# Patient Record
Sex: FEMALE | Race: WHITE | NOT HISPANIC OR LATINO | Employment: PART TIME | URBAN - METROPOLITAN AREA
[De-identification: names, ages, dates, MRNs, and addresses within clinical notes are randomized per-mention and may not be internally consistent; named-entity substitution may affect disease eponyms.]

---

## 2017-11-29 ENCOUNTER — HOSPITAL ENCOUNTER (EMERGENCY)
Facility: HOSPITAL | Age: 32
Discharge: HOME | End: 2017-11-30
Attending: EMERGENCY MEDICINE | Admitting: EMERGENCY MEDICINE
Payer: COMMERCIAL

## 2017-11-29 DIAGNOSIS — I10 ESSENTIAL HYPERTENSION: Primary | ICD-10-CM

## 2017-11-29 DIAGNOSIS — R42 DIZZINESS: ICD-10-CM

## 2017-11-29 PROCEDURE — 93005 ELECTROCARDIOGRAM TRACING: CPT | Performed by: EMERGENCY MEDICINE

## 2017-11-29 RX ORDER — LISINOPRIL 20 MG/1
20 TABLET ORAL DAILY
COMMUNITY
End: 2020-04-27

## 2017-11-29 RX ORDER — LORAZEPAM 0.5 MG/1
0.5 TABLET ORAL ONCE
Status: COMPLETED | OUTPATIENT
Start: 2017-11-29 | End: 2017-11-29

## 2017-11-29 RX ADMIN — LORAZEPAM 0.5 MG: 0.5 TABLET ORAL at 22:54

## 2017-11-30 VITALS
RESPIRATION RATE: 18 BRPM | BODY MASS INDEX: 31.09 KG/M2 | SYSTOLIC BLOOD PRESSURE: 163 MMHG | OXYGEN SATURATION: 100 % | HEART RATE: 50 BPM | DIASTOLIC BLOOD PRESSURE: 102 MMHG | WEIGHT: 170 LBS | TEMPERATURE: 98.5 F

## 2017-11-30 LAB
ATRIAL RATE: 56 BPM
P AXIS: 50 DEGREES
PR INTERVAL: 172 MS
QRS AXIS: 59 DEGREES
QRSD INTERVAL: 96 MS
QT INTERVAL: 408 MS
QTC INTERVAL: 393 MS
T WAVE AXIS: 52 DEGREES
VENTRICULAR RATE: 56 BPM

## 2017-11-30 PROCEDURE — 99283 EMERGENCY DEPT VISIT LOW MDM: CPT

## 2017-11-30 RX ORDER — LISINOPRIL 20 MG/1
20 TABLET ORAL DAILY
Qty: 30 TABLET | Refills: 6 | Status: SHIPPED | OUTPATIENT
Start: 2017-11-30 | End: 2018-04-20 | Stop reason: SDUPTHER

## 2017-11-30 NOTE — ED PROVIDER NOTES
History  Chief Complaint   Patient presents with    Hypertension     pt c/o headache, dizziness, BP at home 179/104, pt c/o CP and being under a lot of stress  27 yr female with hx of htnsion- has been off meds for months-but did take a dose tonight- has been under a lot of stress with mothers current illness- c/o lighthedness- does not feel right-- no vertiog/ no diplopia/ dysathria/ dysphagia/ dysponina/ dsymetria- no headache/neck pain - no cp/sob/plap/near syncope- no other comps        History provided by:  Patient and relative   used: No        Prior to Admission Medications   Prescriptions Last Dose Informant Patient Reported? Taking?   diclofenac (VOLTAREN) 75 mg EC tablet   No No   Sig: Take 1 tablet (75 mg total) by mouth 2 (two) times a day  Facility-Administered Medications: None       Past Medical History:   Diagnosis Date    Hypertension        History reviewed  No pertinent surgical history  History reviewed  No pertinent family history  I have reviewed and agree with the history as documented  Social History   Substance Use Topics    Smoking status: Current Every Day Smoker     Packs/day: 1 00     Types: Cigarettes    Smokeless tobacco: Never Used    Alcohol use Yes      Comment: social        Review of Systems   Constitutional: Negative  HENT: Negative  Eyes: Negative  Respiratory: Negative  Cardiovascular: Negative  Gastrointestinal: Negative  Endocrine: Negative  Genitourinary: Negative  Musculoskeletal: Negative  Skin: Negative  Allergic/Immunologic: Negative  Neurological: Positive for light-headedness  Negative for dizziness, tremors, seizures, syncope, facial asymmetry, speech difficulty, weakness, numbness and headaches  Hematological: Negative      Psychiatric/Behavioral: Negative for agitation, behavioral problems, confusion, decreased concentration, dysphoric mood, hallucinations, self-injury, sleep disturbance and suicidal ideas  The patient is nervous/anxious  The patient is not hyperactive  Physical Exam  ED Triage Vitals [11/29/17 2223]   Temperature Pulse Respirations Blood Pressure SpO2   98 5 °F (36 9 °C) 67 18 (!) 182/91 95 %      Temp Source Heart Rate Source Patient Position - Orthostatic VS BP Location FiO2 (%)   Oral Monitor Sitting Left arm --      Pain Score       6           Orthostatic Vital Signs  Vitals:    11/29/17 2223 11/29/17 2226 11/29/17 2301   BP: (!) 182/91 (!) 195/100 (!) 169/111   Pulse: 67  61   Patient Position - Orthostatic VS: Sitting Sitting Lying       Physical Exam   Constitutional: She is oriented to person, place, and time  She appears well-developed and well-nourished  She appears distressed  avss- htnsive-- bp decreasing- pulse ox 100 % on ra- intepretation is normal- no intervention    HENT:   Head: Normocephalic and atraumatic  Eyes: Conjunctivae and EOM are normal  Pupils are equal, round, and reactive to light  Right eye exhibits no discharge  Left eye exhibits no discharge  No scleral icterus  No papilledema seen with panoptic bilaterallyMm pink   Neck: Normal range of motion  Neck supple  No JVD present  No tracheal deviation present  No thyromegaly present  No caortib bruits bilaterally   Cardiovascular: Regular rhythm, normal heart sounds and intact distal pulses  Exam reveals no gallop and no friction rub  No murmur heard  Pulmonary/Chest: Effort normal and breath sounds normal  No stridor  No respiratory distress  She has no wheezes  She has no rales  She exhibits no tenderness  Abdominal: Soft  Bowel sounds are normal  She exhibits no distension and no mass  There is no tenderness  There is no rebound and no guarding  No hernia  Musculoskeletal: Normal range of motion  She exhibits no edema, tenderness or deformity  Equal bilateral radial/dp pulses- no ble edema/claf tendenress/assym/ erythema   Lymphadenopathy:     She has no cervical adenopathy  Neurological: She is alert and oriented to person, place, and time  She displays normal reflexes  No cranial nerve deficit or sensory deficit  She exhibits normal muscle tone  Coordination normal    No nystagmus- nomral finger to nose- neg test of sckew/ normla gait   Skin: Skin is warm  Capillary refill takes less than 2 seconds  No rash noted  She is not diaphoretic  No erythema  No pallor  Psychiatric:   Anxious appearing   Nursing note and vitals reviewed  ED Medications  Medications   LORazepam (ATIVAN) tablet 0 5 mg (0 5 mg Oral Given 11/29/17 6930)       Diagnostic Studies  Results Reviewed     None                 No orders to display              Procedures  Procedures       Phone Contacts  ED Phone Contact    ED Course  ED Course as of Dec 03 1641   Thu Nov 30, 2017   0101 Er md  note- pt tolerated ambulation in er with no comps prior to er d/c                                MDM  CritCare Time    Disposition  Final diagnoses:   None     ED Disposition     None      Follow-up Information    None       Patient's Medications   Discharge Prescriptions    No medications on file     No discharge procedures on file      ED Provider  Electronically Signed by           Norma Ron MD  12/03/17 7138

## 2017-11-30 NOTE — DISCHARGE INSTRUCTIONS
Diagnosis: high blood pressure/ dizziness    - activity as tolerated    - please start the lisinopril tomorrow 1 tablet- 20 mg daily     - dr Lisa Almanzar- is a local doctor that has been recommended to me --  [phone]     - please return to  The er for any difficulty with vision talking/ swallowing/ or with balance- or any new/ worsening/concerning symptoms to you

## 2017-11-30 NOTE — ED PROCEDURE NOTE
PROCEDURE  ECG 12 Lead Documentation  Date/Time: 11/29/2017 11:27 PM  Performed by: Santi Sapp  Authorized by: Samantha NUNEZ     Indications / Diagnosis:  Cp  ECG reviewed by me, the ED Provider: yes    Patient location:  ED and bedside  Previous ECG:     Previous ECG:  Compared to current    Comparison ECG info:  2/24/15    Similarity:  No change  Interpretation:     Interpretation: non-specific    Rate:     ECG rate:  56    ECG rate assessment: bradycardic    Rhythm:     Rhythm: sinus rhythm      Rhythm comment:  Sinus arrhtymia  Ectopy:     Ectopy: none    QRS:     QRS axis:  Normal    QRS intervals:  Normal  Conduction:     Conduction: normal    ST segments:     ST segments:  Normal  T waves:     T waves: flattening      Flattening:  V1  Q waves:     Q waves:  V1  Other findings:     Other findings: poor R wave progression and U wave    Comments:      No ecg signs of ischemia/ injury / r heart strain

## 2017-12-06 ENCOUNTER — ALLSCRIPTS OFFICE VISIT (OUTPATIENT)
Dept: OTHER | Facility: OTHER | Age: 32
End: 2017-12-06

## 2017-12-06 ENCOUNTER — APPOINTMENT (OUTPATIENT)
Dept: LAB | Facility: HOSPITAL | Age: 32
End: 2017-12-06

## 2017-12-06 DIAGNOSIS — R07.9 CHEST PAIN: ICD-10-CM

## 2017-12-06 DIAGNOSIS — I10 ESSENTIAL (PRIMARY) HYPERTENSION: ICD-10-CM

## 2017-12-06 DIAGNOSIS — F41.8 OTHER SPECIFIED ANXIETY DISORDERS: ICD-10-CM

## 2017-12-06 DIAGNOSIS — R39.15 URGENCY OF URINATION: ICD-10-CM

## 2017-12-06 DIAGNOSIS — F17.210 CIGARETTE NICOTINE DEPENDENCE, UNCOMPLICATED: ICD-10-CM

## 2017-12-06 LAB
BILIRUB UR QL STRIP: NEGATIVE
BILIRUB UR QL STRIP: NORMAL
CLARITY UR: CLEAR
CLARITY UR: NORMAL
COLOR UR: NORMAL
COLOR UR: YELLOW
GLUCOSE (HISTORICAL): NORMAL
GLUCOSE UR STRIP-MCNC: NEGATIVE MG/DL
HGB UR QL STRIP.AUTO: NEGATIVE
HGB UR QL STRIP.AUTO: NORMAL
KETONES UR STRIP-MCNC: NEGATIVE MG/DL
KETONES UR STRIP-MCNC: NORMAL MG/DL
LEUKOCYTE ESTERASE UR QL STRIP: NEGATIVE
LEUKOCYTE ESTERASE UR QL STRIP: NORMAL
NITRITE UR QL STRIP: NEGATIVE
NITRITE UR QL STRIP: NORMAL
PH UR STRIP.AUTO: 7.5 [PH]
PH UR STRIP.AUTO: 7.5 [PH] (ref 4.5–8)
PROT UR STRIP-MCNC: NEGATIVE MG/DL
PROT UR STRIP-MCNC: NORMAL MG/DL
SP GR UR STRIP.AUTO: 1.01
SP GR UR STRIP.AUTO: 1.01 (ref 1–1.03)
UROBILINOGEN UR QL STRIP.AUTO: 0.2
UROBILINOGEN UR QL STRIP.AUTO: 0.2 E.U./DL

## 2017-12-06 PROCEDURE — 81003 URINALYSIS AUTO W/O SCOPE: CPT

## 2017-12-11 ENCOUNTER — GENERIC CONVERSION - ENCOUNTER (OUTPATIENT)
Dept: OTHER | Facility: OTHER | Age: 32
End: 2017-12-11

## 2017-12-12 ENCOUNTER — GENERIC CONVERSION - ENCOUNTER (OUTPATIENT)
Dept: OTHER | Facility: OTHER | Age: 32
End: 2017-12-12

## 2018-01-23 VITALS
SYSTOLIC BLOOD PRESSURE: 156 MMHG | HEART RATE: 72 BPM | RESPIRATION RATE: 16 BRPM | HEIGHT: 62 IN | DIASTOLIC BLOOD PRESSURE: 96 MMHG | BODY MASS INDEX: 32.62 KG/M2 | WEIGHT: 177.25 LBS

## 2018-01-23 NOTE — MISCELLANEOUS
Provider Comments  Provider Comments:   Julia Glover no-showed for her appt with me today        Signatures   Electronically signed by : Hailee Sena LCSW; Dec 12 2017  3:53PM EST                       (Author)

## 2018-01-23 NOTE — RESULT NOTES
Verified Results  (1) URINALYSIS w URINE C/S REFLEX (will reflex a microscopy if leukocytes, occult blood, or nitrites are not within normal limits) [id number] 08:31PM Charlie Matias     Test Name Result Flag Reference   COLOR Yellow     CLARITY Clear     PH UA 7 5  4 5-8 0   LEUKOCYTE ESTERASE UA Negative  Negative   NITRITE UA Negative  Negative   PROTEIN UA Negative mg/dl  Negative   GLUCOSE UA Negative mg/dl  Negative   KETONES UA Negative mg/dl  Negative   UROBILINOGEN UA 0 2 E U /dl  0 2, 1 0 E U /dl   BILIRUBIN UA Negative  Negative   BLOOD UA Negative  Negative   SPECIFIC GRAVITY UA 1 015  1 003-1 030

## 2018-03-26 ENCOUNTER — TRANSCRIBE ORDERS (OUTPATIENT)
Dept: LAB | Facility: CLINIC | Age: 33
End: 2018-03-26

## 2018-03-26 ENCOUNTER — LAB (OUTPATIENT)
Dept: LAB | Facility: CLINIC | Age: 33
End: 2018-03-26
Payer: COMMERCIAL

## 2018-03-26 DIAGNOSIS — F17.210 CIGARETTE NICOTINE DEPENDENCE, UNCOMPLICATED: ICD-10-CM

## 2018-03-26 DIAGNOSIS — Z11.3 SCREENING EXAMINATION FOR VENEREAL DISEASE: ICD-10-CM

## 2018-03-26 DIAGNOSIS — R07.9 CHEST PAIN: ICD-10-CM

## 2018-03-26 DIAGNOSIS — F41.8 OTHER SPECIFIED ANXIETY DISORDERS: ICD-10-CM

## 2018-03-26 DIAGNOSIS — I10 ESSENTIAL (PRIMARY) HYPERTENSION: ICD-10-CM

## 2018-03-26 DIAGNOSIS — Z11.3 SCREENING EXAMINATION FOR VENEREAL DISEASE: Primary | ICD-10-CM

## 2018-03-26 LAB
ALBUMIN SERPL BCP-MCNC: 4.3 G/DL (ref 3.5–5)
ALP SERPL-CCNC: 48 U/L (ref 46–116)
ALT SERPL W P-5'-P-CCNC: 39 U/L (ref 12–78)
ANION GAP SERPL CALCULATED.3IONS-SCNC: 5 MMOL/L (ref 4–13)
AST SERPL W P-5'-P-CCNC: 19 U/L (ref 5–45)
BASOPHILS # BLD AUTO: 0.02 THOUSANDS/ΜL (ref 0–0.1)
BASOPHILS NFR BLD AUTO: 0 % (ref 0–1)
BILIRUB SERPL-MCNC: 0.8 MG/DL (ref 0.2–1)
BUN SERPL-MCNC: 15 MG/DL (ref 5–25)
CALCIUM SERPL-MCNC: 9.3 MG/DL (ref 8.3–10.1)
CHLORIDE SERPL-SCNC: 102 MMOL/L (ref 100–108)
CHOLEST SERPL-MCNC: 191 MG/DL (ref 50–200)
CO2 SERPL-SCNC: 31 MMOL/L (ref 21–32)
CREAT SERPL-MCNC: 0.85 MG/DL (ref 0.6–1.3)
EOSINOPHIL # BLD AUTO: 0.06 THOUSAND/ΜL (ref 0–0.61)
EOSINOPHIL NFR BLD AUTO: 1 % (ref 0–6)
ERYTHROCYTE [DISTWIDTH] IN BLOOD BY AUTOMATED COUNT: 12.7 % (ref 11.6–15.1)
GFR SERPL CREATININE-BSD FRML MDRD: 89 ML/MIN/1.73SQ M
GLUCOSE P FAST SERPL-MCNC: 94 MG/DL (ref 65–99)
HCT VFR BLD AUTO: 44.5 % (ref 34.8–46.1)
HDLC SERPL-MCNC: 48 MG/DL (ref 40–60)
HGB BLD-MCNC: 14.6 G/DL (ref 11.5–15.4)
LDLC SERPL CALC-MCNC: 132 MG/DL (ref 0–100)
LYMPHOCYTES # BLD AUTO: 2.51 THOUSANDS/ΜL (ref 0.6–4.47)
LYMPHOCYTES NFR BLD AUTO: 39 % (ref 14–44)
MCH RBC QN AUTO: 29.2 PG (ref 26.8–34.3)
MCHC RBC AUTO-ENTMCNC: 32.8 G/DL (ref 31.4–37.4)
MCV RBC AUTO: 89 FL (ref 82–98)
MONOCYTES # BLD AUTO: 0.52 THOUSAND/ΜL (ref 0.17–1.22)
MONOCYTES NFR BLD AUTO: 8 % (ref 4–12)
NEUTROPHILS # BLD AUTO: 3.27 THOUSANDS/ΜL (ref 1.85–7.62)
NEUTS SEG NFR BLD AUTO: 52 % (ref 43–75)
PLATELET # BLD AUTO: 223 THOUSANDS/UL (ref 149–390)
PMV BLD AUTO: 10.5 FL (ref 8.9–12.7)
POTASSIUM SERPL-SCNC: 4 MMOL/L (ref 3.5–5.3)
PROT SERPL-MCNC: 7.7 G/DL (ref 6.4–8.2)
RBC # BLD AUTO: 5 MILLION/UL (ref 3.81–5.12)
SODIUM SERPL-SCNC: 138 MMOL/L (ref 136–145)
TRIGL SERPL-MCNC: 56 MG/DL
TSH SERPL DL<=0.05 MIU/L-ACNC: 1.76 UIU/ML (ref 0.36–3.74)
WBC # BLD AUTO: 6.38 THOUSAND/UL (ref 4.31–10.16)

## 2018-03-26 PROCEDURE — 86592 SYPHILIS TEST NON-TREP QUAL: CPT

## 2018-03-26 PROCEDURE — 87491 CHLMYD TRACH DNA AMP PROBE: CPT

## 2018-03-26 PROCEDURE — 80053 COMPREHEN METABOLIC PANEL: CPT

## 2018-03-26 PROCEDURE — 80061 LIPID PANEL: CPT

## 2018-03-26 PROCEDURE — 87536 HIV-1 QUANT&REVRSE TRNSCRPJ: CPT

## 2018-03-26 PROCEDURE — 85025 COMPLETE CBC W/AUTO DIFF WBC: CPT

## 2018-03-26 PROCEDURE — 84443 ASSAY THYROID STIM HORMONE: CPT

## 2018-03-26 PROCEDURE — 80074 ACUTE HEPATITIS PANEL: CPT

## 2018-03-26 PROCEDURE — 87591 N.GONORRHOEAE DNA AMP PROB: CPT

## 2018-03-26 PROCEDURE — 36415 COLL VENOUS BLD VENIPUNCTURE: CPT

## 2018-03-27 LAB — RPR SER QL: NORMAL

## 2018-03-28 LAB
HAV IGM SER QL: NORMAL
HBV CORE IGM SER QL: NORMAL
HBV SURFACE AG SER QL: NORMAL
HCV AB SER QL: NORMAL
HIV1 RNA # SERPL NAA+PROBE: <20 COPIES/ML
HIV1 RNA SERPL NAA+PROBE-LOG#: NORMAL LOG10COPY/ML

## 2018-03-29 LAB
CHLAMYDIA DNA CVX QL NAA+PROBE: NORMAL
N GONORRHOEA DNA GENITAL QL NAA+PROBE: NORMAL

## 2018-03-30 NOTE — PROGRESS NOTES
Please let the patient know that their bloodwork was essentially normal; including her STD screen  LDL cholesterol was slightly up at 132 - continue to work on a healthy diet, and regular exercise  Thanks    Mamta

## 2018-04-20 DIAGNOSIS — I10 ESSENTIAL HYPERTENSION: Primary | Chronic | ICD-10-CM

## 2018-04-20 DIAGNOSIS — I10 ESSENTIAL HYPERTENSION: Chronic | ICD-10-CM

## 2018-04-20 RX ORDER — HYDROCHLOROTHIAZIDE 12.5 MG/1
12.5 TABLET ORAL DAILY
Qty: 90 TABLET | Refills: 1 | Status: SHIPPED | OUTPATIENT
Start: 2018-04-20 | End: 2018-04-20 | Stop reason: SDUPTHER

## 2018-04-20 RX ORDER — LISINOPRIL 20 MG/1
20 TABLET ORAL DAILY
Qty: 90 TABLET | Refills: 3 | Status: SHIPPED | OUTPATIENT
Start: 2018-04-20 | End: 2018-07-19 | Stop reason: ALTCHOICE

## 2018-04-20 RX ORDER — LISINOPRIL 20 MG/1
20 TABLET ORAL DAILY
Qty: 90 TABLET | Refills: 1 | Status: SHIPPED | OUTPATIENT
Start: 2018-04-20 | End: 2018-04-20 | Stop reason: SDUPTHER

## 2018-04-20 RX ORDER — HYDROCHLOROTHIAZIDE 12.5 MG/1
12.5 TABLET ORAL DAILY
Qty: 90 TABLET | Refills: 3 | Status: SHIPPED | OUTPATIENT
Start: 2018-04-20 | End: 2020-04-27

## 2020-03-22 ENCOUNTER — HOSPITAL ENCOUNTER (EMERGENCY)
Facility: HOSPITAL | Age: 35
Discharge: HOME | End: 2020-03-22
Attending: EMERGENCY MEDICINE | Admitting: EMERGENCY MEDICINE
Payer: COMMERCIAL

## 2020-03-22 VITALS
RESPIRATION RATE: 18 BRPM | HEART RATE: 72 BPM | WEIGHT: 195 LBS | OXYGEN SATURATION: 96 % | TEMPERATURE: 97.5 F | SYSTOLIC BLOOD PRESSURE: 178 MMHG | DIASTOLIC BLOOD PRESSURE: 93 MMHG | BODY MASS INDEX: 35.44 KG/M2

## 2020-03-22 VITALS
OXYGEN SATURATION: 100 % | WEIGHT: 195 LBS | HEART RATE: 72 BPM | TEMPERATURE: 97.5 F | RESPIRATION RATE: 16 BRPM | BODY MASS INDEX: 35.44 KG/M2 | SYSTOLIC BLOOD PRESSURE: 141 MMHG | DIASTOLIC BLOOD PRESSURE: 92 MMHG

## 2020-03-22 DIAGNOSIS — R42 DIZZINESS: Primary | ICD-10-CM

## 2020-03-22 DIAGNOSIS — K92.2 GI BLEED: Primary | ICD-10-CM

## 2020-03-22 DIAGNOSIS — R79.89 ELEVATED TSH: ICD-10-CM

## 2020-03-22 LAB
ALBUMIN SERPL BCP-MCNC: 3.6 G/DL (ref 3.5–5)
ALP SERPL-CCNC: 45 U/L (ref 46–116)
ALT SERPL W P-5'-P-CCNC: 39 U/L (ref 12–78)
ANION GAP SERPL CALCULATED.3IONS-SCNC: 9 MMOL/L (ref 4–13)
AST SERPL W P-5'-P-CCNC: 21 U/L (ref 5–45)
ATRIAL RATE: 65 BPM
BACTERIA UR QL AUTO: ABNORMAL /HPF
BASOPHILS # BLD AUTO: 0.05 THOUSANDS/ΜL (ref 0–0.1)
BASOPHILS NFR BLD AUTO: 1 % (ref 0–1)
BILIRUB SERPL-MCNC: 0.24 MG/DL (ref 0.2–1)
BILIRUB UR QL STRIP: NEGATIVE
BUN SERPL-MCNC: 16 MG/DL (ref 5–25)
CALCIUM SERPL-MCNC: 8.8 MG/DL (ref 8.3–10.1)
CHLORIDE SERPL-SCNC: 107 MMOL/L (ref 100–108)
CLARITY UR: CLEAR
CO2 SERPL-SCNC: 26 MMOL/L (ref 21–32)
COLOR UR: YELLOW
CREAT SERPL-MCNC: 0.95 MG/DL (ref 0.6–1.3)
EOSINOPHIL # BLD AUTO: 0.1 THOUSAND/ΜL (ref 0–0.61)
EOSINOPHIL NFR BLD AUTO: 1 % (ref 0–6)
ERYTHROCYTE [DISTWIDTH] IN BLOOD BY AUTOMATED COUNT: 12.3 % (ref 11.6–15.1)
EXT PREG TEST URINE: NEGATIVE
EXT. CONTROL ED NAV: NORMAL
GFR SERPL CREATININE-BSD FRML MDRD: 77 ML/MIN/1.73SQ M
GLUCOSE SERPL-MCNC: 96 MG/DL (ref 65–140)
GLUCOSE UR STRIP-MCNC: NEGATIVE MG/DL
HCT VFR BLD AUTO: 41.3 % (ref 34.8–46.1)
HGB BLD-MCNC: 13.2 G/DL (ref 11.5–15.4)
HGB UR QL STRIP.AUTO: ABNORMAL
HYALINE CASTS #/AREA URNS LPF: ABNORMAL /LPF
IMM GRANULOCYTES # BLD AUTO: 0.03 THOUSAND/UL (ref 0–0.2)
IMM GRANULOCYTES NFR BLD AUTO: 0 % (ref 0–2)
KETONES UR STRIP-MCNC: NEGATIVE MG/DL
LEUKOCYTE ESTERASE UR QL STRIP: ABNORMAL
LYMPHOCYTES # BLD AUTO: 2.38 THOUSANDS/ΜL (ref 0.6–4.47)
LYMPHOCYTES NFR BLD AUTO: 24 % (ref 14–44)
MCH RBC QN AUTO: 29.6 PG (ref 26.8–34.3)
MCHC RBC AUTO-ENTMCNC: 32 G/DL (ref 31.4–37.4)
MCV RBC AUTO: 93 FL (ref 82–98)
MONOCYTES # BLD AUTO: 0.79 THOUSAND/ΜL (ref 0.17–1.22)
MONOCYTES NFR BLD AUTO: 8 % (ref 4–12)
MUCOUS THREADS UR QL AUTO: ABNORMAL
NEUTROPHILS # BLD AUTO: 6.79 THOUSANDS/ΜL (ref 1.85–7.62)
NEUTS SEG NFR BLD AUTO: 66 % (ref 43–75)
NITRITE UR QL STRIP: NEGATIVE
NON-SQ EPI CELLS URNS QL MICRO: ABNORMAL /HPF
NRBC BLD AUTO-RTO: 0 /100 WBCS
P AXIS: 22 DEGREES
PH UR STRIP.AUTO: 6 [PH]
PLATELET # BLD AUTO: 208 THOUSANDS/UL (ref 149–390)
PMV BLD AUTO: 10.1 FL (ref 8.9–12.7)
POTASSIUM SERPL-SCNC: 3.8 MMOL/L (ref 3.5–5.3)
PR INTERVAL: 162 MS
PROT SERPL-MCNC: 6.9 G/DL (ref 6.4–8.2)
PROT UR STRIP-MCNC: ABNORMAL MG/DL
QRS AXIS: 70 DEGREES
QRSD INTERVAL: 96 MS
QT INTERVAL: 388 MS
QTC INTERVAL: 403 MS
RBC # BLD AUTO: 4.46 MILLION/UL (ref 3.81–5.12)
RBC #/AREA URNS AUTO: ABNORMAL /HPF
SODIUM SERPL-SCNC: 142 MMOL/L (ref 136–145)
SP GR UR STRIP.AUTO: 1.02 (ref 1–1.03)
T WAVE AXIS: 46 DEGREES
T4 FREE SERPL-MCNC: 1.13 NG/DL (ref 0.76–1.46)
TROPONIN I SERPL-MCNC: <0.02 NG/ML
TSH SERPL DL<=0.05 MIU/L-ACNC: 4.58 UIU/ML (ref 0.36–3.74)
UROBILINOGEN UR QL STRIP.AUTO: 0.2 E.U./DL
VENTRICULAR RATE: 65 BPM
WBC # BLD AUTO: 10.14 THOUSAND/UL (ref 4.31–10.16)
WBC #/AREA URNS AUTO: ABNORMAL /HPF

## 2020-03-22 PROCEDURE — 93005 ELECTROCARDIOGRAM TRACING: CPT

## 2020-03-22 PROCEDURE — 93010 ELECTROCARDIOGRAM REPORT: CPT | Performed by: INTERNAL MEDICINE

## 2020-03-22 PROCEDURE — 81025 URINE PREGNANCY TEST: CPT | Performed by: PHYSICIAN ASSISTANT

## 2020-03-22 PROCEDURE — 99283 EMERGENCY DEPT VISIT LOW MDM: CPT | Performed by: PHYSICIAN ASSISTANT

## 2020-03-22 PROCEDURE — 99283 EMERGENCY DEPT VISIT LOW MDM: CPT | Performed by: EMERGENCY MEDICINE

## 2020-03-22 PROCEDURE — 84439 ASSAY OF FREE THYROXINE: CPT | Performed by: PHYSICIAN ASSISTANT

## 2020-03-22 PROCEDURE — 99284 EMERGENCY DEPT VISIT MOD MDM: CPT

## 2020-03-22 PROCEDURE — 80053 COMPREHEN METABOLIC PANEL: CPT | Performed by: PHYSICIAN ASSISTANT

## 2020-03-22 PROCEDURE — 36415 COLL VENOUS BLD VENIPUNCTURE: CPT | Performed by: PHYSICIAN ASSISTANT

## 2020-03-22 PROCEDURE — 85025 COMPLETE CBC W/AUTO DIFF WBC: CPT | Performed by: PHYSICIAN ASSISTANT

## 2020-03-22 PROCEDURE — 84443 ASSAY THYROID STIM HORMONE: CPT | Performed by: PHYSICIAN ASSISTANT

## 2020-03-22 PROCEDURE — 84484 ASSAY OF TROPONIN QUANT: CPT | Performed by: PHYSICIAN ASSISTANT

## 2020-03-22 PROCEDURE — 81001 URINALYSIS AUTO W/SCOPE: CPT | Performed by: PHYSICIAN ASSISTANT

## 2020-03-22 RX ORDER — MECLIZINE HCL 12.5 MG/1
25 TABLET ORAL ONCE
Status: COMPLETED | OUTPATIENT
Start: 2020-03-22 | End: 2020-03-22

## 2020-03-22 RX ORDER — MECLIZINE HYDROCHLORIDE 25 MG/1
25 TABLET ORAL 3 TIMES DAILY PRN
Qty: 12 TABLET | Refills: 0 | Status: SHIPPED | OUTPATIENT
Start: 2020-03-22 | End: 2020-04-27

## 2020-03-22 RX ADMIN — SODIUM CHLORIDE 1000 ML: 0.9 INJECTION, SOLUTION INTRAVENOUS at 03:34

## 2020-03-22 RX ADMIN — MECLIZINE 25 MG: 12.5 TABLET ORAL at 03:26

## 2020-03-22 NOTE — ED PROVIDER NOTES
History  Chief Complaint   Patient presents with    Rectal Bleeding     per pt "she was just D/C'd from [facility], had a bowel movement and thinks she saw blood in it "     Patient returns to the emergency department after being discharged 2 hours prior but never leaving the emergency department waiting room waiting for transportation for evaluation of GI bleeding  She states that she thinks she may have had blood in her stool while using the bathroom in the emergency department waiting room  She does not have a history of GI bleeding  She was seen prior to this visit for dizziness which was described as intermittent  She had blood work done which was unremarkable including a CBC not showing anemia  She is not on blood thinners  She denies hematuria  She does have lower abdominal pain but states that she began her period within the past 24 hours  She denies back pain  Denies chest pain or sob  Prior to Admission Medications   Prescriptions Last Dose Informant Patient Reported?  Taking?   hydrochlorothiazide (HYDRODIURIL) 12 5 mg tablet   No Yes   Sig: Take 1 tablet (12 5 mg total) by mouth daily for 90 days   lisinopril (ZESTRIL) 20 mg tablet   Yes Yes   Sig: Take 20 mg by mouth daily   meclizine (ANTIVERT) 25 mg tablet   No Yes   Sig: Take 1 tablet (25 mg total) by mouth 3 (three) times a day as needed for dizziness for up to 4 days      Facility-Administered Medications: None       Past Medical History:   Diagnosis Date    Anxiety 05/2018    Female hirsutism     GERD (gastroesophageal reflux disease)     Hypertension 2018    Papanicolaou smear for cervical cancer screening 2017    NL, no h/o of abn pap that she can recall    Thyroid nodule 05/2018    Tobacco user 05/2018       Past Surgical History:   Procedure Laterality Date    NO PAST SURGERIES         Family History   Problem Relation Age of Onset    Hypertension Mother     Kidney disease Mother     Cirrhosis Mother     Dialysis Mother     Diabetes Mother     Melanoma Paternal Uncle     Stomach cancer Other     Colon cancer Other         Passed age 34     I have reviewed and agree with the history as documented  E-Cigarette/Vaping    E-Cigarette Use Never User      E-Cigarette/Vaping Substances     Social History     Tobacco Use    Smoking status: Current Every Day Smoker     Packs/day: 1 00     Years: 25 00     Pack years: 25 00     Types: Cigarettes    Smokeless tobacco: Never Used    Tobacco comment: smoked since age 13   Substance Use Topics    Alcohol use: Yes     Comment: social    Drug use: Yes     Types: Marijuana     Comment: rare       Review of Systems   Constitutional: Negative  Negative for appetite change, chills, diaphoresis, fatigue and fever  HENT: Negative  Negative for congestion, drooling, ear discharge, rhinorrhea, sinus pressure, sinus pain, sneezing, sore throat, tinnitus, trouble swallowing and voice change  Eyes: Negative  Negative for photophobia and visual disturbance  Respiratory: Negative  Negative for cough, chest tightness, shortness of breath, wheezing and stridor  Cardiovascular: Negative  Negative for chest pain, palpitations and leg swelling  Gastrointestinal: Positive for abdominal pain, blood in stool and diarrhea  Negative for abdominal distention, anal bleeding, constipation, nausea and vomiting  Endocrine: Negative  Genitourinary: Negative  Negative for difficulty urinating, dysuria, flank pain, frequency, hematuria, urgency, vaginal bleeding, vaginal discharge and vaginal pain  Musculoskeletal: Negative  Negative for arthralgias, back pain, gait problem, joint swelling, neck pain and neck stiffness  Skin: Negative  Negative for rash and wound  Allergic/Immunologic: Negative  Neurological: Negative  Negative for dizziness, tremors, seizures, syncope, facial asymmetry, speech difficulty, weakness, light-headedness, numbness and headaches     Hematological: Negative  Does not bruise/bleed easily  Psychiatric/Behavioral: Negative  Negative for confusion  Physical Exam  Physical Exam   Constitutional: She is oriented to person, place, and time  She appears well-developed and well-nourished  No distress  Nontoxic appearance  No respiratory distress  Very anxious affect  Able to speak in full sentences and engage in normal conversation  Normal ambulation in the department  HENT:   Head: Normocephalic and atraumatic  Right Ear: External ear normal    Left Ear: External ear normal    Nose: Nose normal    Mouth/Throat: Oropharynx is clear and moist  No oropharyngeal exudate  Eyes: Pupils are equal, round, and reactive to light  Conjunctivae and EOM are normal    Neck: Normal range of motion  Neck supple  Cardiovascular: Normal rate, regular rhythm, normal heart sounds and intact distal pulses  Pulmonary/Chest: Effort normal and breath sounds normal  No stridor  No respiratory distress  She has no wheezes  She has no rales  She exhibits no tenderness  Abdominal: Soft  Bowel sounds are normal  She exhibits no distension and no mass  There is no tenderness  There is no rebound and no guarding  No hernia  No reproducible tenderness to palpation  No peritoneal signs masses or hernias  Genitourinary:   Genitourinary Comments: Patient refused rectal examination  Musculoskeletal: Normal range of motion  She exhibits no edema, tenderness or deformity  Neurological: She is alert and oriented to person, place, and time  She has normal reflexes  She displays normal reflexes  No cranial nerve deficit or sensory deficit  She exhibits normal muscle tone  Coordination normal    Skin: Skin is warm and dry  Capillary refill takes less than 2 seconds  No rash noted  She is not diaphoretic  No erythema  No pallor  Psychiatric: Her behavior is normal  Judgment and thought content normal    Anxious affect   Nursing note and vitals reviewed        Vital Signs  ED Triage Vitals   Temperature Pulse Respirations Blood Pressure SpO2   03/22/20 0858 03/22/20 0856 03/22/20 0856 03/22/20 0856 03/22/20 0856   97 5 °F (36 4 °C) 72 18 (!) 178/93 96 %      Temp Source Heart Rate Source Patient Position - Orthostatic VS BP Location FiO2 (%)   03/22/20 0858 03/22/20 0856 03/22/20 0856 03/22/20 0856 --   Oral Monitor Lying Right arm       Pain Score       03/22/20 0856       7           Vitals:    03/22/20 0856   BP: (!) 178/93   Pulse: 72   Patient Position - Orthostatic VS: Lying         Visual Acuity      ED Medications  Medications - No data to display    Diagnostic Studies  Results Reviewed     None                 No orders to display              Procedures  Procedures         ED Course  ED Course as of Mar 22 0908   Sun Mar 22, 2020   6972 Patient is stable for discharge  She refuses a rectal exam at this point stating that she feels bloated and prefers not to have this done  Vital signs from prior ED visit in currently stable and unremarkable  Her blood work including CBC was unremarkable  Her abdominal exam is unremarkable  She is not on blood thinners  Will refer to GI  Discussed signs and symptoms requiring more immediate return to the emergency department  MDM      Disposition  Final diagnoses:   GI bleed     Time reflects when diagnosis was documented in both MDM as applicable and the Disposition within this note     Time User Action Codes Description Comment    3/22/2020  8:57 AM Ngoc Page Add [K92 2] GI bleed       ED Disposition     ED Disposition Condition Date/Time Comment    Discharge Stable [place] Mar 22, 2020  8:57 AM Anay De Los Santos discharge to home/self care              Follow-up Information     Follow up With Specialties Details Why Yaneth Brown MD Gastroenterology Schedule an appointment as soon as possible for a visit  Call GI for follow-up appointment [id number] [facility] [place] [address]  [phone]            Discharge Medication List as of 3/22/2020  8:57 AM      CONTINUE these medications which have NOT CHANGED    Details   hydrochlorothiazide (HYDRODIURIL) 12 5 mg tablet Take 1 tablet (12 5 mg total) by mouth daily for 90 days, Starting Fri 4/20/2018, Until Sun 3/22/2020, Normal      lisinopril (ZESTRIL) 20 mg tablet Take 20 mg by mouth daily, Historical Med      meclizine (ANTIVERT) 25 mg tablet Take 1 tablet (25 mg total) by mouth 3 (three) times a day as needed for dizziness for up to 4 days, Starting Sun 3/22/2020, Until u 3/26/2020, Normal           No discharge procedures on file      PDMP Review     None          ED Provider  Electronically Signed by           Josh Booth MD  03/22/20 9468

## 2020-03-22 NOTE — DISCHARGE INSTRUCTIONS
Take Antivert as indicated  Follow up with ENT  Follow up with PCP   Follow up with the emergency department if symptoms persist or exacerbate

## 2020-03-22 NOTE — ED NOTES
Chaperoning Dr Neo Moore, pt refused rectal exam   Dr Neo Moore was informative and recommending her to follow up with her PCP         Argelia Cifuentes  03/22/20 0900

## 2020-03-22 NOTE — ED PROVIDER NOTES
History  Chief Complaint   Patient presents with    Dizziness     Pt comes to ED for feeling dizzy and lightheaded while going to the bathroom this morning  Patient is a 28-year-old female with history of anxiety, GERD, hypertension, thyroid nodule, in no significant past surgical history that presents emergency department with intermittent bouts of swaying dizziness beginning 3 days prior to current ED presentation  Patient reports that the aforementioned dizziness bouts exist 5 seconds approximate induration with complete spontaneous abatement  Patient stated that earlier tonight she had 1 bout of watery nonbloody diarrhea, which exacerbated her dizziness symptomatology and she seeks emergency department evaluation at this time  Patient states that she had recently seen her PCP had prescribed her Keflex  Patient denies palliative and provocative factors  Patient denies not effective treatment  Patient denies fevers, chills, nausea, vomiting, constipation, urinary symptoms  Patient verbalizes last menstrual period is now  Patient denies headaches, tinnitus, and meningeal symptoms  Patient denies numbness, tingling loss of power  Patient denies recent fall or recent trauma  Patient sick contacts or recent travel  Patient verbalizes recent antibiotic use; Keflex  Patient denies chest pain, shortness of breath, and abdominal pain      History provided by:  Patient   used: No    Dizziness   Quality:  Imbalance  Severity:  Mild  Duration:  3 days  Timing:  Intermittent  Progression:  Waxing and waning  Chronicity:  New  Context: bowel movement and medication    Context comment:  Keflex  Relieved by:  None tried  Worsened by:  Nothing  Ineffective treatments:  None tried  Associated symptoms: diarrhea    Associated symptoms: no blood in stool, no chest pain, no headaches, no nausea, no palpitations, no shortness of breath, no tinnitus, no vomiting and no weakness    Diarrhea: Quality:  Watery    Number of occurrences:  2    Severity:  Mild    Duration:  1 day    Timing:  Intermittent    Progression:  Worsening  Risk factors: no anemia, no hx of vertigo and no new medications        Prior to Admission Medications   Prescriptions Last Dose Informant Patient Reported? Taking?   hydrochlorothiazide (HYDRODIURIL) 12 5 mg tablet   No No   Sig: Take 1 tablet (12 5 mg total) by mouth daily for 90 days   lisinopril (ZESTRIL) 20 mg tablet 3/22/2020 at Unknown time  Yes Yes   Sig: Take 20 mg by mouth daily      Facility-Administered Medications: None       Past Medical History:   Diagnosis Date    Anxiety 05/2018    Female hirsutism     GERD (gastroesophageal reflux disease)     Hypertension 2018    Papanicolaou smear for cervical cancer screening 2017    NL, no h/o of abn pap that she can recall    Thyroid nodule 05/2018    Tobacco user 05/2018       Past Surgical History:   Procedure Laterality Date    NO PAST SURGERIES         Family History   Problem Relation Age of Onset    Hypertension Mother     Kidney disease Mother     Cirrhosis Mother     Dialysis Mother     Diabetes Mother     Melanoma Paternal Uncle     Stomach cancer Other     Colon cancer Other         Passed age 34     I have reviewed and agree with the history as documented  E-Cigarette/Vaping    E-Cigarette Use Never User      E-Cigarette/Vaping Substances     Social History     Tobacco Use    Smoking status: Current Every Day Smoker     Packs/day: 1 00     Years: 25 00     Pack years: 25 00     Types: Cigarettes    Smokeless tobacco: Never Used    Tobacco comment: smoked since age 13   Substance Use Topics    Alcohol use: Yes     Comment: social    Drug use: Yes     Types: Marijuana     Comment: rare       Review of Systems   Constitutional: Negative for activity change, appetite change, chills and fever     HENT: Negative for congestion, postnasal drip, rhinorrhea, sinus pressure, sinus pain, sore throat and tinnitus  Eyes: Negative for photophobia and visual disturbance  Respiratory: Negative for cough, chest tightness and shortness of breath  Cardiovascular: Negative for chest pain and palpitations  Gastrointestinal: Positive for diarrhea  Negative for abdominal pain, blood in stool, constipation, nausea and vomiting  Genitourinary: Negative for difficulty urinating, dysuria, flank pain, frequency and urgency  Musculoskeletal: Negative for back pain, gait problem, neck pain and neck stiffness  Skin: Negative for pallor and rash  Allergic/Immunologic: Negative for environmental allergies and food allergies  Neurological: Positive for dizziness  Negative for weakness, numbness and headaches  Psychiatric/Behavioral: Negative for confusion  All other systems reviewed and are negative  Physical Exam  Physical Exam   Constitutional: She is oriented to person, place, and time  She appears well-developed and well-nourished  She is active and cooperative  Non-toxic appearance  She does not have a sickly appearance  She does not appear ill  /83 (BP Location: Right arm)   Pulse 63   Temp 97 5 °F (36 4 °C) (Oral)   Resp 18   Wt 88 5 kg (195 lb)   LMP 03/22/2020   SpO2 100%   BMI 35 44 kg/m²   Patient came to the ed via EMS   HENT:   Head: Normocephalic and atraumatic  Right Ear: Hearing, tympanic membrane, external ear and ear canal normal  No drainage, swelling or tenderness  No mastoid tenderness  No decreased hearing is noted  Left Ear: Hearing, tympanic membrane, external ear and ear canal normal  No drainage, swelling or tenderness  No mastoid tenderness  No decreased hearing is noted  Nose: Nose normal    Mouth/Throat: Uvula is midline, oropharynx is clear and moist and mucous membranes are normal    Eyes: Pupils are equal, round, and reactive to light  Conjunctivae, EOM and lids are normal  Right eye exhibits no discharge  Left eye exhibits no discharge     Neck: Trachea normal, normal range of motion, full passive range of motion without pain and phonation normal  Neck supple  No JVD present  No tracheal tenderness, no spinous process tenderness and no muscular tenderness present  No neck rigidity  No tracheal deviation and normal range of motion present  Cardiovascular: Normal rate, regular rhythm, normal heart sounds, intact distal pulses and normal pulses  Pulses:       Radial pulses are 2+ on the right side, and 2+ on the left side  Posterior tibial pulses are 2+ on the right side, and 2+ on the left side  Pulmonary/Chest: Effort normal and breath sounds normal  No stridor  She has no decreased breath sounds  She has no wheezes  She has no rhonchi  She has no rales  She exhibits no tenderness, no bony tenderness and no crepitus  Abdominal: Soft  Bowel sounds are normal  She exhibits no distension  There is no tenderness  There is no rigidity, no rebound, no guarding and no CVA tenderness  Musculoskeletal: Normal range of motion  Passive ROM intact  Upper and lower extremity 5/5 bilaterally  Neurovascularly intact  No grinding or clicking of joints     Lymphadenopathy:        Head (right side): No submental, no submandibular, no tonsillar, no preauricular, no posterior auricular and no occipital adenopathy present  Head (left side): No submental, no submandibular, no tonsillar, no preauricular, no posterior auricular and no occipital adenopathy present  She has no cervical adenopathy  Right cervical: No superficial cervical, no deep cervical and no posterior cervical adenopathy present  Left cervical: No superficial cervical, no deep cervical and no posterior cervical adenopathy present  Neurological: She is alert and oriented to person, place, and time  She has normal strength and normal reflexes  No cranial nerve deficit (cn 2-12 intact) or sensory deficit  She displays a negative Romberg sign   Coordination and gait normal  GCS eye subscore is 4  GCS verbal subscore is 5  GCS motor subscore is 6  Reflex Scores:       Patellar reflexes are 2+ on the right side and 2+ on the left side  Skin: Skin is warm and intact  Capillary refill takes less than 2 seconds  She is not diaphoretic  Psychiatric: She has a normal mood and affect  Her speech is normal and behavior is normal  Judgment and thought content normal  Cognition and memory are normal    Nursing note and vitals reviewed        Vital Signs  ED Triage Vitals [03/22/20 0248]   Temperature Pulse Respirations Blood Pressure SpO2   97 5 °F (36 4 °C) 63 18 134/83 100 %      Temp Source Heart Rate Source Patient Position - Orthostatic VS BP Location FiO2 (%)   Oral Monitor Sitting Right arm --      Pain Score       --           Vitals:    03/22/20 0248 03/22/20 0359 03/22/20 0400 03/22/20 0401   BP: 134/83 137/82 138/89 141/92   Pulse: 63 64 77 72   Patient Position - Orthostatic VS: Sitting Lying - Orthostatic VS Sitting - Orthostatic VS Standing - Orthostatic VS         Visual Acuity      ED Medications  Medications   sodium chloride 0 9 % bolus 1,000 mL (0 mL Intravenous Stopped 3/22/20 0400)   meclizine (ANTIVERT) tablet 25 mg (25 mg Oral Given 3/22/20 0326)       Diagnostic Studies  Results Reviewed     Procedure Component Value Units Date/Time    Urine Microscopic [[id number]]  (Abnormal) Collected:  03/22/20 0450    Lab Status:  Final result Specimen:  Urine, Clean Catch Updated:  03/22/20 0520     RBC, UA 10-20 /hpf      WBC, UA 4-10 /hpf      Epithelial Cells Moderate /hpf      Bacteria, UA Occasional /hpf      Hyaline Casts, UA 0-1 /lpf      MUCUS THREADS Occasional    UA w Reflex to Microscopic w Reflex to Culture [[id number]]  (Abnormal) Collected:  03/22/20 0450    Lab Status:  Final result Specimen:  Urine, Clean Catch Updated:  03/22/20 0505     Color, UA Yellow     Clarity, UA Clear     Specific Gravity, UA 1 025     pH, UA 6 0     Leukocytes, UA Small     Nitrite, UA Negative     Protein, UA 30 (1+) mg/dl      Glucose, UA Negative mg/dl      Ketones, UA Negative mg/dl      Urobilinogen, UA 0 2 E U /dl      Bilirubin, UA Negative     Blood, UA Moderate    POCT pregnancy, urine [[id number]]  (Normal) Resulted:  03/22/20 0459    Lab Status:  Final result Specimen:  Urine Updated:  03/22/20 0459     EXT PREG TEST UR (Ref: Negative) negative     Control valid    TSH, 3rd generation with Free T4 reflex [[id number]]  (Abnormal) Collected:  03/22/20 0334    Lab Status:  Final result Specimen:  Blood from Arm, Right Updated:  03/22/20 0408     TSH 3RD GENERATON 4 582 uIU/mL     Narrative:       Patients undergoing fluorescein dye angiography may retain small amounts of fluorescein in the body for 48-72 hours post procedure  Samples containing fluorescein can produce falsely depressed TSH values  If the patient had this procedure,a specimen should be resubmitted post fluorescein clearance  T4, free [[id number]] Collected:  03/22/20 0334    Lab Status:   In process Specimen:  Blood from Arm, Right Updated:  03/22/20 0408    Comprehensive metabolic panel [[id number]]  (Abnormal) Collected:  03/22/20 0334    Lab Status:  Final result Specimen:  Blood from Arm, Right Updated:  03/22/20 0401     Sodium 142 mmol/L      Potassium 3 8 mmol/L      Chloride 107 mmol/L      CO2 26 mmol/L      ANION GAP 9 mmol/L      BUN 16 mg/dL      Creatinine 0 95 mg/dL      Glucose 96 mg/dL      Calcium 8 8 mg/dL      AST 21 U/L      ALT 39 U/L      Alkaline Phosphatase 45 U/L      Total Protein 6 9 g/dL      Albumin 3 6 g/dL      Total Bilirubin 0 24 mg/dL      eGFR 77 ml/min/1 73sq m     Narrative:       Debra guidelines for Chronic Kidney Disease (CKD):     Stage 1 with normal or high GFR (GFR > 90 mL/min/1 73 square meters)    Stage 2 Mild CKD (GFR = 60-89 mL/min/1 73 square meters)    Stage 3A Moderate CKD (GFR = 45-59 mL/min/1 73 square meters)    Stage 3B Moderate CKD (GFR = 30-44 mL/min/1 73 square meters)    Stage 4 Severe CKD (GFR = 15-29 mL/min/1 73 square meters)    Stage 5 End Stage CKD (GFR <15 mL/min/1 73 square meters)  Note: GFR calculation is accurate only with a steady state creatinine    Troponin I [[id number]]  (Normal) Collected:  03/22/20 0334    Lab Status:  Final result Specimen:  Blood from Arm, Right Updated:  03/22/20 0359     Troponin I <0 02 ng/mL     CBC and differential [[id number]] Collected:  03/22/20 0334    Lab Status:  Final result Specimen:  Blood from Arm, Right Updated:  03/22/20 0342     WBC 10 14 Thousand/uL      RBC 4 46 Million/uL      Hemoglobin 13 2 g/dL      Hematocrit 41 3 %      MCV 93 fL      MCH 29 6 pg      MCHC 32 0 g/dL      RDW 12 3 %      MPV 10 1 fL      Platelets 396 Thousands/uL      nRBC 0 /100 WBCs      Neutrophils Relative 66 %      Immat GRANS % 0 %      Lymphocytes Relative 24 %      Monocytes Relative 8 %      Eosinophils Relative 1 %      Basophils Relative 1 %      Neutrophils Absolute 6 79 Thousands/µL      Immature Grans Absolute 0 03 Thousand/uL      Lymphocytes Absolute 2 38 Thousands/µL      Monocytes Absolute 0 79 Thousand/µL      Eosinophils Absolute 0 10 Thousand/µL      Basophils Absolute 0 05 Thousands/µL                  No orders to display              Procedures  ECG 12 Lead Documentation Only  Date/Time: 3/22/2020 3:03 AM  Performed by: Calvin Padilla PA-C  Authorized by: Calvin Padilla PA-C     Indications / Diagnosis:  Dizziness  ECG reviewed by me, the ED Provider: yes    Patient location:  ED  Previous ECG:     Previous ECG:  Compared to current    Similarity:  No change    Comparison to cardiac monitor: Yes    Interpretation:     Interpretation: normal    Rate:     ECG rate:  65    ECG rate assessment: normal    Rhythm:     Rhythm: sinus rhythm    Ectopy:     Ectopy: none    QRS:     QRS axis:  Normal    QRS intervals:  Normal  Conduction:     Conduction: normal    ST segments:     ST segments:  Normal  T waves:     T waves: normal               ED Course  ED Course as of Mar 22 0716   Sun Mar 22, 2020   0422 TSH 3RD Hamida Charley(!): [phone] Patient states that she was recently placed on keflex by PCP for a non specific skin reaction; patient states that she requested the amoxicillin and that her PCP was reluctant to give amoxicillin      0521 Patient denies urinary symptoms at this time  RBC 10-20, WBC 4-10, moderate, occasional bacteria with no symptoms of dysuria  Patient denies recent vaginal intercourse  Will not deliver antibiotics at this time; will wait for urine culture to return                                    MDM  Number of Diagnoses or Management Options  Dizziness: new and does not require workup  Elevated TSH: new and does not require workup     Amount and/or Complexity of Data Reviewed  Clinical lab tests: ordered and reviewed  Tests in the radiology section of CPT®: ordered and reviewed  Review and summarize past medical records: yes  Independent visualization of images, tracings, or specimens: yes    Risk of Complications, Morbidity, and/or Mortality  Presenting problems: moderate  Diagnostic procedures: moderate  Management options: low    Patient Progress  Patient progress: stable    Patient is a 27-year-old female with history of anxiety, GERD, hypertension, thyroid nodule, in no significant past surgical history that presents emergency department with intermittent bouts of swaying dizziness beginning 3 days prior to current ED presentation    Patient hemodynamically stable and afebrile  Benign physical exam  ECG with normal sinus rhythm with ventricular rate of 65; negative troponin  Elevated TSH 4 582 with T4 in process; patient has hx of thyroid nodule  Clinical blood labs indicating normal glucose, normal electrolytes, normal H&H, and normal WBC  Normal liver enzynmes  Urinalysis - RBC 10-20 (patient verbalizes being currently on menstrual period), WBC 4-10 with moderate epithelial cells and occasional bacteria  Delivered antivert in the emergency department; patient verbalized decrease in dizziness symptomatology status post medication delivery  Patient with self ambulation without assistance of persons, cane, or assistive ambulatory device with no difficulty or incident  Hypothyroidism vs  Anxiety   Prescribed antivert and counseled patient on medication administration and side effects  Follow-up with ENT  Follow-up with PCP  Follow up with emergency department symptoms persist or exacerbate  Patient demonstrates verbal understanding of all clinical laboratory, discharge instructions, follow-up, and treatment plan        Disposition  Final diagnoses:   Dizziness   Elevated TSH     Time reflects when diagnosis was documented in both MDM as applicable and the Disposition within this note     Time User Action Codes Description Comment    3/22/2020  5:25 AM Rebeca Taj Add [R42] Dizziness     3/22/2020  5:25 AM Rebeca Taj Add [R79 [id number]] Elevated TSH       ED Disposition     ED Disposition Condition Date/Time Comment    Discharge Stable Sun Mar 22, 2020  5:24 AM Serene Wang discharge to home/self care              Follow-up Information     Follow up With Specialties Details Why Contact Info Additional [address] [facility] ENT Otolaryngology Call in 3 days for further evaluation of symptoms [address] [facility] [address]  [address] ENT, [address], [address] [phone]    Vicente Land MD Family Medicine Call in 1 week for further evaluation of symptoms [address] [phone]       [facility] Emergency Department Emergency Medicine Go to  As needed [address] [facility] [address], [address], [place], [address] [facility], [address]          Discharge Medication List as of 3/22/2020  5:30 AM      START taking these medications    Details   meclizine (ANTIVERT) 25 mg tablet Take 1 tablet (25 mg total) by mouth 3 (three) times a day as needed for dizziness for up to 4 days, Starting Sun 3/22/2020, Until Thu 3/26/2020, Normal         CONTINUE these medications which have NOT CHANGED    Details   lisinopril (ZESTRIL) 20 mg tablet Take 20 mg by mouth daily, Historical Med      hydrochlorothiazide (HYDRODIURIL) 12 5 mg tablet Take 1 tablet (12 5 mg total) by mouth daily for 90 days, Starting Fri 4/20/2018, Until Thu 7/19/2018, Normal           No discharge procedures on file      PDMP Review     None          ED Provider  Electronically Signed by           Geno Cano PA-C  03/22/20 5418

## 2020-03-22 NOTE — ED NOTES
Pt ambulated to bathroom and returned to room without difficulty  Pt able to provide urine sample at this time  Will continue to monitor        Natalie Sethi  03/22/20 [id number]

## 2020-04-27 ENCOUNTER — TELEMEDICINE (OUTPATIENT)
Dept: FAMILY MEDICINE CLINIC | Facility: CLINIC | Age: 35
End: 2020-04-27
Payer: COMMERCIAL

## 2020-04-27 VITALS
WEIGHT: 201.8 LBS | DIASTOLIC BLOOD PRESSURE: 92 MMHG | BODY MASS INDEX: 35.75 KG/M2 | SYSTOLIC BLOOD PRESSURE: 132 MMHG | HEART RATE: 78 BPM | OXYGEN SATURATION: 98 % | HEIGHT: 63 IN | TEMPERATURE: 97.3 F | RESPIRATION RATE: 18 BRPM

## 2020-04-27 DIAGNOSIS — E03.8 SUBCLINICAL HYPOTHYROIDISM: Primary | ICD-10-CM

## 2020-04-27 DIAGNOSIS — F41.9 ANXIETY AND DEPRESSION: ICD-10-CM

## 2020-04-27 DIAGNOSIS — F32.A ANXIETY AND DEPRESSION: ICD-10-CM

## 2020-04-27 DIAGNOSIS — I10 BENIGN ESSENTIAL HYPERTENSION: ICD-10-CM

## 2020-04-27 DIAGNOSIS — R21: ICD-10-CM

## 2020-04-27 DIAGNOSIS — J44.9 CHRONIC OBSTRUCTIVE PULMONARY DISEASE, UNSPECIFIED COPD TYPE (HCC): ICD-10-CM

## 2020-04-27 PROBLEM — E04.1 THYROID NODULE: Status: ACTIVE | Noted: 2020-04-27

## 2020-04-27 PROCEDURE — 99214 OFFICE O/P EST MOD 30 MIN: CPT | Performed by: FAMILY MEDICINE

## 2020-04-27 RX ORDER — LISINOPRIL 40 MG/1
40 TABLET ORAL DAILY
COMMUNITY
End: 2020-05-14

## 2020-04-27 RX ORDER — LIDOCAINE 4 G/G
1 PATCH TOPICAL EVERY 12 HOURS PRN
COMMUNITY
End: 2021-03-15

## 2020-04-27 RX ORDER — OMEPRAZOLE 40 MG/1
40 CAPSULE, DELAYED RELEASE ORAL DAILY
COMMUNITY
End: 2021-03-15

## 2020-04-27 RX ORDER — ALPRAZOLAM 1 MG/1
1 TABLET ORAL 2 TIMES DAILY PRN
COMMUNITY
End: 2020-05-14

## 2020-04-27 RX ORDER — CETIRIZINE HYDROCHLORIDE 10 MG/1
10 TABLET ORAL DAILY
COMMUNITY
End: 2020-04-27

## 2020-04-27 RX ORDER — FLUTICASONE FUROATE AND VILANTEROL 200; 25 UG/1; UG/1
1 POWDER RESPIRATORY (INHALATION) DAILY
COMMUNITY
End: 2021-03-15

## 2020-04-27 RX ORDER — ALBUTEROL SULFATE 90 UG/1
2 AEROSOL, METERED RESPIRATORY (INHALATION) EVERY 4 HOURS PRN
COMMUNITY
End: 2020-12-01

## 2020-05-14 DIAGNOSIS — F41.9 ANXIETY: ICD-10-CM

## 2020-05-14 DIAGNOSIS — I10 ESSENTIAL HYPERTENSION: Primary | ICD-10-CM

## 2020-05-14 RX ORDER — LISINOPRIL 40 MG/1
TABLET ORAL
Qty: 90 TABLET | Refills: 3 | Status: SHIPPED | OUTPATIENT
Start: 2020-05-14 | End: 2021-05-14

## 2020-05-14 RX ORDER — ALPRAZOLAM 1 MG/1
TABLET ORAL
Qty: 60 TABLET | Refills: 3 | Status: SHIPPED | OUTPATIENT
Start: 2020-05-14 | End: 2020-09-10

## 2020-06-10 ENCOUNTER — TELEPHONE (OUTPATIENT)
Dept: FAMILY MEDICINE CLINIC | Facility: CLINIC | Age: 35
End: 2020-06-10

## 2020-06-10 DIAGNOSIS — L73.9 FOLLICULITIS: Primary | ICD-10-CM

## 2020-06-10 DIAGNOSIS — L50.8 URTICARIA, ACUTE: ICD-10-CM

## 2020-06-10 RX ORDER — FEXOFENADINE HCL 180 MG/1
180 TABLET ORAL DAILY
Qty: 30 TABLET | Refills: 1 | Status: SHIPPED | OUTPATIENT
Start: 2020-06-10 | End: 2021-03-15

## 2020-06-10 RX ORDER — CLINDAMYCIN PHOSPHATE 10 MG/G
GEL TOPICAL 2 TIMES DAILY
Qty: 30 G | Refills: 0 | Status: SHIPPED | OUTPATIENT
Start: 2020-06-10 | End: 2020-08-11 | Stop reason: SDUPTHER

## 2020-06-25 ENCOUNTER — OFFICE VISIT (OUTPATIENT)
Dept: FAMILY MEDICINE CLINIC | Facility: CLINIC | Age: 35
End: 2020-06-25
Payer: COMMERCIAL

## 2020-06-25 VITALS
BODY MASS INDEX: 32.96 KG/M2 | WEIGHT: 186 LBS | SYSTOLIC BLOOD PRESSURE: 142 MMHG | DIASTOLIC BLOOD PRESSURE: 84 MMHG | HEIGHT: 63 IN | OXYGEN SATURATION: 99 % | TEMPERATURE: 97.6 F | RESPIRATION RATE: 18 BRPM | HEART RATE: 71 BPM

## 2020-06-25 DIAGNOSIS — L81.9 PIGMENTED SKIN LESION OF UNCERTAIN NATURE: Primary | ICD-10-CM

## 2020-06-25 PROCEDURE — 3008F BODY MASS INDEX DOCD: CPT | Performed by: FAMILY MEDICINE

## 2020-06-25 PROCEDURE — 3079F DIAST BP 80-89 MM HG: CPT | Performed by: FAMILY MEDICINE

## 2020-06-25 PROCEDURE — 3077F SYST BP >= 140 MM HG: CPT | Performed by: FAMILY MEDICINE

## 2020-06-25 PROCEDURE — 99213 OFFICE O/P EST LOW 20 MIN: CPT | Performed by: FAMILY MEDICINE

## 2020-06-29 LAB
CLINICAL INFO: NORMAL
PATH REPORT.FINAL DX SPEC: NORMAL
PROCEDURE TYPE: NORMAL
SPECIMEN SOURCE: NORMAL

## 2020-06-30 ENCOUNTER — TELEPHONE (OUTPATIENT)
Dept: FAMILY MEDICINE CLINIC | Facility: CLINIC | Age: 35
End: 2020-06-30

## 2020-08-06 ENCOUNTER — NURSE TRIAGE (OUTPATIENT)
Dept: OTHER | Facility: OTHER | Age: 35
End: 2020-08-06

## 2020-08-06 ENCOUNTER — OFFICE VISIT (OUTPATIENT)
Dept: URGENT CARE | Age: 35
End: 2020-08-06
Payer: COMMERCIAL

## 2020-08-06 VITALS
HEART RATE: 75 BPM | OXYGEN SATURATION: 100 % | RESPIRATION RATE: 16 BRPM | SYSTOLIC BLOOD PRESSURE: 139 MMHG | WEIGHT: 183 LBS | HEIGHT: 62 IN | TEMPERATURE: 96.4 F | BODY MASS INDEX: 33.68 KG/M2 | DIASTOLIC BLOOD PRESSURE: 87 MMHG

## 2020-08-06 DIAGNOSIS — H66.002 ACUTE SUPPURATIVE OTITIS MEDIA OF LEFT EAR WITHOUT SPONTANEOUS RUPTURE OF TYMPANIC MEMBRANE, RECURRENCE NOT SPECIFIED: Primary | ICD-10-CM

## 2020-08-06 PROCEDURE — 99283 EMERGENCY DEPT VISIT LOW MDM: CPT | Performed by: PHYSICIAN ASSISTANT

## 2020-08-06 PROCEDURE — G0382 LEV 3 HOSP TYPE B ED VISIT: HCPCS | Performed by: PHYSICIAN ASSISTANT

## 2020-08-06 RX ORDER — AZITHROMYCIN 250 MG/1
TABLET, FILM COATED ORAL
Qty: 6 TABLET | Refills: 0 | Status: SHIPPED | OUTPATIENT
Start: 2020-08-06 | End: 2020-08-11

## 2020-08-06 NOTE — TELEPHONE ENCOUNTER
Reason for Disposition   Earache persists > 1 hour    Answer Assessment - Initial Assessment Questions  1  LOCATION: "Which ear is involved?"        Left   2  SENSATION: "Describe how the ear feels "       Pressure if she bends down feels like there is fluid is moving inside  3  ONSET:  "When did the ear symptoms start?"        Yesterday but worse today  4  PAIN: "Do you also have an earache?" If so, ask: "How bad is it?" (Scale 1-10; or mild, moderate, severe)      7-8  5  CAUSE: "What do you think is causing the ear congestion?"      Allergic reaction to cats  6  URI: "Do you have a runny nose or cough?"       yes  7  NASAL ALLERGIES: "Are there symptoms of hay fever, such as sneezing or a clear nasal discharge?"      Nose and throat phelgm  8   PREGNANCY: "Is there any chance you are pregnant?" "When was your last menstrual period?"      no    Protocols used: EAR - CONGESTION-ADULT-

## 2020-08-06 NOTE — TELEPHONE ENCOUNTER
Regarding: Fluid in ear and hindered breathing  ----- Message from Karis Valadez sent at 8/6/2020  7:44 PM EDT -----  " I feel like I have fluid in my ear and my breathing is hindered   I am also worried that I am have strep "

## 2020-08-07 NOTE — TELEPHONE ENCOUNTER
Has had similar symptoms in the past due to allergies with cat feels it is a similar flair up  She is planning to isolate from the cat but concerned she takes care of her unwell mother does not want to give her anything  Plans to be seen at [facility] now tonight, did not want to wait for appt tomorrow

## 2020-08-07 NOTE — PROGRESS NOTES
[facility]        NAME: Angus Asif is a 38 y o  female  : 1985    MRN: [id number]  DATE: 2020  TIME: 9:32 PM    Assessment and Plan   Acute suppurative otitis media of left ear without spontaneous rupture of tympanic membrane, recurrence not specified [[id number] 002]  1  Acute suppurative otitis media of left ear without spontaneous rupture of tympanic membrane, recurrence not specified  azithromycin (ZITHROMAX) 250 mg tablet         Patient Instructions       Follow up with PCP in 3-5 days  Proceed to  ER if symptoms worsen  Chief Complaint     Chief Complaint   Patient presents with    Abrasion     FROM CAT ON LEFT FOOT    Sinus Problem    Ear Fullness     CAN FEEL FLUID MOVING WITH CHANGE OF POSITION    Shortness of Breath    Allergies         History of Present Illness       Patient for evaluation of a cat scratch to her left foot as well as fluid in her left ear  Patient states she has had had the issues with fluid before  She has had prior infections in that ear  She denies any fevers or cough  She does feel little short of breath at times  Review of Systems   Review of Systems   Constitutional: Negative  HENT: Positive for ear pain  Negative for congestion, ear discharge, postnasal drip, rhinorrhea, sinus pressure, sinus pain, sore throat and trouble swallowing  Eyes: Negative  Skin: Positive for wound           Current Medications       Current Outpatient Medications:     albuterol (PROVENTIL HFA,VENTOLIN HFA) 90 mcg/act inhaler, Inhale 2 puffs every 4 (four) hours as needed, Disp: , Rfl:     ALPRAZolam (XANAX) 1 mg tablet, take 1 tablet by mouth twice a day, Disp: 60 tablet, Rfl: 3    clindamycin (CLINDAGEL) 1 % gel, Apply topically 2 (two) times a day, Disp: 30 g, Rfl: 0    fluticasone-vilanterol (BREO ELLIPTA) 200-25 MCG/INH inhaler, Inhale 1 puff daily Rinse mouth after use , Disp: , Rfl:     Hydrocortisone Acetate (PROCTOZONE H RE), APPLY A THIN LAYER TO THE AFFECTED AREAS BY TOPICAL ROUTE 2-4 TIMES DAILY FOR 7 DAYS, Disp: , Rfl:     lisinopril (ZESTRIL) 40 mg tablet, take 1 tablet by mouth once daily, Disp: 90 tablet, Rfl: 3    tiotropium (SPIRIVA RESPIMAT) 2 5 MCG/ACT AERS inhaler, Inhale 2 puffs daily, Disp: , Rfl:     azithromycin (ZITHROMAX) 250 mg tablet, Take 2 tablets day 1 then 1 tab days 2-5, Disp: 6 tablet, Rfl: 0    fexofenadine (ALLEGRA) 180 MG tablet, Take 1 tablet (180 mg total) by mouth daily (Patient not taking: Reported on 8/6/2020), Disp: 30 tablet, Rfl: 1    Lidocaine 4 % PTCH, Apply 1 patch topically every 12 (twelve) hours as needed, Disp: , Rfl:     mupirocin (BACTROBAN) 2 % ointment, Apply topically 3 (three) times a day for 7 days, Disp: 22 g, Rfl: 0    omeprazole (PriLOSEC) 40 MG capsule, Take 40 mg by mouth daily, Disp: , Rfl:     Current Allergies     Allergies as of 08/06/2020 - Reviewed 08/06/2020   Allergen Reaction Noted    Penicillins Swelling 04/28/2016            The following portions of the patient's history were reviewed and updated as appropriate: allergies, current medications, past family history, past medical history, past social history, past surgical history and problem list      Past Medical History:   Diagnosis Date    Allergies     Anxiety 05/2018    Female hirsutism     GERD (gastroesophageal reflux disease)     Hypertension 2018    Papanicolaou smear for cervical cancer screening 2017    NL, no h/o of abn pap that she can recall    STD (female)     Thyroid nodule 05/2018    Tobacco user 05/2018       Past Surgical History:   Procedure Laterality Date    NO PAST SURGERIES         Family History   Problem Relation Age of Onset    Hypertension Mother     Kidney disease Mother     Cirrhosis Mother     Dialysis Mother     Diabetes Mother     Melanoma Paternal Uncle     Stomach cancer Other     Colon cancer Other         Passed age 34         Medications have been verified  Objective   /87   Pulse 75   Temp (!) 96 4 °F (35 8 °C)   Resp 16   Ht 5' 2" (1 575 m)   Wt 83 kg (183 lb)   LMP 07/13/2020 (Exact Date)   SpO2 100%   BMI 33 47 kg/m²        Physical Exam     Physical Exam   Constitutional: She is oriented to person, place, and time  She appears well-developed  She does not appear ill  No distress  HENT:   Head: Normocephalic and atraumatic  Right Ear: Tympanic membrane, external ear and ear canal normal    Left Ear: External ear and ear canal normal    Mouth/Throat: Mucous membranes are moist    Eyes: Pupils are equal, round, and reactive to light  Conjunctivae are normal  Right eye exhibits no discharge  Left eye exhibits no discharge  Cardiovascular: Normal rate and regular rhythm  No murmur heard  Pulmonary/Chest: Effort normal and breath sounds normal  No stridor  No respiratory distress  She has no wheezes  She has no rhonchi  She has no rales  Neurological: She is alert and oriented to person, place, and time  Skin: Skin is warm and dry  No rash noted  She is not diaphoretic  Superficial scratch on the left foot  No purulent discharge  No soft tissue swelling  No warmth  Psychiatric: Her behavior is normal  Judgment and thought content normal    Nursing note and vitals reviewed

## 2020-08-11 ENCOUNTER — OFFICE VISIT (OUTPATIENT)
Dept: FAMILY MEDICINE CLINIC | Facility: CLINIC | Age: 35
End: 2020-08-11
Payer: COMMERCIAL

## 2020-08-11 VITALS
WEIGHT: 182 LBS | SYSTOLIC BLOOD PRESSURE: 140 MMHG | BODY MASS INDEX: 33.49 KG/M2 | HEART RATE: 63 BPM | HEIGHT: 62 IN | OXYGEN SATURATION: 98 % | RESPIRATION RATE: 16 BRPM | TEMPERATURE: 99.4 F | DIASTOLIC BLOOD PRESSURE: 90 MMHG

## 2020-08-11 DIAGNOSIS — Z72.0 TOBACCO ABUSE: ICD-10-CM

## 2020-08-11 DIAGNOSIS — F41.9 ANXIETY AND DEPRESSION: ICD-10-CM

## 2020-08-11 DIAGNOSIS — H66.002 ACUTE SUPPURATIVE OTITIS MEDIA OF LEFT EAR WITHOUT SPONTANEOUS RUPTURE OF TYMPANIC MEMBRANE, RECURRENCE NOT SPECIFIED: Primary | ICD-10-CM

## 2020-08-11 DIAGNOSIS — H65.92 LEFT SEROUS OTITIS MEDIA, UNSPECIFIED CHRONICITY: Primary | ICD-10-CM

## 2020-08-11 DIAGNOSIS — I10 BENIGN ESSENTIAL HYPERTENSION: ICD-10-CM

## 2020-08-11 DIAGNOSIS — F32.A ANXIETY AND DEPRESSION: ICD-10-CM

## 2020-08-11 DIAGNOSIS — L73.9 FOLLICULITIS: ICD-10-CM

## 2020-08-11 PROCEDURE — 99214 OFFICE O/P EST MOD 30 MIN: CPT | Performed by: FAMILY MEDICINE

## 2020-08-11 PROCEDURE — 3008F BODY MASS INDEX DOCD: CPT | Performed by: FAMILY MEDICINE

## 2020-08-11 PROCEDURE — 3080F DIAST BP >= 90 MM HG: CPT | Performed by: FAMILY MEDICINE

## 2020-08-11 PROCEDURE — 3077F SYST BP >= 140 MM HG: CPT | Performed by: FAMILY MEDICINE

## 2020-08-11 RX ORDER — CLINDAMYCIN HYDROCHLORIDE 300 MG/1
300 CAPSULE ORAL 3 TIMES DAILY
Qty: 21 CAPSULE | Refills: 0 | Status: SHIPPED | OUTPATIENT
Start: 2020-08-11 | End: 2020-08-18

## 2020-08-11 RX ORDER — CLINDAMYCIN PHOSPHATE 10 MG/G
GEL TOPICAL 2 TIMES DAILY
Qty: 30 G | Refills: 0 | Status: SHIPPED | OUTPATIENT
Start: 2020-08-11 | End: 2020-08-11 | Stop reason: CLARIF

## 2020-08-11 NOTE — ASSESSMENT & PLAN NOTE
Discussed about quitting smoking and she says she will work on it she was given the Chantix by her PCP but she could not quit which she thinks about it

## 2020-08-11 NOTE — PROGRESS NOTES
Assessment/Plan:    Problem List Items Addressed This Visit        Cardiovascular and Mediastinum    Benign essential hypertension     She is nervous anxious and her blood pressure is slightly up, she will continue same medication            Nervous and Auditory    Left serous otitis media - Primary     She finished the antibiotic Zithromax,, there is some fluid behind the left eardrum, advised to see the ENT, she does not want to take prednisone by mouth as of the side effects,           Relevant Orders    Ambulatory Referral to Otolaryngology       Other    Anxiety and depression     She is very anxious and she has few loss in the family, advised to see the counselor         Relevant Orders    Ambulatory referral to [facility] abuse     Discussed about quitting smoking and she says she will work on it she was given the Chantix by her PCP but she could not quit which she thinks about it               Chief Complaint   Patient presents with    Follow-up       Subjective:   Patient ID: Ailyn Narvaez is a 38 y o  female  She says she was treated with Zithromax for the left ear infection, she finished the antibiotic she fell nausea with the medicine but she was able to finish it, she still feel fluid behind the eardrum, when she moves her head, she says she has allergies to the cat and she sometimes get hives when she holds the CAT, he is not currently taking any antihistamines  She tries more natural ways to do things  She also feels lot of anxiety and stress and she is not seeing any counselor she said 3 years ago her  diet and he has another death in the family recently,  She says she was given the alprazolam through her PCP, she does not see any psychiatrist and counselor    She says she is still smoker and she is unable to quit  He does not use inhalers regularly      Review of Systems   Constitutional: Negative for activity change, appetite change, chills, diaphoresis, fatigue, fever and unexpected weight change  HENT: Negative for congestion, dental problem, ear discharge, ear pain, facial swelling, hearing loss, mouth sores, nosebleeds, postnasal drip, rhinorrhea, sinus pressure, sinus pain, sneezing, sore throat, trouble swallowing and voice change  Fluid feeling in left ear    Eyes: Negative for photophobia, pain, discharge, redness and itching  Respiratory: Negative for cough, chest tightness, shortness of breath and wheezing  Cardiovascular: Negative for chest pain, palpitations and leg swelling  Gastrointestinal: Negative for abdominal distention, abdominal pain, blood in stool, constipation, diarrhea and nausea  Endocrine: Negative for cold intolerance, heat intolerance, polydipsia, polyphagia and polyuria  Genitourinary: Negative for dysuria, flank pain, frequency, hematuria and urgency  Musculoskeletal: Negative for arthralgias, back pain, myalgias and neck pain  Skin: Negative for color change and pallor  Cat scratch on rt foot , healing    Allergic/Immunologic: Negative for environmental allergies and food allergies  Neurological: Negative for dizziness, weakness, light-headedness, numbness and headaches  Hematological: Negative for adenopathy  Does not bruise/bleed easily  Psychiatric/Behavioral: Negative for behavioral problems, sleep disturbance and suicidal ideas  The patient is not nervous/anxious  Objective:  Physical Exam  Vitals signs and nursing note reviewed  Constitutional:       Appearance: She is well-developed  HENT:      Head: Normocephalic and atraumatic  Right Ear: Tympanic membrane and external ear normal       Left Ear: External ear normal       Ears:      Comments: Fluid behind the left eardrum     Nose: Nose normal       Mouth/Throat:      Pharynx: No oropharyngeal exudate  Eyes:      General: No scleral icterus  Right eye: No discharge  Left eye: No discharge        Extraocular Movements: Extraocular movements intact  Conjunctiva/sclera: Conjunctivae normal       Pupils: Pupils are equal, round, and reactive to light  Neck:      Musculoskeletal: Normal range of motion and neck supple  Thyroid: No thyromegaly  Trachea: No tracheal deviation  Cardiovascular:      Rate and Rhythm: Normal rate and regular rhythm  Heart sounds: Normal heart sounds  No murmur  Pulmonary:      Effort: Pulmonary effort is normal  No respiratory distress  Breath sounds: Normal breath sounds  No wheezing or rales  Abdominal:      General: Bowel sounds are normal  There is no distension  Palpations: Abdomen is soft  There is no mass  Tenderness: There is no abdominal tenderness  There is no rebound  Musculoskeletal: Normal range of motion  Lymphadenopathy:      Cervical: No cervical adenopathy  Skin:     General: Skin is warm  Coloration: Skin is not pale  Findings: No erythema or rash  Comments: Scratch on the dorsum of the right foot, no infection healing well   Neurological:      Mental Status: She is alert and oriented to person, place, and time  Cranial Nerves: No cranial nerve deficit  Deep Tendon Reflexes: Reflexes are normal and symmetric  Psychiatric:         Behavior: Behavior normal          Thought Content:  Thought content normal          Judgment: Judgment normal             Past Surgical History:   Procedure Laterality Date    NO PAST SURGERIES         Family History   Problem Relation Age of Onset    Hypertension Mother     Kidney disease Mother     Cirrhosis Mother     Dialysis Mother     Diabetes Mother     Melanoma Paternal Uncle     Stomach cancer Other     Colon cancer Other         Passed age 34         Current Outpatient Medications:     albuterol (PROVENTIL HFA,VENTOLIN HFA) 90 mcg/act inhaler, Inhale 2 puffs every 4 (four) hours as needed, Disp: , Rfl:     ALPRAZolam (XANAX) 1 mg tablet, take 1 tablet by mouth twice a day, Disp: 60 tablet, Rfl: 3    clindamycin (CLINDAGEL) 1 % gel, Apply topically 2 (two) times a day, Disp: 30 g, Rfl: 0    fexofenadine (ALLEGRA) 180 MG tablet, Take 1 tablet (180 mg total) by mouth daily, Disp: 30 tablet, Rfl: 1    fluticasone-vilanterol (BREO ELLIPTA) 200-25 MCG/INH inhaler, Inhale 1 puff daily Rinse mouth after use , Disp: , Rfl:     Hydrocortisone Acetate (PROCTOZONE H RE), APPLY A THIN LAYER TO THE AFFECTED AREAS BY TOPICAL ROUTE 2-4 TIMES DAILY FOR 7 DAYS, Disp: , Rfl:     Lidocaine 4 % PTCH, Apply 1 patch topically every 12 (twelve) hours as needed, Disp: , Rfl:     lisinopril (ZESTRIL) 40 mg tablet, take 1 tablet by mouth once daily, Disp: 90 tablet, Rfl: 3    omeprazole (PriLOSEC) 40 MG capsule, Take 40 mg by mouth daily, Disp: , Rfl:     tiotropium (SPIRIVA RESPIMAT) 2 5 MCG/ACT AERS inhaler, Inhale 2 puffs daily, Disp: , Rfl:     mupirocin (BACTROBAN) 2 % ointment, Apply topically 3 (three) times a day for 7 days, Disp: 22 g, Rfl: 0    Allergies   Allergen Reactions    Penicillins Swelling       Vitals:    08/11/20 1402   BP: 140/90   Pulse: 63   Resp: 16   Temp: 99 4 °F (37 4 °C)   SpO2: 98%   Weight: 82 6 kg (182 lb)   Height: 5' 2" (1 575 m)

## 2020-08-11 NOTE — ASSESSMENT & PLAN NOTE
She finished the antibiotic Zithromax,, there is some fluid behind the left eardrum, advised to see the ENT, she does not want to take prednisone by mouth as of the side effects,

## 2020-08-18 ENCOUNTER — TELEPHONE (OUTPATIENT)
Dept: FAMILY MEDICINE CLINIC | Facility: CLINIC | Age: 35
End: 2020-08-18

## 2020-08-18 NOTE — TELEPHONE ENCOUNTER
Please inform the patient that she was referred to ENT and she already have taken antibiotics, she needs to see the ENT

## 2020-08-18 NOTE — TELEPHONE ENCOUNTER
I gave her clindamycin and we cannot give her more antibiotic she has to wait to see the ENT and her appointment is on Thursday that should be okay

## 2020-08-18 NOTE — TELEPHONE ENCOUNTER
Patient was seen a week ago and stated that she isn't sure what she should do  She stated that her ear is not getting better  She wasn't sure if she could get a stronger antibiotic

## 2020-08-18 NOTE — TELEPHONE ENCOUNTER
Patient did call the ENT and got an appointment on Thursday    Advised when I spoke to her she had started the Z amarjit but did not finish it and she has gotten no relief from this antibiotic She feels as though it was not strong enough ? Please advise patient is very upset at this point

## 2020-08-21 ENCOUNTER — TELEPHONE (OUTPATIENT)
Dept: PSYCHIATRY | Facility: CLINIC | Age: 35
End: 2020-08-21

## 2020-08-24 ENCOUNTER — OFFICE VISIT (OUTPATIENT)
Dept: PSYCHOLOGY | Facility: CLINIC | Age: 35
End: 2020-08-24
Payer: COMMERCIAL

## 2020-08-24 ENCOUNTER — OFFICE VISIT (OUTPATIENT)
Dept: PSYCHIATRY | Facility: CLINIC | Age: 35
End: 2020-08-24
Payer: COMMERCIAL

## 2020-08-24 VITALS
DIASTOLIC BLOOD PRESSURE: 87 MMHG | SYSTOLIC BLOOD PRESSURE: 136 MMHG | TEMPERATURE: 98.9 F | WEIGHT: 182 LBS | BODY MASS INDEX: 33.49 KG/M2 | HEIGHT: 62 IN | RESPIRATION RATE: 16 BRPM | HEART RATE: 71 BPM

## 2020-08-24 DIAGNOSIS — F41.1 GENERALIZED ANXIETY DISORDER: ICD-10-CM

## 2020-08-24 DIAGNOSIS — F33.1 MAJOR DEPRESSIVE DISORDER, RECURRENT, MODERATE (HCC): Primary | ICD-10-CM

## 2020-08-24 PROCEDURE — 90792 PSYCH DIAG EVAL W/MED SRVCS: CPT | Performed by: PSYCHIATRY & NEUROLOGY

## 2020-08-24 PROCEDURE — 90791 PSYCH DIAGNOSTIC EVALUATION: CPT

## 2020-08-24 RX ORDER — BUPROPION HYDROCHLORIDE 150 MG/1
150 TABLET ORAL DAILY
Qty: 30 TABLET | Refills: 1 | Status: SHIPPED | OUTPATIENT
Start: 2020-08-24 | End: 2021-03-15

## 2020-08-24 NOTE — PSYCH
Assessment/Plan:      Diagnoses and all orders for this visit:    Major depressive disorder, recurrent, moderate (HCC)    Generalized anxiety disorder          Subjective:     Patient ID: Desiree Beavers is a 38 y o  female  [facility]   AREAS OF NEED: Depression as evidenced by feeling overwhelmed, disorganized, having difficulty following through with goals for herself, tearfulness, anxiety, poor sleep, isolation related to mother's health and multiple losses  Date Initiated: 08/24/2020    Strengths: wants to feel better, cares for others      LONG TERM GOAL:   Date Initiated: 8/24/2020  1 0 I will identify 3 signs that my mood has consistently improvement and I am more productive in my day to day life  Target Date: 9/21/2020  Completion Date:     SHORT TERM OBJECTIVES:     Date Initiated: 8/24/2020  1 1 I will identify 3 mindfulness and/or distress tolerance skills I can utilize to refocus when overwhelmed  Revision Date:   Target Date: 9/3/2020  Completion Date:     Date Initiated: 8/24/2020  1 2 I will identify 3 steps I can take to focus on self-care and my own personal long term goals each day  Revision Date:   Target Date: 9/3/2020  Completion Date:     Date Initiated: 8/24/2020  1 3 I will take medications as prescribed and share questions and concerns if arise  Revision Date:   Target Date: 9/3/2020  Completion Date:     Date Initiated: 8/24/2020  1 4 I will identify 3 ways my supports can assist in my recovery and agree to staff/support contact as indicated      Revision Date:   Target Date: 9/3/2020  Completion Date:          7 DAY REVISION:    Date Initiated:  Revision Date:   Target Date:   Completion Date:      PSYCHIATRY:  Date Initiated: 8/24/2020  Medication Management and Education       Revision Date:   The person(s) responsible for carrying out the plan is Jeannie White MD    NURSING:   Date Initiated: 8/24/2020  1 1,1 2,1 3,1 4 This RN will provide daily wellness group five days weekly to educate BATISTA Roman CatholicSMILEY ROBB on S/S of her diagnoses and medications used in treatment  Revision Date:  The person(s) responsible for carrying out the plan is Lizbeth Cordero RN    PSYCHOLOGY:   Date Initiated: 8/24/2020       1 1, 1 2, 1 4 Provide psychotherapy group 5 times per week to allow opportunity for LYNNE ROBB  to explore stressors and ways of coping  Revision Date:   The person(s) responsible for carrying out the plan is Delores Shaikh MA, LPC    ALLIED THERAPY:   Date Initiated: 8/24/2020  1 1,1 2 Engage LYNNE ROBB in AT group 5 times daily to encourage development and use of wellness tools to decrease symptoms and promote recovery through meaningful activity  Revision Date:   The person(s) responsible for carrying out the plan is DIEGO Boyd     CASE MANAGEMENT:   Date Initiated: 8/24/2020      1 0 This  will meet with BATISTA Roman Catholic ANTONETTECYRUS  3-4 times weekly to assess treatment progress, discharge planning, connection to community supports and UR as indicated  Revision Date:   The person(s) responsible for carrying out the plan is Trey CORTEZ    TREATMENT REVIEW/COMMENTS:     DISCHARGE CRITERIA:Identify 3 signs of progress and complete relapse prevention plan  DISCHARGE PLAN: OP services  Estimated Length of Stay:10 treatment days     Diagnosis and Treatment Plan explained to Fany Jarrett relates understanding diagnosis and is agreeable to Treatment Plan           CLIENT COMMENTS / Please share your thoughts, feelings, need and/or experiences regarding your treatment plan: _____________________________________________________________________________________________________________________________________________________________________________________________________________________________________________________________________________________________________________________ Date/Time: ______________ Patient Signature: _________________________________     Date/Time: ______________      Signature: _________________________________     Date/Time: ______________
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
## 2020-08-24 NOTE — PSYCH
Subjective:     Patient ID: Hue Roth is a 38 y o  female  [facility] Clinical Progress Notes      Specialized Services Documentation  Therapist must complete separate progress note for each specific clinical activity in which the individual participated during the day  Other Pre-Authorization: Spoke with [place] at [employer] - phone [phone]  10 days authorized from 08/24/20  to 9/4/2020  Authorization number [id number]  Reviewer assigned - call in above if more time required  [facility]     Case Management Note    Jessicane Adam [facility]    Current suicide risk : Low     [id number] Met with Hue Rtoh  Reviewed program and given on call number  she completed releases and OP providers/ PCP notified of admission and health care coordination form completed  Completed initial psycho-social evaluation and initial treatment goals discussed  Medications changes/added/denied? Yes     Treatment session number: 1    Individual Case Management Visit provided today?  Yes     [facility] follow up physician's orders: see Dr Elizabeth Galicia evaluation

## 2020-08-24 NOTE — PSYCH
Subjective:     Patient ID: Brenden Khan is a 38 y o  female  [facility] Clinical Progress Notes      Specialized Services Documentation  Therapist must complete separate progress note for each specific clinical activity in which the individual participated during the day  Group Psychotherapy (9:30am- 10:15am) Group was facilitated virtually in a private office using HIPAA Compliant and Approved Microsoft Teams  Brenden Khan consented to the use of tele-video modality of treatment  She was not present for this group today as she just began the program on this date  This group session introduced narrative therapy concepts and how the stories a person tells him or herself about life are largely shaped by external forces and messages received from others throughout life  At times these stories limit a person's view and narrowly focus on details and labels others have influenced  An example of how this could impact a person was reviewed and discussed and participants were asked to help rewrite the narrative in the example  Participants were then asked to reflect on life goals, skills and values at the core of their new narratives for their lives  Anna was attentive throughout this group session  She shared how when she was younger she was known as a complainer which she internalized in her life  She discussed how it was through introspection about herself she came to learn that she was very smart, despite the fact that her sister was always known as the "smart one" in the family  Anna seemed to relate the group discussion well to her own life  She is making good progress toward her treatment goals        Tx Plan Objective: 1 1,1 2, Therapist:  SAVANAH Tejada
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
## 2020-08-24 NOTE — PSYCH
PHQ-9 Depression Screening    PHQ-9:    Frequency of the following problems over the past two weeks:       Little interest or pleasure in doing things:  1 - several days  Feeling down, depressed, or hopeless:  2 - more than half the days  Trouble falling or staying asleep, or sleeping too much:  3 - nearly every day  Feeling tired or having little energy:  2 - more than half the days  Poor appetite or overeating:  3 - nearly every day  Feeling bad about yourself - or that you are a failure or have let yourself or your family down:  2 - more than half the days  Trouble concentrating on things, such as reading the newspaper or watching television:  3 - nearly every day  Moving or speaking so slowly that other people could have noticed   Or the opposite - being so fidgety or restless that you have been moving around a lot more than usual:  3 - nearly every day  Thoughts that you would be better off dead, or of hurting yourself in some way:  0 - not at all  PHQ-2 Score:  3  PHQ-9 Score:  19

## 2020-08-25 ENCOUNTER — OFFICE VISIT (OUTPATIENT)
Dept: PSYCHOLOGY | Facility: CLINIC | Age: 35
End: 2020-08-25
Payer: COMMERCIAL

## 2020-08-25 DIAGNOSIS — F33.1 MAJOR DEPRESSIVE DISORDER, RECURRENT, MODERATE (HCC): Primary | ICD-10-CM

## 2020-08-25 DIAGNOSIS — F41.1 GENERALIZED ANXIETY DISORDER: ICD-10-CM

## 2020-08-25 PROCEDURE — G0176 OPPS/PHP;ACTIVITY THERAPY: HCPCS

## 2020-08-25 PROCEDURE — G0410 GRP PSYCH PARTIAL HOSP 45-50: HCPCS

## 2020-08-25 PROCEDURE — G0177 OPPS/PHP; TRAIN & EDUC SERV: HCPCS

## 2020-08-25 NOTE — PSYCH
Subjective:     Patient ID: Jonas Leos is a 38 y o  female  [facility] Clinical Progress Notes      Specialized Services Documentation  Therapist must complete separate progress note for each specific clinical activity in which the individual participated during the day  Group Psychotherapy     ([id number]) Group was facilitated virtually in a private office using HIPAA Compliant and Approved Microsoft Teams  Jonas Leos consented to the use of tele-video modality of treatment and was virtually present for group psychotherapy process today  Jonas Leos  actively shared in psychoeducational group which focused on nutrition to improve physical and mental wellbeing  Topics included appropriate serving sizes for all food groups as well as benefits to eating for health  They then discussed ideas on how to improve on their nutrition  A little monopolizing at times in group  Had to be redirected  Moderate progress toward goal noted  Continue psychoeducation to educate on the importance of making nutrition a priority      Tx Plan Objective:  1 3 Therapist:  Joaquin Irving RN

## 2020-08-25 NOTE — PSYCH
Subjective:     Patient ID: Tori Wheeler is a 38 y o  female  [facility] Clinical Progress Notes      Specialized Services Documentation  Therapist must complete separate progress note for each specific clinical activity in which the individual participated during the day  Allied Therapy   This group was facilitated virtually in a private office using [employer] and Approved [employer] Teams  Tori Wheeler consented to the use of tele-video modality of treatment  [id number] Tori Wheeler  actively shared in [facility] group focused on the [facility] skills of mindfulness  Group explored practice of what skills through observing, describing and participating in a marie listening and then engaging in song  Group discussed ways to apply to slowing down to make more effective choices  Kathy identified picking up mom and then taking a walk  Some effort noted toward treatment goal   Continue AT to encourage the development and practice of mindfulness strategies to alleviate symptoms and support wellness  Tx Plan Objective: 1 1, Therapist:  Jorge CORTEZ    Education Therapy   This group was facilitated virtually in a private office using [employer] and Approved [employer] Teams  Tori Wheeler consented to the use of tele-video modality of treatment  Time:  1269-5087  Previous goal met: first treatment day  Readiness to Learning: Receptive  Barriers to Learning: None  Learning Assessment  Time: 2108-8563  Education Completed: Illness and Wellness Tools  Teaching Method: Verbal, A/V and Demonstration  Shared Area of Learning: Yes   Goal Set: shower  Tx Plan Objective: 1 2, Therapist:  Jorge CORTEZ      Case Management Note  This case management session was facilitated virtually in a private office using [employer] and Approved [employer] Stone  Tori Liam consented to the use of tele-video modality of treatment     Jorge CORTEZ    Current suicide risk : Low     [id number]  Met with Genia Mckee  Reported a positive first treatment day  Reviewed treatment plan and copy emailed to her  UR reviewed     Medications changes/added/denied? No    Treatment session number: 1    Individual Case Management Visit provided today?  No    [facility] follow up physician's orders: na

## 2020-08-25 NOTE — PSYCH
Subjective:     Patient ID: Smiley Lindsey is a 38 y o  female  [facility] Clinical Progress Notes      Specialized Services Documentation  Therapist must complete separate progress note for each specific clinical activity in which the individual participated during the day  Group Psychotherapy [id number]  This group was facilitated virtually in a private office using [place] and Approved [employer] Teams  Smiley Lindsey consented to the use of tele-video modality of treatment  Psychotherapy group focused on identifying irrational thoughts and cognitive distortions  Psychoeducation was provided on the cognitive model and cognitive distortions  Through discussion, Claudeen Mt was able to gain increased awareness of how her thoughts effect her feelings and behaviors  Due to time constraint, cognitive restructuring techniques were briefly reviewed including:  thought record log, socratic questioning, decatastrophizing, and putting thoughts on trial   Claudeen Mt participated in group discussion  She required redirection at times  She related to several of the cognitive distortions discussed  She demonstrated understanding of the topic by giving personal examples of how her thoughts have effected her emotions and behaviors   Some positive progress toward goals  Claudeen Mt  is encouraged to continue making progress towards goals and objectives through group participation and will continue to attend psychotherapy group     Tx Plan Objective: 1 1, Therapist:  Tran Andrew LCSW

## 2020-08-25 NOTE — PSYCH
Virtual Regular Visit      Assessment/Plan:    Problem List Items Addressed This Visit        Other    Major depressive disorder, recurrent, moderate ([facility] [address] ) - Primary    Generalized anxiety disorder               Reason for visit is VIRTUAL PHP GROUP DUE TO COVID-19  Encounter provider [facility]    Provider located at [address] [address]    The patient was identified by name and date of birth  Angus Asif was informed that this is a telemedicine visit and that the visit is being conducted through [employer]  My office door was closed  No one else was in the room  She acknowledged consent and understanding of privacy and security of the video platform  The patient has agreed to participate and understands they can discontinue the visit at any time  Patient is aware this is a billable service  Татьяна Haddad is a 38 y o  female   I spent FOUR GROUP HOURS PLUS CASE MANAGEMENT minutes with patient today in which greater than 50% of the time was spent in counseling/coordination of care regarding PHP - SEE NOTES  VIRTUAL VISIT DISCLAIMER    Angus Asif acknowledges that she has consented to an online visit or consultation  She understands that the online visit is based solely on information provided by her, and that, in the absence of a face-to-face physical evaluation by the physician, the diagnosis she receives is both limited and provisional in terms of accuracy and completeness  This is not intended to replace a full medical face-to-face evaluation by the physician  Angus Asif understands and accepts these terms

## 2020-08-26 ENCOUNTER — OFFICE VISIT (OUTPATIENT)
Dept: PSYCHOLOGY | Facility: CLINIC | Age: 35
End: 2020-08-26
Payer: COMMERCIAL

## 2020-08-26 DIAGNOSIS — F41.1 GENERALIZED ANXIETY DISORDER: ICD-10-CM

## 2020-08-26 DIAGNOSIS — F33.1 MAJOR DEPRESSIVE DISORDER, RECURRENT, MODERATE (HCC): Primary | ICD-10-CM

## 2020-08-26 PROCEDURE — G0177 OPPS/PHP; TRAIN & EDUC SERV: HCPCS

## 2020-08-26 PROCEDURE — G0410 GRP PSYCH PARTIAL HOSP 45-50: HCPCS

## 2020-08-26 PROCEDURE — G0176 OPPS/PHP;ACTIVITY THERAPY: HCPCS

## 2020-08-26 NOTE — PSYCH
Virtual Regular Visit      Assessment/Plan:    Problem List Items Addressed This Visit        Other    Major depressive disorder, recurrent, moderate ([facility] [address] ) - Primary    Generalized anxiety disorder               Reason for visit is VIRTUAL PHP GROUP DUE TO COVID-19  Encounter provider [facility]    Provider located at [address] [place] [id number]      Recent Visits  Date Type Provider Dept   08/25/20 Office Visit [facility]   08/24/20 Office Visit [facility]   Showing recent visits within past 7 days and meeting all other requirements     Today's Visits  Date Type Provider Dept   08/26/20 Office Visit [facility]   Showing today's visits and meeting all other requirements     Future Appointments  No visits were found meeting these conditions  Showing future appointments within next 150 days and meeting all other requirements        The patient was identified by name and date of birth  Landon Primrose was informed that this is a telemedicine visit and that the visit is being conducted through [employer]  My office door was closed  No one else was in the room  She acknowledged consent and understanding of privacy and security of the video platform  The patient has agreed to participate and understands they can discontinue the visit at any time  Patient is aware this is a billable service       Subjective  Landon Primrose is a 38 y o  female      HPI     Past Medical History:   Diagnosis Date    Allergies     Anxiety 05/2018    Female hirsutism     GERD (gastroesophageal reflux disease)     Head injury     Hypertension 2018    Papanicolaou smear for cervical cancer screening 2017    [place], no h/o of abn pap that she can recall    STD (female)     Thyroid nodule 05/2018    Tobacco user 05/2018       Past Surgical History:   Procedure Laterality Date    NO PAST SURGERIES         Current Outpatient Medications   Medication Sig Dispense Refill    albuterol (PROVENTIL HFA,VENTOLIN HFA) 90 mcg/act inhaler Inhale 2 puffs every 4 (four) hours as needed      ALPRAZolam (XANAX) 1 mg tablet take 1 tablet by mouth twice a day 60 tablet 3    buPROPion (WELLBUTRIN XL) 150 mg 24 hr tablet Take 1 tablet (150 mg total) by mouth daily 30 tablet 1    clarithromycin (BIAXIN) 500 mg tablet Take 1 tablet (500 mg total) by mouth every 12 (twelve) hours for 10 days 20 tablet 0    fexofenadine (ALLEGRA) 180 MG tablet Take 1 tablet (180 mg total) by mouth daily (Patient not taking: Reported on 8/24/2020) 30 tablet 1    fluticasone-vilanterol (BREO ELLIPTA) 200-25 MCG/INH inhaler Inhale 1 puff daily Rinse mouth after use   Lidocaine 4 % PTCH Apply 1 patch topically every 12 (twelve) hours as needed      lisinopril (ZESTRIL) 40 mg tablet take 1 tablet by mouth once daily 90 tablet 3    mupirocin (BACTROBAN) 2 % ointment Apply topically 3 (three) times a day for 7 days 22 g 0    omeprazole (PriLOSEC) 40 MG capsule Take 40 mg by mouth daily      tiotropium (SPIRIVA RESPIMAT) 2 5 MCG/ACT AERS inhaler Inhale 2 puffs daily       No current facility-administered medications for this visit  Allergies   Allergen Reactions    Azithromycin Vomiting    Penicillins Swelling       Review of Systems    Video Exam    There were no vitals filed for this visit  Physical Exam     I spent FOUR GROUP HOURS PLUS CASE MANAGEMENT minutes with patient today in which greater than 50% of the time was spent in counseling/coordination of care regarding PHP - SEE NOTES  VIRTUAL VISIT DISCLAIMER    Mayra Palacios acknowledges that she has consented to an online visit or consultation   She understands that the online visit is based solely on information provided by her, and that, in the absence of a face-to-face physical evaluation by the physician, the diagnosis she receives is both limited and provisional in terms of accuracy and completeness  This is not intended to replace a full medical face-to-face evaluation by the physician  LYNNE ROBB understands and accepts these terms

## 2020-08-26 NOTE — PSYCH
Subjective:     Patient ID: Joe Hi is a 38 y o  female  [facility] Clinical Progress Notes      Specialized Services Documentation  Therapist must complete separate progress note for each specific clinical activity in which the individual participated during the day  Group Psychotherapy     ([id number]) Group was facilitated virtually in a private office using HIPAA Compliant and Approved Microsoft Teams  Joe Hi consented to the use of tele-video modality of treatment and was virtually present for group psychotherapy process today  Joe Hi  attended group on physical wellness  The group used a worksheet to as a guide to prompt talking points regarding care of, goals for and areas of improvement for physical wellness  Group opened by talking about physical and emotional wellness as a continuum  Group talked about 8 separate areas of wellness: weight, flexibility, nutrition, eye, preventative care, oral care, skin and strength, using the prompts  Group discussed the importance of each area for overall physical health  Good progress towards goals  Continue wellness groups to teach about the importance of physical health and how it relates to emotional wellness        Tx Plan Objective:  1 3 Therapist:  Nayeli Whitehead RN

## 2020-08-26 NOTE — PSYCH
Subjective:     Patient ID: Bob Jones is a 38 y o  female  [facility] Clinical Progress Notes      Specialized Services Documentation  Therapist must complete separate progress note for each specific clinical activity in which the individual participated during the day  Allied Therapy   This group was facilitated virtually in a private office using [employer] and Approved [employer] Teams  Bob Jones consented to the use of tele-video modality of treatment  [id number]  Bob Jones  actively shared in [facility] group exploring DBT distress tolerance crisis survival strategies  Group explored crisis survival strategies ACCEPTS, SELF-SOOTHING, TIP, STOP, IMPROVE and PROS & CONS) reinforcing actions one could take to learn to tolerate stressful experiences, thoughts and urges  Guerda June was engaged and she felt she could put effort into practicing imagery from [facility]  Some progress toward goal noted  Continue AT to encourage learning, practice and home practice of skills to manage distress  Tx Plan Objective: 1 1, Therapist:  Sandie CORTEZ    Case Management Note    Sandie CORTEZ    Current suicide risk : Low     1355 Spoke with Bob Jones briefly she requested to touch base tomorrow due to helping her mother  Medications changes/added/denied? No    Treatment session number: 2    Individual Case Management Visit provided today?  Yes     [facility] follow up physician's orders: na

## 2020-08-26 NOTE — PSYCH
Subjective:     Patient ID: Smiley Lindsey is a 38 y o  female  [facility] Clinical Progress Notes      Specialized Services Documentation  Therapist must complete separate progress note for each specific clinical activity in which the individual participated during the day  Group Psychotherapy ([id number])   This group was facilitated virtually in a private office using [place] [employer] and Approved [employer] Teams  Smiley Lindsey consented to the use of tele-video modality of treatment  Smiley Lindsey participated in nurse education group this morning which focused on relationships  Group members were encouraged to talk about their current relationships and whether or not they believed they were supportive  They were also prompted to share one quality of a relationship that is important to them  Group was then educated on characteristics of a healthy relationship and characteristics of an unhealthy relationship  Good progress toward goal noted  Continue nurse education group to educate on the importance of maintaining positive relationships with others  Tx Plan Objective: 1 4 Therapist:  Michelle Moody RN    Education Therapy   This group was facilitated virtually in a private office using HIPPA Compliant and Approved [employer] Teams  Smiley Lindsey consented to the use of tele-video modality of treatment    Time:  1711-5713  Previous goal met: No  Readiness to Learning: Receptive  Barriers to Learning: None  Learning Assessment  Time: 6036-2952  Education Completed: Illness and Wellness Tools  Teaching Method: Verbal, A/V and Demonstration  Shared Area of Learning: Yes   Goal Set: To put on facemask   Tx Plan Objective: 1 2 Therapist:  Michelle Moody RN

## 2020-08-27 ENCOUNTER — OFFICE VISIT (OUTPATIENT)
Dept: PSYCHOLOGY | Facility: CLINIC | Age: 35
End: 2020-08-27
Payer: COMMERCIAL

## 2020-08-27 DIAGNOSIS — F41.1 GENERALIZED ANXIETY DISORDER: ICD-10-CM

## 2020-08-27 DIAGNOSIS — F33.1 MAJOR DEPRESSIVE DISORDER, RECURRENT, MODERATE (HCC): Primary | ICD-10-CM

## 2020-08-27 PROCEDURE — G0410 GRP PSYCH PARTIAL HOSP 45-50: HCPCS

## 2020-08-27 PROCEDURE — G0176 OPPS/PHP;ACTIVITY THERAPY: HCPCS

## 2020-08-27 PROCEDURE — G0177 OPPS/PHP; TRAIN & EDUC SERV: HCPCS

## 2020-08-27 NOTE — PSYCH
Subjective:     Patient ID: Saulo Stone is a 38 y o  female  [facility] Clinical Progress Notes      Specialized Services Documentation  Therapist must complete separate progress note for each specific clinical activity in which the individual participated during the day  Education Therapy   This group was facilitated virtually in a private office using [place] and Approved [employer] Teams  Saulo Stone consented to the use of tele-video modality of treatment    Time:  1693-8561  Previous goal met: No  Readiness to Learning: Receptive  Barriers to Learning: None  Learning Assessment  Time: 9836-5613  Education Completed: Illness and Wellness Tools  Teaching Method: Verbal, A/V and Demonstration  Shared Area of Learning: Yes   Goal Set: To engage in self-care  Tx Plan Objective: 1 2 Therapist:  Loi Corrigan RN

## 2020-08-27 NOTE — PSYCH
Virtual Regular Visit      Assessment/Plan:    Problem List Items Addressed This Visit        Other    Major depressive disorder, recurrent, moderate ([facility] [address] ) - Primary    Generalized anxiety disorder               Reason for visit is VIRTUAL PHP GROUP DUE TO COVID-19  Encounter provider [facility]    Provider located at [address] [place] [phone]      Recent Visits  Date Type Provider Dept   08/26/20 Office Visit [facility]   08/25/20 Office Visit [facility]   08/24/20 Office Visit [facility]   Showing recent visits within past 7 days and meeting all other requirements     Future Appointments  No visits were found meeting these conditions  Showing future appointments within next 150 days and meeting all other requirements        The patient was identified by name and date of birth  Jay Manuel was informed that this is a telemedicine visit and that the visit is being conducted through [employer]  My office door was closed  No one else was in the room  She acknowledged consent and understanding of privacy and security of the video platform  The patient has agreed to participate and understands they can discontinue the visit at any time  Patient is aware this is a billable service       Subjective  Jay Manuel is a 38 y o  female         HPI     Past Medical History:   Diagnosis Date    Allergies     Anxiety 05/2018    Female hirsutism     GERD (gastroesophageal reflux disease)     Head injury     Hypertension 2018    Papanicolaou smear for cervical cancer screening 2017    [place], no h/o of abn pap that she can recall    STD (female)     Thyroid nodule 05/2018    Tobacco user 05/2018       Past Surgical History:   Procedure Laterality Date    NO PAST SURGERIES         Current Outpatient Medications   Medication Sig Dispense Refill    albuterol (PROVENTIL HFA,VENTOLIN HFA) 90 mcg/act inhaler Inhale 2 puffs every 4 (four) hours as needed      ALPRAZolam (XANAX) 1 mg tablet take 1 tablet by mouth twice a day 60 tablet 3    buPROPion (WELLBUTRIN XL) 150 mg 24 hr tablet Take 1 tablet (150 mg total) by mouth daily 30 tablet 1    clarithromycin (BIAXIN) 500 mg tablet Take 1 tablet (500 mg total) by mouth every 12 (twelve) hours for 10 days 20 tablet 0    fexofenadine (ALLEGRA) 180 MG tablet Take 1 tablet (180 mg total) by mouth daily (Patient not taking: Reported on 8/24/2020) 30 tablet 1    fluticasone-vilanterol (BREO ELLIPTA) 200-25 MCG/INH inhaler Inhale 1 puff daily Rinse mouth after use   Lidocaine 4 % PTCH Apply 1 patch topically every 12 (twelve) hours as needed      lisinopril (ZESTRIL) 40 mg tablet take 1 tablet by mouth once daily 90 tablet 3    mupirocin (BACTROBAN) 2 % ointment Apply topically 3 (three) times a day for 7 days 22 g 0    omeprazole (PriLOSEC) 40 MG capsule Take 40 mg by mouth daily      tiotropium (SPIRIVA RESPIMAT) 2 5 MCG/ACT AERS inhaler Inhale 2 puffs daily       No current facility-administered medications for this visit  Allergies   Allergen Reactions    Azithromycin Vomiting    Penicillins Swelling       Review of Systems    Video Exam    There were no vitals filed for this visit  Physical Exam     I spent FOUR GROUP HOURS PLUS CASE MANAGEMENT minutes with patient today in which greater than 50% of the time was spent in counseling/coordination of care regarding PHP - SEE NOTES  VIRTUAL VISIT DISCLAIMER    Yimi Soni acknowledges that she has consented to an online visit or consultation  She understands that the online visit is based solely on information provided by her, and that, in the absence of a face-to-face physical evaluation by the physician, the diagnosis she receives is both limited and provisional in terms of accuracy and completeness   This is not intended to replace a full medical face-to-face evaluation by the physician  LYNNE RBOB understands and accepts these terms

## 2020-08-27 NOTE — PSYCH
Subjective:     Patient ID: Damian Martínez is a 38 y o  female  [facility] Clinical Progress Notes      Specialized Services Documentation  Therapist must complete separate progress note for each specific clinical activity in which the individual participated during the day  Allied Therapy   This group was facilitated virtually in a private office using [place] [employer] and Approved [employer] Teams  Damian Martínez consented to the use of tele-video modality of treatment  [phone] Damian Martínez  actively  shared in [facility] group focused on boundary setting  Kathy engaged in improvisation and discussion exploring value of and ways to set healthy limits with self and others  DBT strategy GIVE introduced as skill to voice boundaries  She participated in practicing boundaries with given scenarios  She required limits as she shares frequently  Some  work toward goal noted  Continue AT to encourage skill development and use  Tx Plan Objective: 1 4,1 1, Therapist:  Letha CORTEZ    Case Management Note  This case management session was facilitated virtually in a private office using HIPPA Compliant and Approved [employer] Teams  Damian Martínez consented to the use of tele-video modality of treatment  Letha CORTEZ    Current suicide risk : Low     INDIVIDUAL PSYCHOTHERAPY  [phone] Spoke with Damian Martínez  Reported that she is gaining from program  She has orientation for her [employer] program tonight  She presents as smiling and talkative, yet individually she becomes tearful "I just want to help others"  Emotional lability continues  Medications changes/added/denied? No    Treatment session number: 3    Individual Case Management Visit provided today?  Yes     [facility] follow up physician's orders: na

## 2020-08-27 NOTE — PSYCH
Subjective:     Patient ID: Hue Roth is a 38 y o  female  [facility] Clinical Progress Notes      Specialized Services Documentation  Therapist must complete separate progress note for each specific clinical activity in which the individual participated during the day  Group Psychotherapy     ([id number]) Group was facilitated virtually in a private office using HIPAA Compliant and Approved [employer] Teams  Hue Roth consented to the use of tele-video modality of treatment and was virtually present for group psychotherapy process today  Hue Roth  engaged in psychoeducational group which focused on the signs and symptoms of metabolic syndrome as when as the possible outcome if left untreated  Information on the syndrome was also related to medications frequently used by those with mental illness  Benefits and risks of medications were discussed  The importance of speaking with a doctor regarding any medication or physical changes was reinforced  The group then discussed how to reduce the likelihood of metabolic syndrome  Good progress toward goal noted  Continue psychoeducation to educate on the importance of physical health as a priority to improve physical and mental wellbeing          Tx Plan Objective:  1 3 Therapist:  Bipin Doshi RN

## 2020-08-28 ENCOUNTER — OFFICE VISIT (OUTPATIENT)
Dept: PSYCHOLOGY | Facility: CLINIC | Age: 35
End: 2020-08-28
Payer: COMMERCIAL

## 2020-08-28 DIAGNOSIS — F33.1 MAJOR DEPRESSIVE DISORDER, RECURRENT, MODERATE (HCC): Primary | ICD-10-CM

## 2020-08-28 DIAGNOSIS — F41.1 GENERALIZED ANXIETY DISORDER: ICD-10-CM

## 2020-08-28 PROCEDURE — G0176 OPPS/PHP;ACTIVITY THERAPY: HCPCS

## 2020-08-28 PROCEDURE — G0410 GRP PSYCH PARTIAL HOSP 45-50: HCPCS

## 2020-08-28 PROCEDURE — G0177 OPPS/PHP; TRAIN & EDUC SERV: HCPCS

## 2020-08-28 NOTE — PSYCH
Subjective:     Patient ID: Genia Mckee is a 38 y o  female  [facility] Clinical Progress Notes      Specialized Services Documentation  Therapist must complete separate progress note for each specific clinical activity in which the individual participated during the day  Allied Therapy   This group was facilitated virtually in a private office using [employer] and Approved [employer] Teams  Genia Mckee consented to the use of tele-video modality of treatment  [id number]  Genia Mckee  actively shared in [facility] group focused on managing anger and emotional regulation skills  She engaged in task exploring effective versus ineffective ways in addition to skills to refocus in the moment  She was slightly less overbearing in group however needs redirection  She was tearful  Group explored the benefits of positive choices with intense emotion and factors that increase emotional vulnerability  Some  work toward goal noted   Continue AT to explore wellness tool awareness and practice  Tx Plan Objective: 1 1,1 2, Therapist:  Bel CORTEZ    Case Management Note    Bel CORTEZ    Current suicide risk : Low    INDIVIDUAL PSYCHOTHERAPY   [id number] Met with Genia Mckee to discuss weekend plans and supports  Genia Mckee identified that she gained from her sessions this week  She feels more peaceful and would like to continue to work on balance  She was encouraged to remember to focus on self versus peers  Goals for next week include work toward treatment plan objectives and finding an OP therapist       Medications changes/added/denied? No    Treatment session number: 4    Individual Case Management Visit provided today?  Yes     [facility] follow up physician's orders: na

## 2020-08-28 NOTE — PSYCH
Subjective:     Patient ID: Mayra Palacios is a 38 y o  female  [facility] Clinical Progress Notes      Specialized Services Documentation  Therapist must complete separate progress note for each specific clinical activity in which the individual participated during the day  Group Psychotherapy     ([id number]) Group was facilitated virtually in a private office using HIPPA Compliant and Approved Microsoft Teams  Mayra Palacios consented to the use of tele-video modality of treatment and was virtually present for group psychotherapy process today  Mayra Palacios actively shared in psychotherapy group which focused on Weekly Wellness Assessment  Theyengaged in self-rate and discussion  Group members individually went through the assessment and rated themselves based on the past week  Group members shared assessment results  Group discussed the six domains of wellness; physical, emotional, cognitive, vocational, social, and spiritual  Group discussed strengths, challenges, barriers, and ways to increase each domain in and open forum and developed goals  Good progress towards goal observed and shared, however, Sudhakar Parisi frequently went off topic and attempted to monopolize the group  Had to be redirected on numerous occasions  Continue psychotherapy to further encourage self-reflection on strengths and challenges with personal wellness      Tx Plan Objective:  1 1,1 2,1 3,1 4 Therapist:  Imelda Sotomayor RN

## 2020-08-31 ENCOUNTER — DOCUMENTATION (OUTPATIENT)
Dept: PSYCHOLOGY | Facility: CLINIC | Age: 35
End: 2020-08-31

## 2020-08-31 NOTE — PROGRESS NOTES
Subjective:     Patient ID: Mic Magana is a 38 y o  female  [facility] Clinical Progress Notes      Specialized Services Documentation  Therapist must complete separate progress note for each specific clinical activity in which the individual participated during the day  Case Management Note    Nimisha Ramos [facility]    Current suicide risk : Low     1106 Mic Magana no show; no call  Called Kathy - she was apologetic but she overslept  She reported  Having a difficult weekend related to issues with her mom  She was difficult to hear and scattered  Encouraged logging in tomorrow  Medications changes/added/denied? No    Treatment session number: na    Individual Case Management Visit provided today? No    [facility] follow up physician's orders: Mic Magana missed her Med Check scheduled with KORTNEY Qiu as she over slept

## 2020-09-01 ENCOUNTER — DOCUMENTATION (OUTPATIENT)
Dept: PSYCHOLOGY | Facility: CLINIC | Age: 35
End: 2020-09-01

## 2020-09-01 NOTE — PROGRESS NOTES
Subjective:     Patient ID: Gabrielle Lawler is a 38 y o  female  [facility] Clinical Progress Notes      Specialized Services Documentation  Therapist must complete separate progress note for each specific clinical activity in which the individual participated during the day  Case Management Note    Chuy Oliva [facility]    Current suicide risk : Low     1243 No call / no show for program  She left an email that she overslept  This writer emailed her a time that I would be contacting her today at 0900 which she did not address in the email related to over sleeping  Called her at that time "can I call you back, I am at the [facility]"  Explained that unfortunately I am not available today later and she was encouraged to be in program tomorrow  Medications changes/added/denied? No    Treatment session number: na    Individual Case Management Visit provided today?  attempted    [facility]ations follow up physician's orders: na

## 2020-09-02 ENCOUNTER — DOCUMENTATION (OUTPATIENT)
Dept: PSYCHOLOGY | Facility: CLINIC | Age: 35
End: 2020-09-02

## 2020-09-02 NOTE — PROGRESS NOTES
Assessment/Plan:       Diagnoses and all orders for this visit:     Major depressive disorder, recurrent, moderate (HCC)     Generalized anxiety disorder            Subjective:      Patient ID: Mic Magana is a 38 y o  female      [facility] Treatment Plan   AREAS OF NEED: Depression as evidenced by feeling overwhelmed, disorganized, having difficulty following through with goals for herself, tearfulness, anxiety, poor sleep, isolation related to mother's health and multiple losses  Date Initiated: 08/24/2020     Strengths: wants to feel better, cares for others         LONG TERM GOAL:   Date Initiated: 8/24/2020  1 0 I will identify 3 signs that my mood has consistently improvement and I am more productive in my day to day life  Target Date: 9/21/2020  Completion Date: AMA DISCHARGE 09/02/20      SHORT TERM OBJECTIVES:      Date Initiated: 8/24/2020  1 1 I will identify 3 mindfulness and/or distress tolerance skills I can utilize to refocus when overwhelmed  Revision Date:   Target Date: 9/3/2020  Completion Date: AMA DISCHARGE 09/02/20        Date Initiated: 8/24/2020  1 2 I will identify 3 steps I can take to focus on self-care and my own personal long term goals each day  Revision Date:   Target Date: 9/3/2020  Completion Date: AMA DISCHARGE 09/02/20        Date Initiated: 8/24/2020  1 3 I will take medications as prescribed and share questions and concerns if arise  Revision Date:   Target Date: 9/3/2020  Completion Date: AMA DISCHARGE 09/02/20        Date Initiated: 8/24/2020  1 4 I will identify 3 ways my supports can assist in my recovery and agree to staff/support contact as indicated      Revision Date:   Target Date: 9/3/2020  Completion Date: AMA DISCHARGE 09/02/20             7 DAY REVISION:     Date Initiated:  Revision Date:   Target Date:   Completion Date:        PSYCHIATRY:  Date Initiated: 8/24/2020  Medication Management and Education       Revision Date:   The person(s) responsible for carrying out the plan is Rebeca Bar MD     NURSING:   Date Initiated: 8/24/2020  1 1,1 2,1 3,1 4 This RN will provide daily wellness group five days weekly to educate BATISTA ReligionSMILEY ROBB on S/S of her diagnoses and medications used in treatment  Revision Date:  The person(s) responsible for carrying out the plan is Letha Dumont RN     PSYCHOLOGY:   Date Initiated: 8/24/2020       1 1, 1 2, 1 4 Provide psychotherapy group 5 times per week to allow opportunity for LYNNE ROBB  to explore stressors and ways of coping  Revision Date:   The person(s) responsible for carrying out the plan is Margaret Pichardo MA, LPC     ALLIED THERAPY:   Date Initiated: 8/24/2020  1 1,1 2 Engage BATISTA ReligionSMILEY ROBB in AT group 5 times daily to encourage development and use of wellness tools to decrease symptoms and promote recovery through meaningful activity  Revision Date:   The person(s) responsible for carrying out the plan is DIEGO Cramer      CASE MANAGEMENT:   Date Initiated: 8/24/2020      1 0 This  will meet with LYNNE ROBB  3-4 times weekly to assess treatment progress, discharge planning, connection to community supports and UR as indicated  Revision Date:   The person(s) responsible for carrying out the plan is Andrea CORTEZ     TREATMENT REVIEW/COMMENTS:      DISCHARGE CRITERIA:Identify 3 signs of progress and complete relapse prevention plan      DISCHARGE PLAN: OP services  Estimated Length of Stay:10 treatment days      Diagnosis and Treatment Plan explained to Ирина Iniguez relates understanding diagnosis and is agreeable to Treatment Plan             CLIENT COMMENTS / Please share your thoughts, feelings, need and/or experiences regarding your treatment plan: _____________________________________________________________________________________________________________________________________________________________________________________________________________________________________________________________________________________________________________________ Date/Time: ______________      Patient Signature: _________________________________      Date/Time: ______________       Signature: _________________________________      Date/Time: ______________

## 2020-09-02 NOTE — PROGRESS NOTES
Subjective:     Patient ID: Miriam Chavis is a 38 y o  female  [facility] Clinical Progress Notes      Specialized Services Documentation  Therapist must complete separate progress note for each specific clinical activity in which the individual participated during the day  Case Management Note    [facility]    Current suicide risk : Low     26 Third no show; no call  Sent email explaining policy and discharge  Miriam Chavis identified struggle with her schedule and understating policy  Discharge AMA due to non-attendance  Medications changes/added/denied? No    Treatment session number: na    Individual Case Management Visit provided today?  No    In[facility]ations follow up physician's orders: DC AMA due to non-attendance - Dr Mauro Nguyen MD

## 2020-09-02 NOTE — PROGRESS NOTES
Subjective:     Patient ID: Neo Dahl is a 38 y o  female  [facility] Discharge Summary:   Admission Date: 8/24/2020  Patient was referred by OP Intake  Discharge Date: 09/02/20   Was this a routine discharge? no - [facility] non-attendance  Diagnosis: Axis I: F31 1 MDD: F 39 1 EMILY   Treating Physician: Dr Tad Arevalo MD; Dr Farooq Crump MD  Treatment Complications: non-compliance with attendance and never started medication to our knowledge    Presenting Need: Per Dr Amaury Estevez Doe Vazquez is a 38 y  o  female with GERD, thyroid nodule, COPD, hypertension, anxiety and depression referred by Dayne Izquierdo is feeling very depressed, she is struggling   Her  and grandmother passed away, both in 36   Her mother is on dialysis and at this time she is in the hospital    Onset of symptoms was  a few years ago with gradually worsening course since that time  Psychosocial Stressors: family   She states that she had been depressed for a long time, she lost her  grandmother in 2017, her mother is in the hospital and her mother is her bigger support   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   She has crying spells, she sleep only with medication, she has fluctuating appetite, she feels hopeless at times, she has little energy and poor concentration   Her primary doctor placing Wellbutrin long time ago but she only took for several days   She also has been in Lexapro, several years ago   TodayKathy Vazquez feels anxious and depressed, she cries during the interview, she denies any suicidal thoughts plans or intent, she denies any psychotic symptoms, she denies any history manic episodes   Her PHQ-9 is 19  She states that she needs support and wants to learn coping skills    Per this writer:  Neo Dahl reached out to treatment per recommendation of PCP and her mother to "get help"  She was labile and disorganized during assessment   She reported worsening symptoms since July which is 3rd anniversary of  and grandmother's death  Tearful, circumstantial, and voiced being overwhelmed  Recent stressor is health of her mother and isolation  Per Lisbeth Morejon:   "I'm overwhelmed"      Course of treatment includes:    group counseling, medication management, individual case management, allied therapy, psychoeducation, psychiatric evaluation and individual therapy     Treatment Progress:   Lisbeth Morejon attended 4 our of 7 treatment days scheduled missing 3 days without calling this week due to over-sleeping and then not contacting   Treatment objectives encouraged her to look at self-care and mindfulness/distress tolerance skills  In session, she needed much redirection as she was very verbal and attempted to be a "co-therapist"  Her mood and presentation was labile  She reported that she had not picked up the medication prescribed by Dr Madina Siddiqui MD  She was continuing to use THC  Aftercare recommendations include:    She was discharged AMA and encouraged to explore providers in her network - she stated she had an appointment on 9/14/2020  With Lin Clifford through the Integration program at her PCP office    Discharge Medications include:  Current Outpatient Medications:     albuterol (PROVENTIL HFA,VENTOLIN HFA) 90 mcg/act inhaler, Inhale 2 puffs every 4 (four) hours as needed, Disp: , Rfl:     ALPRAZolam (XANAX) 1 mg tablet, take 1 tablet by mouth twice a day, Disp: 60 tablet, Rfl: 3    buPROPion (WELLBUTRIN XL) 150 mg 24 hr tablet, Take 1 tablet (150 mg total) by mouth daily, Disp: 30 tablet, Rfl: 1    fexofenadine (ALLEGRA) 180 MG tablet, Take 1 tablet (180 mg total) by mouth daily (Patient not taking: Reported on 8/24/2020), Disp: 30 tablet, Rfl: 1    fluticasone-vilanterol (BREO ELLIPTA) 200-25 MCG/INH inhaler, Inhale 1 puff daily Rinse mouth after use , Disp: , Rfl:     Lidocaine 4 % PTCH, Apply 1 patch topically every 12 (twelve) hours as needed, Disp: , Rfl:     lisinopril (ZESTRIL) 40 mg tablet, take 1 tablet by mouth once daily, Disp: 90 tablet, Rfl: 3    mupirocin (BACTROBAN) 2 % ointment, Apply topically 3 (three) times a day for 7 days, Disp: 22 g, Rfl: 0    omeprazole (PriLOSEC) 40 MG capsule, Take 40 mg by mouth daily, Disp: , Rfl:     tiotropium (SPIRIVA RESPIMAT) 2 5 MCG/ACT AERS inhaler, Inhale 2 puffs daily, Disp: , Rfl:

## 2020-09-10 DIAGNOSIS — F41.9 ANXIETY: ICD-10-CM

## 2020-09-10 RX ORDER — ALPRAZOLAM 1 MG/1
TABLET ORAL
Qty: 60 TABLET | Refills: 0 | Status: SHIPPED | OUTPATIENT
Start: 2020-09-10 | End: 2020-10-13

## 2020-10-12 DIAGNOSIS — F41.9 ANXIETY: ICD-10-CM

## 2020-10-13 RX ORDER — ALPRAZOLAM 1 MG/1
TABLET ORAL
Qty: 60 TABLET | Refills: 0 | Status: SHIPPED | OUTPATIENT
Start: 2020-10-13 | End: 2020-11-16

## 2020-11-14 ENCOUNTER — NURSE TRIAGE (OUTPATIENT)
Dept: OTHER | Facility: OTHER | Age: 35
End: 2020-11-14

## 2020-11-14 DIAGNOSIS — F41.9 ANXIETY: ICD-10-CM

## 2020-11-16 RX ORDER — ALPRAZOLAM 1 MG/1
TABLET ORAL
Qty: 60 TABLET | Refills: 3 | Status: SHIPPED | OUTPATIENT
Start: 2020-11-16 | End: 2021-03-12 | Stop reason: SDUPTHER

## 2020-12-01 DIAGNOSIS — J44.9 CHRONIC OBSTRUCTIVE PULMONARY DISEASE, UNSPECIFIED COPD TYPE (HCC): Primary | ICD-10-CM

## 2020-12-01 RX ORDER — ALBUTEROL SULFATE 90 UG/1
AEROSOL, METERED RESPIRATORY (INHALATION)
Qty: 8.5 G | Refills: 2 | Status: SHIPPED | OUTPATIENT
Start: 2020-12-01 | End: 2021-03-25

## 2021-01-07 ENCOUNTER — TELEPHONE (OUTPATIENT)
Dept: FAMILY MEDICINE CLINIC | Facility: CLINIC | Age: 36
End: 2021-01-07

## 2021-01-07 NOTE — TELEPHONE ENCOUNTER
Pt lost her medicine and she wants to know if we can call the pharmacy and give the ok so she can get a early refill

## 2021-01-08 NOTE — TELEPHONE ENCOUNTER
If she is talking about Lorazepam -we cannot refil that ahead of time whether it is lost or stollen due to CDS contract   Other medications we can , but insurance may not pay for them earlier-please find out which ones and pend them, thanks

## 2021-03-12 ENCOUNTER — NURSE TRIAGE (OUTPATIENT)
Dept: OTHER | Facility: OTHER | Age: 36
End: 2021-03-12

## 2021-03-12 DIAGNOSIS — F41.9 ANXIETY: ICD-10-CM

## 2021-03-12 RX ORDER — ALPRAZOLAM 1 MG/1
1 TABLET ORAL 2 TIMES DAILY
Qty: 6 TABLET | Refills: 0 | Status: SHIPPED | OUTPATIENT
Start: 2021-03-12 | End: 2021-03-15 | Stop reason: SDUPTHER

## 2021-03-13 NOTE — TELEPHONE ENCOUNTER
Regarding: Med  refill  ----- Message from [facility] sent at 3/12/2021  7:20 PM EST -----  "I do not have any more of my aprazolam and I am concerned about what will happen if I do not take it "

## 2021-03-13 NOTE — TELEPHONE ENCOUNTER
Reason for Disposition   [1] Request for URGENT new prescription or refill of "essential" medication (i e , likelihood of harm to patient if not taken) AND [2] triager unable to fill per unit policy    Answer Assessment - Initial Assessment Questions  1  NAME of MEDICATION: "What medicine are you calling about?"      Xanax  2  QUESTION: "What is your question?"      "I need a refill, thought I had more refills left but I don't and I'm totally out of them "  Last refill 2/11/21  3    PRESCRIBING HCP: "Who prescribed it?" Reason: if prescribed by specialist, call should be referred to that group  Dr Pritesh Morris  4   SYMPTOMS: "Do you have any symptoms?"      Anxiety, palpitations at times    Protocols used: MEDICATION QUESTION CALL-ADULT-

## 2021-03-15 ENCOUNTER — OFFICE VISIT (OUTPATIENT)
Dept: FAMILY MEDICINE CLINIC | Facility: CLINIC | Age: 36
End: 2021-03-15
Payer: COMMERCIAL

## 2021-03-15 VITALS
WEIGHT: 194.4 LBS | TEMPERATURE: 97.8 F | RESPIRATION RATE: 18 BRPM | DIASTOLIC BLOOD PRESSURE: 96 MMHG | HEART RATE: 78 BPM | SYSTOLIC BLOOD PRESSURE: 140 MMHG | BODY MASS INDEX: 35.77 KG/M2 | OXYGEN SATURATION: 98 % | HEIGHT: 62 IN

## 2021-03-15 DIAGNOSIS — Z00.01 ENCOUNTER FOR GENERAL ADULT MEDICAL EXAMINATION WITH ABNORMAL FINDINGS: Primary | ICD-10-CM

## 2021-03-15 DIAGNOSIS — J44.9 COPD WITH ASTHMA (HCC): ICD-10-CM

## 2021-03-15 DIAGNOSIS — E66.01 SEVERE OBESITY (BMI 35.0-39.9) WITH COMORBIDITY (HCC): ICD-10-CM

## 2021-03-15 DIAGNOSIS — N60.02 BILATERAL BREAST CYSTS: ICD-10-CM

## 2021-03-15 DIAGNOSIS — F41.8 MIXED ANXIETY DEPRESSIVE DISORDER: ICD-10-CM

## 2021-03-15 DIAGNOSIS — G47.01 INSOMNIA DUE TO MEDICAL CONDITION: ICD-10-CM

## 2021-03-15 DIAGNOSIS — N60.01 BILATERAL BREAST CYSTS: ICD-10-CM

## 2021-03-15 DIAGNOSIS — F41.9 ANXIETY: ICD-10-CM

## 2021-03-15 DIAGNOSIS — R14.2 BELCHING: ICD-10-CM

## 2021-03-15 PROBLEM — J44.89 COPD WITH ASTHMA: Status: ACTIVE | Noted: 2020-04-27

## 2021-03-15 PROCEDURE — 99213 OFFICE O/P EST LOW 20 MIN: CPT | Performed by: FAMILY MEDICINE

## 2021-03-15 PROCEDURE — 99395 PREV VISIT EST AGE 18-39: CPT | Performed by: FAMILY MEDICINE

## 2021-03-15 RX ORDER — FLUTICASONE FUROATE AND VILANTEROL 200; 25 UG/1; UG/1
1 POWDER RESPIRATORY (INHALATION) DAILY
Qty: 1 INHALER | Refills: 1 | Status: SHIPPED | OUTPATIENT
Start: 2021-03-15 | End: 2021-07-12

## 2021-03-15 RX ORDER — ALPRAZOLAM 1 MG/1
TABLET ORAL
Qty: 60 TABLET | OUTPATIENT
Start: 2021-03-15

## 2021-03-15 RX ORDER — HYDROXYZINE HYDROCHLORIDE 25 MG/1
25 TABLET, FILM COATED ORAL EVERY 6 HOURS PRN
Qty: 120 TABLET | Refills: 0 | Status: SHIPPED | OUTPATIENT
Start: 2021-03-15 | End: 2021-06-16

## 2021-03-15 RX ORDER — ALPRAZOLAM 0.5 MG/1
1 TABLET ORAL 2 TIMES DAILY PRN
Qty: 120 TABLET | Refills: 0 | Status: SHIPPED | OUTPATIENT
Start: 2021-03-15 | End: 2021-04-14

## 2021-03-15 RX ORDER — SIMETHICONE 180 MG
180 CAPSULE ORAL EVERY 12 HOURS PRN
Qty: 30 CAPSULE | Refills: 0 | Status: SHIPPED | OUTPATIENT
Start: 2021-03-15 | End: 2021-06-16

## 2021-03-15 RX ORDER — FLUOXETINE HYDROCHLORIDE 20 MG/1
20 CAPSULE ORAL DAILY
Qty: 30 CAPSULE | Refills: 1 | Status: SHIPPED | OUTPATIENT
Start: 2021-03-15 | End: 2021-06-16
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
## 2021-03-15 NOTE — PATIENT INSTRUCTIONS
Wellness Visit for Adults   AMBULATORY CARE:   A wellness visit  is when you see your healthcare provider to get screened for health problems  Your healthcare provider will also give you advice on how to stay healthy  Write down your questions so you remember to ask them  Ask your healthcare provider how often you should have a wellness visit  What happens at a wellness visit:  Your healthcare provider will ask about your health, and your family history of health problems  This includes high blood pressure, heart disease, and cancer  He or she will ask if you have symptoms that concern you, if you smoke, and about your mood  You may also be asked about your intake of medicines, supplements, food, and alcohol  Any of the following may be done:  · Your weight  will be checked  Your height may also be checked so your body mass index (BMI) can be calculated  Your BMI shows if you are at a healthy weight  · Your blood pressure  and heart rate will be checked  Your temperature may also be checked  · Blood and urine tests  may be done  Blood tests may be done to check your cholesterol levels  Abnormal cholesterol levels increase your risk for heart disease and stroke  You may also need a blood or urine test to check for diabetes if you are at increased risk  Urine tests may be done to look for signs of an infection or kidney disease  · A physical exam  includes checking your heartbeat and lungs with a stethoscope  Your healthcare provider may also check your skin to look for sun damage  · Screening tests  may be recommended  A screening test is done to check for diseases that may not cause symptoms  The screening tests you may need depend on your age, gender, family history, and lifestyle habits  For example, colorectal screening may be recommended if you are 48years old or older  Screening tests you need if you are a woman:   · A Pap smear  is used to screen for cervical cancer   Pap smears are usually done every 3 to 5 years depending on your age  You may need them more often if you have had abnormal Pap smear test results in the past  Ask your healthcare provider how often you should have a Pap smear  · A mammogram  is an x-ray of your breasts to screen for breast cancer  Experts recommend mammograms every 2 years starting at age 48 years  You may need a mammogram at age 52 years or younger if you have an increased risk for breast cancer  Talk to your healthcare provider about when you should start having mammograms and how often you need them  Vaccines you may need:   · Get an influenza vaccine  every year  The influenza vaccine protects you from the flu  Several types of viruses cause the flu  The viruses change over time, so new vaccines are made each year  · Get a tetanus-diphtheria (Td) booster vaccine  every 10 years  This vaccine protects you against tetanus and diphtheria  Tetanus is a severe infection that may cause painful muscle spasms and lockjaw  Diphtheria is a severe bacterial infection that causes a thick covering in the back of your mouth and throat  · Get a human papillomavirus (HPV) vaccine  if you are female and aged 23 to 32 or male 23 to 24 and never received it  This vaccine protects you from HPV infection  HPV is the most common infection spread by sexual contact  HPV may also cause vaginal, penile, and anal cancers  · Get a pneumococcal vaccine  if you are aged 72 years or older  The pneumococcal vaccine is an injection given to protect you from pneumococcal disease  Pneumococcal disease is an infection caused by pneumococcal bacteria  The infection may cause pneumonia, meningitis, or an ear infection  · Get a shingles vaccine  if you are 60 or older, even if you have had shingles before  The shingles vaccine is an injection to protect you from the varicella-zoster virus  This is the same virus that causes chickenpox   Shingles is a painful rash that develops in people who had chickenpox or have been exposed to the virus  How to eat healthy:  My Plate is a model for planning healthy meals  It shows the types and amounts of foods that should go on your plate  Fruits and vegetables make up about half of your plate, and grains and protein make up the other half  A serving of dairy is included on the side of your plate  The amount of calories and serving sizes you need depends on your age, gender, weight, and height  Examples of healthy foods are listed below:  · Eat a variety of vegetables  such as dark green, red, and orange vegetables  You can also include canned vegetables low in sodium (salt) and frozen vegetables without added butter or sauces  · Eat a variety of fresh fruits , canned fruit in 100% juice, frozen fruit, and dried fruit  · Include whole grains  At least half of the grains you eat should be whole grains  Examples include whole-wheat bread, wheat pasta, brown rice, and whole-grain cereals such as oatmeal     · Eat a variety of protein foods such as seafood (fish and shellfish), lean meat, and poultry without skin (turkey and chicken)  Examples of lean meats include pork leg, shoulder, or tenderloin, and beef round, sirloin, tenderloin, and extra lean ground beef  Other protein foods include eggs and egg substitutes, beans, peas, soy products, nuts, and seeds  · Choose low-fat dairy products such as skim or 1% milk or low-fat yogurt, cheese, and cottage cheese  · Limit unhealthy fats  such as butter, hard margarine, and shortening  Exercise:  Exercise at least 30 minutes per day on most days of the week  Some examples of exercise include walking, biking, dancing, and swimming  You can also fit in more physical activity by taking the stairs instead of the elevator or parking farther away from stores  Include muscle strengthening activities 2 days each week  Regular exercise provides many health benefits   It helps you manage your weight, and decreases your risk for type 2 diabetes, heart disease, stroke, and high blood pressure  Exercise can also help improve your mood  Ask your healthcare provider about the best exercise plan for you  General health and safety guidelines:   · Do not smoke  Nicotine and other chemicals in cigarettes and cigars can cause lung damage  Ask your healthcare provider for information if you currently smoke and need help to quit  E-cigarettes or smokeless tobacco still contain nicotine  Talk to your healthcare provider before you use these products  · Limit alcohol  A drink of alcohol is 12 ounces of beer, 5 ounces of wine, or 1½ ounces of liquor  · Lose weight, if needed  Being overweight increases your risk of certain health conditions  These include heart disease, high blood pressure, type 2 diabetes, and certain types of cancer  · Protect your skin  Do not sunbathe or use tanning beds  Use sunscreen with a SPF 15 or higher  Apply sunscreen at least 15 minutes before you go outside  Reapply sunscreen every 2 hours  Wear protective clothing, hats, and sunglasses when you are outside  · Drive safely  Always wear your seatbelt  Make sure everyone in your car wears a seatbelt  A seatbelt can save your life if you are in an accident  Do not use your cell phone when you are driving  This could distract you and cause an accident  Pull over if you need to make a call or send a text message  · Practice safe sex  Use latex condoms if are sexually active and have more than one partner  Your healthcare provider may recommend screening tests for sexually transmitted infections (STIs)  · Wear helmets, lifejackets, and protective gear  Always wear a helmet when you ride a bike or motorcycle, go skiing, or play sports that could cause a head injury  Wear protective equipment when you play sports  Wear a lifejacket when you are on a boat or doing water sports      © Copyright [employer] 2020 Information is for End User's use only and may not be sold, redistributed or otherwise used for commercial purposes  All illustrations and images included in CareNotes® are the copyrighted property of A D A M [employer] Inc  or Jessa Cash  The above information is an  only  It is not intended as medical advice for individual conditions or treatments  Talk to your doctor, nurse or pharmacist before following any medical regimen to see if it is safe and effective for you  Obesity   AMBULATORY CARE:   Obesity  is when your body mass index (BMI) is greater than 30  Your healthcare provider will use your height and weight to measure your BMI  The risks of obesity include  many health problems, such as injuries or physical disability  You may need tests to check for the following:  · Diabetes    · High blood pressure or high cholesterol    · Heart disease    · Gallbladder or liver disease    · Cancer of the colon, breast, prostate, liver, or kidney    · Sleep apnea    · Arthritis or gout    Seek care immediately if:   · You have a severe headache, confusion, or difficulty speaking  · You have weakness on one side of your body  · You have chest pain, sweating, or shortness of breath  Contact your healthcare provider if:   · You have symptoms of gallbladder or liver disease, such as pain in your upper abdomen  · You have knee or hip pain and discomfort while walking  · You have symptoms of diabetes, such as intense hunger and thirst, and frequent urination  · You have symptoms of sleep apnea, such as snoring or daytime sleepiness  · You have questions or concerns about your condition or care  Treatment for obesity  focuses on helping you lose weight to improve your health  Even a small decrease in BMI can reduce the risk for many health problems  Your healthcare provider will help you set a weight-loss goal   · Lifestyle changes  are the first step in treating obesity   These include making healthy food choices and getting regular physical activity  Your healthcare provider may suggest a weight-loss program that involves coaching, education, and therapy  · Medicine  may help you lose weight when it is used with a healthy diet and physical activity  · Surgery  can help you lose weight if you are very obese and have other health problems  There are several types of weight-loss surgery  Ask your healthcare provider for more information  Be successful losing weight:   · Set small, realistic goals  An example of a small goal is to walk for 20 minutes 5 days a week  Anther goal is to lose 5% of your body weight  · Tell friends, family members, and coworkers about your goals  and ask for their support  Ask a friend to lose weight with you, or join a weight-loss support group  · Identify foods or triggers that may cause you to overeat , and find ways to avoid them  Remove tempting high-calorie foods from your home and workplace  Place a bowl of fresh fruit on your kitchen counter  If stress causes you to eat, then find other ways to cope with stress  · Keep a diary to track what you eat and drink  Also write down how many minutes of physical activity you do each day  Weigh yourself once a week and record it in your diary  Eating changes: You will need to eat 500 to 1,000 fewer calories each day than you currently eat to lose 1 to 2 pounds a week  The following changes will help you cut calories:  · Eat smaller portions  Use small plates, no larger than 9 inches in diameter  Fill your plate half full of fruits and vegetables  Measure your food using measuring cups until you know what a serving size looks like  · Eat 3 meals and 1 or 2 snacks each day  Plan your meals in advance  Amandas Eliot and eat at home most of the time  Eat slowly  Do not skip meals  Skipping meals can lead to overeating later in the day  This can make it harder for you to lose weight   Talk with a dietitian to help you make a meal plan and schedule that is right for you  · Eat fruits and vegetables at every meal   They are low in calories and high in fiber, which makes you feel full  Do not add butter, margarine, or cream sauce to vegetables  Use herbs to season steamed vegetables  · Eat less fat and fewer fried foods  Eat more baked or grilled chicken and fish  These protein sources are lower in calories and fat than red meat  Limit fast food  Dress your salads with olive oil and vinegar instead of bottled dressing  · Limit the amount of sugar you eat  Do not drink sugary beverages  Limit alcohol  Activity changes:  Physical activity is good for your body in many ways  It helps you burn calories and build strong muscles  It decreases stress and depression, and improves your mood  It can also help you sleep better  Talk to your healthcare provider before you begin an exercise program   · Exercise for at least 30 minutes 5 days a week  Start slowly  Set aside time each day for physical activity that you enjoy and that is convenient for you  It is best to do both weight training and an activity that increases your heart rate, such as walking, bicycling, or swimming  · Find ways to be more active  Do yard work and housecleaning  Walk up the stairs instead of using elevators  Spend your leisure time going to events that require walking, such as outdoor festivals or fairs  This extra physical activity can help you lose weight and keep it off  Follow up with your healthcare provider as directed: You may need to meet with a dietitian  Write down your questions so you remember to ask them during your visits  © Copyright [address] Information is for End User's use only and may not be sold, redistributed or otherwise used for commercial purposes   All illustrations and images included in CareNotes® are the copyrighted property of A D A M , Inc  or [facility] Samara Levi   The above information is an  only  It is not intended as medical advice for individual conditions or treatments  Talk to your doctor, nurse or pharmacist before following any medical regimen to see if it is safe and effective for you

## 2021-03-16 NOTE — PROGRESS NOTES
Subjective:      Patient ID: Kristian Arambula is a 43 y o  female  Mixed anxiety and depression- chronic, patient ahs been on xanax for some years now and states that she has been noticing that she sometimes has to use 1 mg xanax 3 times a day , and is worried about addiction, patient is interested in daily medications, however is very non complaints, worries about side effects of all medications, as well as has erratic schedule  She is trying OTC supplements and kava root etc    Hypertension-chronic, uncontrolled, has stress, continues to smoke, she forgets to take medications daily,    COPD- does not use daily breo, chronic ,uncontrolled, not compliant with breo, has to use albuterol multiple times a day    Obesity- trying to lose weight, but not compliant with diet or exercise, chronic    Breast lesion- needed biopsy, every visit she does mention and is aware that she needed USGbiopsy but does not schedule it, she is aware of the risk and acknowledges it as mentioned several times in the past visits , she is aware that breast cancer can cause death ,but did not schedule the biopsy    Belching- constantly , new ,through out the visit, denies drinking carbonated drinks,   States she has right sided costochondral pain-chronic , she is worried since her ex   of thymus cancer    Anxiety  Symptoms include nervous/anxious behavior  Patient reports no chest pain, dizziness, nausea, palpitations or shortness of breath             Past Medical History:   Diagnosis Date    Allergies     Anxiety 2018    Female hirsutism     GERD (gastroesophageal reflux disease)     Head injury     Hypertension 2018    Papanicolaou smear for cervical cancer screening 2017    NL, no h/o of abn pap that she can recall    STD (female)     Thyroid nodule 2018    Tobacco user 2018       Family History   Problem Relation Age of Onset    Hypertension Mother     Kidney disease Mother     Cirrhosis Mother    Reid Aceves Dialysis Mother     Diabetes Mother     Anxiety disorder Mother     Depression Mother     Melanoma Paternal Uncle     Stomach cancer Other     Colon cancer Other         Passed age 34   Eduar Galvan Anxiety disorder Father     Depression Father     Drug abuse Father        Past Surgical History:   Procedure Laterality Date    NO PAST SURGERIES          reports that she has been smoking cigarettes  She has a 25 00 pack-year smoking history  She has never used smokeless tobacco  She reports current alcohol use  She reports current drug use  Drug: Marijuana        Current Outpatient Medications:     albuterol (PROVENTIL HFA,VENTOLIN HFA) 90 mcg/act inhaler, inhale 2 puffs by mouth every 4 hours if needed, Disp: 8 5 g, Rfl: 2    ALPRAZolam (XANAX) 0 5 mg tablet, Take 2 tablets (1 mg total) by mouth 2 (two) times a day as needed for anxiety, Disp: 120 tablet, Rfl: 0    lisinopril (ZESTRIL) 40 mg tablet, take 1 tablet by mouth once daily, Disp: 90 tablet, Rfl: 3    aluminum-magnesium hydroxide 200-200 MG/5ML suspension, Take 10 mL by mouth 2 (two) times a day as needed for heartburn for up to 18 days, Disp: 355 mL, Rfl: 0    FLUoxetine (PROzac) 20 mg capsule, Take 1 capsule (20 mg total) by mouth daily, Disp: 30 capsule, Rfl: 1    fluticasone-vilanterol (BREO ELLIPTA) 200-25 MCG/INH inhaler, Inhale 1 puff daily Rinse mouth after use , Disp: 1 Inhaler, Rfl: 1    hydrOXYzine HCL (ATARAX) 25 mg tablet, Take 1 tablet (25 mg total) by mouth every 6 (six) hours as needed for anxiety (and 2 at bedtime if needed for insomnia), Disp: 120 tablet, Rfl: 0    mupirocin (BACTROBAN) 2 % ointment, Apply topically 3 (three) times a day for 7 days, Disp: 22 g, Rfl: 0    simethicone (MYLICON,GAS-X) 620 MG capsule, Take 1 capsule (180 mg total) by mouth every 12 (twelve) hours as needed for flatulence (bloating, gas), Disp: 30 capsule, Rfl: 0    The following portions of the patient's history were reviewed and updated as appropriate: allergies, current medications, past family history, past medical history, past social history, past surgical history and problem list     Review of Systems   Constitutional: Negative for activity change, appetite change, chills, diaphoresis, fatigue and fever  HENT: Negative for congestion, facial swelling, mouth sores, rhinorrhea, sinus pressure, sneezing, sore throat, tinnitus, trouble swallowing and voice change  Eyes: Negative for pain, discharge, redness and visual disturbance  Respiratory: Positive for wheezing  Negative for cough and shortness of breath  Cardiovascular: Negative for chest pain, palpitations and leg swelling  Chest area pain-right costochondral pain   Gastrointestinal: Negative for abdominal pain, blood in stool, constipation, diarrhea and nausea  Endocrine: Negative for cold intolerance and heat intolerance  Genitourinary: Negative for dysuria, hematuria and urgency  Musculoskeletal: Negative for arthralgias, back pain and myalgias  Skin: Negative for rash and wound  Allergic/Immunologic: Negative for environmental allergies and food allergies  Neurological: Negative for dizziness, tremors, seizures, syncope, facial asymmetry, speech difficulty, weakness, light-headedness, numbness and headaches  Hematological: Negative for adenopathy  Psychiatric/Behavioral: Positive for sleep disturbance  Negative for agitation and behavioral problems  The patient is nervous/anxious  Objective:    /96 (BP Location: Left arm, Patient Position: Sitting, Cuff Size: Adult)   Pulse 78   Temp 97 8 °F (36 6 °C) (Temporal)   Resp 18   Ht 5' 2" (1 575 m)   Wt 88 2 kg (194 lb 6 4 oz)   SpO2 98%   BMI 35 56 kg/m²      Physical Exam  Vitals signs and nursing note reviewed  Constitutional:       Appearance: Normal appearance  She is well-developed  She is obese  HENT:      Head: Normocephalic and atraumatic        Right Ear: Tympanic membrane and external ear normal       Left Ear: Tympanic membrane and external ear normal       Nose: Nose normal    Eyes:      General: No scleral icterus  Right eye: No discharge  Left eye: No discharge  Conjunctiva/sclera: Conjunctivae normal       Pupils: Pupils are equal, round, and reactive to light  Neck:      Musculoskeletal: Normal range of motion and neck supple  Thyroid: No thyromegaly  Cardiovascular:      Rate and Rhythm: Normal rate and regular rhythm  Heart sounds: Normal heart sounds  No murmur  No gallop  Pulmonary:      Effort: Pulmonary effort is normal       Breath sounds: Normal breath sounds  No wheezing or rales  Chest:      Chest wall: Tenderness (right constochondral area tenderness) present  Abdominal:      General: There is no distension  Palpations: Abdomen is soft  Tenderness: There is no abdominal tenderness  Musculoskeletal:         General: No tenderness or deformity  Lymphadenopathy:      Cervical: No cervical adenopathy  Skin:     Findings: No erythema or rash  Neurological:      General: No focal deficit present  Mental Status: She is alert and oriented to person, place, and time  Mental status is at baseline  Psychiatric:         Mood and Affect: Mood normal          Behavior: Behavior normal              Assessment/Plan:         Diagnoses and all orders for this visit:    Mixed anxiety depressive disorder  -     FLUoxetine (PROzac) 20 mg capsule; Take 1 capsule (20 mg total) by mouth daily  -     CBC and differential; Future  -     Comprehensive metabolic panel; Future  -     Lipid panel; Future  -     TSH, 3rd generation with Free T4 reflex; Future  -     hydrOXYzine HCL (ATARAX) 25 mg tablet; Take 1 tablet (25 mg total) by mouth every 6 (six) hours as needed for anxiety (and 2 at bedtime if needed for insomnia)    Bilateral breast cysts  -     Mammo diagnostic bilateral w cad;  Future    [facility]  - fluticasone-vilanterol (BREO ELLIPTA) 200-25 MCG/INH inhaler; Inhale 1 puff daily Rinse mouth after use  Belching  -     aluminum-magnesium hydroxide 200-200 MG/5ML suspension; Take 10 mL by mouth 2 (two) times a day as needed for heartburn for up to 18 days  -     simethicone (MYLICON,GAS-X) 260 MG capsule; Take 1 capsule (180 mg total) by mouth every 12 (twelve) hours as needed for flatulence (bloating, gas)    Anxiety  -     ALPRAZolam (XANAX) 0 5 mg tablet; Take 2 tablets (1 mg total) by mouth 2 (two) times a day as needed for anxiety  -     TSH, 3rd generation with Free T4 reflex; Future    Severe obesity (BMI 35 0-39  9) with comorbidity ([address] )    Insomnia due to medical condition  -     hydrOXYzine HCL (ATARAX) 25 mg tablet; Take 1 tablet (25 mg total) by mouth every 6 (six) hours as needed for anxiety (and 2 at bedtime if needed for insomnia)    Encounter for general adult medical examination with abnormal findings        Check labs,  Advised to get mammogram bilat diagnostic, she migh need USG breast bilateral    Start fluoxetine daily,  I have discussed FDA blackbox warning of increased risk of suicides with SSRI's and abruptly stopping it or changing the dose  She verbalized understanding and is accepting of the risks  Advised to start daily Breo and increase compliance with lisinopril daily  Prn maalox and simethicone for belching  Avoid excess tea, carbonated drinks and artificial sweetners  Advised smoking cessation, she verbalized understanding and is willing to quit and will cut back  Read package inserts for all medications before starting a new medications, call me if you have any questions  Patient was given opportunity to ask questions and all questions were answered

## 2021-03-24 NOTE — PSYCH
[facility] ED  EMERGENCY DEPARTMENT ENCOUNTER      Pt Name: Jag Cleary  MRN: [id number]  Mariamgfgin 1973  Date of evaluation: 3/24/2021  Provider: Jung Burrell MD    CHIEF COMPLAINT       Chief Complaint   Patient presents with    Headache         HISTORY OF PRESENT ILLNESS   (Location/Symptom, Timing/Onset, Context/Setting, Quality, Duration, Modifying Factors, Severity)  Note limiting factors. 57-year-old female presenting with headache. Patient notes an intermittent, sharp, shooting pain on the left side of the head that started around 2a. History of migraines but this feels different. Not currently having any symptoms. Has not tried anything for relief. No vision disturbance. Nothing noted to make symptoms better or worse. Nursing Notes were reviewed. REVIEW OF SYSTEMS    (2-9 systems for level 4, 10 or more for level 5)     Review of Systems   Neurological: Positive for headaches. All other systems reviewed and are negative. Except as noted above the remainder of the review of systems was reviewed and negative. PAST MEDICAL HISTORY     Past Medical History:   Diagnosis Date    Anxiety     Arthritis     Asthma     Bronchopneumonia     Cancer ([address]     renal    Cerebral artery occlusion with cerebral infarction (HCC)     Chronic bilateral low back pain with sciatica     Chronic kidney disease     Chronic obstructive lung disease ([address] 7/26/2019    Depression     Fibromyalgia     Hypertension     Insulin dependent type 2 diabetes mellitus, uncontrolled ([address] 8/3/2018    Localized enlarged lymph nodes 10/26/2018    Mixed headache     Pure hyperglyceridemia 5/19/2017    Sarcoidosis     Sleep apnea     does not wear cpap    Thyroid goiter          SURGICAL HISTORY       Past Surgical History:   Procedure Laterality Date    BRONCHOSCOPY  10/26/2018    DR. STEARNS    KIDNEY REMOVAL Right 08/2016    KIDNEY REMOVAL Right 2016    LUNG BIOPSY Right Virtual Regular Visit      Assessment/Plan:    Problem List Items Addressed This Visit        Other    Major depressive disorder, recurrent, moderate ([facility] [address] ) - Primary    Generalized anxiety disorder               Reason for visit is VIRTUAL PHP GROUP DUE TO COVID-19  Encounter provider [facility]    Provider located at [address] [place] [phone]      Recent Visits  Date Type Provider Dept   08/27/20 Office Visit [facility]   08/26/20 Office Visit [facility]   08/25/20 Office Visit [facility]   08/24/20 Office Visit [facility]   Showing recent visits within past 7 days and meeting all other requirements     Future Appointments  No visits were found meeting these conditions  Showing future appointments within next 150 days and meeting all other requirements        The patient was identified by name and date of birth  Zulema Lainez was informed that this is a telemedicine visit and that the visit is being conducted through [employer]  My office door was closed  No one else was in the room  She acknowledged consent and understanding of privacy and security of the video platform  The patient has agreed to participate and understands they can discontinue the visit at any time  Patient is aware this is a billable service       Subjective  Zulema Lainez is a 38 y o  female      HPI     Past Medical History:   Diagnosis Date    Allergies     Anxiety 05/2018    Female hirsutism     GERD (gastroesophageal reflux disease)     Head injury     Hypertension 2018    Papanicolaou smear for cervical cancer screening 2017    NL, no h/o of abn pap that she can recall    STD (female)     Thyroid nodule 05/2018    Tobacco user 05/2018       Past Surgical History:   Procedure Laterality Date    NO PAST 10/2018    THYROID LOBECTOMY Right 6/13/14    THYROIDECTOMY  02/21/2019    DR. MAGDALENO    THYROIDECTOMY  2018    URETER STENT PLACEMENT Left 08/2016         CURRENT MEDICATIONS       Current Discharge Medication List      CONTINUE these medications which have NOT CHANGED    Details   metoclopramide (REGLAN) 10 MG tablet Take 1 tablet by mouth 2 times daily as needed (Headache)  Qty: 60 tablet, Refills: 0      acetaminophen (TYLENOL) 500 MG tablet Take 1 tablet by mouth 4 times daily as needed for Pain  Qty: 360 tablet, Refills: 1      !! B-D ULTRAFINE III SHORT PEN 31G X 8 MM MISC USE THREE TIMES DAILY  Qty: 100 each, Refills: 5      lidocaine viscous hcl (XYLOCAINE) 2 % SOLN solution Take 15 mLs by mouth as needed for Dental Pain  Qty: 15 mL, Refills: 0      cetirizine (ZYRTEC) 10 MG tablet TAKE 1 TABLET BY MOUTH EVERY NIGHT AT BEDTIME AS NEEDED FOR ALLERGIES  Qty: 30 tablet, Refills: 5    Associated Diagnoses: Acute seasonal allergic rhinitis due to pollen      Dulaglutide (TRULICITY) 3.90 BC/6.9EO SOPN Inject 0.75 mg into the skin once a week  Qty: 4 pen, Refills: 3      Insulin Degludec (TRESIBA FLEXTOUCH) 100 UNIT/ML SOPN INJECT 40 UNITS UNDER THE SKIN NIGHTLY  Qty: 15 mL, Refills: 3    Associated Diagnoses: Type 2 diabetes mellitus with hyperglycemia, with long-term current use of insulin ([facility])      insulin lispro, 1 Unit Dial, (HUMALOG KWIKPEN) 100 UNIT/ML SOPN 4 units at each meals hold if glucose less than 150  Qty: 5 pen, Refills: 3      spironolactone (ALDACTONE) 50 MG tablet Take 1 tablet by mouth daily  Qty: 30 tablet, Refills: 3      !! OneTouch Delica Lancets 17M MISC qid  Qty: 200 each, Refills: 3      !! blood glucose test strips (ONETOUCH VERIO) strip 1 each by In Vitro route daily As needed. Qty: 100 each, Refills: 3      !! Blood Glucose Monitoring Suppl (Bakari Hwang) w/Device KIT As  Directed  Qty: 1 kit, Refills: 00      !!  Insulin Pen Needle (NOVOFINE) 32G X 6 MM MISC qid  Qty: 300 SURGERIES         Current Outpatient Medications   Medication Sig Dispense Refill    albuterol (PROVENTIL HFA,VENTOLIN HFA) 90 mcg/act inhaler Inhale 2 puffs every 4 (four) hours as needed      ALPRAZolam (XANAX) 1 mg tablet take 1 tablet by mouth twice a day 60 tablet 3    buPROPion (WELLBUTRIN XL) 150 mg 24 hr tablet Take 1 tablet (150 mg total) by mouth daily 30 tablet 1    clarithromycin (BIAXIN) 500 mg tablet Take 1 tablet (500 mg total) by mouth every 12 (twelve) hours for 10 days 20 tablet 0    fexofenadine (ALLEGRA) 180 MG tablet Take 1 tablet (180 mg total) by mouth daily (Patient not taking: Reported on 8/24/2020) 30 tablet 1    fluticasone-vilanterol (BREO ELLIPTA) 200-25 MCG/INH inhaler Inhale 1 puff daily Rinse mouth after use   Lidocaine 4 % PTCH Apply 1 patch topically every 12 (twelve) hours as needed      lisinopril (ZESTRIL) 40 mg tablet take 1 tablet by mouth once daily 90 tablet 3    mupirocin (BACTROBAN) 2 % ointment Apply topically 3 (three) times a day for 7 days 22 g 0    omeprazole (PriLOSEC) 40 MG capsule Take 40 mg by mouth daily      tiotropium (SPIRIVA RESPIMAT) 2 5 MCG/ACT AERS inhaler Inhale 2 puffs daily       No current facility-administered medications for this visit  Allergies   Allergen Reactions    Azithromycin Vomiting    Penicillins Swelling       Review of Systems    Video Exam    There were no vitals filed for this visit  Physical Exam     I spent FOUR GROUP HOURS PLUS CASE MANAGEMENT minutes with patient today in which greater than 50% of the time was spent in counseling/coordination of care regarding PHP - SEE NOTES  VIRTUAL VISIT DISCLAIMER    Tiffany Pena acknowledges that she has consented to an online visit or consultation   She understands that the online visit is based solely on information provided by her, and that, in the absence of a face-to-face physical evaluation by the physician, the diagnosis she receives is both limited and provisional in terms of accuracy and completeness  This is not intended to replace a full medical face-to-face evaluation by the physician  LYNNE ROBB understands and accepts these terms  each, Refills: 3      Continuous Blood Gluc Sensor (FREESTYLE JESSICA 14 DAY SENSOR) MISC As directed  Qty: 1 each, Refills: 00      Continuous Blood Gluc  (FREESTYLE JESSICA 14 DAY READER) LENNY As directed  Qty: 1 Device, Refills: 00      baclofen (LIORESAL) 10 MG tablet Take 1 tablet by mouth 3 times daily  Qty: 60 tablet, Refills: 0    Comments: Disregard other order  Associated Diagnoses: Back spasm      lidocaine (XYLOCAINE) 5 % ointment Apply topically BID as needed. Qty: 30 g, Refills: 0      montelukast (SINGULAIR) 10 MG tablet Take 1 tab nightly at supper for breathing/allergies  Qty: 30 tablet, Refills: 5    Associated Diagnoses: Chronic seasonal allergic rhinitis due to pollen; Moderate persistent asthma with acute exacerbation      buPROPion (WELLBUTRIN XL) 150 MG extended release tablet Take 1 tablet by mouth every morning  Qty: 30 tablet, Refills: 3    Associated Diagnoses: Anxiety      tiZANidine (ZANAFLEX) 2 MG tablet Take 1 tablet by mouth 3 times daily as needed (back pain/ spasm)  Qty: 15 tablet, Refills: 0      butalbital-acetaminophen-caffeine (FIORICET, ESGIC) -40 MG per tablet Take 1 tablet by mouth every 6 hours as needed for Headaches  Qty: 18 tablet, Refills: 0      pregabalin (LYRICA) 150 MG capsule Take 1 capsule by mouth 3 times daily for 30 days. Qty: 90 capsule, Refills: 0    Associated Diagnoses: Fibromyalgia      DULoxetine (CYMBALTA) 60 MG extended release capsule Take 60 mg by mouth every 24 hours       ALPRAZolam (XANAX) 0.25 MG tablet Take 0.25 mg by mouth nightly as needed.        pantoprazole (PROTONIX) 20 MG tablet Take 2 tablets by mouth daily  Qty: 30 tablet, Refills: 0      magnesium oxide (MAG-OX) 400 (241.3 Mg) MG TABS tablet TAKE 1/2 TABLET BY MOUTH DAILY  Qty: 30 tablet, Refills: 5    Associated Diagnoses: Low magnesium level      busPIRone (BUSPAR) 15 MG tablet TAKE 1 TABLET BY MOUTH THREE TIMES DAILY  Qty: 90 tablet, Refills: 5    Associated Diagnoses: Anxiety      albuterol sulfate HFA (PROVENTIL HFA) 108 (90 Base) MCG/ACT inhaler Inhale 2 puffs into the lungs every 6 hours as needed for Wheezing  Qty: 1 Inhaler, Refills: 3      insulin lispro (HUMALOG) 100 UNIT/ML injection vial INJECT 4 UNITS UNDER THE SKIN THREE TIMES DAILY AS NEEDED FOR HIGH BLOOD SUGAR  Qty: 10 mL, Refills: 0    Associated Diagnoses: Insulin dependent type 2 diabetes mellitus, uncontrolled ([facility])      levothyroxine (SYNTHROID) 150 MCG tablet Take 150 mcg by mouth Daily       SUMAtriptan (IMITREX) 25 MG tablet TAKE 1 TABLET BY MOUTH 1 TIME AS NEEDED FOR MIGRAINE  Qty: 9 tablet, Refills: 1    Associated Diagnoses: Migraine without status migrainosus, not intractable, unspecified migraine type      promethazine (PHENERGAN) 25 MG tablet WARNING:  May cause drowsiness. May impair ability to operate vehicles or machinery. Do not use in combination with alcohol. ! Tablet every 6 hours as needed in conjunction with Ultram for headaches  Qty: 20 tablet, Refills: 0      !! blood glucose test strips (EXACTECH TEST) strip 1 each by In Vitro route 3 times daily (Accu-check test strips) As needed.  DX:E11.65  Qty: 300 strip, Refills: 5    Associated Diagnoses: Insulin dependent type 2 diabetes mellitus, uncontrolled ([facility])      !! ONE TOUCH ULTRASOFT LANCETS MISC TEST 3 TIMES DAILY AS NEEDED  Qty: 300 each, Refills: 3    Associated Diagnoses: Insulin dependent type 2 diabetes mellitus, uncontrolled ([facility])      albuterol (PROVENTIL) (2.5 MG/3ML) 0.083% nebulizer solution Take 3 mLs by nebulization every 4 hours as needed for Wheezing  Qty: 120 each, Refills: 3      atorvastatin (LIPITOR) 40 MG tablet Take 1 tablet by mouth nightly  Qty: 30 tablet, Refills: 3      metoprolol tartrate (LOPRESSOR) 25 MG tablet Take 1 tablet by mouth 2 times daily  Qty: 60 tablet, Refills: 0      fluticasone (FLONASE) 50 MCG/ACT nasal spray 1 spray by Nasal route daily  Qty: 1 Bottle, Refills: 3    Associated Diagnoses: Allergic rhinitis, unspecified seasonality, unspecified trigger      traMADol (ULTRAM) 50 MG tablet Take 50 mg by mouth every 6 hours as needed for Pain. Insulin Syringe-Needle U-100 30G X 1/2\" 1 ML MISC 1 each by Does not apply route daily  Qty: 100 each, Refills: 3    Associated Diagnoses: Insulin dependent type 2 diabetes mellitus, uncontrolled ([facility] [address]      ! ! blood glucose test strips (ONETOUCH VERIO) strip TEST 3 TIMES DAILY AS NEEDED  Qty: 300 each, Refills: 3    Associated Diagnoses: Insulin dependent type 2 diabetes mellitus, uncontrolled ([address]      ! ! Blood Glucose Monitoring Suppl (Jaqueline Miller) w/Device KIT TEST 3 TIMES DAILY AS NEEDED  Qty: 1 kit, Refills: 0    Associated Diagnoses: Insulin dependent type 2 diabetes mellitus, uncontrolled ([facility] [address]       ! ! - Potential duplicate medications found. Please discuss with provider.           ALLERGIES     Shellfish-derived products, Ibuprofen, Ketorolac, Morphine, Other, Penicillins, Propoxyphene n-acetaminophen, and Toradol [ketorolac tromethamine]    FAMILY HISTORY       Family History   Problem Relation Age of Onset    Cancer Father     Diabetes Father     Allergy (Severe) Father     Heart Attack Father     Prostate Cancer Father     High Blood Pressure Mother     Diabetes Mother     Arthritis Mother     High Cholesterol Mother     Vision Loss Mother     Alcohol Abuse Neg Hx     Anemia Neg Hx     Arrhythmia Neg Hx     Asthma Neg Hx     Atrial Fibrillation Neg Hx     Birth Defects Neg Hx     Breast Cancer Neg Hx     Coronary Art Dis Neg Hx     Colon Cancer Neg Hx     Depression Neg Hx     Early Death Neg Hx     Hearing Loss Neg Hx     Heart Disease Neg Hx     Learning Disabilities Neg Hx     Kidney Disease Neg Hx     Mental Illness Neg Hx     Mental Retardation Neg Hx     Miscarriages / Stillbirths Neg Hx     Obesity Neg Hx     Osteoporosis Neg Hx     Stroke Neg Hx     Substance Abuse Neg Hx           SOCIAL HISTORY Social History     Socioeconomic History    Marital status: Legally      Spouse name: None    Number of children: None    Years of education: 15    Highest education level: High school graduate   Occupational History    None   Social Needs    Financial resource strain: Somewhat hard    Food insecurity     Worry: Sometimes true     Inability: Sometimes true    Transportation needs     Medical: No     Non-medical: No   Tobacco Use    Smoking status: Former Smoker     Packs/day: 0.50     Years: 15.00     Pack years: 7.50     Types: Cigarettes     Start date: 2014     Quit date: 2015     Years since quittin.1    Smokeless tobacco: Former User     Quit date: 3/23/2016   Substance and Sexual Activity    Alcohol use: Not Currently     Alcohol/week: 0.0 standard drinks     Comment: occasionally    Drug use: Yes     Frequency: 5.0 times per week     Types: Marijuana    Sexual activity: Yes     Partners: Male   Lifestyle    Physical activity     Days per week: 0 days     Minutes per session: 0 min    Stress: Very much   Relationships    Social connections     Talks on phone: Three times a week     Gets together: Never     Attends Taoism service: Never     Active member of club or organization: No     Attends meetings of clubs or organizations: Never     Relationship status:     Intimate partner violence     Fear of current or ex partner: No     Emotionally abused: No     Physically abused: No     Forced sexual activity: No   Other Topics Concern    None   Social History Narrative    None       SCREENINGS               PHYSICAL EXAM    (up to 7 for level 4, 8 or more for level 5)     ED Triage Vitals [21 0327]   BP Temp Temp Source Pulse Resp SpO2 Height Weight   128/73 97.9 °F (36.6 °C) Oral 78 20 97 % 5' 3\" (1.6 m) 260 lb (117.9 kg)       Physical Exam  Vitals signs and nursing note reviewed. Constitutional:       General: She is not in acute distress. Appearance: Normal appearance. She is well-developed. She is obese. HENT:      Head: Normocephalic and atraumatic. Mouth/Throat:      Mouth: Mucous membranes are moist.      Pharynx: Oropharynx is clear. Eyes:      Extraocular Movements: Extraocular movements intact. Conjunctiva/sclera: Conjunctivae normal.   Neck:      Musculoskeletal: Normal range of motion and neck supple. Cardiovascular:      Rate and Rhythm: Normal rate and regular rhythm. Pulmonary:      Effort: Pulmonary effort is normal.      Breath sounds: Normal breath sounds. Abdominal:      General: Bowel sounds are normal.      Palpations: Abdomen is soft. Musculoskeletal: Normal range of motion. General: No deformity. Skin:     General: Skin is warm and dry. Capillary Refill: Capillary refill takes less than 2 seconds. Neurological:      General: No focal deficit present. Mental Status: She is alert and oriented to person, place, and time. Mental status is at baseline. Cranial Nerves: No cranial nerve deficit. Motor: No weakness. Psychiatric:         Thought Content: Thought content normal.         DIAGNOSTIC RESULTS     EKG: All EKG's are interpreted by the Emergency Department Physician who either signs or Co-signs this chart in the absence of a cardiologist.    RADIOLOGY:   Non-plain film images such as CT, Ultrasound and MRI are read by the radiologist. Plain radiographic images are visualized and preliminarily interpreted by the emergency physician with the below findings:    Interpretation per the Radiologist below, if available at the time of this note:    No orders to display       LABS:  Labs Reviewed - No data to display    All other labs were within normal range or not returned as of this dictation.     EMERGENCY DEPARTMENT COURSE and DIFFERENTIAL DIAGNOSIS/MDM:   Vitals:    Vitals:    03/24/21 0327   BP: 128/73   Pulse: 78   Resp: 20   Temp: 97.9 °F (36.6 °C)   TempSrc: Oral   SpO2: 97% Weight: 260 lb (117.9 kg)   Height: 5' 3\" (1.6 m)       MDM  Number of Diagnoses or Management Options  Acute nonintractable headache, unspecified headache type  Diagnosis management comments: 38-year-old female presenting with sharp, shooting intermittent pains in the head. Reviewed previous CT scan from 3 days ago, this was normal.  Do not feel patient requires further imaging as she currently is asymptomatic. Symptoms may be consistent with trigeminal neuralgia. I did give her neurology follow-up. Patient will be discharged home in good condition. Patient has been hemodynamically stable throughout ED course and is appropriate for outpatient follow up. Patient should follow up with neurology in 2-3 days or return to ED immediately for any new or worsening symptoms. Patient is well appearing on discharge and agreeable with plan of care. Procedures    CRITICAL CARE TIME   Total Critical Care time was 0 minutes, excluding separately reportable procedures. There was a high probability of clinically significant/life threatening deterioration in the patient's condition which required my urgent intervention. FINAL IMPRESSION      1.  Acute nonintractable headache, unspecified headache type          DISPOSITION/PLAN   DISPOSITION Decision To Discharge 03/24/2021 03:40:22 AM      (Please note that portions of this note were completed with a voice recognition program.  Efforts were made to edit the dictations but occasionally words are mis-transcribed.)    Lizzette Benjamin MD (electronically signed)  Attending Emergency Physician        Lizzette Benjamin MD  03/24/21 [id number]

## 2021-03-25 DIAGNOSIS — J44.9 CHRONIC OBSTRUCTIVE PULMONARY DISEASE, UNSPECIFIED COPD TYPE (HCC): ICD-10-CM

## 2021-03-25 RX ORDER — ALBUTEROL SULFATE 90 UG/1
AEROSOL, METERED RESPIRATORY (INHALATION)
Qty: 8.5 G | Refills: 2 | Status: SHIPPED | OUTPATIENT
Start: 2021-03-25 | End: 2021-08-12

## 2021-04-13 DIAGNOSIS — F41.9 ANXIETY: ICD-10-CM

## 2021-04-14 RX ORDER — ALPRAZOLAM 1 MG/1
TABLET ORAL
Qty: 60 TABLET | Refills: 0 | Status: SHIPPED | OUTPATIENT
Start: 2021-04-14 | End: 2021-05-18

## 2021-05-14 DIAGNOSIS — I10 ESSENTIAL HYPERTENSION: ICD-10-CM

## 2021-05-14 DIAGNOSIS — F41.9 ANXIETY: ICD-10-CM

## 2021-05-14 RX ORDER — LISINOPRIL 40 MG/1
TABLET ORAL
Qty: 90 TABLET | Refills: 3 | Status: SHIPPED | OUTPATIENT
Start: 2021-05-14 | End: 2022-01-05 | Stop reason: SDUPTHER

## 2021-05-18 RX ORDER — ALPRAZOLAM 1 MG/1
TABLET ORAL
Qty: 60 TABLET | Refills: 0 | Status: SHIPPED | OUTPATIENT
Start: 2021-05-18 | End: 2021-06-14

## 2021-06-14 DIAGNOSIS — F41.9 ANXIETY: ICD-10-CM

## 2021-06-14 RX ORDER — ALPRAZOLAM 1 MG/1
TABLET ORAL
Qty: 60 TABLET | Refills: 0 | Status: SHIPPED | OUTPATIENT
Start: 2021-06-14 | End: 2021-07-15

## 2021-06-16 ENCOUNTER — OFFICE VISIT (OUTPATIENT)
Dept: FAMILY MEDICINE CLINIC | Facility: CLINIC | Age: 36
End: 2021-06-16
Payer: COMMERCIAL

## 2021-06-16 VITALS
DIASTOLIC BLOOD PRESSURE: 96 MMHG | OXYGEN SATURATION: 97 % | WEIGHT: 184.4 LBS | HEIGHT: 62 IN | HEART RATE: 82 BPM | SYSTOLIC BLOOD PRESSURE: 146 MMHG | RESPIRATION RATE: 18 BRPM | BODY MASS INDEX: 33.93 KG/M2 | TEMPERATURE: 98.4 F

## 2021-06-16 DIAGNOSIS — Z12.4 ENCOUNTER FOR SCREENING FOR CERVICAL CANCER: Primary | ICD-10-CM

## 2021-06-16 DIAGNOSIS — E03.8 SUBCLINICAL HYPOTHYROIDISM: ICD-10-CM

## 2021-06-16 DIAGNOSIS — Z11.3 SCREEN FOR STD (SEXUALLY TRANSMITTED DISEASE): ICD-10-CM

## 2021-06-16 DIAGNOSIS — F41.9 ANXIETY: ICD-10-CM

## 2021-06-16 DIAGNOSIS — Z11.59 ENCOUNTER FOR HEPATITIS C SCREENING TEST FOR LOW RISK PATIENT: ICD-10-CM

## 2021-06-16 DIAGNOSIS — I10 BENIGN ESSENTIAL HYPERTENSION: ICD-10-CM

## 2021-06-16 DIAGNOSIS — Z11.59 NEED FOR HEPATITIS B SCREENING TEST: ICD-10-CM

## 2021-06-16 DIAGNOSIS — F41.8 MIXED ANXIETY DEPRESSIVE DISORDER: ICD-10-CM

## 2021-06-16 DIAGNOSIS — Z11.4 ENCOUNTER FOR SCREENING FOR HUMAN IMMUNODEFICIENCY VIRUS (HIV): ICD-10-CM

## 2021-06-16 DIAGNOSIS — J44.9 COPD WITH ASTHMA (HCC): ICD-10-CM

## 2021-06-16 PROCEDURE — 87491 CHLMYD TRACH DNA AMP PROBE: CPT | Performed by: FAMILY MEDICINE

## 2021-06-16 PROCEDURE — 87591 N.GONORRHOEAE DNA AMP PROB: CPT | Performed by: FAMILY MEDICINE

## 2021-06-16 PROCEDURE — 99214 OFFICE O/P EST MOD 30 MIN: CPT | Performed by: FAMILY MEDICINE

## 2021-06-16 NOTE — PROGRESS NOTES
Subjective:      Patient ID: Mayra Palacios is a 43 y o  female  Mixed anxiety and depression- chronic,IMPROVED SINCE 3-4 MONTHS, patient has been on xanax for some years now and states that she has been noticing that she sometimes has to use 1 mg xanax 3 times a day , and is worried about addiction, patient is interested in daily medications, however is very non complaint, worries about side effects of all medications, as well as has erratic schedule  She is trying OTC supplements and kava root etc    Hypertension-chronic, uncontrolled, has stress, continues to smoke but did cut back significantly, takes lisinopril as needed    COPD- does not use daily breo, chronic ,improved after she has quit smoking, not compliant with breo, has to use albuterol multiple times a day    Obesity- trying to lose weight,    Breast lesion- needed biopsy, every visit she does mention and is aware that she needed USGbiopsy but does not schedule it, she is aware of the risk and acknowledges it as mentioned several times in the past visits , she is aware that breast cancer can cause death ,but did not schedule the biopsy  She is not sexually active but would like to get STD TESTING, 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, no active lesions      Past Medical History:   Diagnosis Date    Allergies     Anxiety 05/2018    Female hirsutism     GERD (gastroesophageal reflux disease)     Head injury     Hypertension 2018    Papanicolaou smear for cervical cancer screening 2017    NL, no h/o of abn pap that she can recall    STD (female)     Thyroid nodule 05/2018    Tobacco user 05/2018       Family History   Problem Relation Age of Onset    Hypertension Mother     Kidney disease Mother     Cirrhosis Mother     Dialysis Mother     Diabetes Mother     Anxiety disorder Mother     Depression Mother     Melanoma Paternal Uncle     Stomach cancer Other     Colon cancer Other         Passed age 34   Shannan Sánchez Anxiety disorder Father  Depression Father     Drug abuse Father        Past Surgical History:   Procedure Laterality Date    NO PAST SURGERIES          reports that she has been smoking cigarettes  She has a 25 00 pack-year smoking history  She has never used smokeless tobacco  She reports current alcohol use  She reports current drug use  Drug: Marijuana  Current Outpatient Medications:     albuterol (PROVENTIL HFA,VENTOLIN HFA) 90 mcg/act inhaler, inhale 2 puffs by mouth every 4 hours if needed, Disp: 8 5 g, Rfl: 2    ALPRAZolam (XANAX) 1 mg tablet, take 1 tablet by mouth twice a day if needed for anxiety, Disp: 60 tablet, Rfl: 0    fluticasone-vilanterol (BREO ELLIPTA) 200-25 MCG/INH inhaler, Inhale 1 puff daily Rinse mouth after use , Disp: 1 Inhaler, Rfl: 1    lisinopril (ZESTRIL) 40 mg tablet, take 1 tablet by mouth once daily (Patient not taking: Reported on 6/16/2021), Disp: 90 tablet, Rfl: 3    mupirocin (BACTROBAN) 2 % ointment, Apply topically 3 (three) times a day for 7 days, Disp: 22 g, Rfl: 0    The following portions of the patient's history were reviewed and updated as appropriate: allergies, current medications, past family history, past medical history, past social history, past surgical history and problem list     Review of Systems   Constitutional: Negative for activity change, appetite change, chills, diaphoresis, fatigue and fever  HENT: Negative for congestion, facial swelling, mouth sores, rhinorrhea, sinus pressure, sneezing, sore throat, tinnitus, trouble swallowing and voice change  Eyes: Negative for pain, discharge, redness and visual disturbance  Respiratory: Negative for cough, shortness of breath and wheezing  Cardiovascular: Negative for chest pain, palpitations and leg swelling  Gastrointestinal: Negative for abdominal pain, blood in stool, constipation, diarrhea and nausea  Endocrine: Negative for cold intolerance and heat intolerance     Genitourinary: Negative for dysuria, hematuria and urgency  Musculoskeletal: Negative for arthralgias, back pain and myalgias  Skin: Negative for rash and wound  Allergic/Immunologic: Negative for environmental allergies and food allergies  Neurological: Negative for dizziness, tremors, seizures, syncope, facial asymmetry, speech difficulty, weakness, light-headedness, numbness and headaches  Hematological: Negative for adenopathy  Psychiatric/Behavioral: Negative for agitation and behavioral problems  PHQ-9 Depression Screening    PHQ-9:   Frequency of the following problems over the past two weeks:           EMILY-7 Flowsheet Screening      Most Recent Value   Over the last two weeks, how often have you been bothered by the following problems? Feeling nervous, anxious, or on edge  0   Not being able to stop or control worrying  0   Worrying too much about different things  0   Trouble relaxing   0   Being so restless that it's hard to sit still  0   Becoming easily annoyed or irritable   1   Feeling afraid as if something awful might happen  0   How difficult have these problems made it for you to do your work, take care of things at home, or get along with other people? Not difficult at all   EMILY Score   1          Objective:    /96 (BP Location: Left arm, Patient Position: Sitting, Cuff Size: Adult)   Pulse 82   Temp 98 4 °F (36 9 °C) (Temporal)   Resp 18   Ht 5' 2" (1 575 m)   Wt 83 6 kg (184 lb 6 4 oz)   SpO2 97%   BMI 33 73 kg/m²      Physical Exam  Vitals and nursing note reviewed  Constitutional:       Appearance: Normal appearance  She is well-developed  HENT:      Head: Normocephalic and atraumatic  Right Ear: External ear normal       Left Ear: External ear normal       Nose: Nose normal    Eyes:      General: No scleral icterus  Right eye: No discharge  Left eye: No discharge        Conjunctiva/sclera: Conjunctivae normal       Pupils: Pupils are equal, round, and reactive to light    Neck:      Thyroid: No thyromegaly  Cardiovascular:      Rate and Rhythm: Normal rate and regular rhythm  Heart sounds: Normal heart sounds  No murmur heard  No gallop  Pulmonary:      Effort: Pulmonary effort is normal       Breath sounds: Normal breath sounds  No wheezing or rales  Abdominal:      General: There is no distension  Palpations: Abdomen is soft  Tenderness: There is no abdominal tenderness  Musculoskeletal:         General: No tenderness or deformity  Cervical back: Normal range of motion and neck supple  Lymphadenopathy:      Cervical: No cervical adenopathy  Skin:     Findings: No erythema or rash  Neurological:      General: No focal deficit present  Mental Status: She is alert  Mental status is at baseline  Psychiatric:         Mood and Affect: Mood normal          Behavior: Behavior normal                Assessment/Plan:       Diagnoses and all orders for this visit:    Encounter for screening for cervical cancer   -     Ambulatory referral to Gynecology; Future    Need for hepatitis B screening test  -     Hepatitis B surface antigen; Future    Encounter for hepatitis C screening test for low risk patient  -     Hepatitis C antibody; Future    Encounter for screening for human immunodeficiency virus (HIV)  -     HIV 1/2 Antigen/Antibody (4th Generation) w Reflex [facility]; Future    Screen for STD (sexually transmitted disease)  -     Chlamydia/GC amplified DNA by PCR; Future  -     Chlamydia/GC amplified DNA by PCR        Check labs, encouraged to start lisinopril 20 mg daily and f/u for bp check in 7 days  She agreed   She is  Aware of ER red flag precautions  She understands that her mother is on dialysis secondary to uncontrolled hypertension and she is very well aware of the consequences of the same  If she does quit smoking probably she might need pressure medications but this time she will need I have made this very clear to her      Have encouraged her to get her ultrasound of the as well as the mammogram     Continue Xanax as needed and start weaning  She has never started fluoxetine as she does not like taking a daily medication  Read package inserts for all medications before starting a new medications, call me if you have any questions  Patient was given opportunity to ask questions and all questions were answered

## 2021-06-17 LAB
C TRACH DNA SPEC QL NAA+PROBE: NEGATIVE
N GONORRHOEA DNA SPEC QL NAA+PROBE: NEGATIVE

## 2021-06-23 ENCOUNTER — CLINICAL SUPPORT (OUTPATIENT)
Dept: FAMILY MEDICINE CLINIC | Facility: CLINIC | Age: 36
End: 2021-06-23
Payer: COMMERCIAL

## 2021-06-23 VITALS — SYSTOLIC BLOOD PRESSURE: 126 MMHG | DIASTOLIC BLOOD PRESSURE: 72 MMHG

## 2021-06-23 DIAGNOSIS — I10 BENIGN ESSENTIAL HYPERTENSION: Primary | ICD-10-CM

## 2021-06-23 PROCEDURE — 99211 OFF/OP EST MAY X REQ PHY/QHP: CPT

## 2021-08-11 DIAGNOSIS — J44.9 CHRONIC OBSTRUCTIVE PULMONARY DISEASE, UNSPECIFIED COPD TYPE (HCC): ICD-10-CM

## 2021-08-12 RX ORDER — ALBUTEROL SULFATE 90 UG/1
AEROSOL, METERED RESPIRATORY (INHALATION)
Qty: 8.5 G | Refills: 2 | Status: SHIPPED | OUTPATIENT
Start: 2021-08-12 | End: 2022-01-05 | Stop reason: SDUPTHER

## 2021-10-27 DIAGNOSIS — F41.9 ANXIETY: ICD-10-CM

## 2021-10-28 RX ORDER — ALPRAZOLAM 1 MG/1
1 TABLET ORAL 2 TIMES DAILY PRN
Qty: 60 TABLET | Refills: 0 | Status: SHIPPED | OUTPATIENT
Start: 2021-10-28 | End: 2021-11-30

## 2022-01-05 ENCOUNTER — TELEMEDICINE (OUTPATIENT)
Dept: FAMILY MEDICINE CLINIC | Facility: CLINIC | Age: 37
End: 2022-01-05
Payer: COMMERCIAL

## 2022-01-05 VITALS — BODY MASS INDEX: 36.8 KG/M2 | HEIGHT: 62 IN | WEIGHT: 200 LBS

## 2022-01-05 DIAGNOSIS — Z11.59 ENCOUNTER FOR HEPATITIS C SCREENING TEST FOR LOW RISK PATIENT: ICD-10-CM

## 2022-01-05 DIAGNOSIS — N60.01 BILATERAL BREAST CYSTS: ICD-10-CM

## 2022-01-05 DIAGNOSIS — Z11.59 NEED FOR HEPATITIS B SCREENING TEST: Primary | ICD-10-CM

## 2022-01-05 DIAGNOSIS — F41.9 ANXIETY: ICD-10-CM

## 2022-01-05 DIAGNOSIS — Z13.29 THYROID DISORDER SCREENING: ICD-10-CM

## 2022-01-05 DIAGNOSIS — Z11.4 ENCOUNTER FOR SCREENING FOR HIV: ICD-10-CM

## 2022-01-05 DIAGNOSIS — J44.9 COPD WITH ASTHMA (HCC): ICD-10-CM

## 2022-01-05 DIAGNOSIS — Z13.220 ENCOUNTER FOR LIPID SCREENING FOR CARDIOVASCULAR DISEASE: ICD-10-CM

## 2022-01-05 DIAGNOSIS — I10 ESSENTIAL HYPERTENSION: ICD-10-CM

## 2022-01-05 DIAGNOSIS — N60.02 BILATERAL BREAST CYSTS: ICD-10-CM

## 2022-01-05 DIAGNOSIS — Z13.6 ENCOUNTER FOR LIPID SCREENING FOR CARDIOVASCULAR DISEASE: ICD-10-CM

## 2022-01-05 DIAGNOSIS — J44.9 CHRONIC OBSTRUCTIVE PULMONARY DISEASE, UNSPECIFIED COPD TYPE (HCC): ICD-10-CM

## 2022-01-05 DIAGNOSIS — F41.8 MIXED ANXIETY DEPRESSIVE DISORDER: ICD-10-CM

## 2022-01-05 PROCEDURE — 99214 OFFICE O/P EST MOD 30 MIN: CPT | Performed by: FAMILY MEDICINE

## 2022-01-05 RX ORDER — LISINOPRIL 40 MG/1
40 TABLET ORAL DAILY
Qty: 90 TABLET | Refills: 1 | Status: SHIPPED | OUTPATIENT
Start: 2022-01-05 | End: 2022-06-23 | Stop reason: SDUPTHER

## 2022-01-05 RX ORDER — ALPRAZOLAM 1 MG/1
1 TABLET ORAL 2 TIMES DAILY PRN
Qty: 60 TABLET | Refills: 0 | Status: SHIPPED | OUTPATIENT
Start: 2022-01-05 | End: 2022-02-04 | Stop reason: SDUPTHER

## 2022-01-05 RX ORDER — ALBUTEROL SULFATE 90 UG/1
2 AEROSOL, METERED RESPIRATORY (INHALATION) EVERY 4 HOURS PRN
Qty: 8.5 G | Refills: 2 | Status: SHIPPED | OUTPATIENT
Start: 2022-01-05 | End: 2022-05-10

## 2022-01-05 NOTE — PROGRESS NOTES
Virtual Regular Visit    Verification of patient location:    Patient is located in the following state in which I hold an active license [place]      Assessment/Plan:    Problem List Items Addressed This Visit        Respiratory    COPD with asthma ([facility] )    Relevant Medications    albuterol (PROVENTIL HFA,VENTOLIN HFA) 90 mcg/act inhaler    fluticasone-vilanterol (Breo Ellipta) 200-25 MCG/INH inhaler       Other    Mixed anxiety depressive disorder    Relevant Medications    ALPRAZolam (XANAX) 1 mg tablet    Other Relevant Orders    CBC and differential    Comprehensive metabolic panel    Ambulatory referral to behavioral health therapists    Anxiety    Relevant Medications    ALPRAZolam (XANAX) 1 mg tablet    Other Relevant Orders    CBC and differential    Comprehensive metabolic panel    Ambulatory referral to behavioral health therapists    Bilateral breast cysts    Relevant Orders    Mammo diagnostic bilateral w cad    US breast left limited (diagnostic)    US breast right limited (diagnostic)      Other Visit Diagnoses     Need for hepatitis B screening test    -  Primary    Relevant Orders    Hepatitis B surface antigen    Chronic obstructive pulmonary disease, unspecified COPD type ([facility] )        Relevant Medications    albuterol (PROVENTIL HFA,VENTOLIN HFA) 90 mcg/act inhaler    fluticasone-vilanterol (Breo Ellipta) 200-25 MCG/INH inhaler    Essential hypertension        Relevant Medications    lisinopril (ZESTRIL) 40 mg tablet    Encounter for hepatitis C screening test for low risk patient        Relevant Orders    Hepatitis C antibody    Encounter for screening for HIV        Relevant Orders    HIV 1/2 Antigen/Antibody (4th Generation) w Reflex [facility]    Thyroid disorder screening        Relevant Orders    TSH, 3rd generation with Free T4 reflex    Encounter for lipid screening for cardiovascular disease        Relevant Orders    Lipid panel        Counseled, discussed to obtain labs and mammogram, ultrasound- again stated she will schedule it  Continue medications  Hayden Craig is not on formulary, start advair bid, stop smoking advised        BMI Counseling: Body mass index is 36 58 kg/m²  The BMI is above normal  Nutrition recommendations include decreasing portion sizes, encouraging healthy choices of fruits and vegetables, decreasing fast food intake, consuming healthier snacks, limiting drinks that contain sugar, moderation in carbohydrate intake and reducing intake of cholesterol  Exercise recommendations include moderate physical activity 150 minutes/week  No pharmacotherapy was ordered  Rationale for BMI follow-up plan is due to patient being overweight or obese  Tobacco Cessation Counseling: Tobacco cessation counseling was provided  The patient is sincerely urged to quit consumption of tobacco  She is ready to quit tobacco  Medication options and side effects of medication discussed  Patient refused medication  Reason for visit is   Chief Complaint   Patient presents with    Virtual Brief Visit    Anxiety    Depression    Hypertension    Virtual Regular Visit        Encounter provider Cindy Conroy MD    Provider located at [address] [place] [id number] [phone]      Recent Visits  No visits were found meeting these conditions  Showing recent visits within past 7 days and meeting all other requirements  Today's Visits  Date Type Provider Dept   01/05/22 Telemedicine Cindy Conroy MD [facility] today's visits and meeting all other requirements  Future Appointments  No visits were found meeting these conditions  Showing future appointments within next 150 days and meeting all other requirements       The patient was identified by name and date of birth   Tiffany Pena was informed that this is a telemedicine visit and that the visit is being conducted through [address] [place] [address] and patient was informed this is a secure, HIPAA-complaint platform  She agrees to proceed     My office door was closed  No one else was in the room  She acknowledged consent and understanding of privacy and security of the video platform  The patient has agreed to participate and understands they can discontinue the visit at any time  Patient is aware this is a billable service  Subjective  Tori Liam is a 42 y o  female       Mixed anxiety and depression- chronic,on Xanax prn, does not like SSRI, she has reduced from TID to BID  Hypertension-chronic, improving, has stress, continues to smoke but did cut back significantly to 10 cig/day, takes lisinopril regularly, did not get the labs as orderd    COPD- does not use daily breo, chronic ,improved after she has cut back on smoking, not compliant with breo, has to use albuterol few times a week    Obesity- trying to lose weight,    Breast lesion- needed biopsy, every visit she does mention and is aware that she needed USG biopsy but does not schedule it, she is aware of the risk and acknowledges it as mentioned several times in the past visits , she is aware that breast cancer can cause death ,but did not schedule the biopsy  Grief-since her mother's death over 2 months ago, she would like to speak with someone    Anxiety  Patient reports no chest pain, nausea, palpitations or shortness of breath  Depression  Pertinent negatives include no abdominal pain, arthralgias, chest pain, congestion, coughing, fatigue, fever, headaches, myalgias, nausea, rash or sore throat  Hypertension  Associated symptoms include anxiety  Pertinent negatives include no chest pain, headaches, palpitations or shortness of breath          Past Medical History:   Diagnosis Date    Allergies     Anxiety 05/2018    Female hirsutism     GERD (gastroesophageal reflux disease)     Head injury     Hypertension 2018    Papanicolaou smear for cervical cancer screening 2017    NL, no h/o of abn pap that she can recall    STD (female)     Thyroid nodule 05/2018    Tobacco user 05/2018       Past Surgical History:   Procedure Laterality Date    NO PAST SURGERIES         Current Outpatient Medications   Medication Sig Dispense Refill    albuterol (PROVENTIL HFA,VENTOLIN HFA) 90 mcg/act inhaler Inhale 2 puffs every 4 (four) hours as needed for wheezing or shortness of breath 8 5 g 2    ALPRAZolam (XANAX) 1 mg tablet Take 1 tablet (1 mg total) by mouth 2 (two) times a day as needed for anxiety 60 tablet 0    fluticasone-vilanterol (Breo Ellipta) 200-25 MCG/INH inhaler Inhale 1 puff daily Rinse mouth after use  30 blister 3    lisinopril (ZESTRIL) 40 mg tablet Take 1 tablet (40 mg total) by mouth daily 90 tablet 1    mupirocin (BACTROBAN) 2 % ointment Apply topically 3 (three) times a day for 7 days 22 g 0     No current facility-administered medications for this visit  Allergies   Allergen Reactions    Azithromycin Vomiting    Penicillins Swelling       Review of Systems   Constitutional: Negative for fatigue and fever  HENT: Negative for congestion, facial swelling, mouth sores, rhinorrhea, sore throat and trouble swallowing  Eyes: Negative for pain and redness  Respiratory: Negative for cough, shortness of breath and wheezing  Cardiovascular: Negative for chest pain, palpitations and leg swelling  Gastrointestinal: Negative for abdominal pain, blood in stool, constipation, diarrhea and nausea  Genitourinary: Negative for dysuria, hematuria and urgency  Musculoskeletal: Negative for arthralgias, back pain and myalgias  Skin: Negative for rash and wound  Neurological: Negative for seizures, syncope and headaches  Hematological: Negative for adenopathy  Psychiatric/Behavioral: Positive for depression  Negative for agitation and behavioral problems         Video Exam    Vitals:    01/05/22 1325   Weight: 90 7 kg (200 lb)   Height: 5' 2" (1 575 m)       Physical Exam  Vitals and nursing note reviewed  Constitutional:       Appearance: She is well-developed  HENT:      Head: Normocephalic and atraumatic  Right Ear: External ear normal       Left Ear: External ear normal       Nose: Nose normal    Eyes:      General: No scleral icterus  Right eye: No discharge  Left eye: No discharge  Conjunctiva/sclera: Conjunctivae normal       Comments: Visual observation   Pulmonary:      Effort: No respiratory distress  Abdominal:      Palpations: Abdomen is soft  Tenderness: There is no abdominal tenderness  Comments: Patient self-examined   Musculoskeletal:         General: No tenderness or deformity  Skin:     Findings: No rash  Neurological:      Mental Status: She is alert  Mental status is at baseline  Psychiatric:         Behavior: Behavior normal          Judgment: Judgment normal             VIRTUAL VISIT DISCLAIMER      Gabrielle Lawler verbally agrees to participate in [employer]  Pt is aware that [employer] could be limited without vital signs or the ability to perform a full hands-on physical Miranda Tanmay understands she or the provider may request at any time to terminate the video visit and request the patient to seek care or treatment in person

## 2022-01-14 ENCOUNTER — TELEPHONE (OUTPATIENT)
Dept: FAMILY MEDICINE CLINIC | Facility: CLINIC | Age: 37
End: 2022-01-14

## 2022-01-15 ENCOUNTER — NURSE TRIAGE (OUTPATIENT)
Dept: OTHER | Facility: OTHER | Age: 37
End: 2022-01-15

## 2022-01-15 NOTE — TELEPHONE ENCOUNTER
Advised patient be seen at [facility] for shortness of breath symptoms  Patient stated she was unsure if she would go because of Covid 19 concerns  Advised patient against taking alprazolam before driving  Reason for Disposition   [1] MILD difficulty breathing (e g , minimal/no SOB at rest, SOB with walking, pulse <100) AND [2] NEW-onset or WORSE than normal    Answer Assessment - Initial Assessment Questions  1  RESPIRATORY STATUS: "Describe your breathing?" (e g , wheezing, shortness of breath, unable to speak, severe coughing)       Not able to take a full breath  2  ONSET: "When did this breathing problem begin?"       1/13  3  PATTERN "Does the difficult breathing come and go, or has it been constant since it started?"       Comes and goes  4  SEVERITY: "How bad is your breathing?" (e g , mild, moderate, severe)     - MILD: No SOB at rest, mild SOB with walking, speaks normally in sentences, can lay down, no retractions, pulse < 100      - MODERATE: SOB at rest, SOB with minimal exertion and prefers to sit, cannot lie down flat, speaks in phrases, mild retractions, audible wheezing, pulse 100-120      - SEVERE: Very SOB at rest, speaks in single words, struggling to breathe, sitting hunched forward, retractions, pulse > 120       Mild SoB  5  RECURRENT SYMPTOM: "Have you had difficulty breathing before?" If Yes, ask: "When was the last time?" and "What happened that time?"       Copd, smoker  6  CARDIAC HISTORY: "Do you have any history of heart disease?" (e g , heart attack, angina, bypass surgery, angioplasty)       Denies  7  LUNG HISTORY: "Do you have any history of lung disease?"  (e g , pulmonary embolus, asthma, emphysema)      COPD  8  CAUSE: "What do you think is causing the breathing problem?"       Bronchitis  9  OTHER SYMPTOMS: "Do you have any other symptoms? (e g , dizziness, runny nose, cough, chest pain, fever)      Fever  10   PREGNANCY: "Is there any chance you are pregnant?" "When was your last menstrual period?"        Denies    Protocols used: BREATHING DIFFICULTY-ADULT-AH

## 2022-01-15 NOTE — TELEPHONE ENCOUNTER
Regarding: possible bronchitis- chest pain, wants abx, just stopped smoking   ----- Message from Donn Quinonez MA sent at 1/15/2022  3:03 PM EST -----  "I have bronchitis and I do not have COVID  I am having chest pain  My doctor usually calls in antibiotics  I would like to speak with them   I have quit smoking in the last 48 hours also "  Made it very clear she does not want to go to ER

## 2022-02-04 DIAGNOSIS — F41.9 ANXIETY: ICD-10-CM

## 2022-02-04 RX ORDER — ALPRAZOLAM 1 MG/1
1 TABLET ORAL 2 TIMES DAILY PRN
Qty: 60 TABLET | Refills: 0 | Status: SHIPPED | OUTPATIENT
Start: 2022-02-04 | End: 2022-03-08

## 2022-02-09 ENCOUNTER — OFFICE VISIT (OUTPATIENT)
Dept: FAMILY MEDICINE CLINIC | Facility: CLINIC | Age: 37
End: 2022-02-09
Payer: COMMERCIAL

## 2022-02-09 VITALS
SYSTOLIC BLOOD PRESSURE: 136 MMHG | HEART RATE: 82 BPM | HEIGHT: 62 IN | BODY MASS INDEX: 34.23 KG/M2 | DIASTOLIC BLOOD PRESSURE: 82 MMHG | RESPIRATION RATE: 18 BRPM | WEIGHT: 186 LBS | OXYGEN SATURATION: 98 % | TEMPERATURE: 98.1 F

## 2022-02-09 DIAGNOSIS — E03.8 SUBCLINICAL HYPOTHYROIDISM: ICD-10-CM

## 2022-02-09 DIAGNOSIS — L30.9 ECZEMA, UNSPECIFIED TYPE: ICD-10-CM

## 2022-02-09 DIAGNOSIS — H66.002 ACUTE SUPPR OTITIS MEDIA W/O SPON RUPT EAR DRUM, LEFT EAR: ICD-10-CM

## 2022-02-09 DIAGNOSIS — H66.001 ACUTE SUPPURATIVE OTITIS MEDIA OF RIGHT EAR WITHOUT SPONTANEOUS RUPTURE OF TYMPANIC MEMBRANE, RECURRENCE NOT SPECIFIED: Primary | ICD-10-CM

## 2022-02-09 DIAGNOSIS — F41.8 MIXED ANXIETY DEPRESSIVE DISORDER: ICD-10-CM

## 2022-02-09 DIAGNOSIS — Z72.0 TOBACCO ABUSE: ICD-10-CM

## 2022-02-09 DIAGNOSIS — I10 ESSENTIAL HYPERTENSION: ICD-10-CM

## 2022-02-09 DIAGNOSIS — F33.1 MAJOR DEPRESSIVE DISORDER, RECURRENT, MODERATE (HCC): ICD-10-CM

## 2022-02-09 PROCEDURE — 99406 BEHAV CHNG SMOKING 3-10 MIN: CPT | Performed by: FAMILY MEDICINE

## 2022-02-09 PROCEDURE — 99214 OFFICE O/P EST MOD 30 MIN: CPT | Performed by: FAMILY MEDICINE

## 2022-02-09 RX ORDER — AMMONIUM LACTATE 12 G/100G
CREAM TOPICAL AS NEEDED
Qty: 385 G | Refills: 0 | Status: SHIPPED | OUTPATIENT
Start: 2022-02-09 | End: 2022-06-23

## 2022-02-09 RX ORDER — CLOBETASOL PROPIONATE 0.5 MG/G
CREAM TOPICAL 2 TIMES DAILY
Qty: 45 G | Refills: 0 | Status: SHIPPED | OUTPATIENT
Start: 2022-02-09 | End: 2022-06-23

## 2022-02-09 RX ORDER — CEPHALEXIN 500 MG/1
500 CAPSULE ORAL EVERY 8 HOURS SCHEDULED
Qty: 30 CAPSULE | Refills: 0 | Status: SHIPPED | OUTPATIENT
Start: 2022-02-09 | End: 2022-02-19

## 2022-02-09 RX ORDER — BUPROPION HYDROCHLORIDE 150 MG/1
150 TABLET ORAL EVERY MORNING
Qty: 30 TABLET | Refills: 2 | Status: SHIPPED | OUTPATIENT
Start: 2022-02-09 | End: 2022-04-06

## 2022-02-09 NOTE — PROGRESS NOTES
Subjective:      Patient ID: Tomi Lawler is a 42 y o  female  Here for follow up and bilateral ear pain left >right, thinks could be dental issue too , recent URI with fever 2 weeks ago    Mixed anxiety and depression- chronic,IMPROVED SINCE 3-4 MONTHS, patient has been on xanax for some years now and states that she has been noticing that she sometimes has to use 1 mg xanax 3 times a day , and is worried about addiction, patient is interested in daily medications, however is very non complaint, worries about side effects of all medications, as well as has erratic schedule  She is trying OTC supplements and kava root etc    Hypertension-chronic, now controlled, continues to smoke but did cut back significantly to 3-6 cig/day, takes lisinopril regularly now    COPD- on advair daily, chronic, needs albuterol <2 times/week    Obesity- trying to lose weight,lost from 200lbs->182lbs    Breast lesion- needed biopsy, every visit she does mention and is aware that she needed USGbiopsy but does not schedule it, she is aware of the risk and acknowledges it as mentioned several times in the past visits , she is aware that breast cancer can cause death ,but did not schedule the biopsy   States she will schedule      Past Medical History:   Diagnosis Date    Allergies     Anxiety 05/2018    Female hirsutism     GERD (gastroesophageal reflux disease)     Head injury     Hypertension 2018    Papanicolaou smear for cervical cancer screening 2017    NL, no h/o of abn pap that she can recall    STD (female)     Thyroid nodule 05/2018    Tobacco user 05/2018       Family History   Problem Relation Age of Onset    Hypertension Mother     Kidney disease Mother     Cirrhosis Mother     Dialysis Mother     Diabetes Mother     Anxiety disorder Mother     Depression Mother     Melanoma Paternal Uncle     Stomach cancer Other     Colon cancer Other         Passed age 34   Chaparrita Majano Anxiety disorder Father     Depression Father     Drug abuse Father        Past Surgical History:   Procedure Laterality Date    NO PAST SURGERIES          reports that she has been smoking cigarettes  She has a 25 00 pack-year smoking history  She has never used smokeless tobacco  She reports current alcohol use  She reports current drug use  Drug: Marijuana  Current Outpatient Medications:     albuterol (PROVENTIL HFA,VENTOLIN HFA) 90 mcg/act inhaler, Inhale 2 puffs every 4 (four) hours as needed for wheezing or shortness of breath, Disp: 8 5 g, Rfl: 2    ALPRAZolam (XANAX) 1 mg tablet, Take 1 tablet (1 mg total) by mouth 2 (two) times a day as needed for anxiety, Disp: 60 tablet, Rfl: 0    fluticasone-salmeterol (ADVAIR HFA) 230-21 MCG/ACT inhaler, Inhale 2 puffs 2 (two) times a day Rinse mouth after use , Disp: 12 g, Rfl: 1    lisinopril (ZESTRIL) 40 mg tablet, Take 1 tablet (40 mg total) by mouth daily, Disp: 90 tablet, Rfl: 1    ammonium lactate (LAC-HYDRIN) 12 % cream, Apply topically as needed for dry skin, Disp: 385 g, Rfl: 0    buPROPion (Wellbutrin XL) 150 mg 24 hr tablet, Take 1 tablet (150 mg total) by mouth every morning, Disp: 30 tablet, Rfl: 2    cephalexin (KEFLEX) 500 mg capsule, Take 1 capsule (500 mg total) by mouth every 8 (eight) hours for 10 days, Disp: 30 capsule, Rfl: 0    clobetasol (TEMOVATE) 0 05 % cream, Apply topically 2 (two) times a day for 7 days For 7 days, Disp: 45 g, Rfl: 0    The following portions of the patient's history were reviewed and updated as appropriate: allergies, current medications, past family history, past medical history, past social history, past surgical history and problem list     Review of Systems   Constitutional: Negative for fatigue and fever  HENT: Positive for ear pain  Negative for congestion, facial swelling, mouth sores, rhinorrhea, sore throat and trouble swallowing  Eyes: Negative for pain and redness     Respiratory: Negative for cough, shortness of breath and wheezing  Cardiovascular: Negative for chest pain, palpitations and leg swelling  Gastrointestinal: Negative for abdominal pain, blood in stool, constipation, diarrhea and nausea  Genitourinary: Negative for dysuria, hematuria and urgency  Musculoskeletal: Negative for arthralgias, back pain and myalgias  Skin: Negative for rash and wound  Neurological: Negative for seizures, syncope and headaches  Hematological: Negative for adenopathy  Psychiatric/Behavioral: Negative for agitation and behavioral problems  Objective:    /82 (BP Location: Left arm, Patient Position: Sitting, Cuff Size: Adult)   Pulse 82   Temp 98 1 °F (36 7 °C) (Temporal)   Resp 18   Ht 5' 2" (1 575 m)   Wt 84 4 kg (186 lb)   SpO2 98%   BMI 34 02 kg/m²      Physical Exam  Vitals and nursing note reviewed  Constitutional:       Appearance: Normal appearance  She is well-developed  HENT:      Head: Normocephalic and atraumatic  Right Ear: External ear normal  A middle ear effusion is present  Tympanic membrane is erythematous  Left Ear: External ear normal  A middle ear effusion is present  Tympanic membrane is erythematous  Nose: Nose normal    Eyes:      General: No scleral icterus  Right eye: No discharge  Left eye: No discharge  Conjunctiva/sclera: Conjunctivae normal       Pupils: Pupils are equal, round, and reactive to light  Neck:      Thyroid: No thyromegaly  Cardiovascular:      Rate and Rhythm: Normal rate and regular rhythm  Heart sounds: Normal heart sounds  No murmur heard  No gallop  Pulmonary:      Effort: Pulmonary effort is normal       Breath sounds: Normal breath sounds  No wheezing or rales  Abdominal:      General: There is no distension  Palpations: Abdomen is soft  Tenderness: There is no abdominal tenderness  Musculoskeletal:         General: No tenderness or deformity        Cervical back: Normal range of motion and neck supple  Lymphadenopathy:      Cervical: No cervical adenopathy  Skin:     Findings: Rash present  No erythema  Comments: Rash on dorsum of the palm- itchy and scaly   Neurological:      General: No focal deficit present  Mental Status: She is alert  Mental status is at baseline  Psychiatric:         Mood and Affect: Mood normal          Behavior: Behavior normal                Assessment/Plan:         Diagnoses and all orders for this visit:    Acute suppurative otitis media of right ear without spontaneous rupture of tympanic membrane, recurrence not specified  -     cephalexin (KEFLEX) 500 mg capsule; Take 1 capsule (500 mg total) by mouth every 8 (eight) hours for 10 days    Essential hypertension    Major depressive disorder, recurrent, moderate (HCC)    Mixed anxiety depressive disorder  -     buPROPion (Wellbutrin XL) 150 mg 24 hr tablet; Take 1 tablet (150 mg total) by mouth every morning    Subclinical hypothyroidism    Tobacco abuse  -     buPROPion (Wellbutrin XL) 150 mg 24 hr tablet; Take 1 tablet (150 mg total) by mouth every morning    Acute suppr otitis media w/o spon rupt ear drum, left ear  -     cephalexin (KEFLEX) 500 mg capsule; Take 1 capsule (500 mg total) by mouth every 8 (eight) hours for 10 days    Eczema, unspecified type  -     ammonium lactate (LAC-HYDRIN) 12 % cream; Apply topically as needed for dry skin  -     clobetasol (TEMOVATE) 0 05 % cream; Apply topically 2 (two) times a day for 7 days For 7 days          Antibiotics as above for empiric treatment of otitis media  The patient is advised to keep the ears dry  There should be no instrumentation or digital manipulation of the external auditory canals or opening to the ear canal  Nothing should be put in the ear unless it is prescribed by the physician managing your ear problems   Do not place cotton, ear buds, hearing aids, or other items into your ear unless these items are discussed specifically with the physician  Discussed to call the office or go to nearest emergency room if not improving in 3-5 days or if develops any discharge from ears, high grade fever >103 5F or mastoid tenderness or focal neuro signs develop  Patient verbalized understanding  Eczema- likely due to winter weather  Topical creams as above  Educated on pathophysiology of rash  Reviewed the importance of adequate hydration to prevent dryness of the skin  Advised frequent use of topical moisturizers to prevent further drying of the skin  Use a viktor fragrance-free moisturizer  Reduce frequency and duration of bathing  Use warm water instead of hot  Once bathing complete, pat skin with towel until damp and apply topical moisturizer  Call the office if symptoms       Advised to get labs and mammogram/ultrasound as previously ordered, she verbalized that she will do them  Tobacco Cessation Counseling: Tobacco cessation counseling was provided  The patient is sincerely urged to quit consumption of tobacco  She is ready to quit tobacco  Medication options and side effects of medication discussed  Patient refused medication  Bupropion SR was prescribed  Spent 7 min in counseling face to face for smoking cessation    Read package inserts for all medications before starting a new medications, call me if you have any questions  Patient was given opportunity to ask questions and all questions were answered  Portions of the record may have been created with voice recognition software  Occasional wrong word or "sound a like" substitutions may have occurred due to the inherent limitations of voice recognition software  Read the chart carefully and recognize, using context, where substitutions have occurred

## 2022-03-03 DIAGNOSIS — J44.9 CHRONIC OBSTRUCTIVE PULMONARY DISEASE, UNSPECIFIED COPD TYPE (HCC): ICD-10-CM

## 2022-03-04 RX ORDER — FLUTICASONE PROPIONATE AND SALMETEROL XINAFOATE 230; 21 UG/1; UG/1
AEROSOL, METERED RESPIRATORY (INHALATION)
Qty: 12 G | Refills: 1 | Status: SHIPPED | OUTPATIENT
Start: 2022-03-04 | End: 2022-04-11

## 2022-04-06 DIAGNOSIS — F41.8 MIXED ANXIETY DEPRESSIVE DISORDER: ICD-10-CM

## 2022-04-06 DIAGNOSIS — Z72.0 TOBACCO ABUSE: ICD-10-CM

## 2022-04-06 RX ORDER — BUPROPION HYDROCHLORIDE 150 MG/1
TABLET ORAL
Qty: 30 TABLET | Refills: 2 | Status: SHIPPED | OUTPATIENT
Start: 2022-04-06 | End: 2022-06-23

## 2022-04-08 DIAGNOSIS — J44.9 CHRONIC OBSTRUCTIVE PULMONARY DISEASE, UNSPECIFIED COPD TYPE (HCC): ICD-10-CM

## 2022-04-11 RX ORDER — FLUTICASONE PROPIONATE AND SALMETEROL XINAFOATE 230; 21 UG/1; UG/1
AEROSOL, METERED RESPIRATORY (INHALATION)
Qty: 12 G | Refills: 1 | Status: SHIPPED | OUTPATIENT
Start: 2022-04-11 | End: 2022-04-13

## 2022-04-12 DIAGNOSIS — J44.9 CHRONIC OBSTRUCTIVE PULMONARY DISEASE, UNSPECIFIED COPD TYPE (HCC): ICD-10-CM

## 2022-04-13 DIAGNOSIS — J44.9 CHRONIC OBSTRUCTIVE PULMONARY DISEASE, UNSPECIFIED COPD TYPE (HCC): ICD-10-CM

## 2022-04-13 RX ORDER — FLUTICASONE PROPIONATE AND SALMETEROL XINAFOATE 230; 21 UG/1; UG/1
AEROSOL, METERED RESPIRATORY (INHALATION)
Qty: 12 G | Refills: 1 | Status: SHIPPED | OUTPATIENT
Start: 2022-04-13 | End: 2022-04-13

## 2022-04-13 RX ORDER — FLUTICASONE PROPIONATE AND SALMETEROL XINAFOATE 230; 21 UG/1; UG/1
AEROSOL, METERED RESPIRATORY (INHALATION)
Qty: 12 G | Refills: 1 | Status: SHIPPED | OUTPATIENT
Start: 2022-04-13 | End: 2022-04-28 | Stop reason: SDUPTHER

## 2022-04-27 NOTE — PATIENT INSTRUCTIONS
Anxiety, Ambulatory Care   GENERAL INFORMATION:   Anxiety  is a condition that causes you to feel excessive worry, uneasiness, or fear  Family or work stress, smoking, caffeine, and alcohol can increase your risk for anxiety  Certain medicines or health conditions can also increase your risk  Anxiety may begin gradually and can become a long-term condition if it is not managed or treated  Common symptoms include the following:   · Fatigue or muscle tightness     · Shaking, restlessness, or irritability     · Problems focusing     · Trouble sleeping     · Feeling jumpy, easily startled, or dizzy     · Rapid heartbeat or shortness of breath  Seek immediate care for the following symptoms:   · Chest pain, tightness, or heaviness that may spread to your shoulders, arms, jaw, neck, or back    · Feeling like hurting yourself or someone else    · Dizziness or feeling lightheaded or faint  Treatment for anxiety  may include medicines to help you feel calm and relaxed, and decrease your symptoms  Healthcare providers will treat any medical conditions that may be causing your symptoms  Manage anxiety:   · Go to counseling as directed  Cognitive behavioral therapy can help you understand and change how you react to events that trigger your symptoms  · Find ways to manage your symptoms  Activities such as exercise, meditation, or listening to music can help you relax  · Practice deep breathing  Breathing can change how your body reacts to stress  Focus on taking slow, deep breaths several times a day, or during an anxiety attack  Breathe in through your nose, and out through your mouth  · Avoid caffeine  Caffeine can make your symptoms worse  Avoid foods or drinks that are meant to increase your energy level  · Limit or avoid alcohol  Ask your healthcare provider if alcohol is safe for you  You may not be able to drink alcohol if you take certain anxiety or depression medicines   Limit alcohol to 1 drink per day if you are a woman  Limit alcohol to 2 drinks per day if you are a man  A drink of alcohol is 12 ounces of beer, 5 ounces of wine, or 1½ ounces of liquor  Follow up with your healthcare provider as directed:  Write down your questions so you remember to ask them during your visits  CARE AGREEMENT:   You have the right to help plan your care  Learn about your health condition and how it may be treated  Discuss treatment options with your caregivers to decide what care you want to receive  You always have the right to refuse treatment  The above information is an  only  It is not intended as medical advice for individual conditions or treatments  Talk to your doctor, nurse or pharmacist before following any medical regimen to see if it is safe and effective for you  © 2014 [address] is for End User's use only and may not be sold, redistributed or otherwise used for commercial purposes  All illustrations and images included in CareNotes® are the copyrighted property of [employer]  or Mic Ziegler

## 2022-04-27 NOTE — PROGRESS NOTES
BMI Counseling: Body mass index is 37 13 kg/m²  The BMI is above normal  Nutrition recommendations include decreasing portion sizes, encouraging healthy choices of fruits and vegetables, decreasing fast food intake, consuming healthier snacks, limiting drinks that contain sugar, moderation in carbohydrate intake, increasing intake of lean protein, reducing intake of saturated and trans fat and reducing intake of cholesterol  Exercise recommendations include vigorous physical activity 75 minutes/week, exercising 3-5 times per week, obtaining a gym membership and strength training exercises  No pharmacotherapy was ordered  Rationale for BMI follow-up plan is due to patient being overweight or obese  Depression Screening and Follow-up Plan: Patient declines further evaluation by mental health professional and/or medications  Brief counseling provided  Will re-evaluate at next office visit  Continue regular follow-up with their mental health provider who is managing their mental health condition(s)  Tobacco Cessation Counseling: Tobacco cessation counseling was provided  The patient is sincerely urged to quit consumption of tobacco  She is ready to quit tobacco  Medication options and side effects of medication discussed  Patient refused medication  Bupropion SR was prescribed  Patient on Wellbutrin       Assessment/Plan:         Problem List Items Addressed This Visit        Respiratory    Chronic obstructive pulmonary disease (HCC)    Relevant Medications    fluticasone-salmeterol (Advair HFA) 230-21 MCG/ACT inhaler    fluticasone (FLONASE) 50 mcg/act nasal spray    Acute ethmoidal sinusitis - Primary    Relevant Medications    doxycycline hyclate (VIBRAMYCIN) 100 mg capsule    fluticasone (FLONASE) 50 mcg/act nasal spray       Other    Anxiety and depression    Relevant Orders    Ambulatory Referral to Psychiatry    Adverse effect of alprazolam    Relevant Orders    Ambulatory Referral to Chemical Dependency Other Visit Diagnoses     Ethmoid sinusitis, unspecified chronicity                Subjective:      Patient ID: Hilary Lewis is a 42 y o  female  Patient is a 42year old female that takes 1mg xanax 2 x  a day  She has been taking it more frequently than she had been  Has concerns over having recent blood work, increased anxiety and depression  Bilateral ear pain currently with noted ear infections in the past   Also, patient has granulized skin rash secondary to her cats  Instructed on taking loratadine 10mg p o  daily  The following portions of the patient's history were reviewed and updated as appropriate:   Past Medical History:  She has a past medical history of Allergies, Anxiety (05/2018), Female hirsutism, GERD (gastroesophageal reflux disease), Head injury, Hypertension (2018), Papanicolaou smear for cervical cancer screening (2017), STD (female), Thyroid nodule (05/2018), and Tobacco user (05/2018)  ,  _______________________________________________________________________  Medical Problems:  does not have any pertinent problems on file ,  _______________________________________________________________________  Past Surgical History:   has a past surgical history that includes No past surgeries  ,  _______________________________________________________________________  Family History:  family history includes Anxiety disorder in her father and mother; Cirrhosis in her mother; Colon cancer in her other; Depression in her father and mother; Diabetes in her mother; Dialysis in her mother; Drug abuse in her father; Hypertension in her mother; Kidney disease in her mother; Melanoma in her paternal uncle; Stomach cancer in her other ,  _______________________________________________________________________  Social History:   reports that she has been smoking cigarettes  She has a 25 00 pack-year smoking history  She has never used smokeless tobacco  She reports current alcohol use   She reports current drug use  Drug: Marijuana  ,  _______________________________________________________________________  Allergies:  is allergic to penicillins     _______________________________________________________________________  Current Outpatient Medications   Medication Sig Dispense Refill    albuterol (PROVENTIL HFA,VENTOLIN HFA) 90 mcg/act inhaler Inhale 2 puffs every 4 (four) hours as needed for wheezing or shortness of breath 8 5 g 2    ALPRAZolam (XANAX) 1 mg tablet take 1 tablet by mouth twice a day AS NEEDED FOR ANXIETY 60 tablet 2    ammonium lactate (LAC-HYDRIN) 12 % cream Apply topically as needed for dry skin 385 g 0    clobetasol (TEMOVATE) 0 05 % cream Apply topically 2 (two) times a day for 7 days For 7 days 45 g 0    fluticasone-salmeterol (Advair HFA) 230-21 MCG/ACT inhaler Inhale 2 puffs 2 (two) times a day Rinse mouth after use  12 g 1    lisinopril (ZESTRIL) 40 mg tablet Take 1 tablet (40 mg total) by mouth daily 90 tablet 1    buPROPion (WELLBUTRIN XL) 150 mg 24 hr tablet TAKE 1 TABLET BY MOUTH EVERY MORNING (Patient not taking: Reported on 4/28/2022) 30 tablet 2    doxycycline hyclate (VIBRAMYCIN) 100 mg capsule Take 1 capsule (100 mg total) by mouth every 12 (twelve) hours for 10 days 20 capsule 0    fluticasone (FLONASE) 50 mcg/act nasal spray 1 spray into each nostril daily 16 g 0     No current facility-administered medications for this visit      _______________________________________________________________________  Review of Systems   Constitutional: Negative for activity change, appetite change, chills, fatigue, fever and unexpected weight change  HENT: Negative for congestion, ear discharge, ear pain, nosebleeds, postnasal drip, rhinorrhea, sinus pressure, sinus pain, sneezing, sore throat and voice change  Eyes: Negative for pain, redness and visual disturbance  Respiratory: Negative for cough, chest tightness, shortness of breath and wheezing      Cardiovascular: Negative for chest pain and palpitations  Gastrointestinal: Negative for abdominal distention, abdominal pain, constipation, diarrhea, nausea and vomiting  Endocrine: Negative  Genitourinary: Negative for difficulty urinating, dysuria, flank pain, frequency, hematuria and urgency  Musculoskeletal: Negative for arthralgias and myalgias  Skin: Negative  Allergic/Immunologic: Negative  Neurological: Negative  Hematological: Negative  Psychiatric/Behavioral: The patient is nervous/anxious  Objective:  Vitals:    04/28/22 1635   BP: 120/80   BP Location: Left arm   Patient Position: Sitting   Cuff Size: Adult   Pulse: 74   Resp: 18   Temp: (!) 97 3 °F (36 3 °C)   TempSrc: Temporal   SpO2: 98%   Weight: 92 1 kg (203 lb)   Height: 5' 2" (1 575 m)     Body mass index is 37 13 kg/m²  Physical Exam  Vitals and nursing note reviewed  Constitutional:       Appearance: Normal appearance  She is well-developed  She is obese  HENT:      Head: Normocephalic and atraumatic  Right Ear: Tympanic membrane, ear canal and external ear normal       Left Ear: Tympanic membrane, ear canal and external ear normal       Nose: Nose normal  No congestion or rhinorrhea  Mouth/Throat:      Mouth: Mucous membranes are moist       Pharynx: No oropharyngeal exudate or posterior oropharyngeal erythema  Eyes:      Extraocular Movements: Extraocular movements intact  Conjunctiva/sclera: Conjunctivae normal       Pupils: Pupils are equal, round, and reactive to light  Cardiovascular:      Rate and Rhythm: Normal rate and regular rhythm  Pulses: Normal pulses  Heart sounds: Normal heart sounds  No murmur heard  Pulmonary:      Effort: Pulmonary effort is normal       Breath sounds: Normal breath sounds  Abdominal:      General: Bowel sounds are normal       Palpations: Abdomen is soft  Musculoskeletal:         General: Normal range of motion        Cervical back: Normal range of motion  Skin:     General: Skin is warm  Capillary Refill: Capillary refill takes less than 2 seconds  Neurological:      General: No focal deficit present  Mental Status: She is alert and oriented to person, place, and time     Psychiatric:         Mood and Affect: Mood normal          Behavior: Behavior normal

## 2022-04-28 ENCOUNTER — OFFICE VISIT (OUTPATIENT)
Dept: FAMILY MEDICINE CLINIC | Facility: CLINIC | Age: 37
End: 2022-04-28
Payer: COMMERCIAL

## 2022-04-28 VITALS
SYSTOLIC BLOOD PRESSURE: 120 MMHG | WEIGHT: 203 LBS | BODY MASS INDEX: 37.36 KG/M2 | DIASTOLIC BLOOD PRESSURE: 80 MMHG | HEIGHT: 62 IN | HEART RATE: 74 BPM | RESPIRATION RATE: 18 BRPM | OXYGEN SATURATION: 98 % | TEMPERATURE: 97.3 F

## 2022-04-28 DIAGNOSIS — E66.01 SEVERE OBESITY (BMI 35.0-39.9) WITH COMORBIDITY (HCC): ICD-10-CM

## 2022-04-28 DIAGNOSIS — T42.4X5A: ICD-10-CM

## 2022-04-28 DIAGNOSIS — J32.2 ETHMOID SINUSITIS, UNSPECIFIED CHRONICITY: ICD-10-CM

## 2022-04-28 DIAGNOSIS — F41.9 ANXIETY AND DEPRESSION: ICD-10-CM

## 2022-04-28 DIAGNOSIS — F32.A ANXIETY AND DEPRESSION: ICD-10-CM

## 2022-04-28 DIAGNOSIS — J44.9 CHRONIC OBSTRUCTIVE PULMONARY DISEASE, UNSPECIFIED COPD TYPE (HCC): ICD-10-CM

## 2022-04-28 DIAGNOSIS — J01.20 ACUTE ETHMOIDAL SINUSITIS, RECURRENCE NOT SPECIFIED: Primary | ICD-10-CM

## 2022-04-28 DIAGNOSIS — Z72.0 TOBACCO ABUSE: ICD-10-CM

## 2022-04-28 PROCEDURE — 99215 OFFICE O/P EST HI 40 MIN: CPT | Performed by: NURSE PRACTITIONER

## 2022-04-28 RX ORDER — FLUTICASONE PROPIONATE AND SALMETEROL XINAFOATE 230; 21 UG/1; UG/1
2 AEROSOL, METERED RESPIRATORY (INHALATION) 2 TIMES DAILY
Qty: 12 G | Refills: 1 | Status: SHIPPED | OUTPATIENT
Start: 2022-04-28 | End: 2022-05-02

## 2022-04-28 RX ORDER — FLUTICASONE PROPIONATE 50 MCG
1 SPRAY, SUSPENSION (ML) NASAL DAILY
Qty: 16 G | Refills: 0 | Status: SHIPPED | OUTPATIENT
Start: 2022-04-28 | End: 2022-06-23

## 2022-04-28 RX ORDER — DOXYCYCLINE HYCLATE 100 MG/1
100 CAPSULE ORAL EVERY 12 HOURS SCHEDULED
Qty: 20 CAPSULE | Refills: 0 | Status: SHIPPED | OUTPATIENT
Start: 2022-04-28 | End: 2022-05-08

## 2022-04-28 NOTE — ASSESSMENT & PLAN NOTE
BMI 37 13 kg/M2 so  Patient counseled on proper diet, exercise and nutrition  Encouraged to exercise 3-5 times per week for at least 60 minutes of cardiovascular activity  Recheck BMI next office visit

## 2022-04-28 NOTE — ASSESSMENT & PLAN NOTE
Patient referred to Addiction Medicine as well psychiatry for follow-up evaluation  Concerned about the side effects of alprazolam   Would like to wean off the medication

## 2022-04-28 NOTE — ASSESSMENT & PLAN NOTE
PHQ-9 and EMILY-7 scale reviewed and patient currently still on Xanax 1 mg Q 12 hours  Patient refer her to addiction medicine as well as Psychiatry to wean off of medication at this time and treatment for depression and anxiety

## 2022-04-28 NOTE — ASSESSMENT & PLAN NOTE
Patient counseled on tobacco cessation  Currently on Wellbutrin 150 mg p o  Daily  Tobacco cessation planning considered  Patient to wean off cigarettes and start medication

## 2022-05-02 RX ORDER — FLUTICASONE PROPIONATE AND SALMETEROL XINAFOATE 230; 21 UG/1; UG/1
AEROSOL, METERED RESPIRATORY (INHALATION)
Qty: 12 G | Refills: 1 | Status: SHIPPED | OUTPATIENT
Start: 2022-05-02

## 2022-05-10 ENCOUNTER — TELEPHONE (OUTPATIENT)
Dept: PSYCHIATRY | Facility: CLINIC | Age: 37
End: 2022-05-10

## 2022-05-10 DIAGNOSIS — J44.9 CHRONIC OBSTRUCTIVE PULMONARY DISEASE, UNSPECIFIED COPD TYPE (HCC): ICD-10-CM

## 2022-05-10 RX ORDER — ALBUTEROL SULFATE 90 UG/1
AEROSOL, METERED RESPIRATORY (INHALATION)
Qty: 8.5 G | Refills: 2 | Status: SHIPPED | OUTPATIENT
Start: 2022-05-10

## 2022-05-13 ENCOUNTER — TELEPHONE (OUTPATIENT)
Dept: PSYCHIATRY | Facility: CLINIC | Age: 37
End: 2022-05-13

## 2022-05-23 ENCOUNTER — TELEPHONE (OUTPATIENT)
Dept: PSYCHIATRY | Facility: CLINIC | Age: 37
End: 2022-05-23

## 2022-06-06 DIAGNOSIS — F41.9 ANXIETY: ICD-10-CM

## 2022-06-07 RX ORDER — ALPRAZOLAM 1 MG/1
TABLET ORAL
Qty: 60 TABLET | Refills: 0 | Status: SHIPPED | OUTPATIENT
Start: 2022-06-07 | End: 2022-07-05

## 2022-06-12 ENCOUNTER — NURSE TRIAGE (OUTPATIENT)
Dept: OTHER | Facility: OTHER | Age: 37
End: 2022-06-12

## 2022-06-12 NOTE — TELEPHONE ENCOUNTER
Regarding: pressure/burning in chest, swollen glands  ----- Message from Geo Bro sent at 6/12/2022  2:35 PM EDT -----  "I have pressure and burning in my chest and swollen glands "

## 2022-06-12 NOTE — TELEPHONE ENCOUNTER
Pt calling concerned about symptoms as noted in initial assessment  Protocol disposition was for pt to be seen in the ER for evaluation  Pt did not want to go to the ER and has chosen to go to [facility]

## 2022-06-12 NOTE — TELEPHONE ENCOUNTER
Reason for Disposition   [1] Chest pain lasts > 5 minutes AND [2] age > 27 AND [3] one or more cardiac risk factors (e g , diabetes, high blood pressure, high cholesterol, smoker, or strong family history of heart disease)    Answer Assessment - Initial Assessment Questions  1  LOCATION: "Where does it hurt?"        Pressure in right side of chest and back, burning in the left side of chest    2  RADIATION: "Does the pain go anywhere else?" (e g , into neck, jaw, arms, back)      As noted above    3  ONSET: "When did the chest pain begin?" (Minutes, hours or days)       Yesterday     4  PATTERN "Does the pain come and go, or has it been constant since it started?"  "Does it get worse with exertion?"       Constant     5  DURATION: "How long does it last" (e g , seconds, minutes, hours)      Constant     6  SEVERITY: "How bad is the pain?"  (e g , Scale 1-10; mild, moderate, or severe)     - MILD (1-3): doesn't interfere with normal activities      - MODERATE (4-7): interferes with normal activities or awakens from sleep     - SEVERE (8-10): excruciating pain, unable to do any normal activities        Moderate     7  CARDIAC RISK FACTORS: "Do you have any history of heart problems or risk factors for heart disease?" (e g , angina, prior heart attack; diabetes, high blood pressure, high cholesterol, smoker, or strong family history of heart disease)      HTN, smoker    8  PULMONARY RISK FACTORS: "Do you have any history of lung disease?"  (e g , blood clots in lung, asthma, emphysema, birth control pills)      COPD    9  CAUSE: "What do you think is causing the chest pain?"       Unknown     10  OTHER SYMPTOMS: "Do you have any other symptoms?" (e g , dizziness, nausea, vomiting, sweating, fever, difficulty breathing, cough)       Swollen glands in neck and under jaw, under a lot of stress lately, crying often while on telephone call    11   PREGNANCY: "Is there any chance you are pregnant?" "When was your last menstrual period?"        Denies    Protocols used: CHEST PAIN-ADULT-AH

## 2022-06-14 ENCOUNTER — OFFICE VISIT (OUTPATIENT)
Dept: FAMILY MEDICINE CLINIC | Facility: CLINIC | Age: 37
End: 2022-06-14
Payer: COMMERCIAL

## 2022-06-14 VITALS
HEIGHT: 62 IN | RESPIRATION RATE: 16 BRPM | OXYGEN SATURATION: 98 % | WEIGHT: 197 LBS | HEART RATE: 75 BPM | SYSTOLIC BLOOD PRESSURE: 140 MMHG | DIASTOLIC BLOOD PRESSURE: 100 MMHG | TEMPERATURE: 97.2 F | BODY MASS INDEX: 36.25 KG/M2

## 2022-06-14 DIAGNOSIS — R76.11 POSITIVE TB TEST: Primary | ICD-10-CM

## 2022-06-14 DIAGNOSIS — R05.1 ACUTE COUGH: ICD-10-CM

## 2022-06-14 PROCEDURE — 99213 OFFICE O/P EST LOW 20 MIN: CPT

## 2022-06-14 NOTE — ASSESSMENT & PLAN NOTE
Pt has non-productive cough for approx  a week  She has been using honey which helps  Recommend OTC antitussives  Continue with honey and halls/vicks lozenges as needed  F/u if no improvement w/n 4 weeks productive purulent sputum or fever> 101

## 2022-06-14 NOTE — PROGRESS NOTES
Assessment/Plan:    Positive TB test  AVS from visit at  [facility] - Pt had TB skin test done at [facility] for employment purposes on 6/7/22  Returned on 6/9 for reading and noted to be positive with 18 mm induration  Pt had CXR 6/11/22 with no abnormal findings  PPD site with erythema no induration noted  Advised no active TB  Pt requesting TB blood test so she can feel that everything has been done  She has not received BCG vaccine in the past and no exposure to anyone with active TB  Pt without any signs or symptoms that would indicate active TB  Advised false positive and may require blood test or CXR in the future rather than a skin test      Acute cough  Pt has non-productive cough for approx  a week  She has been using honey which helps  Recommend OTC antitussives  Continue with honey and halls/vicks lozenges as needed  F/u if no improvement w/n 4 weeks productive purulent sputum or fever> 101  Diagnoses and all orders for this visit:    Positive TB test  -     Quantiferon TB Gold Plus; Future    Acute cough          Subjective:      Patient ID: Corrie Olvera is a 42 y o  female  HPI    The following portions of the patient's history were reviewed and updated as appropriate: allergies, current medications, past family history, past medical history, past social history, past surgical history and problem list     Review of Systems   Constitutional: Negative for activity change, chills, diaphoresis, fatigue, fever and unexpected weight change  HENT: Negative for congestion, ear pain, facial swelling, mouth sores, postnasal drip, rhinorrhea and sore throat  Nosebleeds: wisdom tooth pain, has appointment with dentist     Respiratory: Positive for cough (non-productive)  Negative for chest tightness, shortness of breath and wheezing  Cardiovascular: Negative for chest pain, palpitations and leg swelling     Gastrointestinal: Negative for abdominal distention, abdominal pain, nausea and vomiting  Musculoskeletal: Negative for joint swelling and myalgias  Skin: Negative for color change and pallor  Allergic/Immunologic: Negative for environmental allergies  Neurological: Negative for dizziness, light-headedness and headaches  Psychiatric/Behavioral: The patient is nervous/anxious  Objective:      /100 (BP Location: Right arm, Patient Position: Sitting, Cuff Size: Standard)   Pulse 75   Temp (!) 97 2 °F (36 2 °C) (Temporal)   Resp 16   Ht 5' 2" (1 575 m)   Wt 89 4 kg (197 lb)   SpO2 98%   BMI 36 03 kg/m²          Physical Exam  Constitutional:       General: She is not in acute distress  Appearance: Normal appearance  She is obese  She is not ill-appearing  HENT:      Head: Normocephalic  Right Ear: Tympanic membrane, ear canal and external ear normal  There is no impacted cerumen  Left Ear: Tympanic membrane, ear canal and external ear normal  There is no impacted cerumen  Nose: Nose normal  No congestion or rhinorrhea  Mouth/Throat:      Mouth: Mucous membranes are moist       Pharynx: Posterior oropharyngeal erythema present  No oropharyngeal exudate  Eyes:      Conjunctiva/sclera: Conjunctivae normal       Pupils: Pupils are equal, round, and reactive to light  Cardiovascular:      Rate and Rhythm: Normal rate and regular rhythm  Pulses: Normal pulses  Heart sounds: Normal heart sounds  Pulmonary:      Effort: Pulmonary effort is normal  No respiratory distress  Breath sounds: Normal breath sounds  No wheezing or rales  Chest:      Chest wall: No tenderness  Abdominal:      General: Bowel sounds are normal       Palpations: Abdomen is soft  Musculoskeletal:         General: Normal range of motion  Cervical back: Normal range of motion  Skin:     General: Skin is warm  Neurological:      General: No focal deficit present  Mental Status: She is alert and oriented to person, place, and time  Cranial Nerves: No cranial nerve deficit     Psychiatric:         Mood and Affect: Mood normal          Behavior: Behavior normal

## 2022-06-14 NOTE — ASSESSMENT & PLAN NOTE
AVS from visit at  [facility] - Pt had TB skin test done at [facility] for employment purposes on 6/7/22  Returned on 6/9 for reading and noted to be positive with 18 mm induration  Pt had CXR 6/11/22 with no abnormal findings  PPD site with erythema no induration noted  Advised no active TB  Pt requesting TB blood test so she can feel that everything has been done  She has not received BCG vaccine in the past and no exposure to anyone with active TB  Pt without any signs or symptoms that would indicate active TB   Advised false positive and may require blood test or CXR in the future rather than a skin test

## 2022-06-23 ENCOUNTER — APPOINTMENT (OUTPATIENT)
Dept: RADIOLOGY | Facility: CLINIC | Age: 37
End: 2022-06-23
Payer: COMMERCIAL

## 2022-06-23 ENCOUNTER — TELEMEDICINE (OUTPATIENT)
Dept: FAMILY MEDICINE CLINIC | Facility: CLINIC | Age: 37
End: 2022-06-23
Payer: COMMERCIAL

## 2022-06-23 VITALS — WEIGHT: 198 LBS | HEIGHT: 62 IN | BODY MASS INDEX: 36.44 KG/M2

## 2022-06-23 DIAGNOSIS — R76.11 POSITIVE TB TEST: ICD-10-CM

## 2022-06-23 DIAGNOSIS — J44.9 CHRONIC OBSTRUCTIVE PULMONARY DISEASE, UNSPECIFIED COPD TYPE (HCC): ICD-10-CM

## 2022-06-23 DIAGNOSIS — I10 ESSENTIAL HYPERTENSION: Primary | ICD-10-CM

## 2022-06-23 DIAGNOSIS — J03.90 ACUTE TONSILLITIS, UNSPECIFIED ETIOLOGY: ICD-10-CM

## 2022-06-23 PROCEDURE — 71046 X-RAY EXAM CHEST 2 VIEWS: CPT

## 2022-06-23 PROCEDURE — 99214 OFFICE O/P EST MOD 30 MIN: CPT | Performed by: FAMILY MEDICINE

## 2022-06-23 RX ORDER — CEPHALEXIN 500 MG/1
500 CAPSULE ORAL EVERY 8 HOURS SCHEDULED
Qty: 15 CAPSULE | Refills: 0 | Status: SHIPPED | OUTPATIENT
Start: 2022-06-23 | End: 2022-06-23

## 2022-06-23 RX ORDER — DOXYCYCLINE HYCLATE 100 MG/1
100 CAPSULE ORAL EVERY 12 HOURS SCHEDULED
Qty: 10 CAPSULE | Refills: 0 | Status: SHIPPED | OUTPATIENT
Start: 2022-06-23 | End: 2022-06-28

## 2022-06-23 RX ORDER — LISINOPRIL 40 MG/1
40 TABLET ORAL DAILY
Qty: 90 TABLET | Refills: 1 | Status: SHIPPED | OUTPATIENT
Start: 2022-06-23

## 2022-06-23 RX ORDER — IBUPROFEN 800 MG/1
800 TABLET ORAL EVERY 8 HOURS PRN
Qty: 30 TABLET | Refills: 0 | Status: SHIPPED | OUTPATIENT
Start: 2022-06-23

## 2022-06-23 NOTE — PROGRESS NOTES
Virtual Regular Visit    Verification of patient location:    Patient is located in the following state in which I hold an active license [place]      Assessment/Plan:    Problem List Items Addressed This Visit        Respiratory    Chronic obstructive pulmonary disease ([facility] [place] [address] )    Relevant Orders    XR chest pa & lateral       Other    Positive TB test    Relevant Orders    XR chest pa & lateral      Other Visit Diagnoses     Essential hypertension    -  Primary    Relevant Medications    lisinopril (ZESTRIL) 40 mg tablet    Acute tonsillitis, unspecified etiology        Relevant Medications    doxycycline hyclate (VIBRAMYCIN) 100 mg capsule    ibuprofen (MOTRIN) 800 mg tablet      check chest xray  Symptoms ongoing and worsening,  doxycycline for 5 days, follow up chest x ray for TB screening  Patient is aware to call 9-1-1 and go to the ER for any red flag symptoms including chest pain, fever >103 5, shortness of breath, dizziness, lightheadedness or syncope or tingling, numbness or weakness of any extremity, headache that is not going away, confusion and more  Advised to obtain labs and follow up in 4 weeks for annual physical           Reason for visit is   Chief Complaint   Patient presents with    Virtual Brief Visit    Dental Pain    Swollen Glands    Virtual Regular Visit        Encounter provider Nathan Das MD    Provider located at [address] [place] [id number] [phone]      Recent Visits  No visits were found meeting these conditions  Showing recent visits within past 7 days and meeting all other requirements  Today's Visits  Date Type Provider Dept   06/23/22 Telemedicine Nathan Das MD  [address] today's visits and meeting all other requirements  Future Appointments  No visits were found meeting these conditions    Showing future appointments within next 150 days and meeting all other requirements       The patient was identified by name and date of birth  Marimar Davis was informed that this is a telemedicine visit and that the visit is being conducted through [facility] and patient was informed this is a secure, HIPAA-complaint platform  She agrees to proceed     My office door was closed  No one else was in the room  She acknowledged consent and understanding of privacy and security of the video platform  The patient has agreed to participate and understands they can discontinue the visit at any time  Patient is aware this is a billable service  Taylor Moore is a 42 y o  female       States she has cough congestion, swollen glands, continues to smoke for over 10 days  Trying to wean of xanax  Compliant with losartan  [facility] and normal chest xray, she was evaluated by CRNP at our office and told it was false positive as there was no induration, only redness, patient would like to confirm with another chest xray  Has not done the labs as previously ordered    Dental Pain   Pertinent negatives include no fever          Past Medical History:   Diagnosis Date    Allergies     Anxiety 05/2018    Female hirsutism     GERD (gastroesophageal reflux disease)     Head injury     Hypertension 2018    Papanicolaou smear for cervical cancer screening 2017    NL, no h/o of abn pap that she can recall    STD (female)     Thyroid nodule 05/2018    Tobacco user 05/2018       Past Surgical History:   Procedure Laterality Date    NO PAST SURGERIES         Current Outpatient Medications   Medication Sig Dispense Refill    Advair -21 MCG/ACT inhaler INHALE 2 PUFFS BY MOUTH AND INTO THE LUNGS TWICE A DAY RINSE MOUTH AFTER USE 12 g 1    albuterol (PROVENTIL HFA,VENTOLIN HFA) 90 mcg/act inhaler inhale 2 puffs by mouth and INTO THE LUNGS every 4 hours if needed for wheezing shortness of breath 8 5 g 2    ALPRAZolam (XANAX) 1 mg tablet take 1 tablet by mouth twice a day AS NEEDED FOR ANXIETY 60 tablet 0    doxycycline hyclate (VIBRAMYCIN) 100 mg capsule Take 1 capsule (100 mg total) by mouth every 12 (twelve) hours for 5 days 10 capsule 0    ibuprofen (MOTRIN) 800 mg tablet Take 1 tablet (800 mg total) by mouth every 8 (eight) hours as needed for mild pain or fever 30 tablet 0    lisinopril (ZESTRIL) 40 mg tablet Take 1 tablet (40 mg total) by mouth daily 90 tablet 1     No current facility-administered medications for this visit  Allergies   Allergen Reactions    Penicillins Swelling     Was too IV penicillin       Review of Systems   Constitutional: Negative for fatigue and fever  HENT: Positive for congestion  Negative for facial swelling, mouth sores, rhinorrhea, sore throat and trouble swallowing  Eyes: Negative for pain and redness  Respiratory: Positive for cough  Negative for shortness of breath and wheezing  Cardiovascular: Negative for chest pain, palpitations and leg swelling  Gastrointestinal: Negative for abdominal pain, blood in stool, constipation, diarrhea and nausea  Genitourinary: Negative for dysuria, hematuria and urgency  Musculoskeletal: Negative for arthralgias, back pain and myalgias  Skin: Negative for rash and wound  Neurological: Negative for seizures, syncope and headaches  Hematological: Negative for adenopathy  Psychiatric/Behavioral: Negative for agitation and behavioral problems  Video Exam    Vitals:    06/23/22 1533   Weight: 89 8 kg (198 lb)   Height: 5' 2" (1 575 m)       Physical Exam  Vitals and nursing note reviewed  Constitutional:       Appearance: She is well-developed  HENT:      Head: Normocephalic and atraumatic  Right Ear: External ear normal       Left Ear: External ear normal       Nose: Congestion present  Eyes:      General: No scleral icterus  Right eye: No discharge  Left eye: No discharge        Conjunctiva/sclera: Conjunctivae normal  Comments: Visual observation   Pulmonary:      Effort: No respiratory distress  Abdominal:      Palpations: Abdomen is soft  Tenderness: There is no abdominal tenderness  Comments: Patient self-examined   Musculoskeletal:         General: No tenderness or deformity  Skin:     Findings: No rash  Neurological:      Mental Status: She is alert  Mental status is at baseline  Psychiatric:         Behavior: Behavior normal          Judgment: Judgment normal               VIRTUAL VISIT DISCLAIMER      Inge Parr verbally agrees to participate in [employer]  Pt is aware that [employer] could be limited without vital signs or the ability to perform a full hands-on physical Teo Hernandes understands she or the provider may request at any time to terminate the video visit and request the patient to seek care or treatment in person

## 2022-06-23 NOTE — LETTER
June 23, 2022     Patient: Mara Greer  YOB: 1985  Date of Visit: 6/23/2022      To Whom it May Concern:    Mara Greer is under my professional care  Salomon Hansen was seen in my office on 6/23/2022  Salomon Hansen may return to work on 6/24/2022  If you have any questions or concerns, please don't hesitate to call           Sincerely,          Mindy Lott MD        CC: No Recipients

## 2022-07-11 ENCOUNTER — LAB (OUTPATIENT)
Dept: LAB | Facility: CLINIC | Age: 37
End: 2022-07-11
Payer: COMMERCIAL

## 2022-07-11 DIAGNOSIS — Z13.29 THYROID DISORDER SCREENING: ICD-10-CM

## 2022-07-11 DIAGNOSIS — Z11.59 ENCOUNTER FOR HEPATITIS C SCREENING TEST FOR LOW RISK PATIENT: ICD-10-CM

## 2022-07-11 DIAGNOSIS — F41.8 MIXED ANXIETY DEPRESSIVE DISORDER: ICD-10-CM

## 2022-07-11 DIAGNOSIS — Z11.59 NEED FOR HEPATITIS B SCREENING TEST: ICD-10-CM

## 2022-07-11 DIAGNOSIS — Z13.220 ENCOUNTER FOR LIPID SCREENING FOR CARDIOVASCULAR DISEASE: ICD-10-CM

## 2022-07-11 DIAGNOSIS — F41.9 ANXIETY: ICD-10-CM

## 2022-07-11 DIAGNOSIS — R76.11 POSITIVE TB TEST: ICD-10-CM

## 2022-07-11 DIAGNOSIS — Z13.6 ENCOUNTER FOR LIPID SCREENING FOR CARDIOVASCULAR DISEASE: ICD-10-CM

## 2022-07-11 DIAGNOSIS — Z11.4 ENCOUNTER FOR SCREENING FOR HIV: ICD-10-CM

## 2022-07-11 LAB
ALBUMIN SERPL BCP-MCNC: 3.6 G/DL (ref 3.5–5)
ALP SERPL-CCNC: 46 U/L (ref 46–116)
ALT SERPL W P-5'-P-CCNC: 40 U/L (ref 12–78)
ANION GAP SERPL CALCULATED.3IONS-SCNC: 2 MMOL/L (ref 4–13)
AST SERPL W P-5'-P-CCNC: 19 U/L (ref 5–45)
BASOPHILS # BLD AUTO: 0.05 THOUSANDS/ΜL (ref 0–0.1)
BASOPHILS NFR BLD AUTO: 1 % (ref 0–1)
BILIRUB SERPL-MCNC: 0.59 MG/DL (ref 0.2–1)
BUN SERPL-MCNC: 15 MG/DL (ref 5–25)
CALCIUM SERPL-MCNC: 9.5 MG/DL (ref 8.3–10.1)
CHLORIDE SERPL-SCNC: 111 MMOL/L (ref 100–108)
CHOLEST SERPL-MCNC: 145 MG/DL
CO2 SERPL-SCNC: 28 MMOL/L (ref 21–32)
CREAT SERPL-MCNC: 0.9 MG/DL (ref 0.6–1.3)
EOSINOPHIL # BLD AUTO: 0.09 THOUSAND/ΜL (ref 0–0.61)
EOSINOPHIL NFR BLD AUTO: 1 % (ref 0–6)
ERYTHROCYTE [DISTWIDTH] IN BLOOD BY AUTOMATED COUNT: 12.4 % (ref 11.6–15.1)
GFR SERPL CREATININE-BSD FRML MDRD: 80 ML/MIN/1.73SQ M
GLUCOSE SERPL-MCNC: 92 MG/DL (ref 65–140)
HCT VFR BLD AUTO: 40.1 % (ref 34.8–46.1)
HDLC SERPL-MCNC: 38 MG/DL
HGB BLD-MCNC: 12.9 G/DL (ref 11.5–15.4)
IMM GRANULOCYTES # BLD AUTO: 0.04 THOUSAND/UL (ref 0–0.2)
IMM GRANULOCYTES NFR BLD AUTO: 1 % (ref 0–2)
LDLC SERPL CALC-MCNC: 75 MG/DL (ref 0–100)
LYMPHOCYTES # BLD AUTO: 3.54 THOUSANDS/ΜL (ref 0.6–4.47)
LYMPHOCYTES NFR BLD AUTO: 41 % (ref 14–44)
MCH RBC QN AUTO: 29.9 PG (ref 26.8–34.3)
MCHC RBC AUTO-ENTMCNC: 32.2 G/DL (ref 31.4–37.4)
MCV RBC AUTO: 93 FL (ref 82–98)
MONOCYTES # BLD AUTO: 0.64 THOUSAND/ΜL (ref 0.17–1.22)
MONOCYTES NFR BLD AUTO: 8 % (ref 4–12)
NEUTROPHILS # BLD AUTO: 4.21 THOUSANDS/ΜL (ref 1.85–7.62)
NEUTS SEG NFR BLD AUTO: 48 % (ref 43–75)
NONHDLC SERPL-MCNC: 107 MG/DL
NRBC BLD AUTO-RTO: 0 /100 WBCS
PLATELET # BLD AUTO: 217 THOUSANDS/UL (ref 149–390)
PMV BLD AUTO: 10.6 FL (ref 8.9–12.7)
POTASSIUM SERPL-SCNC: 4.6 MMOL/L (ref 3.5–5.3)
PROT SERPL-MCNC: 7.3 G/DL (ref 6.4–8.2)
RBC # BLD AUTO: 4.31 MILLION/UL (ref 3.81–5.12)
SODIUM SERPL-SCNC: 141 MMOL/L (ref 136–145)
TRIGL SERPL-MCNC: 158 MG/DL
TSH SERPL DL<=0.05 MIU/L-ACNC: 1.58 UIU/ML (ref 0.45–4.5)
WBC # BLD AUTO: 8.57 THOUSAND/UL (ref 4.31–10.16)

## 2022-07-11 PROCEDURE — 87389 HIV-1 AG W/HIV-1&-2 AB AG IA: CPT

## 2022-07-11 PROCEDURE — 36415 COLL VENOUS BLD VENIPUNCTURE: CPT

## 2022-07-11 PROCEDURE — 84443 ASSAY THYROID STIM HORMONE: CPT

## 2022-07-11 PROCEDURE — 86480 TB TEST CELL IMMUN MEASURE: CPT

## 2022-07-11 PROCEDURE — 80053 COMPREHEN METABOLIC PANEL: CPT

## 2022-07-11 PROCEDURE — 87340 HEPATITIS B SURFACE AG IA: CPT

## 2022-07-11 PROCEDURE — 86803 HEPATITIS C AB TEST: CPT

## 2022-07-11 PROCEDURE — 80061 LIPID PANEL: CPT

## 2022-07-11 PROCEDURE — 85025 COMPLETE CBC W/AUTO DIFF WBC: CPT

## 2022-07-12 LAB
HBV SURFACE AG SER QL: NORMAL
HCV AB SER QL: NORMAL
HIV 1+2 AB+HIV1 P24 AG SERPL QL IA: NORMAL

## 2022-07-14 DIAGNOSIS — R76.11 POSITIVE TB TEST: Primary | ICD-10-CM

## 2022-07-14 LAB
GAMMA INTERFERON BACKGROUND BLD IA-ACNC: 0.31 IU/ML
M TB IFN-G BLD-IMP: POSITIVE
M TB IFN-G CD4+ BCKGRND COR BLD-ACNC: 0.59 IU/ML
M TB IFN-G CD4+ BCKGRND COR BLD-ACNC: 0.84 IU/ML
MITOGEN IGNF BCKGRD COR BLD-ACNC: >10 IU/ML

## 2022-07-14 NOTE — RESULT ENCOUNTER NOTE
Spoke with pt made aware of positive TB quantiferon  Will refer to Infectious disease and [facility] should be contacting to discuss tx regimen as well  Can contact [facility] if no response in 7 days

## 2022-07-28 ENCOUNTER — TELEPHONE (OUTPATIENT)
Dept: FAMILY MEDICINE CLINIC | Facility: CLINIC | Age: 37
End: 2022-07-28

## 2022-07-28 NOTE — TELEPHONE ENCOUNTER
Frankey Hickman is asking for an antibiotic for an infected tooth (molar)  She see's the dentist Fri, but is in pain  She just got insurance so she was able to make a dental appt  She's working 12 hour days & is not able to come in    [facility]

## 2022-07-28 NOTE — TELEPHONE ENCOUNTER
I will send amoxicillin for 7 days, tell her that she has had too many antibiotics recently take daily probiotics, remind her to follow up with ID for appointment tomorrow 11 am

## 2022-07-29 ENCOUNTER — CONSULT (OUTPATIENT)
Dept: INFECTIOUS DISEASES | Facility: CLINIC | Age: 37
End: 2022-07-29
Payer: COMMERCIAL

## 2022-07-29 VITALS
OXYGEN SATURATION: 98 % | SYSTOLIC BLOOD PRESSURE: 140 MMHG | DIASTOLIC BLOOD PRESSURE: 80 MMHG | WEIGHT: 197 LBS | TEMPERATURE: 97.5 F | HEART RATE: 76 BPM | BODY MASS INDEX: 36.03 KG/M2

## 2022-07-29 DIAGNOSIS — Z22.7 LATENT TUBERCULOSIS: ICD-10-CM

## 2022-07-29 DIAGNOSIS — E66.01 SEVERE OBESITY (BMI 35.0-39.9) WITH COMORBIDITY (HCC): ICD-10-CM

## 2022-07-29 DIAGNOSIS — Z72.0 TOBACCO ABUSE: Primary | ICD-10-CM

## 2022-07-29 DIAGNOSIS — R76.11 POSITIVE TB TEST: ICD-10-CM

## 2022-07-29 PROCEDURE — 99244 OFF/OP CNSLTJ NEW/EST MOD 40: CPT | Performed by: INTERNAL MEDICINE

## 2022-07-29 RX ORDER — POLYETHYLENE GLYCOL 3350 17 G
4 POWDER IN PACKET (EA) ORAL AS NEEDED
Qty: 100 EACH | Refills: 0 | Status: SHIPPED | OUTPATIENT
Start: 2022-07-29 | End: 2022-08-02 | Stop reason: SDUPTHER

## 2022-07-29 NOTE — PATIENT INSTRUCTIONS
Start rifampin 600mg po daily  Side effects of antibiotic therapy discussed with patient to include but not limited to nausea, vomiting, heartburn, diminished appetite, fatigue, headache, drowsiness, diarrhea, rash, transaminitis, bone marrow suppression  I counseled the patient that this may also cause discoloration of teeth, urine, sweat, sputum and tears   I advised patient to take the antibiotic 1 hour before or 2 hours after a meal with a full glass of water  I advised patient to avoid alcohol and herbal products while taking rifampin  Labs prior to next appt: CBC with diff, CMP  Return to office in 4 weeks

## 2022-07-29 NOTE — TELEPHONE ENCOUNTER
Spoke with patient and can not take amoxicillin because she is allergic to penicillin  Has an appointment with the dentist today told her to see them and see what they say

## 2022-07-29 NOTE — PROGRESS NOTES
Consultation - Infectious Disease   Landon Primrose 42 y o  female MRN: [id number]    Encounter: [id number]    IMPRESSION/RECOMMENDATIONS:   1  Latent TB: Pt does not have symptoms consistent with active tuberculosis  PPD and TB QuantiFERON gold are positive  Chest x-ray is negative for radiographic signs of tuberculosis  · Benefits, risks, effectiveness of latent tuberculosis treatment were discussed with the patient  Patient wishes to proceed with latent TB treatment  · Start rifampin 600mg po daily x 4 months  · Side effects of antibiotic therapy discussed with patient to include but not limited to nausea, vomiting, heartburn, diminished appetite, fatigue, headache, drowsiness, diarrhea, rash, transaminitis  · I counseled the patient that this may also cause discoloration of teeth, urine, sweat, sputum and tears   · I advised patient to take the antibiotic 1 hour before or 2 hours after a meal with a full glass of water  · I advised patient to avoid alcohol and herbal products while taking rifampin  · Labs prior to next appt: CBC with diff, CMP  · Return to office in 4 weeks    2  Cigarette smoking  · I counseled patient on importance of smoking cessation  · Start nicotine lozenges and f/u with PCP for refills    3  Morbid obesity: with BMI of 36  · I advised patient on importance of lifestyle modifications including dietary changes and increased cardiovascular exercise for weight loss    My recommendations were discussed with the patient in detail who verbalized understanding  Thank you for allowing me to participate in the care of this patient  The ID service will follow  HISTORY OF PRESENT ILLNESS:  Reason for Consult: +TB QFN gold  HPI: Landon Primrose is a 42y o  year old female referred by PCP for TB QFN gold  She underwent TB testing as part of pre-employment screening  Pt says her PPD was reported as positive with 18 mm induration  Pt was born and raised in the [address] [place] [address] [address]   She has never been incarcerated  Her father was a "carrier of TB" and was treated with antibiotics for 6-12 mo in the 46s  Pt reports dental pain and has impacted molars/wisdom teeth  She has a dental appt this Friday  She reports congestion and mild SOB  She denies fever, chills  REVIEW OF SYSTEMS:  Constitutional: Negative for fever, chills, wt loss  HENT: +dental pain  Negative headache, runny nose, sore throat, congestion  Eyes: Negative for change in vision  Respiratory: +mild shortness of breath  Negative cough  Cardiovascular: Negative for chest pain, palpitations  Gastrointestinal: +gas, belching, diarrhea after eating regina-eleanor  Negative for abdominal pain, nausea, vomiting  Musculoskeletal: +low back/rib pain  Skin:  Negative for rash or wound  Neurological: Negative for syncope, focal weakness  Hematological: Negative for excessive bruising, bleeding  Psychiatric/Behavioral: Negative for confusion, hallucination        PAST MEDICAL HISTORY:  Past Medical History:   Diagnosis Date    Allergies     Anxiety 05/2018    Female hirsutism     GERD (gastroesophageal reflux disease)     Head injury     Hypertension 2018    Papanicolaou smear for cervical cancer screening 2017    NL, no h/o of abn pap that she can recall    STD (female)     Thyroid nodule 05/2018    Tobacco user 05/2018     Past Surgical History:   Procedure Laterality Date    NO PAST SURGERIES       FAMILY HISTORY:  Family History   Problem Relation Age of Onset    Hypertension Mother     Kidney disease Mother     Cirrhosis Mother     Dialysis Mother     Diabetes Mother     Anxiety disorder Mother     Depression Mother     Anxiety disorder Father     Depression Father     Drug abuse Father     Melanoma Paternal Uncle     Stomach cancer Other     Colon cancer Other         Passed age 34     SOCIAL HISTORY:  Social History   Social History     Substance and Sexual Activity   Alcohol Use Yes    Comment: social Social History     Substance and Sexual Activity   Drug Use Yes    Types: Marijuana    Comment: rare     Social History     Tobacco Use   Smoking Status Current Every Day Smoker    Packs/day: 1 00    Years: 25 00    Pack years: 25 00    Types: Cigarettes   Smokeless Tobacco Never Used   Tobacco Comment    smoked since age 13   Born and raised in [place], [place]  Has lived in [address] [place] [address] and [place] [address] [address]  Has lived in [place] since 2016  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   since 2017, no children  No intimate partners curretly  Pets: 4 cats  ALLERGIES:  Allergies   Allergen Reactions    Penicillins Swelling     Was too IV penicillin     MEDICATIONS:  All current active medications have been reviewed      Current Outpatient Medications:     Advair -21 MCG/ACT inhaler, INHALE 2 PUFFS BY MOUTH AND INTO THE LUNGS TWICE A DAY RINSE MOUTH AFTER USE, Disp: 12 g, Rfl: 1    albuterol (PROVENTIL HFA,VENTOLIN HFA) 90 mcg/act inhaler, inhale 2 puffs by mouth and INTO THE LUNGS every 4 hours if needed for wheezing shortness of breath, Disp: 8 5 g, Rfl: 2    ALPRAZolam (XANAX) 1 mg tablet, take 1 tablet by mouth twice a day AS NEEDED FOR ANXIETY, Disp: 60 tablet, Rfl: 0    ibuprofen (MOTRIN) 800 mg tablet, Take 1 tablet (800 mg total) by mouth every 8 (eight) hours as needed for mild pain or fever, Disp: 30 tablet, Rfl: 0    lisinopril (ZESTRIL) 40 mg tablet, Take 1 tablet (40 mg total) by mouth daily, Disp: 90 tablet, Rfl: 1    PHYSICAL EXAM:  Vitals:    07/29/22 1000   BP: 140/80   BP Location: Left arm   Cuff Size: Standard   Pulse: 76   Temp: 97 5 °F (36 4 °C)   TempSrc: Temporal   SpO2: 98%   Weight: 89 4 kg (197 lb)     General Appearance:  Appearing well, nontoxic, and in no distress   Head:  Normocephalic, without obvious abnormality, atraumatic   Eyes:  Conjunctiva pink and sclera anicteric, both eyes   Nose: Nasal mucosa is moist    Throat: Oral mucosa is moist    Neck: Supple   Lungs:   Clear to auscultation bilaterally, respirations unlabored on room air   Heart:  S1, S2, RRR; no murmur   Abdomen:   Soft, non-tender, non-distended   Extremities: No distal leg edema b/l   Skin: No rash or draining lesions   Lymph nodes: Cervical, supraclavicular nodes normal   Neurologic: Alert and oriented times to person, surroundings, conversant, fluent speech, MATA x 4     LABS, IMAGING, & OTHER STUDIES:  Lab Results:  I have personally reviewed pertinent labs, cultures,  Lab Results   Component Value Date    K 4 6 07/11/2022     (H) 07/11/2022    CO2 28 07/11/2022    BUN 15 07/11/2022    CREATININE 0 90 07/11/2022    GLUF 94 03/26/2018    CALCIUM 9 5 07/11/2022    AST 19 07/11/2022    ALT 40 07/11/2022    ALKPHOS 46 07/11/2022    EGFR 80 07/11/2022     Lab Results   Component Value Date    WBC 8 57 07/11/2022    HGB 12 9 07/11/2022    HCT 40 1 07/11/2022    MCV 93 07/11/2022     07/11/2022 7/11/22 TB QFN: positive    Imaging Studies:   6/23/22 CXR PA/Lat: No acute cardiopulmonary disease  No radiographic evidence of active TB  I have personally reviewed pertinent imaging study reports and images in PACS

## 2022-08-02 ENCOUNTER — OFFICE VISIT (OUTPATIENT)
Dept: FAMILY MEDICINE CLINIC | Facility: CLINIC | Age: 37
End: 2022-08-02
Payer: COMMERCIAL

## 2022-08-02 VITALS
BODY MASS INDEX: 36.62 KG/M2 | HEIGHT: 62 IN | RESPIRATION RATE: 18 BRPM | HEART RATE: 78 BPM | WEIGHT: 199 LBS | SYSTOLIC BLOOD PRESSURE: 126 MMHG | DIASTOLIC BLOOD PRESSURE: 84 MMHG | TEMPERATURE: 97.6 F | OXYGEN SATURATION: 98 %

## 2022-08-02 DIAGNOSIS — R14.2 BELCHINGS: ICD-10-CM

## 2022-08-02 DIAGNOSIS — J03.90 ACUTE TONSILLITIS, UNSPECIFIED ETIOLOGY: ICD-10-CM

## 2022-08-02 DIAGNOSIS — G89.29 CHRONIC DENTAL PAIN: ICD-10-CM

## 2022-08-02 DIAGNOSIS — K21.9 GASTROESOPHAGEAL REFLUX DISEASE WITHOUT ESOPHAGITIS: ICD-10-CM

## 2022-08-02 DIAGNOSIS — Z72.0 TOBACCO ABUSE: ICD-10-CM

## 2022-08-02 DIAGNOSIS — R00.2 PALPITATION: ICD-10-CM

## 2022-08-02 DIAGNOSIS — K08.9 CHRONIC DENTAL PAIN: ICD-10-CM

## 2022-08-02 DIAGNOSIS — Z22.7 LATENT TUBERCULOSIS DIAGNOSED BY BLOOD TEST: Primary | ICD-10-CM

## 2022-08-02 PROCEDURE — 99215 OFFICE O/P EST HI 40 MIN: CPT | Performed by: FAMILY MEDICINE

## 2022-08-02 RX ORDER — BACLOFEN 10 MG/1
10 TABLET ORAL 3 TIMES DAILY
Qty: 20 TABLET | Refills: 0 | Status: SHIPPED | OUTPATIENT
Start: 2022-08-02 | End: 2022-09-14

## 2022-08-02 RX ORDER — POLYETHYLENE GLYCOL 3350 17 G
4 POWDER IN PACKET (EA) ORAL AS NEEDED
Qty: 100 EACH | Refills: 0 | Status: SHIPPED | OUTPATIENT
Start: 2022-08-02 | End: 2022-09-14 | Stop reason: SDUPTHER

## 2022-08-02 RX ORDER — OMEPRAZOLE 40 MG/1
40 CAPSULE, DELAYED RELEASE ORAL
Qty: 30 CAPSULE | Refills: 1 | Status: SHIPPED | OUTPATIENT
Start: 2022-08-02 | End: 2022-09-14

## 2022-08-02 NOTE — PROGRESS NOTES
Subjective:      Patient ID: Hue Roth is a 42 y o  female  Was here for physical which was rescheduled as patient had multiple problems that needed to be addressed:  Constantly burping- states she does not drink carbonated drinks  , is not bloated, >20-30 burps in an hour  Has pain- "burning all over her right chest and back, tender over right chest " which she describes to be chronic  Had "heartburn"-states this is not heartburn, has not been on PPIs  Mixed anxiety and depression- chronic,IMPROVED SINCE 3-4 MONTHS, patient has been on xanax for some years now and states that she has been noticing that she sometimes has to use 1 mg xanax 3 times a day , and is worried about addiction, patient is interested in daily medications, however is very non complaint, worries about side effects of all medications, as well as has erratic schedule  She is trying OTC supplements and kava root etc    Hypertension-chronic, now controlled, continues to smoke 1ppd, takes lisinopril regularly now and is compliant with diet, trying to eat healthier and limits sodium  Tobacco use- cig smoking, has tried patches in the past and she smoked while on patches and had chest pain and palpitations, does not want chantix due to vivid dreams  COPD- on advair daily, chronic, needs albuterol <2 times/week  Latent Tuberculosis- saw ID, has not started rifampin yet, she is concerned that her symptoms- SOB,cough , chest burning and pain could be tuberculosis related -her father was an IV drug user and had latent TB was treated with isoniazid  Dental infection- she is on clindamycin tid    Obesity- trying to lose weight,lost from 200lbs->182lbs->199 lbs now    Breast lesion- needed biopsy, every visit she does mention and is aware that she needed USG biopsy but does not schedule it, she is aware of the risk and acknowledges it as mentioned several times in the past visits , she is aware that breast cancer can cause death ,but did not schedule the biopsy  States she will schedule again this time      Past Medical History:   Diagnosis Date    Allergies     Anxiety 05/2018    Female hirsutism     GERD (gastroesophageal reflux disease)     Head injury     Hypertension 2018    Papanicolaou smear for cervical cancer screening 2017    NL, no h/o of abn pap that she can recall    STD (female)     Thyroid nodule 05/2018    Tobacco user 05/2018       Family History   Problem Relation Age of Onset    Hypertension Mother     Kidney disease Mother     Cirrhosis Mother     Dialysis Mother     Diabetes Mother     Anxiety disorder Mother     Depression Mother     Anxiety disorder Father     Depression Father     Drug abuse Father     Melanoma Paternal Uncle     Stomach cancer Other     Colon cancer Other         Passed age 34       Past Surgical History:   Procedure Laterality Date    NO PAST SURGERIES          reports that she has been smoking cigarettes  She has a 25 00 pack-year smoking history  She has never used smokeless tobacco  She reports current alcohol use  She reports current drug use  Drug: Marijuana        Current Outpatient Medications:     Advair -21 MCG/ACT inhaler, INHALE 2 PUFFS BY MOUTH AND INTO THE LUNGS TWICE A DAY RINSE MOUTH AFTER USE, Disp: 12 g, Rfl: 1    albuterol (PROVENTIL HFA,VENTOLIN HFA) 90 mcg/act inhaler, inhale 2 puffs by mouth and INTO THE LUNGS every 4 hours if needed for wheezing shortness of breath, Disp: 8 5 g, Rfl: 2    ALPRAZolam (XANAX) 1 mg tablet, take 1 tablet by mouth twice a day AS NEEDED FOR ANXIETY, Disp: 60 tablet, Rfl: 0    baclofen 10 mg tablet, Take 1 tablet (10 mg total) by mouth 3 (three) times a day, Disp: 20 tablet, Rfl: 0    ibuprofen (MOTRIN) 800 mg tablet, Take 1 tablet (800 mg total) by mouth every 8 (eight) hours as needed for mild pain or fever, Disp: 30 tablet, Rfl: 0    lisinopril (ZESTRIL) 40 mg tablet, Take 1 tablet (40 mg total) by mouth daily, Disp: 90 tablet, Rfl: 1    nicotine polacrilex (COMMIT) 4 MG lozenge, Apply 1 lozenge (4 mg total) to the mouth or throat as needed for smoking cessation, Disp: 100 each, Rfl: 0    omeprazole (PriLOSEC) 40 MG capsule, Take 1 capsule (40 mg total) by mouth daily in the early morning, Disp: 30 capsule, Rfl: 1    rifampin (RIFADIN) 300 mg capsule, Take 2 capsules (600 mg total) by mouth daily (Patient not taking: Reported on 8/2/2022), Disp: 60 capsule, Rfl: 3    The following portions of the patient's history were reviewed and updated as appropriate: allergies, current medications, past family history, past medical history, past social history, past surgical history and problem list     Review of Systems   Constitutional: Negative for fatigue and fever  HENT: Negative for congestion, facial swelling, mouth sores, rhinorrhea, sore throat and trouble swallowing  Eyes: Negative for pain and redness  Respiratory: Positive for cough and shortness of breath  Negative for wheezing  Cardiovascular: Positive for chest pain  Negative for palpitations and leg swelling  Gastrointestinal: Negative for abdominal pain, blood in stool, constipation, diarrhea and nausea  Genitourinary: Negative for dysuria, hematuria and urgency  Musculoskeletal: Negative for arthralgias, back pain and myalgias  Skin: Negative for rash and wound  Neurological: Negative for seizures, syncope and headaches  Hematological: Negative for adenopathy  Psychiatric/Behavioral: Negative for agitation and behavioral problems  Objective:    /84 (BP Location: Left arm, Patient Position: Sitting, Cuff Size: Large)   Pulse 78   Temp 97 6 °F (36 4 °C) (Temporal)   Resp 18   Ht 5' 2" (1 575 m)   Wt 90 3 kg (199 lb)   SpO2 98%   BMI 36 40 kg/m²      Physical Exam  Vitals and nursing note reviewed  Constitutional:       Appearance: Normal appearance  She is well-developed  She is obese  She is not ill-appearing  Comments: Constant belching/burping throughout the visit   HENT:      Head: Normocephalic and atraumatic  Right Ear: External ear normal       Left Ear: External ear normal       Nose: Nose normal       Mouth/Throat:      Mouth: Mucous membranes are moist       Pharynx: No oropharyngeal exudate or posterior oropharyngeal erythema  Eyes:      General: No scleral icterus  Right eye: No discharge  Left eye: No discharge  Conjunctiva/sclera: Conjunctivae normal    Cardiovascular:      Rate and Rhythm: Normal rate  Heart sounds: No murmur heard  No gallop  Pulmonary:      Effort: Pulmonary effort is normal  No respiratory distress  Breath sounds: Normal breath sounds  No stridor  No wheezing, rhonchi or rales  Abdominal:      Palpations: Abdomen is soft  Tenderness: There is no abdominal tenderness  Musculoskeletal:         General: No tenderness or deformity  Skin:     Findings: No erythema or rash  Neurological:      Mental Status: She is alert  Mental status is at baseline     Psychiatric:         Behavior: Behavior normal          Judgment: Judgment normal            Recent Results (from the past 8736 hour(s))   CBC and differential    Collection Time: 07/11/22  2:22 PM   Result Value Ref Range    WBC 8 57 4 31 - 10 16 Thousand/uL    RBC 4 31 3 81 - 5 12 Million/uL    Hemoglobin 12 9 11 5 - 15 4 g/dL    Hematocrit 40 1 34 8 - 46 1 %    MCV 93 82 - 98 fL    MCH 29 9 26 8 - 34 3 pg    MCHC 32 2 31 4 - 37 4 g/dL    RDW 12 4 11 6 - 15 1 %    MPV 10 6 8 9 - 12 7 fL    Platelets 235 863 - 769 Thousands/uL    nRBC 0 /100 WBCs    Neutrophils Relative 48 43 - 75 %    Immat GRANS % 1 0 - 2 %    Lymphocytes Relative 41 14 - 44 %    Monocytes Relative 8 4 - 12 %    Eosinophils Relative 1 0 - 6 %    Basophils Relative 1 0 - 1 %    Neutrophils Absolute 4 21 1 85 - 7 62 Thousands/µL    Immature Grans Absolute 0 04 0 00 - 0 20 Thousand/uL    Lymphocytes Absolute 3 54 0 60 - 4 47 Thousands/µL    Monocytes Absolute 0 64 0 17 - 1 22 Thousand/µL    Eosinophils Absolute 0 09 0 00 - 0 61 Thousand/µL    Basophils Absolute 0 05 0 00 - 0 10 Thousands/µL   Comprehensive metabolic panel    Collection Time: 07/11/22  2:22 PM   Result Value Ref Range    Sodium 141 136 - 145 mmol/L    Potassium 4 6 3 5 - 5 3 mmol/L    Chloride 111 (H) 100 - 108 mmol/L    CO2 28 21 - 32 mmol/L    ANION GAP 2 (L) 4 - 13 mmol/L    BUN 15 5 - 25 mg/dL    Creatinine 0 90 0 60 - 1 30 mg/dL    Glucose 92 65 - 140 mg/dL    Calcium 9 5 8 3 - 10 1 mg/dL    AST 19 5 - 45 U/L    ALT 40 12 - 78 U/L    Alkaline Phosphatase 46 46 - 116 U/L    Total Protein 7 3 6 4 - 8 2 g/dL    Albumin 3 6 3 5 - 5 0 g/dL    Total Bilirubin 0 59 0 20 - 1 00 mg/dL    eGFR 80 ml/min/1 73sq m   Lipid panel    Collection Time: 07/11/22  2:22 PM   Result Value Ref Range    Cholesterol 145 See Comment mg/dL    Triglycerides 158 (H) See Comment mg/dL    HDL, Direct 38 (L) >=50 mg/dL    LDL Calculated 75 0 - 100 mg/dL    Non-HDL-Chol (CHOL-HDL) 107 mg/dl   TSH, 3rd generation with Free T4 reflex    Collection Time: 07/11/22  2:22 PM   Result Value Ref Range    TSH 3RD GENERATON 1 580 0 450 - 4 500 uIU/mL   HIV 1/2 Antigen/Antibody (4th Generation) w Reflex [facility]    Collection Time: 07/11/22  2:22 PM   Result Value Ref Range    HIV-1/HIV-2 Ab Non-Reactive Non-Reactive   Hepatitis C antibody    Collection Time: 07/11/22  2:22 PM   Result Value Ref Range    Hepatitis C Ab Non-reactive Non-reactive   Hepatitis B surface antigen    Collection Time: 07/11/22  2:22 PM   Result Value Ref Range    Hepatitis B Surface Ag Non-reactive Non-reactive, NonReactive - Confirmed   Quantiferon TB Gold Plus    Collection Time: 07/11/22  2:22 PM   Result Value Ref Range    QFT Nil 0 31 0 - 8 0 IU/ml    QFT TB1-NIL 0 84 IU/ml    QFT TB2-NIL 0 59 IU/ml    QFT Mitogen-NIL >10 00 IU/ml    QFT Final Interpretation Positive (A) Negative       Laboratory Results: I have personally reviewed the pertinent laboratory results/reports         Assessment/Plan:         Diagnoses and all orders for this visit:    Latent tuberculosis diagnosed by blood test  -     CT chest wo contrast; Future  -     AFB Culture with Stain; Future    Palpitation  -     POCT ECG    Chronic dental pain    Belchings  -     baclofen 10 mg tablet; Take 1 tablet (10 mg total) by mouth 3 (three) times a day    Acute tonsillitis, unspecified etiology    Tobacco abuse  -     nicotine polacrilex (COMMIT) 4 MG lozenge; Apply 1 lozenge (4 mg total) to the mouth or throat as needed for smoking cessation    Gastroesophageal reflux disease without esophagitis  -     omeprazole (PriLOSEC) 40 MG capsule; Take 1 capsule (40 mg total) by mouth daily in the early morning      Given the history and symptoms, check CT chest and if patient can produce phlegm do AFB sputum , I have discussed that she does not have tuberculosis and usually would see changes of chest xray  However given chest pain, will check CT  EKG- showed nsr, no acute st-t wave changes, normal intervals-reassured patient that her chest pain is mostly costochondral in origin  I have advised her to take ibuprofen 3 times a day as needed  Patient does have extreme belching  I will try baclofen 10 mg oral daily  I have advised her to stop smoking several times in the past and now  She can continue using lozenges    Blood pressure is well controlled continue lisinopril  I have advised her to do a trial of omeprazole 40 mg 1 tablet daily in the morning empty stomach for the next 30 days  Follow-up with dentist for dental problems    Start taking rifampin as recommended by Infectious Disease physician  Reinforce that she must schedule her ultrasound of the breast, I have expressed my concern that patient seems to be in denial and probably does not want to find out hence she does not schedule the test   I will follow-up in 4 weeks for annual physical    Read package inserts for all medications before starting a new medications, call me if you have any questions  Patient was given opportunity to ask questions and all questions were answered  Portions of the record may have been created with voice recognition software  Occasional wrong word or "sound a like" substitutions may have occurred due to the inherent limitations of voice recognition software  Read the chart carefully and recognize, using context, where substitutions have occurred

## 2022-08-03 PROCEDURE — 93000 ELECTROCARDIOGRAM COMPLETE: CPT | Performed by: FAMILY MEDICINE

## 2022-08-07 DIAGNOSIS — F41.9 ANXIETY: ICD-10-CM

## 2022-08-08 RX ORDER — ALPRAZOLAM 1 MG/1
TABLET ORAL
Qty: 60 TABLET | Refills: 0 | Status: SHIPPED | OUTPATIENT
Start: 2022-08-08 | End: 2022-09-08

## 2022-08-15 ENCOUNTER — HOSPITAL ENCOUNTER (OUTPATIENT)
Dept: CT IMAGING | Facility: HOSPITAL | Age: 37
Discharge: HOME | End: 2022-08-15
Attending: FAMILY MEDICINE
Payer: COMMERCIAL

## 2022-08-15 DIAGNOSIS — Z22.7 LATENT TUBERCULOSIS DIAGNOSED BY BLOOD TEST: ICD-10-CM

## 2022-08-15 PROCEDURE — G1004 CDSM NDSC: HCPCS

## 2022-08-15 PROCEDURE — 71250 CT THORAX DX C-: CPT

## 2022-08-18 ENCOUNTER — TELEPHONE (OUTPATIENT)
Dept: INFECTIOUS DISEASES | Facility: CLINIC | Age: 37
End: 2022-08-18

## 2022-08-18 NOTE — TELEPHONE ENCOUNTER
Contacted pt today regarding her up coming appt and labs that need to be drawn before that appt  Pt states that she hs not started to take medication that our office prescribed  Pt states that she has been experiencing a tooth problem and that she is currently taking an antibiotic prescribed by them and that is the reason that she did not start ours  Pt states that she is currently following up with them to create a care plan for her tooth  Pt states that she has already picked up the medication and has it at home  Pt advised that once she is able to begin the medication that should start taking it and contact the office so that her 4 week appt can be scheduled at that time  Pt verbalizes understanding and will contact the office

## 2022-09-08 ENCOUNTER — TELEPHONE (OUTPATIENT)
Dept: INFECTIOUS DISEASES | Facility: CLINIC | Age: 37
End: 2022-09-08

## 2022-09-08 DIAGNOSIS — F41.9 ANXIETY: ICD-10-CM

## 2022-09-08 RX ORDER — ALPRAZOLAM 1 MG/1
TABLET ORAL
Qty: 60 TABLET | Refills: 0 | Status: SHIPPED | OUTPATIENT
Start: 2022-09-08 | End: 2022-10-13

## 2022-09-08 NOTE — TELEPHONE ENCOUNTER
Patient calls as she has had the tooth issue, and not started treatment for latent tb  However, she is concerned about this and has questions about it because she was advised to wait to start until after tooth issue is resolved but wants to start the latent tb medication    Can you call her at your convenience?     CB: [phone]

## 2022-09-10 DIAGNOSIS — J44.9 CHRONIC OBSTRUCTIVE PULMONARY DISEASE, UNSPECIFIED COPD TYPE (HCC): ICD-10-CM

## 2022-09-12 RX ORDER — FLUTICASONE PROPIONATE AND SALMETEROL XINAFOATE 230; 21 UG/1; UG/1
AEROSOL, METERED RESPIRATORY (INHALATION)
Qty: 12 G | Refills: 1 | Status: SHIPPED | OUTPATIENT
Start: 2022-09-12

## 2022-09-13 ENCOUNTER — TELEPHONE (OUTPATIENT)
Dept: INFECTIOUS DISEASES | Facility: CLINIC | Age: 37
End: 2022-09-13

## 2022-09-13 NOTE — TELEPHONE ENCOUNTER
Pt calls office to inform staff she began to take the Rifadin  Pt states she has questions medication  Scheduled a f/u virtual appt with Dr Johny Pedraza on 9/14 at 9:30

## 2022-09-14 ENCOUNTER — TELEMEDICINE (OUTPATIENT)
Dept: FAMILY MEDICINE CLINIC | Facility: CLINIC | Age: 37
End: 2022-09-14
Payer: COMMERCIAL

## 2022-09-14 ENCOUNTER — TELEPHONE (OUTPATIENT)
Dept: INFECTIOUS DISEASES | Facility: CLINIC | Age: 37
End: 2022-09-14

## 2022-09-14 DIAGNOSIS — F41.1 GENERALIZED ANXIETY DISORDER WITH PANIC ATTACKS: Primary | ICD-10-CM

## 2022-09-14 DIAGNOSIS — Z22.7 LATENT TUBERCULOSIS: ICD-10-CM

## 2022-09-14 DIAGNOSIS — F41.1 GENERALIZED ANXIETY DISORDER: ICD-10-CM

## 2022-09-14 DIAGNOSIS — R23.4 SCAB: ICD-10-CM

## 2022-09-14 DIAGNOSIS — J44.9 CHRONIC OBSTRUCTIVE PULMONARY DISEASE, UNSPECIFIED COPD TYPE (HCC): ICD-10-CM

## 2022-09-14 DIAGNOSIS — F41.0 GENERALIZED ANXIETY DISORDER WITH PANIC ATTACKS: Primary | ICD-10-CM

## 2022-09-14 DIAGNOSIS — Z72.0 TOBACCO ABUSE: ICD-10-CM

## 2022-09-14 PROBLEM — R05.1 ACUTE COUGH: Status: RESOLVED | Noted: 2022-06-14 | Resolved: 2022-09-14

## 2022-09-14 PROCEDURE — 99214 OFFICE O/P EST MOD 30 MIN: CPT | Performed by: FAMILY MEDICINE

## 2022-09-14 RX ORDER — POLYETHYLENE GLYCOL 3350 17 G
4 POWDER IN PACKET (EA) ORAL AS NEEDED
Qty: 100 EACH | Refills: 0 | Status: SHIPPED | OUTPATIENT
Start: 2022-09-14 | End: 2022-09-21

## 2022-09-14 RX ORDER — BUPROPION HYDROCHLORIDE 150 MG/1
150 TABLET ORAL EVERY MORNING
Qty: 30 TABLET | Refills: 2 | Status: SHIPPED | OUTPATIENT
Start: 2022-09-14 | End: 2022-09-21 | Stop reason: SDUPTHER

## 2022-09-14 NOTE — TELEPHONE ENCOUNTER
Called to get pt ready for her virtual appt with Dr Joycelyn Hein  She stated that she is actually at work and unable to do the the virtual now  She asked if she can be rescheduled to Friday when she doesn't work  Appt made for Friday  Since appt was cancelled less than 24hr it is considered No Show and letter will be sent via [facility]

## 2022-09-14 NOTE — PROGRESS NOTES
Virtual Regular Visit    Verification of patient location:    Patient is located in the following state in which I hold an active license [place]      Assessment/Plan:    Problem List Items Addressed This Visit        Respiratory    Chronic obstructive pulmonary disease (HCC)       Other    Tobacco abuse    Relevant Medications    nicotine polacrilex (COMMIT) 4 MG lozenge    Generalized anxiety disorder    Relevant Medications    nicotine polacrilex (COMMIT) 4 MG lozenge    buPROPion (Wellbutrin XL) 150 mg 24 hr tablet    Anxiety - Primary    Relevant Medications    buPROPion (Wellbutrin XL) 150 mg 24 hr tablet      Other Visit Diagnoses     Latent tuberculosis        Scab            Advised to start wellbutrin and nicotine lozenges to decrease /quit smoking  Discussed normal labs and CT chest-which was primarily due to her concerns for latent TB  Continue rifampin managed by ID, do not see any interaction between serafin root and cinnamon and rifampin  Reviewed and discussed no cause for her chest discomfort-which she keeps describing as burning in her epigastric area, she has not tried PPIs since she does not believe it is GERD, recommend follow up with GI for endoscopy if symptoms are persistent  Also recommend to schedule US breast         Reason for visit is   Chief Complaint   Patient presents with    Virtual Brief Visit    Virtual Regular Visit        Encounter provider Luis Antonio Farrell MD    Provider located at [address] [place] [id number] [phone]      Recent Visits  Date Type Provider Dept   09/14/22 Telemedicine Luis Antonio Farrell MD [address] recent visits within past 7 days and meeting all other requirements  Future Appointments  No visits were found meeting these conditions    Showing future appointments within next 150 days and meeting all other requirements       The patient was identified by name and date of birth  Neo Dahl was informed that this is a telemedicine visit and that the visit is being conducted through [facility] and patient was informed this is a secure, HIPAA-complaint platform  She agrees to proceed     My office door was closed  No one else was in the room  She acknowledged consent and understanding of privacy and security of the video platform  The patient has agreed to participate and understands they can discontinue the visit at any time  Patient is aware this is a billable service  Subjective  Neo Dahl is a 42 y o  female   Has pain- "burning all over her right chest and back, tender over right chest " which she describes to be chronic  Had "heartburn"-states this is not heartburn, has not been on PPIs,CT chest negative  Constantly burping- states she does not drink carbonated drinks  , is not bloated, >20-30 burps in an hour    Mixed anxiety and depression- chronic,IMPROVED SINCE 3-4 MONTHS, patient has been on xanax for some years now and states that she has been noticing that she sometimes has to use 1 mg xanax 3 times a day , and is worried about addiction, patient is interested in daily medications, however is very non complaint, worries about side effects of all medications, as well as has erratic schedule  She is trying OTC supplements and kava root etc    Hypertension-chronic, now controlled, continues to smoke 1ppd, takes lisinopril regularly now and is compliant with diet, trying to eat healthier and limits sodium  Tobacco use- cig smoking, has tried patches in the past and she smoked while on patches and had chest pain and palpitations, does not want chantix due to vivid dreams  COPD- on advair daily, chronic, needs albuterol <2 times/week  Latent Tuberculosis- saw ID, started rifampin 2 days ago and was told no herbs-she has a serafin root and cinnamon powder which she would like to continue taking, she is concerned that her symptoms- SOB,cough , chest burning and pain could be tuberculosis related -her father was an IV drug user and had latent TB was treated with isoniazid   Dental infection- completed multiple courses of antibiotics-doxycyclin and clindamycin  Injury-hit the ankle to "dirt cart at work", worried about the wound, wants to know if she needs Tdap    Obesity- trying to lose weight,lost from 200lbs->182lbs->199 lbs now    Breast lesion- needed biopsy, every visit she does mention and is aware that she needed USG biopsy but does not schedule it, she is aware of the risk and acknowledges it as mentioned several times in the past visits , she is aware that breast cancer can cause death ,but did not schedule the biopsy   States she scheduled it , however cancelled the appointment and did not reschedule yet due to her busy work schedule       Past Medical History:   Diagnosis Date    Acute cough 6/14/2022    Allergies     Anxiety 05/2018    Female hirsutism     GERD (gastroesophageal reflux disease)     Head injury     Hypertension 2018    Papanicolaou smear for cervical cancer screening 2017    NL, no h/o of abn pap that she can recall    STD (female)     Thyroid nodule 05/2018    Tobacco user 05/2018       Past Surgical History:   Procedure Laterality Date    NO PAST SURGERIES         Current Outpatient Medications   Medication Sig Dispense Refill    Advair -21 MCG/ACT inhaler inhale 2 puffs by mouth and INTO THE LUNGS twice a day Rinse mouth after use 12 g 1    albuterol (PROVENTIL HFA,VENTOLIN HFA) 90 mcg/act inhaler inhale 2 puffs by mouth and INTO THE LUNGS every 4 hours if needed for wheezing shortness of breath 8 5 g 2    ALPRAZolam (XANAX) 1 mg tablet take 1 tablet by mouth twice a day AS NEEDED FOR ANXIETY 60 tablet 0    buPROPion (Wellbutrin XL) 150 mg 24 hr tablet Take 1 tablet (150 mg total) by mouth every morning 30 tablet 2    lisinopril (ZESTRIL) 40 mg tablet Take 1 tablet (40 mg total) by mouth daily 90 tablet 1    nicotine polacrilex (COMMIT) 4 MG lozenge Apply 1 lozenge (4 mg total) to the mouth or throat as needed for smoking cessation 100 each 0     No current facility-administered medications for this visit  Allergies   Allergen Reactions    Penicillins Swelling     Rash, swelling at IV site  Was to IV penicillin       Review of Systems   Constitutional: Negative for fatigue and fever  HENT: Negative for congestion, facial swelling, mouth sores, rhinorrhea, sore throat and trouble swallowing  Eyes: Negative for pain and redness  Respiratory: Positive for cough  Negative for shortness of breath and wheezing  Cardiovascular: Negative for chest pain, palpitations and leg swelling  Gastrointestinal: Negative for abdominal pain, blood in stool, constipation, diarrhea and nausea  Genitourinary: Negative for dysuria, hematuria and urgency  Musculoskeletal: Negative for arthralgias, back pain and myalgias  Skin: Positive for wound  Negative for rash  Neurological: Negative for seizures, syncope and headaches  Hematological: Negative for adenopathy  Psychiatric/Behavioral: Positive for sleep disturbance  Negative for agitation and behavioral problems  The patient is nervous/anxious  Video Exam      Physical Exam  Vitals and nursing note reviewed  Constitutional:       Appearance: She is well-developed  HENT:      Head: Normocephalic and atraumatic  Right Ear: External ear normal       Left Ear: External ear normal       Nose: Nose normal    Eyes:      General: No scleral icterus  Right eye: No discharge  Left eye: No discharge  Conjunctiva/sclera: Conjunctivae normal       Comments: Visual observation   Pulmonary:      Effort: No respiratory distress  Musculoskeletal:         General: Signs of injury (healed wound-scab over posterior ankle) present  No tenderness or deformity     Skin:     Comments: Small subcentimeter scab on ankle   Neurological:      Mental Status: She is alert  Mental status is at baseline     Psychiatric:         Behavior: Behavior normal          Judgment: Judgment normal           I spent 25 minutes directly with the patient during this visit and additional 15 min documenting and reviewing her information including recent labs, CT imaging and tobacco cessation counselling

## 2022-09-16 ENCOUNTER — DOCUMENTATION (OUTPATIENT)
Dept: INFECTIOUS DISEASES | Facility: CLINIC | Age: 37
End: 2022-09-16

## 2022-09-16 NOTE — PROGRESS NOTES
No show #2 sent via [employer]    Attempt x3 to reach  Doctor sent invite, pt did not join   Pt needs to be aware that next appt needs to be in person, and if she does not show we will have to dismiss her from our practice

## 2022-09-18 ENCOUNTER — APPOINTMENT (OUTPATIENT)
Dept: RADIOLOGY | Facility: HOSPITAL | Age: 37
End: 2022-09-18
Payer: COMMERCIAL

## 2022-09-18 ENCOUNTER — HOSPITAL ENCOUNTER (EMERGENCY)
Facility: HOSPITAL | Age: 37
Discharge: HOME | End: 2022-09-18
Attending: EMERGENCY MEDICINE
Payer: COMMERCIAL

## 2022-09-18 VITALS
HEART RATE: 72 BPM | DIASTOLIC BLOOD PRESSURE: 85 MMHG | WEIGHT: 204.59 LBS | BODY MASS INDEX: 37.42 KG/M2 | RESPIRATION RATE: 16 BRPM | SYSTOLIC BLOOD PRESSURE: 157 MMHG | TEMPERATURE: 98.3 F | OXYGEN SATURATION: 100 %

## 2022-09-18 DIAGNOSIS — R07.9 CHEST PAIN: Primary | ICD-10-CM

## 2022-09-18 LAB
ALBUMIN SERPL BCP-MCNC: 4.3 G/DL (ref 3.5–5)
ALP SERPL-CCNC: 38 U/L (ref 34–104)
ALT SERPL W P-5'-P-CCNC: 24 U/L (ref 7–52)
ANION GAP SERPL CALCULATED.3IONS-SCNC: 6 MMOL/L (ref 4–13)
AST SERPL W P-5'-P-CCNC: 16 U/L (ref 13–39)
ATRIAL RATE: 62 BPM
BASOPHILS # BLD AUTO: 0.07 THOUSANDS/ΜL (ref 0–0.1)
BASOPHILS NFR BLD AUTO: 1 % (ref 0–1)
BILIRUB SERPL-MCNC: 0.3 MG/DL (ref 0.2–1)
BILIRUB UR QL STRIP: NEGATIVE
BUN SERPL-MCNC: 14 MG/DL (ref 5–25)
CALCIUM SERPL-MCNC: 9.2 MG/DL (ref 8.4–10.2)
CARDIAC TROPONIN I PNL SERPL HS: <2 NG/L
CHLORIDE SERPL-SCNC: 108 MMOL/L (ref 96–108)
CLARITY UR: CLEAR
CO2 SERPL-SCNC: 22 MMOL/L (ref 21–32)
COLOR UR: COLORLESS
CREAT SERPL-MCNC: 0.68 MG/DL (ref 0.6–1.3)
EOSINOPHIL # BLD AUTO: 0.09 THOUSAND/ΜL (ref 0–0.61)
EOSINOPHIL NFR BLD AUTO: 1 % (ref 0–6)
ERYTHROCYTE [DISTWIDTH] IN BLOOD BY AUTOMATED COUNT: 12.4 % (ref 11.6–15.1)
EXT PREG TEST URINE: NEGATIVE
EXT. CONTROL ED NAV: NORMAL
FLUAV RNA RESP QL NAA+PROBE: NEGATIVE
FLUBV RNA RESP QL NAA+PROBE: NEGATIVE
GFR SERPL CREATININE-BSD FRML MDRD: 110 ML/MIN/1.73SQ M
GLUCOSE SERPL-MCNC: 101 MG/DL (ref 65–140)
GLUCOSE UR STRIP-MCNC: NEGATIVE MG/DL
HCT VFR BLD AUTO: 42.8 % (ref 34.8–46.1)
HGB BLD-MCNC: 14 G/DL (ref 11.5–15.4)
HGB UR QL STRIP.AUTO: NEGATIVE
IMM GRANULOCYTES # BLD AUTO: 0.02 THOUSAND/UL (ref 0–0.2)
IMM GRANULOCYTES NFR BLD AUTO: 0 % (ref 0–2)
KETONES UR STRIP-MCNC: NEGATIVE MG/DL
LEUKOCYTE ESTERASE UR QL STRIP: NEGATIVE
LYMPHOCYTES # BLD AUTO: 3.34 THOUSANDS/ΜL (ref 0.6–4.47)
LYMPHOCYTES NFR BLD AUTO: 42 % (ref 14–44)
MCH RBC QN AUTO: 29.7 PG (ref 26.8–34.3)
MCHC RBC AUTO-ENTMCNC: 32.7 G/DL (ref 31.4–37.4)
MCV RBC AUTO: 91 FL (ref 82–98)
MONOCYTES # BLD AUTO: 0.66 THOUSAND/ΜL (ref 0.17–1.22)
MONOCYTES NFR BLD AUTO: 8 % (ref 4–12)
NEUTROPHILS # BLD AUTO: 3.85 THOUSANDS/ΜL (ref 1.85–7.62)
NEUTS SEG NFR BLD AUTO: 48 % (ref 43–75)
NITRITE UR QL STRIP: NEGATIVE
NRBC BLD AUTO-RTO: 0 /100 WBCS
P AXIS: 52 DEGREES
PH UR STRIP.AUTO: 6 [PH]
PLATELET # BLD AUTO: 198 THOUSANDS/UL (ref 149–390)
PMV BLD AUTO: 10.5 FL (ref 8.9–12.7)
POTASSIUM SERPL-SCNC: 4.2 MMOL/L (ref 3.5–5.3)
PR INTERVAL: 154 MS
PROT SERPL-MCNC: 7 G/DL (ref 6.4–8.4)
PROT UR STRIP-MCNC: NEGATIVE MG/DL
QRS AXIS: 63 DEGREES
QRSD INTERVAL: 98 MS
QT INTERVAL: 388 MS
QTC INTERVAL: 393 MS
RBC # BLD AUTO: 4.71 MILLION/UL (ref 3.81–5.12)
RSV RNA RESP QL NAA+PROBE: NEGATIVE
SARS-COV-2 RNA RESP QL NAA+PROBE: NEGATIVE
SODIUM SERPL-SCNC: 136 MMOL/L (ref 135–147)
SP GR UR STRIP.AUTO: 1.01 (ref 1–1.03)
T WAVE AXIS: 55 DEGREES
UROBILINOGEN UR STRIP-ACNC: <2 MG/DL
VENTRICULAR RATE: 62 BPM
WBC # BLD AUTO: 8.03 THOUSAND/UL (ref 4.31–10.16)

## 2022-09-18 PROCEDURE — 93010 ELECTROCARDIOGRAM REPORT: CPT | Performed by: INTERNAL MEDICINE

## 2022-09-18 PROCEDURE — 85025 COMPLETE CBC W/AUTO DIFF WBC: CPT | Performed by: EMERGENCY MEDICINE

## 2022-09-18 PROCEDURE — 99285 EMERGENCY DEPT VISIT HI MDM: CPT

## 2022-09-18 PROCEDURE — 36415 COLL VENOUS BLD VENIPUNCTURE: CPT | Performed by: EMERGENCY MEDICINE

## 2022-09-18 PROCEDURE — 93005 ELECTROCARDIOGRAM TRACING: CPT

## 2022-09-18 PROCEDURE — 81025 URINE PREGNANCY TEST: CPT | Performed by: EMERGENCY MEDICINE

## 2022-09-18 PROCEDURE — 96374 THER/PROPH/DIAG INJ IV PUSH: CPT

## 2022-09-18 PROCEDURE — 81003 URINALYSIS AUTO W/O SCOPE: CPT | Performed by: EMERGENCY MEDICINE

## 2022-09-18 PROCEDURE — 99284 EMERGENCY DEPT VISIT MOD MDM: CPT | Performed by: EMERGENCY MEDICINE

## 2022-09-18 PROCEDURE — 80053 COMPREHEN METABOLIC PANEL: CPT | Performed by: EMERGENCY MEDICINE

## 2022-09-18 PROCEDURE — 0241U HB NFCT DS VIR RESP RNA 4 TRGT: CPT | Performed by: EMERGENCY MEDICINE

## 2022-09-18 PROCEDURE — 96361 HYDRATE IV INFUSION ADD-ON: CPT

## 2022-09-18 PROCEDURE — 71046 X-RAY EXAM CHEST 2 VIEWS: CPT

## 2022-09-18 PROCEDURE — 84484 ASSAY OF TROPONIN QUANT: CPT | Performed by: EMERGENCY MEDICINE

## 2022-09-18 RX ORDER — KETOROLAC TROMETHAMINE 30 MG/ML
15 INJECTION, SOLUTION INTRAMUSCULAR; INTRAVENOUS ONCE
Status: COMPLETED | OUTPATIENT
Start: 2022-09-18 | End: 2022-09-18

## 2022-09-18 RX ADMIN — KETOROLAC TROMETHAMINE 15 MG: 30 INJECTION, SOLUTION INTRAMUSCULAR at 15:07

## 2022-09-18 RX ADMIN — SODIUM CHLORIDE 1000 ML: 0.9 INJECTION, SOLUTION INTRAVENOUS at 13:55

## 2022-09-18 NOTE — DISCHARGE INSTRUCTIONS
Please follow-up with cardiology and family doctor  Use ibuprofen and Tylenol for pain as needed  Return to ER for any new or worsened symptoms

## 2022-09-18 NOTE — Clinical Note
Harinder Ron was seen and treated in our emergency department on 9/18/2022  No restrictions            Diagnosis:     Donell Haas  may return to work on return date  She may return on this date: 09/20/2022         If you have any questions or concerns, please don't hesitate to call        Felicia Amor RN    ______________________________           _______________          _______________  Hospital Representative                              Date                                Time

## 2022-09-18 NOTE — Clinical Note
Yimi Soni was seen and treated in our emergency department on 9/18/2022  Diagnosis:     Jah People  may return to work on return date  She may return on this date: 09/20/2022         If you have any questions or concerns, please don't hesitate to call        Samia Trent MD    ______________________________           _______________          _______________  Hospital Representative                              Date                                Time

## 2022-09-19 NOTE — ED PROVIDER NOTES
History  Chief Complaint   Patient presents with    Chest Pain     Pt presents to the ED with reports of SOB/Chills/CP x 1 week  Reports positive TB from June  Reports xray normal         History provided by:  Patient   used: No    Chest Pain  Associated symptoms: nausea and shortness of breath    Associated symptoms: no abdominal pain, no cough, no diaphoresis, no dizziness, no fever, no headache, no palpitations, not vomiting and no weakness      Patient is a 77-year-old female presenting to emergency department with chills, shortness of breath, chest pain, nausea, congestion  Present for 1 5-2 weeks  Not getting better  No fever  No vomiting  No diarrhea  No abdominal pain  No history of blood clots  No PE or DVT risk factors  No history of MI   no recent stress test or catheterization  History of hypertension  Smoker  No diabetes  MDM will do cardiac evaluation, chest x-ray, COVID flu swab, chest pains likely musculoskeletal as she is tender    Negative workup  Patient to follow-up as outpatient  Stressed importance of following up with both family doctor and Cardiology  Patient verbalized understanding of these directions  She also is aware to return to the ER for any worsening symptoms      Prior to Admission Medications   Prescriptions Last Dose Informant Patient Reported? Taking?    ALPRAZolam (XANAX) 1 mg tablet   No Yes   Sig: take 1 tablet by mouth twice a day AS NEEDED FOR ANXIETY   Advair -21 MCG/ACT inhaler   No Yes   Sig: inhale 2 puffs by mouth and INTO THE LUNGS twice a day Rinse mouth after use   albuterol (PROVENTIL HFA,VENTOLIN HFA) 90 mcg/act inhaler   No Yes   Sig: inhale 2 puffs by mouth and INTO THE LUNGS every 4 hours if needed for wheezing shortness of breath   buPROPion (Wellbutrin XL) 150 mg 24 hr tablet   No Yes   Sig: Take 1 tablet (150 mg total) by mouth every morning   lisinopril (ZESTRIL) 40 mg tablet   No Yes   Sig: Take 1 tablet (40 mg total) by mouth daily   nicotine polacrilex (COMMIT) 4 MG lozenge Not Taking at Unknown time  No No   Sig: Apply 1 lozenge (4 mg total) to the mouth or throat as needed for smoking cessation   Patient not taking: Reported on 9/18/2022      Facility-Administered Medications: None       Past Medical History:   Diagnosis Date    Acute cough 6/14/2022    Allergies     Anxiety 05/2018    Female hirsutism     GERD (gastroesophageal reflux disease)     Head injury     Hypertension 2018    Papanicolaou smear for cervical cancer screening 2017    NL, no h/o of abn pap that she can recall    STD (female)     Thyroid nodule 05/2018    Tobacco user 05/2018       Past Surgical History:   Procedure Laterality Date    NO PAST SURGERIES         Family History   Problem Relation Age of Onset    Hypertension Mother     Kidney disease Mother     Cirrhosis Mother     Dialysis Mother     Diabetes Mother     Anxiety disorder Mother     Depression Mother     Anxiety disorder Father     Depression Father     Drug abuse Father     Melanoma Paternal Uncle     Stomach cancer Other     Colon cancer Other         Passed age 34     I have reviewed and agree with the history as documented  E-Cigarette/Vaping    E-Cigarette Use Never User      E-Cigarette/Vaping Substances     Social History     Tobacco Use    Smoking status: Current Every Day Smoker     Packs/day: 1 00     Years: 25 00     Pack years: 25 00     Types: Cigarettes    Smokeless tobacco: Never Used    Tobacco comment: smoked since age 13   Vaping Use    Vaping Use: Never used   Substance Use Topics    Alcohol use: Yes     Comment: social    Drug use: Yes     Types: Marijuana     Comment: rare       Review of Systems   Constitutional: Positive for chills  Negative for diaphoresis and fever  HENT: Positive for congestion  Negative for sore throat  Respiratory: Positive for shortness of breath  Negative for cough, wheezing and stridor  Cardiovascular: Positive for chest pain  Negative for palpitations and leg swelling  Gastrointestinal: Positive for nausea  Negative for abdominal pain, blood in stool, diarrhea and vomiting  Genitourinary: Negative for dysuria, frequency and urgency  Musculoskeletal: Negative for neck stiffness  Skin: Negative for pallor and rash  Neurological: Negative for dizziness, syncope, weakness, light-headedness and headaches  All other systems reviewed and are negative  Physical Exam  Physical Exam  Vitals reviewed  Constitutional:       Appearance: She is well-developed  HENT:      Head: Normocephalic and atraumatic  Eyes:      Pupils: Pupils are equal, round, and reactive to light  Cardiovascular:      Rate and Rhythm: Normal rate and regular rhythm  Heart sounds: Normal heart sounds  Pulmonary:      Effort: Pulmonary effort is normal  No respiratory distress  Breath sounds: Normal breath sounds  Chest:      Chest wall: Tenderness present  No crepitus  Comments: Chest is tender, reproduces exact pain  Abdominal:      General: Bowel sounds are normal       Palpations: Abdomen is soft  Tenderness: There is no abdominal tenderness  Musculoskeletal:         General: Normal range of motion  Cervical back: Neck supple  Right lower leg: No tenderness  No edema  Left lower leg: No tenderness  No edema  Skin:     General: Skin is warm and dry  Capillary Refill: Capillary refill takes less than 2 seconds  Neurological:      Mental Status: She is alert and oriented to person, place, and time           Vital Signs  ED Triage Vitals   Temperature Pulse Respirations Blood Pressure SpO2   09/18/22 1445 09/18/22 1308 09/18/22 1308 09/18/22 1308 09/18/22 1308   98 3 °F (36 8 °C) 76 16 (!) 160/106 100 %      Temp Source Heart Rate Source Patient Position - Orthostatic VS BP Location FiO2 (%)   09/18/22 1445 09/18/22 1308 09/18/22 1308 09/18/22 1308 --   Oral Monitor Sitting Right arm       Pain Score       09/18/22 1453       8           Vitals:    09/18/22 1308 09/18/22 1445 09/18/22 1453 09/18/22 1500   BP: (!) 160/106  157/85 157/85   Pulse: 76 65 62 72   Patient Position - Orthostatic VS: Sitting   Lying         Visual Acuity      ED Medications  Medications   sodium chloride 0 9 % bolus 1,000 mL (0 mL Intravenous Stopped 9/18/22 1455)   ketorolac (TORADOL) injection 15 mg (15 mg Intravenous Given 9/18/22 1507)       Diagnostic Studies  Results Reviewed     Procedure Component Value Units Date/Time    FLU/RSV/COVID - if FLU/RSV clinically relevant [[id number]]  (Normal) Collected: 09/18/22 1354    Lab Status: Final result Specimen: Nares from Nose Updated: 09/18/22 1455     SARS-CoV-2 Negative     INFLUENZA A PCR Negative     INFLUENZA B PCR Negative     RSV PCR Negative    Narrative:      FOR PEDIATRIC PATIENTS - copy/paste COVID Guidelines URL to browser: [email or web address]  Stampedx    SARS-CoV-2 assay is a Nucleic Acid Amplification assay intended for the  qualitative detection of nucleic acid from SARS-CoV-2 in nasopharyngeal  swabs  Results are for the presumptive identification of SARS-CoV-2 RNA  Positive results are indicative of infection with SARS-CoV-2, the virus  causing COVID-19, but do not rule out bacterial infection or co-infection  with other viruses  Laboratories within the [place] and its  territories are required to report all positive results to the appropriate  public health authorities  Negative results do not preclude SARS-CoV-2  infection and should not be used as the sole basis for treatment or other  patient management decisions  Negative results must be combined with  clinical observations, patient history, and epidemiological information  This test has not been FDA cleared or approved  This test has been authorized by FDA under an Emergency Use Authorization  (EUA)   This test is only authorized for the duration of time the  declaration that circumstances exist justifying the authorization of the  emergency use of an in vitro diagnostic tests for detection of SARS-CoV-2  virus and/or diagnosis of COVID-19 infection under section 564(b)(1) of  the Act, 21 U  S C  669BDQ-6(C)(4), unless the authorization is terminated  or revoked sooner  The test has been validated but independent review by FDA  and CLIA is pending  Test performed using [employer] GeneXpert: This RT-PCR assay targets N2,  a region unique to SARS-CoV-2  A conserved region in the E-gene was chosen  for pan-Sarbecovirus detection which includes SARS-CoV-2      HS Troponin 0hr (reflex protocol) [[id number]]  (Normal) Collected: 09/18/22 1354    Lab Status: Final result Specimen: Blood from Arm, Right Updated: 09/18/22 1440     hs TnI 0hr <2 ng/L     Comprehensive metabolic panel [[id number]] Collected: 09/18/22 1354    Lab Status: Final result Specimen: Blood from Arm, Right Updated: 09/18/22 1432     Sodium 136 mmol/L      Potassium 4 2 mmol/L      Chloride 108 mmol/L      CO2 22 mmol/L      ANION GAP 6 mmol/L      BUN 14 mg/dL      Creatinine 0 68 mg/dL      Glucose 101 mg/dL      Calcium 9 2 mg/dL      AST 16 U/L      ALT 24 U/L      Alkaline Phosphatase 38 U/L      Total Protein 7 0 g/dL      Albumin 4 3 g/dL      Total Bilirubin 0 30 mg/dL      eGFR 110 ml/min/1 73sq m     Narrative:      Debra guidelines for Chronic Kidney Disease (CKD):     Stage 1 with normal or high GFR (GFR > 90 mL/min/1 73 square meters)    Stage 2 Mild CKD (GFR = 60-89 mL/min/1 73 square meters)    Stage 3A Moderate CKD (GFR = 45-59 mL/min/1 73 square meters)    Stage 3B Moderate CKD (GFR = 30-44 mL/min/1 73 square meters)    Stage 4 Severe CKD (GFR = 15-29 mL/min/1 73 square meters)    Stage 5 End Stage CKD (GFR <15 mL/min/1 73 square meters)  Note: GFR calculation is accurate only with a steady state creatinine    UA w Reflex to Microscopic w Reflex to Culture [[id number]] Collected: 09/18/22 1359    Lab Status: Final result Specimen: Urine, Clean Catch Updated: 09/18/22 1417     Color, UA Colorless     Clarity, UA Clear     Specific Gravity, UA 1 011     pH, UA 6 0     Leukocytes, UA Negative     Nitrite, UA Negative     Protein, UA Negative mg/dl      Glucose, UA Negative mg/dl      Ketones, UA Negative mg/dl      Urobilinogen, UA <2 0 mg/dl      Bilirubin, UA Negative     Occult Blood, UA Negative    CBC and differential [[id number]] Collected: 09/18/22 1354    Lab Status: Final result Specimen: Blood from Arm, Right Updated: 09/18/22 1415     WBC 8 03 Thousand/uL      RBC 4 71 Million/uL      Hemoglobin 14 0 g/dL      Hematocrit 42 8 %      MCV 91 fL      MCH 29 7 pg      MCHC 32 7 g/dL      RDW 12 4 %      MPV 10 5 fL      Platelets 077 Thousands/uL      nRBC 0 /100 WBCs      Neutrophils Relative 48 %      Immat GRANS % 0 %      Lymphocytes Relative 42 %      Monocytes Relative 8 %      Eosinophils Relative 1 %      Basophils Relative 1 %      Neutrophils Absolute 3 85 Thousands/µL      Immature Grans Absolute 0 02 Thousand/uL      Lymphocytes Absolute 3 34 Thousands/µL      Monocytes Absolute 0 66 Thousand/µL      Eosinophils Absolute 0 09 Thousand/µL      Basophils Absolute 0 07 Thousands/µL     POCT pregnancy, urine [[id number]]  (Normal) Resulted: 09/18/22 1405    Lab Status: Final result Updated: 09/18/22 1405     EXT PREG TEST UR (Ref: Negative) negative     Control valid                 XR chest 2 views   Final Result by Claudio Tavares MD (09/18 1403)      No acute cardiopulmonary disease  No radiographic evidence of active TB                 Workstation performed: [id number]                    Procedures  Procedures         ED Course  ED Course as of 09/18/22 2055   Sun Sep 18, 2022   1428 ECG shows rate of 62, sinus, normal axis, normal QRS, no significant ST or T-wave changes, normal intervals, no significant changes from previous EKG, independently interpreted by me             HEART Risk Score    Flowsheet Row Most Recent Value   Heart Score Risk Calculator    History 0 Filed at: 09/18/2022 1509   ECG 0 Filed at: 09/18/2022 1509   Age 0 Filed at: 09/18/2022 1509   Risk Factors 1 Filed at: 09/18/2022 1509   Troponin 0 Filed at: 09/18/2022 1509   HEART Score 1 Filed at: 09/18/2022 1509                PERC Rule for PE    Flowsheet Row Most Recent Value   PERC Rule for PE    Age >=50 0 Filed at: 09/18/2022 1511   HR >=100 0 Filed at: 09/18/2022 1511   O2 Sat on room air < 95% 0 Filed at: 09/18/2022 1511   History of PE or DVT 0 Filed at: 09/18/2022 1511   Recent trauma or surgery 0 Filed at: 09/18/2022 1511   Hemoptysis 0 Filed at: 09/18/2022 1511   Exogenous estrogen 0 Filed at: 09/18/2022 1511   Unilateral leg swelling 0 Filed at: 09/18/2022 1511   PERC Rule for PE Results 0 Filed at: 09/18/2022 1511                  Wells' Criteria for PE    Flowsheet Row Most Recent Value   Wells' Criteria for PE    Clinical signs and symptoms of DVT 0 Filed at: 09/18/2022 1511   PE is primary diagnosis or equally likely 0 Filed at: 09/18/2022 1511   HR >100 0 Filed at: 09/18/2022 1511   Immobilization at least 3 days or Surgery in the previous 4 weeks 0 Filed at: 09/18/2022 1511   Previous, objectively diagnosed PE or DVT 0 Filed at: 09/18/2022 1511   Hemoptysis 0 Filed at: 09/18/2022 1511   Malignancy with treatment within 6 months or palliative 0 Filed at: 09/18/2022 1511   Wells' Criteria Total 0 Filed at: 09/18/2022 1511                MDM    Disposition  Final diagnoses:   Chest pain     Time reflects when diagnosis was documented in both MDM as applicable and the Disposition within this note     Time User Action Codes Description Comment    9/18/2022  3:09 PM Chago Maguire Add [R07 9] Chest pain       ED Disposition     ED Disposition   Discharge    Condition   Stable    Date/Time   Sun Sep 18, 2022  3:09 PM    Comment Yimi Soni discharge to home/self care  Follow-up Information     Follow up With Specialties Details Why Contact Info Additional Information    Radha Burns MD Family Medicine In 3 days Re-evaluation [address] [facility]  [address]  [address] [facility] [address] Emergency Department Emergency Medicine  As needed, If symptoms worsen [address] [facility] [address] [facility] Emergency Department, [address], [place], [address], [address] Cardiology Schedule an appointment as soon as possible for a visit  Please follow-up with cardiology for chest pain [phone] [address] Cardiology [address] [facility], [address] [facility] [place], [address] [facility], [address]          Discharge Medication List as of 9/18/2022  3:10 PM      CONTINUE these medications which have NOT CHANGED    Details   Advair -21 MCG/ACT inhaler inhale 2 puffs by mouth and INTO THE LUNGS twice a day Rinse mouth after use, Normal      albuterol (PROVENTIL HFA,VENTOLIN HFA) 90 mcg/act inhaler inhale 2 puffs by mouth and INTO THE LUNGS every 4 hours if needed for wheezing shortness of breath, Normal      ALPRAZolam (XANAX) 1 mg tablet take 1 tablet by mouth twice a day AS NEEDED FOR ANXIETY, Normal      buPROPion (Wellbutrin XL) 150 mg 24 hr tablet Take 1 tablet (150 mg total) by mouth every morning, Starting Wed 9/14/2022, Normal      lisinopril (ZESTRIL) 40 mg tablet Take 1 tablet (40 mg total) by mouth daily, Starting Thu 6/23/2022, Normal      nicotine polacrilex (COMMIT) 4 MG lozenge Apply 1 lozenge (4 mg total) to the mouth or throat as needed for smoking cessation, Starting Wed 9/14/2022, Until Fri 10/14/2022 at 2359, Normal             No discharge procedures on file      PDMP Review       Value Time User    PDMP Reviewed  Yes 9/14/2022  8:44 AM Giorgio Anderson MD          ED Provider  Electronically Signed by           Vlad Ying MD  09/18/22 2055

## 2022-09-21 ENCOUNTER — OFFICE VISIT (OUTPATIENT)
Dept: FAMILY MEDICINE CLINIC | Facility: CLINIC | Age: 37
End: 2022-09-21
Payer: COMMERCIAL

## 2022-09-21 VITALS
BODY MASS INDEX: 36.07 KG/M2 | HEIGHT: 62 IN | WEIGHT: 196 LBS | DIASTOLIC BLOOD PRESSURE: 80 MMHG | TEMPERATURE: 97.2 F | RESPIRATION RATE: 16 BRPM | SYSTOLIC BLOOD PRESSURE: 120 MMHG | HEART RATE: 72 BPM | OXYGEN SATURATION: 98 %

## 2022-09-21 DIAGNOSIS — K08.9 CHRONIC DENTAL PAIN: Primary | ICD-10-CM

## 2022-09-21 DIAGNOSIS — F41.1 GENERALIZED ANXIETY DISORDER WITH PANIC ATTACKS: ICD-10-CM

## 2022-09-21 DIAGNOSIS — G89.29 CHRONIC DENTAL PAIN: Primary | ICD-10-CM

## 2022-09-21 DIAGNOSIS — F41.0 GENERALIZED ANXIETY DISORDER WITH PANIC ATTACKS: ICD-10-CM

## 2022-09-21 DIAGNOSIS — N60.01 BILATERAL BREAST CYSTS: ICD-10-CM

## 2022-09-21 DIAGNOSIS — R14.2 BELCHING: ICD-10-CM

## 2022-09-21 DIAGNOSIS — N60.02 BILATERAL BREAST CYSTS: ICD-10-CM

## 2022-09-21 DIAGNOSIS — Z00.00 ANNUAL PHYSICAL EXAM: ICD-10-CM

## 2022-09-21 DIAGNOSIS — I10 ESSENTIAL HYPERTENSION: ICD-10-CM

## 2022-09-21 PROCEDURE — 99395 PREV VISIT EST AGE 18-39: CPT | Performed by: FAMILY MEDICINE

## 2022-09-21 RX ORDER — BUPROPION HYDROCHLORIDE 150 MG/1
150 TABLET ORAL EVERY MORNING
Qty: 90 TABLET | Refills: 0 | Status: SHIPPED | OUTPATIENT
Start: 2022-09-21

## 2022-09-21 RX ORDER — IBUPROFEN 800 MG/1
800 TABLET ORAL EVERY 8 HOURS PRN
Qty: 90 TABLET | Refills: 0 | Status: SHIPPED | OUTPATIENT
Start: 2022-09-21 | End: 2022-10-21

## 2022-09-21 RX ORDER — LISINOPRIL 40 MG/1
40 TABLET ORAL DAILY
Qty: 90 TABLET | Refills: 1 | Status: SHIPPED | OUTPATIENT
Start: 2022-09-21

## 2022-09-21 RX ORDER — DOXYCYCLINE HYCLATE 100 MG
100 TABLET ORAL 2 TIMES DAILY
Qty: 28 TABLET | Refills: 0 | Status: SHIPPED | OUTPATIENT
Start: 2022-09-21 | End: 2022-10-05

## 2022-09-21 NOTE — PATIENT INSTRUCTIONS
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
## 2022-10-02 DIAGNOSIS — J44.9 CHRONIC OBSTRUCTIVE PULMONARY DISEASE, UNSPECIFIED COPD TYPE (HCC): ICD-10-CM

## 2022-10-03 RX ORDER — ALBUTEROL SULFATE 90 UG/1
AEROSOL, METERED RESPIRATORY (INHALATION)
Qty: 8.5 G | Refills: 2 | Status: SHIPPED | OUTPATIENT
Start: 2022-10-03

## 2022-10-06 ENCOUNTER — TELEPHONE (OUTPATIENT)
Dept: FAMILY MEDICINE CLINIC | Facility: CLINIC | Age: 37
End: 2022-10-06

## 2022-10-11 ENCOUNTER — TELEPHONE (OUTPATIENT)
Dept: INFECTIOUS DISEASES | Facility: CLINIC | Age: 37
End: 2022-10-11

## 2022-10-11 PROBLEM — J32.2 ETHMOID SINUSITIS: Status: RESOLVED | Noted: 2022-04-28 | Resolved: 2022-10-11

## 2022-10-13 DIAGNOSIS — F41.9 ANXIETY: ICD-10-CM

## 2022-10-13 RX ORDER — ALPRAZOLAM 1 MG/1
TABLET ORAL
Qty: 60 TABLET | Refills: 0 | Status: SHIPPED | OUTPATIENT
Start: 2022-10-13

## 2022-11-19 DIAGNOSIS — F41.9 ANXIETY: ICD-10-CM

## 2022-11-21 RX ORDER — ALPRAZOLAM 1 MG/1
TABLET ORAL
Qty: 60 TABLET | Refills: 0 | Status: SHIPPED | OUTPATIENT
Start: 2022-11-21

## 2022-12-03 DIAGNOSIS — J44.9 CHRONIC OBSTRUCTIVE PULMONARY DISEASE, UNSPECIFIED COPD TYPE (HCC): ICD-10-CM

## 2022-12-05 RX ORDER — ALBUTEROL SULFATE 90 UG/1
AEROSOL, METERED RESPIRATORY (INHALATION)
Qty: 8.5 G | Refills: 2 | Status: SHIPPED | OUTPATIENT
Start: 2022-12-05

## 2022-12-12 ENCOUNTER — OFFICE VISIT (OUTPATIENT)
Dept: FAMILY MEDICINE CLINIC | Facility: CLINIC | Age: 37
End: 2022-12-12

## 2022-12-12 VITALS
RESPIRATION RATE: 18 BRPM | TEMPERATURE: 97.1 F | WEIGHT: 196 LBS | HEIGHT: 62 IN | BODY MASS INDEX: 36.07 KG/M2 | DIASTOLIC BLOOD PRESSURE: 78 MMHG | OXYGEN SATURATION: 98 % | SYSTOLIC BLOOD PRESSURE: 122 MMHG | HEART RATE: 72 BPM

## 2022-12-12 DIAGNOSIS — F41.9 ANXIETY: ICD-10-CM

## 2022-12-12 DIAGNOSIS — I10 ESSENTIAL HYPERTENSION: ICD-10-CM

## 2022-12-12 DIAGNOSIS — E03.8 SUBCLINICAL HYPOTHYROIDISM: ICD-10-CM

## 2022-12-12 DIAGNOSIS — J44.9 CHRONIC OBSTRUCTIVE PULMONARY DISEASE, UNSPECIFIED COPD TYPE (HCC): ICD-10-CM

## 2022-12-12 DIAGNOSIS — F41.1 GENERALIZED ANXIETY DISORDER: ICD-10-CM

## 2022-12-12 DIAGNOSIS — Z28.21 INFLUENZA VACCINE REFUSED: Primary | ICD-10-CM

## 2022-12-12 DIAGNOSIS — L20.9 ATOPIC DERMATITIS, UNSPECIFIED TYPE: ICD-10-CM

## 2022-12-12 DIAGNOSIS — I10 BENIGN ESSENTIAL HYPERTENSION: ICD-10-CM

## 2022-12-12 RX ORDER — ALPRAZOLAM 1 MG/1
1 TABLET ORAL 2 TIMES DAILY PRN
Qty: 60 TABLET | Refills: 0 | Status: SHIPPED | OUTPATIENT
Start: 2022-12-12

## 2022-12-12 RX ORDER — FLUTICASONE PROPIONATE AND SALMETEROL XINAFOATE 230; 21 UG/1; UG/1
2 AEROSOL, METERED RESPIRATORY (INHALATION) 2 TIMES DAILY
Qty: 12 G | Refills: 1 | Status: SHIPPED | OUTPATIENT
Start: 2022-12-12

## 2022-12-12 RX ORDER — CLOBETASOL PROPIONATE 0.5 MG/G
CREAM TOPICAL 2 TIMES DAILY
Qty: 30 G | Refills: 0 | Status: SHIPPED | OUTPATIENT
Start: 2022-12-12 | End: 2022-12-19

## 2022-12-12 RX ORDER — LISINOPRIL 40 MG/1
40 TABLET ORAL DAILY
Qty: 90 TABLET | Refills: 1 | Status: SHIPPED | OUTPATIENT
Start: 2022-12-12

## 2022-12-12 NOTE — PROGRESS NOTES
Subjective:      Patient ID: Tori Wheeler is a 42 y o  female  Was here for physical which was rescheduled as patient had multiple problems that needed to be addressed:  Constantly burping- states she does not drink carbonated drinks  , is not bloated, >20-30 burps in an hour  Has pain- "burning all over her right chest and back, tender over right chest ", ongoing, did not do US breast and mammogram- has not scheduled, CT chest was normal   Had "heartburn"-states this is not heartburn, has not been on PPIs  Mixed anxiety and depression- chronic, has exacerbated, did not take wellbutrin which would have helped with depression as well as smoking cessation, patient has been on xanax for some years now and states that she has been noticing that she sometimes has to use 1 mg xanax 3 times a day , and is worried about addiction, patient is interested in daily medications, however is very non complaint, worries about side effects of all medications, as well as has erratic schedule  She is trying OTC supplements and kava root etc    Hypertension-chronic, now controlled, continues to smoke 1ppd, takes lisinopril regularly now and is compliant with diet, trying to eat healthier and limits sodium  Tobacco use- cig smoking, has tried patches in the past and she smoked while on patches and had chest pain and palpitations, does not want chantix due to vivid dreams, did not start wellbutrin  COPD- on advair daily, chronic, needs albuterol <2 times/week  Latent Tuberculosis- saw ID, has not started rifampin yet, she is very concerned about this despite several reassurances, she had a normal CT chest  Obesity- trying to lose weight,lost from 200lbs->182lbs->199->196 lbs now    Breast lesion- needed biopsy, every visit she does mention and is aware that she needed USG biopsy but does not schedule it, she is aware of the risk and acknowledges it as mentioned several times in the past visits , she is aware that breast cancer can cause death ,but did not schedule the biopsy  She is aware that due to her 's thymic cancer stage IV she probably does not want to find out and hence keeps on making excuses to not schedule the imaging  Chest Pain   Pertinent negatives include no abdominal pain, back pain, cough, fever, headaches, nausea, palpitations or shortness of breath  Pertinent negatives for past medical history include no seizures  Past Medical History:   Diagnosis Date   • Acute cough 6/14/2022   • Allergies    • Anxiety 05/2018   • Female hirsutism    • GERD (gastroesophageal reflux disease)    • Head injury    • Hypertension 2018   • Papanicolaou smear for cervical cancer screening 2017    NL, no h/o of abn pap that she can recall   • STD (female)    • Thyroid nodule 05/2018   • Tobacco user 05/2018       Family History   Problem Relation Age of Onset   • Hypertension Mother    • Kidney disease Mother    • Cirrhosis Mother    • Dialysis Mother    • Diabetes Mother    • Anxiety disorder Mother    • Depression Mother    • Anxiety disorder Father    • Depression Father    • Drug abuse Father    • Melanoma Paternal Uncle    • Stomach cancer Other    • Colon cancer Other         Passed age 34       Past Surgical History:   Procedure Laterality Date   • NO PAST SURGERIES          reports that she has been smoking cigarettes  She has a 25 00 pack-year smoking history  She has never used smokeless tobacco  She reports current alcohol use  She reports current drug use  Drug: Marijuana        Current Outpatient Medications:   •  albuterol (PROVENTIL HFA,VENTOLIN HFA) 90 mcg/act inhaler, inhale 2 puffs by mouth and INTO THE LUNGS every 4 hours if needed for wheezing shortness of breath, Disp: 8 5 g, Rfl: 2  •  ALPRAZolam (XANAX) 1 mg tablet, Take 1 tablet (1 mg total) by mouth 2 (two) times a day as needed for anxiety, Disp: 60 tablet, Rfl: 0  •  clobetasol (TEMOVATE) 0 05 % cream, Apply topically 2 (two) times a day for 7 days, Disp: 30 g, Rfl: 0  •  fluticasone-salmeterol (Advair HFA) 230-21 MCG/ACT inhaler, Inhale 2 puffs 2 (two) times a day Rinse mouth after use , Disp: 12 g, Rfl: 1  •  lisinopril (ZESTRIL) 40 mg tablet, Take 1 tablet (40 mg total) by mouth daily, Disp: 90 tablet, Rfl: 1  •  buPROPion (Wellbutrin XL) 150 mg 24 hr tablet, Take 1 tablet (150 mg total) by mouth every morning (Patient not taking: Reported on 12/12/2022), Disp: 90 tablet, Rfl: 0  •  ibuprofen (MOTRIN) 800 mg tablet, Take 1 tablet (800 mg total) by mouth every 8 (eight) hours as needed for mild pain, Disp: 90 tablet, Rfl: 0    The following portions of the patient's history were reviewed and updated as appropriate: allergies, current medications, past family history, past medical history, past social history, past surgical history and problem list     Review of Systems   Constitutional: Negative for fatigue and fever  HENT: Negative for congestion, facial swelling, mouth sores, rhinorrhea, sore throat and trouble swallowing  Eyes: Negative for pain and redness  Respiratory: Positive for chest tightness  Negative for cough, shortness of breath and wheezing  Cardiovascular: Positive for chest pain  Negative for palpitations and leg swelling  Gastrointestinal: Negative for abdominal pain, blood in stool, constipation, diarrhea and nausea  Genitourinary: Negative for dysuria, hematuria and urgency  Musculoskeletal: Negative for arthralgias, back pain and myalgias  Skin: Positive for rash  Negative for wound  Neurological: Negative for seizures, syncope and headaches  Hematological: Negative for adenopathy  Psychiatric/Behavioral: Negative for agitation and behavioral problems  The patient is nervous/anxious            PHQ-2/9 Depression Screening    Little interest or pleasure in doing things: 1 - several days  Feeling down, depressed, or hopeless: 1 - several days  Trouble falling or staying asleep, or sleeping too much: 3 - nearly every day  Feeling tired or having little energy: 3 - nearly every day  Poor appetite or overeatin - not at all  Feeling bad about yourself - or that you are a failure or have let yourself or your family down: 0 - not at all  Trouble concentrating on things, such as reading the newspaper or watching television: 3 - nearly every day  Moving or speaking so slowly that other people could have noticed  Or the opposite - being so fidgety or restless that you have been moving around a lot more than usual: 0 - not at all  Thoughts that you would be better off dead, or of hurting yourself in some way: 0 - not at all  PHQ-9 Score: 11   PHQ-9 Interpretation: Moderate depression              Objective:    /78 (BP Location: Left arm, Patient Position: Sitting, Cuff Size: Adult)   Pulse 72   Temp (!) 97 1 °F (36 2 °C) (Temporal)   Resp 18   Ht 5' 2" (1 575 m)   Wt 88 9 kg (196 lb)   SpO2 98%   BMI 35 85 kg/m²      Physical Exam  Vitals and nursing note reviewed  Constitutional:       Appearance: Normal appearance  She is well-developed  She is not ill-appearing  HENT:      Head: Normocephalic and atraumatic  Right Ear: External ear normal       Left Ear: External ear normal       Nose: Nose normal       Mouth/Throat:      Mouth: Mucous membranes are moist       Pharynx: No oropharyngeal exudate or posterior oropharyngeal erythema  Eyes:      General: No scleral icterus  Right eye: No discharge  Left eye: No discharge  Conjunctiva/sclera: Conjunctivae normal    Cardiovascular:      Rate and Rhythm: Normal rate  Heart sounds: No murmur heard  No gallop  Pulmonary:      Effort: Pulmonary effort is normal  No respiratory distress  Breath sounds: Normal breath sounds  No stridor  No wheezing, rhonchi or rales  Abdominal:      Palpations: Abdomen is soft  Tenderness: There is no abdominal tenderness  Musculoskeletal:         General: No tenderness or deformity  Skin:     Findings: Rash present  No erythema  Rash is urticarial       Comments: Bilateral dorsum of hands   Neurological:      Mental Status: She is alert  Mental status is at baseline  Psychiatric:         Mood and Affect: Mood is depressed  Affect is tearful           Behavior: Behavior normal          Judgment: Judgment normal            Recent Results (from the past 8736 hour(s))   CBC and differential    Collection Time: 07/11/22  2:22 PM   Result Value Ref Range    WBC 8 57 4 31 - 10 16 Thousand/uL    RBC 4 31 3 81 - 5 12 Million/uL    Hemoglobin 12 9 11 5 - 15 4 g/dL    Hematocrit 40 1 34 8 - 46 1 %    MCV 93 82 - 98 fL    MCH 29 9 26 8 - 34 3 pg    MCHC 32 2 31 4 - 37 4 g/dL    RDW 12 4 11 6 - 15 1 %    MPV 10 6 8 9 - 12 7 fL    Platelets 438 999 - 097 Thousands/uL    nRBC 0 /100 WBCs    Neutrophils Relative 48 43 - 75 %    Immat GRANS % 1 0 - 2 %    Lymphocytes Relative 41 14 - 44 %    Monocytes Relative 8 4 - 12 %    Eosinophils Relative 1 0 - 6 %    Basophils Relative 1 0 - 1 %    Neutrophils Absolute 4 21 1 85 - 7 62 Thousands/µL    Immature Grans Absolute 0 04 0 00 - 0 20 Thousand/uL    Lymphocytes Absolute 3 54 0 60 - 4 47 Thousands/µL    Monocytes Absolute 0 64 0 17 - 1 22 Thousand/µL    Eosinophils Absolute 0 09 0 00 - 0 61 Thousand/µL    Basophils Absolute 0 05 0 00 - 0 10 Thousands/µL   Comprehensive metabolic panel    Collection Time: 07/11/22  2:22 PM   Result Value Ref Range    Sodium 141 136 - 145 mmol/L    Potassium 4 6 3 5 - 5 3 mmol/L    Chloride 111 (H) 100 - 108 mmol/L    CO2 28 21 - 32 mmol/L    ANION GAP 2 (L) 4 - 13 mmol/L    BUN 15 5 - 25 mg/dL    Creatinine 0 90 0 60 - 1 30 mg/dL    Glucose 92 65 - 140 mg/dL    Calcium 9 5 8 3 - 10 1 mg/dL    AST 19 5 - 45 U/L    ALT 40 12 - 78 U/L    Alkaline Phosphatase 46 46 - 116 U/L    Total Protein 7 3 6 4 - 8 2 g/dL    Albumin 3 6 3 5 - 5 0 g/dL    Total Bilirubin 0 59 0 20 - 1 00 mg/dL    eGFR 80 ml/min/1 73sq m   Lipid panel Collection Time: 07/11/22  2:22 PM   Result Value Ref Range    Cholesterol 145 See Comment mg/dL    Triglycerides 158 (H) See Comment mg/dL    HDL, Direct 38 (L) >=50 mg/dL    LDL Calculated 75 0 - 100 mg/dL    Non-HDL-Chol (CHOL-HDL) 107 mg/dl   TSH, 3rd generation with Free T4 reflex    Collection Time: 07/11/22  2:22 PM   Result Value Ref Range    TSH 3RD GENERATON 1 580 0 450 - 4 500 uIU/mL   HIV 1/2 Antigen/Antibody (4th Generation) w Reflex [facility]    Collection Time: 07/11/22  2:22 PM   Result Value Ref Range    HIV-1/HIV-2 Ab Non-Reactive Non-Reactive   Hepatitis C antibody    Collection Time: 07/11/22  2:22 PM   Result Value Ref Range    Hepatitis C Ab Non-reactive Non-reactive   Hepatitis B surface antigen    Collection Time: 07/11/22  2:22 PM   Result Value Ref Range    Hepatitis B Surface Ag Non-reactive Non-reactive, NonReactive - Confirmed   Quantiferon TB Gold Plus    Collection Time: 07/11/22  2:22 PM   Result Value Ref Range    QFT Nil 0 31 0 - 8 0 IU/ml    QFT TB1-NIL 0 84 IU/ml    QFT TB2-NIL 0 59 IU/ml    QFT Mitogen-NIL >10 00 IU/ml    QFT Final Interpretation Positive (A) Negative   CBC and differential    Collection Time: 09/18/22  1:54 PM   Result Value Ref Range    WBC 8 03 4 31 - 10 16 Thousand/uL    RBC 4 71 3 81 - 5 12 Million/uL    Hemoglobin 14 0 11 5 - 15 4 g/dL    Hematocrit 42 8 34 8 - 46 1 %    MCV 91 82 - 98 fL    MCH 29 7 26 8 - 34 3 pg    MCHC 32 7 31 4 - 37 4 g/dL    RDW 12 4 11 6 - 15 1 %    MPV 10 5 8 9 - 12 7 fL    Platelets 173 282 - 964 Thousands/uL    nRBC 0 /100 WBCs    Neutrophils Relative 48 43 - 75 %    Immat GRANS % 0 0 - 2 %    Lymphocytes Relative 42 14 - 44 %    Monocytes Relative 8 4 - 12 %    Eosinophils Relative 1 0 - 6 %    Basophils Relative 1 0 - 1 %    Neutrophils Absolute 3 85 1 85 - 7 62 Thousands/µL    Immature Grans Absolute 0 02 0 00 - 0 20 Thousand/uL    Lymphocytes Absolute 3 34 0 60 - 4 47 Thousands/µL    Monocytes Absolute 0 66 0 17 - 1 22 Thousand/µL Eosinophils Absolute 0 09 0 00 - 0 61 Thousand/µL    Basophils Absolute 0 07 0 00 - 0 10 Thousands/µL   Comprehensive metabolic panel    Collection Time: 09/18/22  1:54 PM   Result Value Ref Range    Sodium 136 135 - 147 mmol/L    Potassium 4 2 3 5 - 5 3 mmol/L    Chloride 108 96 - 108 mmol/L    CO2 22 21 - 32 mmol/L    ANION GAP 6 4 - 13 mmol/L    BUN 14 5 - 25 mg/dL    Creatinine 0 68 0 60 - 1 30 mg/dL    Glucose 101 65 - 140 mg/dL    Calcium 9 2 8 4 - 10 2 mg/dL    AST 16 13 - 39 U/L    ALT 24 7 - 52 U/L    Alkaline Phosphatase 38 34 - 104 U/L    Total Protein 7 0 6 4 - 8 4 g/dL    Albumin 4 3 3 5 - 5 0 g/dL    Total Bilirubin 0 30 0 20 - 1 00 mg/dL    eGFR 110 ml/min/1 73sq m   HS Troponin 0hr (reflex protocol)    Collection Time: 09/18/22  1:54 PM   Result Value Ref Range    hs TnI 0hr <2 "Refer to ACS Flowchart"- see link ng/L   FLU/RSV/COVID - if FLU/RSV clinically relevant    Collection Time: 09/18/22  1:54 PM    Specimen: Nose; Nares   Result Value Ref Range    SARS-CoV-2 Negative Negative    INFLUENZA A PCR Negative Negative    INFLUENZA B PCR Negative Negative    RSV PCR Negative Negative   UA w Reflex to Microscopic w Reflex to Culture    Collection Time: 09/18/22  1:59 PM    Specimen: Urine, Clean Catch   Result Value Ref Range    Color, UA Colorless     Clarity, UA Clear     Specific [place], UA 1 011 1 003 - 1 030    pH, UA 6 0 4 5, 5 0, 5 5, 6 0, 6 5, 7 0, 7 5, 8 0    Leukocytes, UA Negative Negative    Nitrite, UA Negative Negative    Protein, UA Negative Negative mg/dl    Glucose, UA Negative Negative mg/dl    Ketones, UA Negative Negative mg/dl    Urobilinogen, UA <2 0 <2 0 mg/dl mg/dl    Bilirubin, UA Negative Negative    Occult Blood, UA Negative Negative   POCT pregnancy, urine    Collection Time: 09/18/22  2:05 PM   Result Value Ref Range    EXT PREG TEST UR (Ref: Negative) negative     Control valid    ECG 12 lead    Collection Time: 09/18/22  2:19 PM   Result Value Ref Range Ventricular Rate 62 BPM    Atrial Rate 62 BPM    CA Interval 154 ms    QRSD Interval 98 ms    QT Interval 388 ms    QTC Interval 393 ms    P Axis 52 degrees    QRS Axis 63 degrees    T Wave Axis 55 degrees       Laboratory Results: I have personally reviewed the pertinent laboratory results/reports     Radiology/Other Diagnostic Testing Results: I have personally reviewed pertinent reports  XR chest 2 views    Result Date: 9/18/2022  CHEST INDICATION:   cough, chills  Reports positive TB from June  COMPARISON:  CXR 6/23/2022 and chest CT 8/15/2022  EXAM PERFORMED/VIEWS:  XR CHEST PA & LATERAL FINDINGS: Cardiomediastinal silhouette appears unremarkable  The lungs are clear  No pneumothorax or pleural effusion  Osseous structures appear within normal limits for patient age  No acute cardiopulmonary disease  No radiographic evidence of active TB  Workstation performed: [id number]        Assessment/Plan:  Problem List Items Addressed This Visit        Endocrine    Subclinical hypothyroidism       Respiratory    Chronic obstructive pulmonary disease (HCC)    Relevant Medications    fluticasone-salmeterol (Advair HFA) 230-21 MCG/ACT inhaler       Cardiovascular and Mediastinum    Benign essential hypertension    Relevant Medications    lisinopril (ZESTRIL) 40 mg tablet    Other Relevant Orders    Comprehensive metabolic panel       Other    Generalized anxiety disorder    Relevant Medications    ALPRAZolam (XANAX) 1 mg tablet    Anxiety    Relevant Medications    ALPRAZolam (XANAX) 1 mg tablet   Other Visit Diagnoses     Influenza vaccine refused    -  Primary    Atopic dermatitis, unspecified type        Relevant Medications    clobetasol (TEMOVATE) 0 05 % cream    Other Relevant Orders    [facility] Allergy Panel, Adult    Food Allergy Profile w/Reflex    Essential hypertension        Relevant Medications    lisinopril (ZESTRIL) 40 mg tablet        Blood pressure is well controlled continue lisinopril    Had not been taking Advair, advised to be compliant, smoking cessation advised  Recommend starting wellbutrin,she has the bottle of medication filled, afraid to take it  Start taking rifampin as recommended by Infectious Disease physician  Reinforce that she must schedule her ultrasound of the breast, I have expressed my concern that patient seems to be in denial and probably does not want to find out hence she does not schedule the test This is ongoing since 2019 and she does rationalize and reason her behavior  Allergy testing as above for atopic dermatitis of hands which she attributes to her cat- though I think it could be related to excess  use and possibly due to cold exposure  Read package inserts for all medications before starting a new medications, call me if you have any questions  Patient was given opportunity to ask questions and all questions were answered  Disclaimer: Portions of the record may have been created with voice recognition software  Occasional wrong word or "sound a like" substitutions may have occurred due to the inherent limitations of voice recognition software  Read the chart carefully and recognize, using context, where substitutions have occurred  I have used the Epic copy/forward function to compose this note  I have reviewed my current note to ensure it reflects the current patient status, exam, assessment and plan

## 2022-12-14 DIAGNOSIS — F41.0 GENERALIZED ANXIETY DISORDER WITH PANIC ATTACKS: ICD-10-CM

## 2022-12-14 DIAGNOSIS — F41.1 GENERALIZED ANXIETY DISORDER WITH PANIC ATTACKS: ICD-10-CM

## 2022-12-14 RX ORDER — BUPROPION HYDROCHLORIDE 150 MG/1
TABLET ORAL
Qty: 90 TABLET | Refills: 0 | Status: SHIPPED | OUTPATIENT
Start: 2022-12-14

## 2023-01-16 DIAGNOSIS — F41.9 ANXIETY: ICD-10-CM

## 2023-01-16 RX ORDER — ALPRAZOLAM 1 MG/1
TABLET ORAL
Qty: 60 TABLET | Refills: 0 | Status: SHIPPED | OUTPATIENT
Start: 2023-01-16

## 2023-02-28 DIAGNOSIS — F41.9 ANXIETY: ICD-10-CM

## 2023-03-01 RX ORDER — ALPRAZOLAM 1 MG/1
TABLET ORAL
Qty: 60 TABLET | Refills: 0 | Status: SHIPPED | OUTPATIENT
Start: 2023-03-01

## 2023-03-20 DIAGNOSIS — F41.1 GENERALIZED ANXIETY DISORDER WITH PANIC ATTACKS: ICD-10-CM

## 2023-03-20 DIAGNOSIS — F41.0 GENERALIZED ANXIETY DISORDER WITH PANIC ATTACKS: ICD-10-CM

## 2023-03-20 RX ORDER — BUPROPION HYDROCHLORIDE 150 MG/1
TABLET ORAL
Qty: 90 TABLET | Refills: 0 | Status: SHIPPED | OUTPATIENT
Start: 2023-03-20

## 2023-03-30 ENCOUNTER — HOSPITAL ENCOUNTER (OUTPATIENT)
Dept: RADIOLOGY | Facility: HOSPITAL | Age: 38
Discharge: HOME | End: 2023-03-30

## 2023-03-30 VITALS — BODY MASS INDEX: 36.07 KG/M2 | WEIGHT: 196 LBS | HEIGHT: 62 IN

## 2023-03-30 DIAGNOSIS — R92.8 ABNORMAL FINDINGS ON DIAGNOSTIC IMAGING OF BREAST: ICD-10-CM

## 2023-03-30 DIAGNOSIS — Z12.31 ENCOUNTER FOR SCREENING MAMMOGRAM FOR MALIGNANT NEOPLASM OF BREAST: ICD-10-CM

## 2023-04-05 ENCOUNTER — TELEMEDICINE (OUTPATIENT)
Dept: FAMILY MEDICINE CLINIC | Facility: CLINIC | Age: 38
End: 2023-04-05

## 2023-04-05 VITALS — HEIGHT: 63 IN | WEIGHT: 190 LBS | BODY MASS INDEX: 33.66 KG/M2

## 2023-04-05 DIAGNOSIS — Z72.0 SMOKING TRYING TO QUIT: ICD-10-CM

## 2023-04-05 DIAGNOSIS — N60.02 CYST, BREAST SOLITARY, LEFT: ICD-10-CM

## 2023-04-05 DIAGNOSIS — F41.9 ANXIETY: ICD-10-CM

## 2023-04-05 DIAGNOSIS — D24.1 FIBROADENOMA OF BREAST, RIGHT: ICD-10-CM

## 2023-04-05 DIAGNOSIS — J44.9 CHRONIC OBSTRUCTIVE PULMONARY DISEASE, UNSPECIFIED COPD TYPE (HCC): ICD-10-CM

## 2023-04-05 DIAGNOSIS — F41.8 DEPRESSION WITH ANXIETY: Primary | ICD-10-CM

## 2023-04-05 DIAGNOSIS — I10 ESSENTIAL HYPERTENSION: ICD-10-CM

## 2023-04-05 RX ORDER — FLUTICASONE PROPIONATE AND SALMETEROL XINAFOATE 230; 21 UG/1; UG/1
2 AEROSOL, METERED RESPIRATORY (INHALATION) 2 TIMES DAILY
Qty: 12 G | Refills: 1 | Status: SHIPPED | OUTPATIENT
Start: 2023-04-05

## 2023-04-05 RX ORDER — ALBUTEROL SULFATE 90 UG/1
2 AEROSOL, METERED RESPIRATORY (INHALATION) EVERY 4 HOURS PRN
Qty: 8.5 G | Refills: 2 | Status: SHIPPED | OUTPATIENT
Start: 2023-04-05

## 2023-04-05 RX ORDER — ALPRAZOLAM 1 MG/1
1 TABLET ORAL 2 TIMES DAILY PRN
Qty: 60 TABLET | Refills: 1 | Status: SHIPPED | OUTPATIENT
Start: 2023-04-05

## 2023-04-05 RX ORDER — LISINOPRIL 40 MG/1
40 TABLET ORAL DAILY
Qty: 90 TABLET | Refills: 1 | Status: SHIPPED | OUTPATIENT
Start: 2023-04-05

## 2023-04-05 NOTE — PROGRESS NOTES
Virtual Regular Visit    Verification of patient location:    Patient is located in the following state in which I hold an active license [place]      Assessment/Plan:    Problem List Items Addressed This Visit        Respiratory    Chronic obstructive pulmonary disease (HCC)    Relevant Medications    albuterol (PROVENTIL HFA,VENTOLIN HFA) 90 mcg/act inhaler    fluticasone-salmeterol (Advair HFA) 230-21 MCG/ACT inhaler       Other    Depression with anxiety - Primary    Relevant Medications    ALPRAZolam (XANAX) 1 mg tablet    Other Relevant Orders    Ambulatory Referral to [address] [place] [address]    Anxiety    Relevant Medications    ALPRAZolam (XANAX) 1 mg tablet   Other Visit Diagnoses     Smoking trying to quit        Fibroadenoma of breast, right        Cyst, breast solitary, left        Essential hypertension        Relevant Medications    lisinopril (ZESTRIL) 40 mg tablet        Advised to start wellbutrin daily will help with anxiety, depression and smoking cessation  Counseled  Discussed likely benign fibroadenoma of right breast, stable complex cyst of left breast  Discussed gradual dose reduction of xanax once she is stable on Wellbutrin  F/u in 8 weeks  BMI Counseling: Body mass index is 33 66 kg/m²  The BMI is above normal  Nutrition recommendations include decreasing portion sizes, encouraging healthy choices of fruits and vegetables, decreasing fast food intake, consuming healthier snacks, limiting drinks that contain sugar, moderation in carbohydrate intake and reducing intake of cholesterol  Exercise recommendations include moderate physical activity 150 minutes/week  No pharmacotherapy was ordered  Rationale for BMI follow-up plan is due to patient being overweight or obese           Reason for visit is   Chief Complaint   Patient presents with   • Results     Mammo results   • Follow-up   • Virtual Regular Visit        Encounter provider Dayne Hyatt MD    Provider located at [address] [place] [address] [facility]  [address] [place] [id number] [phone]      Recent Visits  No visits were found meeting these conditions  Showing recent visits within past 7 days and meeting all other requirements  Today's Visits  Date Type Provider Dept   04/05/23 Telemedicine Margo Rojas MD [address] today's visits and meeting all other requirements  Future Appointments  No visits were found meeting these conditions  Showing future appointments within next 150 days and meeting all other requirements       The patient was identified by name and date of birth  Analilia Whitehead was informed that this is a telemedicine visit and that the visit is being conducted through the [employer]  She agrees to proceed     My office door was closed  No one else was in the room  She acknowledged consent and understanding of privacy and security of the video platform  The patient has agreed to participate and understands they can discontinue the visit at any time  Patient is aware this is a billable service       Subjective  Analilia Whitehead is a 34 y o  female       Quit her job, not doing well, has been depressed,she is crying for days and throughout the visit  Never started wellbutrin , never started latent TB treament with rifampin  She did do her mammogram and we will discuss the results today       Past Medical History:   Diagnosis Date   • Acute cough 6/14/2022   • Allergies    • Anxiety 05/2018   • Female hirsutism    • GERD (gastroesophageal reflux disease)    • Head injury    • Hypertension 2018   • Papanicolaou smear for cervical cancer screening 2017    NL, no h/o of abn pap that she can recall   • STD (female)    • Thyroid nodule 05/2018   • Tobacco user 05/2018       Past Surgical History:   Procedure Laterality Date   • NO PAST SURGERIES         Current Outpatient Medications   Medication Sig Dispense Refill   • albuterol (PROVENTIL "HFA,VENTOLIN HFA) 90 mcg/act inhaler Inhale 2 puffs every 4 (four) hours as needed for wheezing or shortness of breath 8 5 g 2   • ALPRAZolam (XANAX) 1 mg tablet Take 1 tablet (1 mg total) by mouth 2 (two) times a day as needed for anxiety 60 tablet 1   • buPROPion (WELLBUTRIN XL) 150 mg 24 hr tablet take 1 tablet by mouth every morning 90 tablet 0   • fluticasone-salmeterol (Advair HFA) 230-21 MCG/ACT inhaler Inhale 2 puffs 2 (two) times a day Rinse mouth after use  12 g 1   • ibuprofen (MOTRIN) 800 mg tablet Take 1 tablet (800 mg total) by mouth every 8 (eight) hours as needed for mild pain 90 tablet 0   • lisinopril (ZESTRIL) 40 mg tablet Take 1 tablet (40 mg total) by mouth daily 90 tablet 1     No current facility-administered medications for this visit  Allergies   Allergen Reactions   • Penicillins Swelling     Rash, swelling at IV site  Was to IV penicillin       Review of Systems   Constitutional: Negative for fatigue and fever  HENT: Negative for congestion, facial swelling, mouth sores, rhinorrhea, sore throat and trouble swallowing  Eyes: Negative for pain and redness  Respiratory: Negative for cough, shortness of breath and wheezing  Cardiovascular: Negative for chest pain, palpitations and leg swelling  Gastrointestinal: Negative for abdominal pain, blood in stool, constipation, diarrhea and nausea  Genitourinary: Negative for dysuria, hematuria and urgency  Musculoskeletal: Negative for arthralgias, back pain and myalgias  Skin: Negative for rash and wound  Neurological: Negative for seizures, syncope and headaches  Hematological: Negative for adenopathy  Psychiatric/Behavioral: Positive for decreased concentration and sleep disturbance  Negative for agitation, behavioral problems and suicidal ideas  Video Exam    Vitals:    04/05/23 1459   Weight: 86 2 kg (190 lb)   Height: 5' 3\" (1 6 m)       Physical Exam  Vitals and nursing note reviewed     Constitutional:      " Appearance: She is well-developed  HENT:      Head: Normocephalic and atraumatic  Right Ear: External ear normal       Left Ear: External ear normal       Nose: Nose normal    Eyes:      General: No scleral icterus  Right eye: No discharge  Left eye: No discharge  Conjunctiva/sclera: Conjunctivae normal       Comments: Visual observation   Pulmonary:      Effort: No respiratory distress  Musculoskeletal:         General: No tenderness or deformity  Neurological:      Mental Status: She is alert  Mental status is at baseline  Psychiatric:         Mood and Affect: Mood is depressed  Affect is tearful           Behavior: Behavior normal          Judgment: Judgment normal

## 2023-06-12 ENCOUNTER — TELEPHONE (OUTPATIENT)
Dept: FAMILY MEDICINE CLINIC | Facility: CLINIC | Age: 38
End: 2023-06-12

## 2023-06-12 DIAGNOSIS — F41.9 ANXIETY: ICD-10-CM

## 2023-06-12 DIAGNOSIS — J44.9 CHRONIC OBSTRUCTIVE PULMONARY DISEASE, UNSPECIFIED COPD TYPE (HCC): ICD-10-CM

## 2023-06-12 NOTE — TELEPHONE ENCOUNTER
Patient needs refills on   ALPRAZolam Alex Ramos) 1 mg tablet  fluticasone-salmeterol (Advair HFA) 230-21 MCG/ACT inhaler    [facility] [address] [facility]

## 2023-06-13 RX ORDER — ALPRAZOLAM 1 MG/1
TABLET ORAL
Qty: 60 TABLET | Refills: 0 | Status: SHIPPED | OUTPATIENT
Start: 2023-06-13

## 2023-06-13 RX ORDER — FLUTICASONE PROPIONATE AND SALMETEROL XINAFOATE 230; 21 UG/1; UG/1
2 AEROSOL, METERED RESPIRATORY (INHALATION) 2 TIMES DAILY
Qty: 12 G | Refills: 1 | Status: SHIPPED | OUTPATIENT
Start: 2023-06-13

## 2023-06-21 ENCOUNTER — TELEPHONE (OUTPATIENT)
Dept: PSYCHIATRY | Facility: CLINIC | Age: 38
End: 2023-06-21

## 2023-07-09 ENCOUNTER — APPOINTMENT (EMERGENCY)
Dept: CT IMAGING | Facility: HOSPITAL | Age: 38
End: 2023-07-09
Payer: COMMERCIAL

## 2023-07-09 ENCOUNTER — HOSPITAL ENCOUNTER (EMERGENCY)
Facility: HOSPITAL | Age: 38
Discharge: HOME | End: 2023-07-09
Attending: EMERGENCY MEDICINE | Admitting: EMERGENCY MEDICINE
Payer: COMMERCIAL

## 2023-07-09 VITALS
HEART RATE: 73 BPM | OXYGEN SATURATION: 98 % | SYSTOLIC BLOOD PRESSURE: 153 MMHG | TEMPERATURE: 98.1 F | DIASTOLIC BLOOD PRESSURE: 86 MMHG | RESPIRATION RATE: 20 BRPM

## 2023-07-09 DIAGNOSIS — Z72.0 TOBACCO ABUSE: ICD-10-CM

## 2023-07-09 DIAGNOSIS — K29.90 GASTRITIS AND DUODENITIS: Primary | ICD-10-CM

## 2023-07-09 LAB
ALBUMIN SERPL BCP-MCNC: 4.3 G/DL (ref 3.5–5)
ALP SERPL-CCNC: 43 U/L (ref 34–104)
ALT SERPL W P-5'-P-CCNC: 32 U/L (ref 7–52)
ANION GAP SERPL CALCULATED.3IONS-SCNC: 7 MMOL/L
AST SERPL W P-5'-P-CCNC: 19 U/L (ref 13–39)
BASOPHILS # BLD AUTO: 0.04 THOUSANDS/ÂΜL (ref 0–0.1)
BASOPHILS NFR BLD AUTO: 1 % (ref 0–1)
BILIRUB SERPL-MCNC: 0.47 MG/DL (ref 0.2–1)
BUN SERPL-MCNC: 12 MG/DL (ref 5–25)
CALCIUM SERPL-MCNC: 9.1 MG/DL (ref 8.4–10.2)
CARDIAC TROPONIN I PNL SERPL HS: <2 NG/L
CHLORIDE SERPL-SCNC: 105 MMOL/L (ref 96–108)
CO2 SERPL-SCNC: 25 MMOL/L (ref 21–32)
CREAT SERPL-MCNC: 0.75 MG/DL (ref 0.6–1.3)
EOSINOPHIL # BLD AUTO: 0.08 THOUSAND/ÂΜL (ref 0–0.61)
EOSINOPHIL NFR BLD AUTO: 1 % (ref 0–6)
ERYTHROCYTE [DISTWIDTH] IN BLOOD BY AUTOMATED COUNT: 12.5 % (ref 11.6–15.1)
EXT PREGNANCY TEST URINE: NEGATIVE
EXT. CONTROL: NORMAL
GFR SERPL CREATININE-BSD FRML MDRD: 100 ML/MIN/1.73SQ M
GLUCOSE SERPL-MCNC: 97 MG/DL (ref 65–140)
HCT VFR BLD AUTO: 40.7 % (ref 34.8–46.1)
HGB BLD-MCNC: 13.5 G/DL (ref 11.5–15.4)
IMM GRANULOCYTES # BLD AUTO: 0.01 THOUSAND/UL (ref 0–0.2)
IMM GRANULOCYTES NFR BLD AUTO: 0 % (ref 0–2)
LYMPHOCYTES # BLD AUTO: 2.82 THOUSANDS/ÂΜL (ref 0.6–4.47)
LYMPHOCYTES NFR BLD AUTO: 33 % (ref 14–44)
MAGNESIUM SERPL-MCNC: 1.8 MG/DL (ref 1.9–2.7)
MCH RBC QN AUTO: 29.7 PG (ref 26.8–34.3)
MCHC RBC AUTO-ENTMCNC: 33.2 G/DL (ref 31.4–37.4)
MCV RBC AUTO: 90 FL (ref 82–98)
MONOCYTES # BLD AUTO: 0.58 THOUSAND/ÂΜL (ref 0.17–1.22)
MONOCYTES NFR BLD AUTO: 7 % (ref 4–12)
NEUTROPHILS # BLD AUTO: 5.16 THOUSANDS/ÂΜL (ref 1.85–7.62)
NEUTS SEG NFR BLD AUTO: 58 % (ref 43–75)
NRBC BLD AUTO-RTO: 0 /100 WBCS
PLATELET # BLD AUTO: 236 THOUSANDS/UL (ref 149–390)
PMV BLD AUTO: 10.3 FL (ref 8.9–12.7)
POTASSIUM SERPL-SCNC: 3.9 MMOL/L (ref 3.5–5.3)
PROT SERPL-MCNC: 7 G/DL (ref 6.4–8.4)
RBC # BLD AUTO: 4.54 MILLION/UL (ref 3.81–5.12)
SODIUM SERPL-SCNC: 137 MMOL/L (ref 135–147)
TSH SERPL DL<=0.05 MIU/L-ACNC: 1.78 UIU/ML (ref 0.45–4.5)
WBC # BLD AUTO: 8.69 THOUSAND/UL (ref 4.31–10.16)

## 2023-07-09 PROCEDURE — 71275 CT ANGIOGRAPHY CHEST: CPT

## 2023-07-09 PROCEDURE — 85025 COMPLETE CBC W/AUTO DIFF WBC: CPT | Performed by: EMERGENCY MEDICINE

## 2023-07-09 PROCEDURE — 84443 ASSAY THYROID STIM HORMONE: CPT | Performed by: EMERGENCY MEDICINE

## 2023-07-09 PROCEDURE — 84484 ASSAY OF TROPONIN QUANT: CPT | Performed by: EMERGENCY MEDICINE

## 2023-07-09 PROCEDURE — 36415 COLL VENOUS BLD VENIPUNCTURE: CPT | Performed by: EMERGENCY MEDICINE

## 2023-07-09 PROCEDURE — 96374 THER/PROPH/DIAG INJ IV PUSH: CPT

## 2023-07-09 PROCEDURE — 96375 TX/PRO/DX INJ NEW DRUG ADDON: CPT

## 2023-07-09 PROCEDURE — 83735 ASSAY OF MAGNESIUM: CPT | Performed by: EMERGENCY MEDICINE

## 2023-07-09 PROCEDURE — C9113 INJ PANTOPRAZOLE SODIUM, VIA: HCPCS | Performed by: EMERGENCY MEDICINE

## 2023-07-09 PROCEDURE — 93005 ELECTROCARDIOGRAM TRACING: CPT

## 2023-07-09 PROCEDURE — 80053 COMPREHEN METABOLIC PANEL: CPT | Performed by: EMERGENCY MEDICINE

## 2023-07-09 PROCEDURE — 99285 EMERGENCY DEPT VISIT HI MDM: CPT

## 2023-07-09 PROCEDURE — 81025 URINE PREGNANCY TEST: CPT | Performed by: EMERGENCY MEDICINE

## 2023-07-09 PROCEDURE — G1004 CDSM NDSC: HCPCS

## 2023-07-09 PROCEDURE — 74177 CT ABD & PELVIS W/CONTRAST: CPT

## 2023-07-09 RX ORDER — PANTOPRAZOLE SODIUM 40 MG/10ML
40 INJECTION, POWDER, LYOPHILIZED, FOR SOLUTION INTRAVENOUS ONCE
Status: COMPLETED | OUTPATIENT
Start: 2023-07-09 | End: 2023-07-09

## 2023-07-09 RX ORDER — KETOROLAC TROMETHAMINE 30 MG/ML
15 INJECTION, SOLUTION INTRAMUSCULAR; INTRAVENOUS ONCE
Status: COMPLETED | OUTPATIENT
Start: 2023-07-09 | End: 2023-07-09

## 2023-07-09 RX ORDER — FAMOTIDINE 20 MG/1
20 TABLET, FILM COATED ORAL 2 TIMES DAILY
Qty: 30 TABLET | Refills: 0 | Status: SHIPPED | OUTPATIENT
Start: 2023-07-09

## 2023-07-09 RX ORDER — MAGNESIUM HYDROXIDE/ALUMINUM HYDROXICE/SIMETHICONE 120; 1200; 1200 MG/30ML; MG/30ML; MG/30ML
30 SUSPENSION ORAL ONCE
Status: COMPLETED | OUTPATIENT
Start: 2023-07-09 | End: 2023-07-09

## 2023-07-09 RX ORDER — NICOTINE 21 MG/24HR
1 PATCH, TRANSDERMAL 24 HOURS TRANSDERMAL EVERY 24 HOURS
Qty: 28 PATCH | Refills: 0 | Status: SHIPPED | OUTPATIENT
Start: 2023-07-09

## 2023-07-09 RX ORDER — SUCRALFATE ORAL 1 G/10ML
1 SUSPENSION ORAL 4 TIMES DAILY
Qty: 280 ML | Refills: 0 | Status: SHIPPED | OUTPATIENT
Start: 2023-07-09 | End: 2023-07-16

## 2023-07-09 RX ADMIN — KETOROLAC TROMETHAMINE 15 MG: 30 INJECTION, SOLUTION INTRAMUSCULAR at 01:50

## 2023-07-09 RX ADMIN — PANTOPRAZOLE SODIUM 40 MG: 40 INJECTION, POWDER, FOR SOLUTION INTRAVENOUS at 02:55

## 2023-07-09 RX ADMIN — IOHEXOL 100 ML: 350 INJECTION, SOLUTION INTRAVENOUS at 02:16

## 2023-07-09 RX ADMIN — ALUMINUM HYDROXIDE, MAGNESIUM HYDROXIDE, AND DIMETHICONE 30 ML: 200; 20; 200 SUSPENSION ORAL at 00:50

## 2023-07-09 NOTE — Clinical Note
Gonsalo Lakia was seen and treated in our emergency department on 7/9/2023. Diagnosis:     Jorge Ferguson  may return to work on return date. She may return on this date: 07/10/2023         If you have any questions or concerns, please don't hesitate to call.       Bhavana Child, DO    ______________________________           _______________          _______________  Hospital Representative                              Date                                Time

## 2023-07-09 NOTE — ED NOTES
D/c instructions reviewed, pt verbalized understanding and has no further questions at this time. Pt ambulatory off unit with steady gait.      Erica Nguyen RN  07/09/23 2861

## 2023-07-09 NOTE — ED PROVIDER NOTES
History  Chief Complaint   Patient presents with   • Chest Pain     Patient complains of chest pain that feels like pressure and burning. This has been occurring for the past 3-4 days on and off, pain also on the right shoulder blade. 40-year-old female comes in for evaluation of chest pain. Patient states for approximately 4 days she has had burning in the center of her chest.  That radiates into her right shoulder and into her back. Is very tearful in triage and concerned that something is really wrong. Patient denies any fever chills nausea vomiting or diarrhea      History provided by:  Patient   used: No    Chest Pain  Pain location:  Substernal area  Pain quality: burning and pressure    Pain radiates to:  Upper back, R shoulder, precordial region and epigastrium  Pain radiates to the back: yes    Pain severity:  Severe  Onset quality:  Gradual  Timing:  Constant  Progression:  Worsening  Chronicity:  New  Context: at rest    Ineffective treatments:  None tried  Associated symptoms: anorexia and shortness of breath    Associated symptoms: no abdominal pain, no back pain, no cough, no fever, no palpitations and not vomiting    Risk factors: obesity and smoking        Prior to Admission Medications   Prescriptions Last Dose Informant Patient Reported? Taking? ALPRAZolam (XANAX) 1 mg tablet   No No   Sig: take 1 tablet by mouth twice a day if needed for anxiety   albuterol (PROVENTIL HFA,VENTOLIN HFA) 90 mcg/act inhaler   No No   Sig: Inhale 2 puffs every 4 (four) hours as needed for wheezing or shortness of breath   buPROPion (WELLBUTRIN XL) 150 mg 24 hr tablet   No No   Sig: take 1 tablet by mouth every morning   fluticasone-salmeterol (Advair HFA) 230-21 MCG/ACT inhaler   No No   Sig: Inhale 2 puffs 2 (two) times a day Rinse mouth after use.    ibuprofen (MOTRIN) 800 mg tablet   No No   Sig: Take 1 tablet (800 mg total) by mouth every 8 (eight) hours as needed for mild pain lisinopril (ZESTRIL) 40 mg tablet   No No   Sig: Take 1 tablet (40 mg total) by mouth daily      Facility-Administered Medications: None       Past Medical History:   Diagnosis Date   • Acute cough 6/14/2022   • Allergies    • Anxiety 05/2018   • Female hirsutism    • GERD (gastroesophageal reflux disease)    • Head injury    • Hypertension 2018   • Papanicolaou smear for cervical cancer screening 2017    NL, no h/o of abn pap that she can recall   • STD (female)    • Thyroid nodule 05/2018   • Tobacco user 05/2018       Past Surgical History:   Procedure Laterality Date   • NO PAST SURGERIES         Family History   Problem Relation Age of Onset   • Hypertension Mother    • Kidney disease Mother    • Cirrhosis Mother    • Dialysis Mother    • Diabetes Mother    • Anxiety disorder Mother    • Depression Mother    • Anxiety disorder Father    • Depression Father    • Drug abuse Father    • Melanoma Paternal Uncle    • Stomach cancer Other    • Colon cancer Other         Passed age 36     I have reviewed and agree with the history as documented. E-Cigarette/Vaping   • E-Cigarette Use Never User      E-Cigarette/Vaping Substances     Social History     Tobacco Use   • Smoking status: Every Day     Packs/day: 1.00     Years: 25.00     Total pack years: 25.00     Types: Cigarettes   • Smokeless tobacco: Never   • Tobacco comments:     smoked since age 13   Vaping Use   • Vaping Use: Never used   Substance Use Topics   • Alcohol use: Not Currently   • Drug use: Yes     Types: Marijuana     Comment: rare       Review of Systems   Constitutional: Negative for chills and fever. HENT: Negative for ear pain and sore throat. Eyes: Negative for pain and visual disturbance. Respiratory: Positive for shortness of breath. Negative for cough. Cardiovascular: Positive for chest pain. Negative for palpitations. Gastrointestinal: Positive for anorexia. Negative for abdominal pain and vomiting.    Genitourinary: Negative for dysuria and hematuria. Musculoskeletal: Negative for arthralgias and back pain. Skin: Negative for color change and rash. Neurological: Negative for seizures and syncope. All other systems reviewed and are negative. Physical Exam  Physical Exam  Vitals and nursing note reviewed. Constitutional:       General: She is not in acute distress. Appearance: She is well-developed. HENT:      Head: Normocephalic and atraumatic. Eyes:      Conjunctiva/sclera: Conjunctivae normal.   Cardiovascular:      Rate and Rhythm: Normal rate and regular rhythm. Heart sounds: No murmur heard. Pulmonary:      Effort: Pulmonary effort is normal. No respiratory distress. Breath sounds: Normal breath sounds. Abdominal:      Palpations: Abdomen is soft. Tenderness: There is no abdominal tenderness. Musculoskeletal:         General: No swelling. Cervical back: Neck supple. Skin:     General: Skin is warm and dry. Capillary Refill: Capillary refill takes less than 2 seconds. Neurological:      Mental Status: She is alert. Psychiatric:         Mood and Affect: Mood is anxious. Affect is tearful.          Vital Signs  ED Triage Vitals   Temperature Pulse Respirations Blood Pressure SpO2   07/09/23 0046 07/09/23 0030 07/09/23 0030 07/09/23 0030 07/09/23 0030   98.1 °F (36.7 °C) 85 20 139/89 100 %      Temp Source Heart Rate Source Patient Position - Orthostatic VS BP Location FiO2 (%)   07/09/23 0046 07/09/23 0030 07/09/23 0030 -- --   Oral Monitor Lying        Pain Score       07/09/23 0030       9           Vitals:    07/09/23 0030   BP: 139/89   Pulse: 85   Patient Position - Orthostatic VS: Lying         Visual Acuity      ED Medications  Medications   pantoprazole (PROTONIX) injection 40 mg (has no administration in time range)   aluminum-magnesium hydroxide-simethicone (MAALOX) oral suspension 30 mL (30 mL Oral Given 7/9/23 0050)   ketorolac (TORADOL) injection 15 mg (15 mg Intravenous Given 7/9/23 0150)   iohexol (OMNIPAQUE) 350 MG/ML injection (SINGLE-DOSE) 100 mL (100 mL Intravenous Given 7/9/23 0216)       Diagnostic Studies  Results Reviewed     Procedure Component Value Units Date/Time    POCT pregnancy, urine [[id number]]  (Normal) Resulted: 07/09/23 0150    Lab Status: Final result Updated: 07/09/23 0150     EXT Preg Test, Ur Negative     Control Valid    TSH [[id number]]  (Normal) Collected: 07/09/23 0049    Lab Status: Final result Specimen: Blood from Arm, Left Updated: 07/09/23 0129     TSH 3RD GENERATON 1.782 uIU/mL     HS Troponin 0hr (reflex protocol) [[id number]]  (Normal) Collected: 07/09/23 0049    Lab Status: Final result Specimen: Blood from Arm, Left Updated: 07/09/23 0121     hs TnI 0hr <2 ng/L     Comprehensive metabolic panel [[id number]] Collected: 07/09/23 0049    Lab Status: Final result Specimen: Blood from Arm, Left Updated: 07/09/23 0113     Sodium 137 mmol/L      Potassium 3.9 mmol/L      Chloride 105 mmol/L      CO2 25 mmol/L      ANION GAP 7 mmol/L      BUN 12 mg/dL      Creatinine 0.75 mg/dL      Glucose 97 mg/dL      Calcium 9.1 mg/dL      AST 19 U/L      ALT 32 U/L      Alkaline Phosphatase 43 U/L      Total Protein 7.0 g/dL      Albumin 4.3 g/dL      Total Bilirubin 0.47 mg/dL      eGFR 100 ml/min/1.73sq m     Narrative:      [place] guidelines for Chronic Kidney Disease (CKD):   •  Stage 1 with normal or high GFR (GFR > 90 mL/min/1.73 square meters)  •  Stage 2 Mild CKD (GFR = 60-89 mL/min/1.73 square meters)  •  Stage 3A Moderate CKD (GFR = 45-59 mL/min/1.73 square meters)  •  Stage 3B Moderate CKD (GFR = 30-44 mL/min/1.73 square meters)  •  Stage 4 Severe CKD (GFR = 15-29 mL/min/1.73 square meters)  •  Stage 5 End Stage CKD (GFR <15 mL/min/1.73 square meters)  Note: GFR calculation is accurate only with a steady state creatinine    Magnesium [[id number]]  (Abnormal) Collected: 07/09/23 0049    Lab Status: Final result Specimen: Blood from Arm, Left Updated: 07/09/23 0113     Magnesium 1.8 mg/dL     CBC and differential [[id number]] Collected: 07/09/23 0049    Lab Status: Final result Specimen: Blood from Arm, Left Updated: 07/09/23 0103     WBC 8.69 Thousand/uL      RBC 4.54 Million/uL      Hemoglobin 13.5 g/dL      Hematocrit 40.7 %      MCV 90 fL      MCH 29.7 pg      MCHC 33.2 g/dL      RDW 12.5 %      MPV 10.3 fL      Platelets 704 Thousands/uL      nRBC 0 /100 WBCs      Neutrophils Relative 58 %      Immat GRANS % 0 %      Lymphocytes Relative 33 %      Monocytes Relative 7 %      Eosinophils Relative 1 %      Basophils Relative 1 %      Neutrophils Absolute 5.16 Thousands/µL      Immature Grans Absolute 0.01 Thousand/uL      Lymphocytes Absolute 2.82 Thousands/µL      Monocytes Absolute 0.58 Thousand/µL      Eosinophils Absolute 0.08 Thousand/µL      Basophils Absolute 0.04 Thousands/µL                  PE Study with CT Abdomen and Pelvis with contrast   Final Result by Forrest Houser DO (07/09 0245)      No evidence of pulmonary artery embolus. Thickening versus underdistention of the gastric wall which may represent gastritis. Follow-up with gastroenterology. Thickening versus underdistention of the ascending colon which may represent colitis. Follow-up with gastroenterology. Multiple enlarged masses within the markedly enlarged uterus likely due to underlying fibroids. Follow-up with pelvic sonogram can be obtained. The study was marked in [facility] for immediate notification.                      Workstation performed: [id number]                    Procedures  ECG 12 Lead Documentation Only    Date/Time: 7/9/2023 12:40 PM    Performed by: Joaquin Hester DO  Authorized by: Ayan Maxwell DO    Rate:     ECG rate:  82  Rhythm:     Rhythm: sinus rhythm    Ectopy:     Ectopy: none    QRS:     QRS axis:  Normal             ED Course             HEART Risk Score    Flowsheet Row Most Recent Value   Heart Score Risk Calculator    History 0 Filed at: 07/09/2023 0257   ECG 0 Filed at: 07/09/2023 0257   Age 0 Filed at: 07/09/2023 0257   Risk Factors 1 Filed at: 07/09/2023 0257   Troponin 0 Filed at: 07/09/2023 0257   HEART Score 1 Filed at: 07/09/2023 8104                                      Medical Decision Making  Differential diagnosis includes but is not limited to PE, ACS, palpitations, arrhythmia, gastritis, peptic ulcer disease, colitis, GERD    Gastritis and duodenitis: acute illness or injury  Tobacco abuse: chronic illness or injury  Amount and/or Complexity of Data Reviewed  Labs: ordered. Radiology: ordered. Discussion of management or test interpretation with external provider(s): Discussed with patient's finding on CT discussed with patient quitting smoking and decreasing caffeine take because these are contributing to her gastritis. Patient has remained extended nicotine patches. Risk  OTC drugs. Prescription drug management. Risk Details: We will start patient on Pepcid as well as Carafate and nicotine patches. She may take Tylenol as needed for pain decrease NSAID use        Disposition  Final diagnoses:   Gastritis and duodenitis   Tobacco abuse     Time reflects when diagnosis was documented in both MDM as applicable and the Disposition within this note     Time User Action Codes Description Comment    7/9/2023  2:47 AM Rachel MARTINEZ Add [K29.90] Gastritis and duodenitis     7/9/2023  2:54 AM Allison Valderrama Add [Z72.0] Tobacco abuse       ED Disposition     ED Disposition   Discharge    Condition   Stable    Date/Time   Sun Jul 9, 2023  2:47 AM    Comment   [address] discharge to home/self care.                Follow-up Information     Follow up With Specialties Details Why Contact Info Additional [address] [facility] Schedule an appointment as soon as possible for a visit   [address] [place] [address] [place] [address]  [address] [facility] [place] [facility] [place], [address] [place] [address] [facility], [place], [address] [phone]          Patient's Medications   Discharge Prescriptions    FAMOTIDINE (PEPCID) 20 MG TABLET    Take 1 tablet (20 mg total) by mouth 2 (two) times a day       Start Date: 7/9/2023  End Date: --       Order Dose: 20 mg       Quantity: 30 tablet    Refills: 0    NICOTINE (NICODERM CQ) 14 MG/24HR TD 24 HR PATCH    Place 1 patch on the skin over 24 hours every 24 hours       Start Date: 7/9/2023  End Date: --       Order Dose: 1 patch       Quantity: 28 patch    Refills: 0    SUCRALFATE (CARAFATE) 1 G/10 ML SUSPENSION    Take 10 mL (1 g total) by mouth 4 (four) times a day for 7 days       Start Date: 7/9/2023  End Date: 7/16/2023       Order Dose: 1 g       Quantity: 280 mL    Refills: 0       No discharge procedures on file.     PDMP Review       Value Time User    PDMP Reviewed  Yes 6/13/2023  2:02 PM Tracy Luevano MD          ED Provider  Electronically Signed by           Charleen Nieto DO  07/09/23 0805

## 2023-07-09 NOTE — ED NOTES
Patient aware urine sample is needed. Will notify staff when able to provide sample.      Mildred Gomez RN  07/09/23 2937

## 2023-07-12 DIAGNOSIS — F41.9 ANXIETY: ICD-10-CM

## 2023-07-12 LAB
ATRIAL RATE: 82 BPM
P AXIS: 46 DEGREES
PR INTERVAL: 157 MS
QRS AXIS: 35 DEGREES
QRSD INTERVAL: 104 MS
QT INTERVAL: 381 MS
QTC INTERVAL: 445 MS
T WAVE AXIS: 39 DEGREES
VENTRICULAR RATE: 82 BPM

## 2023-07-12 PROCEDURE — 93010 ELECTROCARDIOGRAM REPORT: CPT | Performed by: INTERNAL MEDICINE

## 2023-07-13 ENCOUNTER — TELEPHONE (OUTPATIENT)
Dept: PSYCHIATRY | Facility: CLINIC | Age: 38
End: 2023-07-13

## 2023-07-13 RX ORDER — ALPRAZOLAM 1 MG/1
TABLET ORAL
Qty: 60 TABLET | Refills: 0 | Status: SHIPPED | OUTPATIENT
Start: 2023-07-13

## 2023-07-13 NOTE — TELEPHONE ENCOUNTER
Patient contacted the office in regards to appt on 7/19/23 at 2:00p. Patient is requesting to reschedule the appt for an earlier time if possible. Patient expressed to the writer that she works over night from 6pm-6am.   Patient stated she would prefer morning appointments. Writer informed the patient that he would notify the Provider and her MR. They will be in contact at their earliest convenience to assist with rescheduling.

## 2023-07-19 ENCOUNTER — TELEMEDICINE (OUTPATIENT)
Dept: PSYCHIATRY | Facility: CLINIC | Age: 38
End: 2023-07-19
Payer: COMMERCIAL

## 2023-07-19 ENCOUNTER — TELEPHONE (OUTPATIENT)
Dept: PSYCHIATRY | Facility: CLINIC | Age: 38
End: 2023-07-19

## 2023-07-19 DIAGNOSIS — F41.8 DEPRESSION WITH ANXIETY: ICD-10-CM

## 2023-07-19 DIAGNOSIS — F33.1 MAJOR DEPRESSIVE DISORDER, RECURRENT, MODERATE (HCC): Primary | ICD-10-CM

## 2023-07-19 DIAGNOSIS — F41.1 GENERALIZED ANXIETY DISORDER: ICD-10-CM

## 2023-07-19 DIAGNOSIS — F43.21 COMPLICATED GRIEF: ICD-10-CM

## 2023-07-19 PROCEDURE — 90791 PSYCH DIAGNOSTIC EVALUATION: CPT | Performed by: SOCIAL WORKER

## 2023-07-19 NOTE — TELEPHONE ENCOUNTER
Patient contacted the office seeking information on her virtual visit for 7/19/23 at 8:00a. Patient stated she was sleeping and missed a call from the provider. Patient contacted the office as soon asa she noticed what time it was.  Writer informed the patient that he will notify the provider and her . The Provider and her MR will be in contact at their earliest convenience to assist.

## 2023-07-19 NOTE — PSYCH
Behavioral Health Psychotherapy Assessment    No Call. No Show. No Charge    Aden Nance no showed 07/19/23 appointment , staff called and left message to reschedule appointment. Clinician sent invite to patients cell phone, email and left a voicemail    Treatment Plan not due at this session.

## 2023-07-19 NOTE — ADDENDUM NOTE
Addended by: Patria Courtney on: 7/19/2023 09:44 AM     Modules accepted: Level of Service, SmartSet
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
## 2023-07-26 ENCOUNTER — TELEMEDICINE (OUTPATIENT)
Dept: PSYCHIATRY | Facility: CLINIC | Age: 38
End: 2023-07-26

## 2023-07-26 ENCOUNTER — TELEPHONE (OUTPATIENT)
Dept: PSYCHIATRY | Facility: CLINIC | Age: 38
End: 2023-07-26

## 2023-07-26 DIAGNOSIS — F41.1 GENERALIZED ANXIETY DISORDER: ICD-10-CM

## 2023-07-26 DIAGNOSIS — F33.1 MAJOR DEPRESSIVE DISORDER, RECURRENT, MODERATE (HCC): Primary | ICD-10-CM

## 2023-07-26 DIAGNOSIS — F43.21 COMPLICATED GRIEF: ICD-10-CM

## 2023-07-26 NOTE — PSYCH
Virtual Regular Visit    Verification of patient location:    Patient is located at {Amb Virtual Patient Location:19908} in the following state in which I hold an active license { amb virtual patient location:[address]}      Assessment/Plan:    Problem List Items Addressed This Visit        Other    Major depressive disorder, recurrent, moderate ([address]) - Primary    Generalized anxiety disorder   Other Visit Diagnoses     Complicated grief              Goals addressed in session: {GOALS:52946}          Reason for visit is No chief complaint on file. Encounter provider Sudeep Carrasco LCSW    Provider located at [address] [place] [id number]  [phone]      Recent Visits  Date Type Provider Dept   07/19/23 Telephone Sudeep Carrasco, [address] [place] [address]   07/19/23 Telemedicine [facility]   Showing recent visits within past 7 days and meeting all other requirements  Today's Visits  Date Type Provider Dept   07/26/23 Telemedicine Sudeep Pac, [facility]   Showing today's visits and meeting all other requirements  Future Appointments  No visits were found meeting these conditions. Showing future appointments within next 150 days and meeting all other requirements       The patient was identified by name and date of birth. Prashanth Jett was informed that this is a telemedicine visit and that the visit is being conducted through{AMB VIRTUAL VISIT DKRXIF:01095}. {Telemedicine confidentiality :74164} {Telemedicine participants:41451}  She acknowledged consent and understanding of privacy and security of the video platform. The patient has agreed to participate and understands they can discontinue the visit at any time. Patient is aware this is a billable service.      Subjective  Prashanth Jett is a 34 y.o. female *** Dandre White HPI     Past Medical History:   Diagnosis Date   • Acute cough 6/14/2022   • Allergies    • Anxiety 05/2018   • Female hirsutism    • GERD (gastroesophageal reflux disease)    • Head injury    • Hypertension 2018   • Papanicolaou smear for cervical cancer screening 2017    NL, no h/o of abn pap that she can recall   • STD (female)    • Thyroid nodule 05/2018   • Tobacco user 05/2018       Past Surgical History:   Procedure Laterality Date   • NO PAST SURGERIES         Current Outpatient Medications   Medication Sig Dispense Refill   • albuterol (PROVENTIL HFA,VENTOLIN HFA) 90 mcg/act inhaler Inhale 2 puffs every 4 (four) hours as needed for wheezing or shortness of breath 8.5 g 2   • ALPRAZolam (XANAX) 1 mg tablet take 1 tablet by mouth twice a day if needed for anxiety 60 tablet 0   • buPROPion (WELLBUTRIN XL) 150 mg 24 hr tablet take 1 tablet by mouth every morning 90 tablet 0   • famotidine (PEPCID) 20 mg tablet Take 1 tablet (20 mg total) by mouth 2 (two) times a day 30 tablet 0   • fluticasone-salmeterol (Advair HFA) 230-21 MCG/ACT inhaler Inhale 2 puffs 2 (two) times a day Rinse mouth after use. 12 g 1   • ibuprofen (MOTRIN) 800 mg tablet Take 1 tablet (800 mg total) by mouth every 8 (eight) hours as needed for mild pain 90 tablet 0   • lisinopril (ZESTRIL) 40 mg tablet Take 1 tablet (40 mg total) by mouth daily 90 tablet 1   • nicotine (NICODERM CQ) 14 mg/24hr TD 24 hr patch Place 1 patch on the skin over 24 hours every 24 hours 28 patch 0   • sucralfate (CARAFATE) 1 g/10 mL suspension Take 10 mL (1 g total) by mouth 4 (four) times a day for 7 days 280 mL 0     No current facility-administered medications for this visit. Allergies   Allergen Reactions   • Penicillins Swelling     Rash, swelling at IV site. Was to IV penicillin       Review of Systems    Video Exam    There were no vitals filed for this visit.     Physical Exam     Visit Time    Visit Start Time: ***  Visit Stop Time: ***  Total Visit Duration: {Psych Total Visit JVQN:60271}

## 2023-07-26 NOTE — PSYCH
Behavioral Health Psychotherapy Progress Note    Psychotherapy Provided: Individual Psychotherapy     1. Major depressive disorder, recurrent, moderate (HCC)        2. Generalized anxiety disorder        3. Complicated grief            No Call. No Show. No Charge    Christy John no showed 07/26/23 appointment , staff called and phone was answered but no one would answer when writer said hello 3 times. Writer ended session after sending two invites at [address] [place] [address] Road has been informed at initial assessment about no show policy. Treatment Plan not due at this session.

## 2023-07-26 NOTE — BH CRISIS PLAN
Client Name: Christy Lopez       Client YOB: 1985  : 1985    Treatment Team (include name and contact information):     Psychotherapist: ***    Psychiatrist: ***   Release of information completed: {YES/NO:}    " ***   Release of information completed: {YES/NO:}    Other (Specify Role): ***    Release of information completed: {YES/NO:}    Other (Specify Role): ***   Release of information completed: {YES/NO:}    Healthcare Provider  Yogesh Colin MD  [address]  [address] [place] [address]  [phone]    Type of Plan   * Child plans (children 15 yo and younger) must be completed and signed by the child's legal guardian   * Plans for all individuals 15 yo and above must be signed by the client.      Plan Type: {AMB PSYCH/BH CHILD/ADULT:59415} {AMB PSYCH/BH Initial/update:29308}      My Personal Strengths are (in the client's own words):  "***"  The stressors and triggers that may put me at risk are:  {AMB PSYCH/BH Triggers/Stressors:59953}    Coping skills I can use to keep myself calm and safe:  {AMB PSYCH/BH COPING SKILLS:96334}    Coping skills/supports I can use to maintain abstinence from substance use:   {Substance Copin}    The people that provide me with help and support: (Include name, contact, and how they can help)   Support person #1: ***    * Phone number: {Support Person Phone:37155}    * How can they help me? ***   Support person #2:***    * Phone number: {Support Person Phone:12794}    * How can they help me? ***     Support person #3: ***    * Phone number: {Support Person Phone:52430}    * How can they help me? ***    In the past, the following has helped me in times of crisis:    {AMB PSYCH/BH Things that help:36543}      If it is an emergency and you need immediate help, call     If there is a possibility of danger to yourself or others, call the following crisis hotline resources:     Adult Crisis Numbers  Suicide Prevention Hotline - Dial 9-8-8  [facility]: [address] [facility] Street: [address]: [address]: [phone]  [address] [place] [address] Street: [phone]  [facility]: [address]: [address]: [phone] (daytime). [phone] (after hours, weekends, holidays)     Child/Adolescent Crisis Numbers   [facility]: [address]: [phone]   Bo Kushal: [phone]   [address] [place] [address] Street: [phone]    Please note: Some [facility] do not have a separate number for Child/Adolescent specific crisis. If your county is not listed under Child/Adolescent, please call the adult number for your county     National Talk to Text Line   All Ages - [phone]    In the event your feelings become unmanageable, and you cannot reach your support system, you will call 911 immediately or go to the nearest hospital emergency room.

## 2023-07-26 NOTE — TELEPHONE ENCOUNTER
Gonsalo Dorman  requested a call back to discuss rescheduling a missed appt. They can be reached at P# [phone]. Thank you.

## 2023-07-26 NOTE — BH TREATMENT PLAN
Outpatient Behavioral Health Psychotherapy Treatment Plan    Anabel Fried  1985     Date of Initial Psychotherapy Assessment: ***   Date of Current Treatment Plan: 07/26/23  Treatment Plan Target Date: ***  Treatment Plan Expiration Date: ***    Diagnosis:   1. Major depressive disorder, recurrent, moderate (HCC)        2. Generalized anxiety disorder        3.  Complicated grief            Area(s) of Need: ***    Long Term Goal 1 (in the client's own words): ***    Stage of Change: {SL AMB PSYCH STAGE OF TNKOPC:42580}    Target Date for completion: ***     Anticipated therapeutic modalities: ***     People identified to complete this goal: ***      Objective 1: (identify the means of measuring success in meeting the objective): ***      Objective 2: (identify the means of measuring success in meeting the objective): ***      Long Term Goal 2 (in the client's own words): ***    Stage of Change: {SL AMB PSYCH STAGE OF SQWUNL:55284}    Target Date for completion: ***     Anticipated therapeutic modalities: ***     People identified to complete this goal: ***      Objective 1: (identify the means of measuring success in meeting the objective): ***      Objective 2: (identify the means of measuring success in meeting the objective): ***     Long Term Goal 3 (in the client's own words): ***    Stage of Change: {SL AMB PSYCH STAGE OF IBYAAN:71218}    Target Date for completion: ***     Anticipated therapeutic modalities: ***     People identified to complete this goal: ***      Objective 1: (identify the means of measuring success in meeting the objective): ***      Objective 2: (identify the means of measuring success in meeting the objective): ***     I am currently under the care of a [facility] psychiatric provider: {YES/NO:20200}    My [facility] psychiatric provider is: ***    I am currently taking psychiatric medications: {SL AMB TAKING PSYCH MEDS:28465}    I feel that I will be ready for discharge from mental health care when I reach the following (measurable goal/objective): ***    For children and adults who have a legal guardian:   Has there been any change to custody orders and/or guardianship status? {Yes/No/NA:80633}. If yes, attach updated documentation. I have { AMB PSYCH CREATED/UPDATED:73827} my Crisis Plan and have been offered a copy of this plan    [address]: Diagnosis and Treatment Plan explained to [address] [place] [address] {acknowledge/does not acknowledge:62506} an understanding of their diagnosis. Kathy Vazquez { AMB PSYCH AGREE/DISAGREE:01042} this treatment plan. I have been offered a copy of this Treatment Plan.  {YES/NO:20200}

## 2023-07-31 ENCOUNTER — TELEPHONE (OUTPATIENT)
Dept: PSYCHIATRY | Facility: CLINIC | Age: 38
End: 2023-07-31

## 2023-07-31 NOTE — TELEPHONE ENCOUNTER
Marjanr called Bridgett Sanderson after a cancellation to see if she was available. Writer left  and also sent schedule ticket to [employer].

## 2023-08-09 DIAGNOSIS — F41.9 ANXIETY: ICD-10-CM

## 2023-08-10 RX ORDER — ALPRAZOLAM 1 MG/1
TABLET ORAL
Qty: 60 TABLET | OUTPATIENT
Start: 2023-08-10

## 2023-08-11 ENCOUNTER — TELEMEDICINE (OUTPATIENT)
Dept: FAMILY MEDICINE CLINIC | Facility: CLINIC | Age: 38
End: 2023-08-11
Payer: COMMERCIAL

## 2023-08-11 VITALS — WEIGHT: 195 LBS | BODY MASS INDEX: 34.55 KG/M2 | HEIGHT: 63 IN

## 2023-08-11 DIAGNOSIS — F41.9 ANXIETY: ICD-10-CM

## 2023-08-11 PROCEDURE — 99213 OFFICE O/P EST LOW 20 MIN: CPT

## 2023-08-11 RX ORDER — ALPRAZOLAM 1 MG/1
1 TABLET ORAL 2 TIMES DAILY PRN
Qty: 60 TABLET | Refills: 0 | Status: SHIPPED | OUTPATIENT
Start: 2023-08-11

## 2023-08-11 NOTE — ASSESSMENT & PLAN NOTE
Pt not taking Bupropion states symptoms controlled with alprazolam 1 mg bid PRN. Pt interested in weaning medication, will f/u in next 2 weeks to discuss. Pt initiated CBT however has missed last 2 appointments states due to conflict with work schedule, encouraged to f/u . PDMP reviewed and medication refill sent.

## 2023-08-11 NOTE — PROGRESS NOTES
Virtual Regular Visit    Verification of patient location:    Patient is located at Home in the following state in which I hold an active license [place]      Assessment/Plan:    Problem List Items Addressed This Visit        Other    Anxiety     Pt not taking Bupropion states symptoms controlled with alprazolam 1 mg bid PRN. Pt interested in weaning medication, will f/u in next 2 weeks to discuss. Pt initiated CBT however has missed last 2 appointments states due to conflict with work schedule, encouraged to f/u . PDMP reviewed and medication refill sent. Relevant Medications    ALPRAZolam (XANAX) 1 mg tablet            Reason for visit is   Chief Complaint   Patient presents with   • Virtual Brief Visit   • Medication Refill   • Virtual Regular Visit        Encounter provider [address], [address] [facility]    Provider located at [address] [place] [id number] [phone]      Recent Visits  No visits were found meeting these conditions. Showing recent visits within past 7 days and meeting all other requirements  Today's Visits  Date Type Provider Dept   08/11/23 Telemedicine Tori Hallison, [facility] today's visits and meeting all other requirements  Future Appointments  No visits were found meeting these conditions. Showing future appointments within next 150 days and meeting all other requirements       The patient was identified by name and date of birth. Juancarlos Herman was informed that this is a telemedicine visit and that the visit is being conducted through the [employer]. She agrees to proceed. .  My office door was closed. No one else was in the room. She acknowledged consent and understanding of privacy and security of the video platform. The patient has agreed to participate and understands they can discontinue the visit at any time.     Patient is aware this is a billable service. Subjective  Debby Valladares is a 34 y.o. female . Pt needs refill of alprazolam states anxiety symptoms have been mostly controlled with 1 mg dose bid, did not start Bupropion as discussed previously as she is hesitant to take too many medications. Pt reports wanting to wean alprazolam however due to increased stress currently will wait to start. Pt will schedule f/u appt with PCP in 2 weeks to discuss weaning. Pt states has initiated CBT however due to work schedule has missed the last few virtual appts and has reached out to therapist to attempt to reschedule. Past Medical History:   Diagnosis Date   • Acute cough 6/14/2022   • Allergies    • Anxiety 05/2018   • Female hirsutism    • GERD (gastroesophageal reflux disease)    • Head injury    • Hypertension 2018   • Papanicolaou smear for cervical cancer screening 2017    NL, no h/o of abn pap that she can recall   • STD (female)    • Thyroid nodule 05/2018   • Tobacco user 05/2018       Past Surgical History:   Procedure Laterality Date   • NO PAST SURGERIES         Current Outpatient Medications   Medication Sig Dispense Refill   • albuterol (PROVENTIL HFA,VENTOLIN HFA) 90 mcg/act inhaler Inhale 2 puffs every 4 (four) hours as needed for wheezing or shortness of breath 8.5 g 2   • ALPRAZolam (XANAX) 1 mg tablet Take 1 tablet (1 mg total) by mouth 2 (two) times a day as needed for anxiety 60 tablet 0   • famotidine (PEPCID) 20 mg tablet Take 1 tablet (20 mg total) by mouth 2 (two) times a day 30 tablet 0   • fluticasone-salmeterol (Advair HFA) 230-21 MCG/ACT inhaler Inhale 2 puffs 2 (two) times a day Rinse mouth after use.  12 g 1   • lisinopril (ZESTRIL) 40 mg tablet Take 1 tablet (40 mg total) by mouth daily 90 tablet 1   • buPROPion (WELLBUTRIN XL) 150 mg 24 hr tablet take 1 tablet by mouth every morning 90 tablet 0   • ibuprofen (MOTRIN) 800 mg tablet Take 1 tablet (800 mg total) by mouth every 8 (eight) hours as needed for mild pain 90 tablet 0   • nicotine (NICODERM CQ) 14 mg/24hr TD 24 hr patch Place 1 patch on the skin over 24 hours every 24 hours 28 patch 0   • sucralfate (CARAFATE) 1 g/10 mL suspension Take 10 mL (1 g total) by mouth 4 (four) times a day for 7 days 280 mL 0     No current facility-administered medications for this visit. Allergies   Allergen Reactions   • Penicillins Swelling     Rash, swelling at IV site. Was to IV penicillin       Review of Systems   Skin: Negative for color change. Psychiatric/Behavioral: Positive for agitation, decreased concentration and sleep disturbance. Negative for suicidal ideas. The patient is nervous/anxious. Video Exam    Vitals:    08/11/23 1045   Weight: 88.5 kg (195 lb)   Height: 5' 3" (1.6 m)       Physical Exam  Nursing note reviewed. Constitutional:       General: She is not in acute distress. Appearance: Normal appearance. She is not ill-appearing, toxic-appearing or diaphoretic. HENT:      Head: Normocephalic and atraumatic. Pulmonary:      Effort: Pulmonary effort is normal. No respiratory distress. Neurological:      Mental Status: She is alert. Psychiatric:         Attention and Perception: Attention and perception normal.         Mood and Affect: Mood and affect normal. Mood is not anxious. Speech: Speech normal.         Behavior: Behavior normal. Behavior is cooperative. Thought Content: Thought content normal. Thought content is not paranoid or delusional. Thought content does not include homicidal or suicidal ideation. Thought content does not include homicidal or suicidal plan. Cognition and Memory: Cognition and memory normal. Cognition is not impaired. Judgment: Judgment normal. Judgment is not impulsive.           Visit Time  Total Visit Duration: 10

## 2023-08-24 ENCOUNTER — TELEPHONE (OUTPATIENT)
Dept: PSYCHIATRY | Facility: CLINIC | Age: 38
End: 2023-08-24

## 2023-09-05 ENCOUNTER — HOSPITAL ENCOUNTER (EMERGENCY)
Facility: HOSPITAL | Age: 38
Discharge: HOME | End: 2023-09-05
Attending: EMERGENCY MEDICINE
Payer: COMMERCIAL

## 2023-09-05 ENCOUNTER — APPOINTMENT (EMERGENCY)
Dept: RADIOLOGY | Facility: HOSPITAL | Age: 38
End: 2023-09-05
Payer: COMMERCIAL

## 2023-09-05 VITALS
TEMPERATURE: 97.3 F | DIASTOLIC BLOOD PRESSURE: 98 MMHG | SYSTOLIC BLOOD PRESSURE: 136 MMHG | HEART RATE: 88 BPM | RESPIRATION RATE: 18 BRPM | OXYGEN SATURATION: 100 %

## 2023-09-05 DIAGNOSIS — Z86.15 HISTORY OF LATENT TUBERCULOSIS: ICD-10-CM

## 2023-09-05 DIAGNOSIS — J44.9 CHRONIC OBSTRUCTIVE PULMONARY DISEASE, UNSPECIFIED COPD TYPE (HCC): ICD-10-CM

## 2023-09-05 DIAGNOSIS — J06.9 VIRAL URI WITH COUGH: Primary | ICD-10-CM

## 2023-09-05 DIAGNOSIS — J98.01 BRONCHOSPASM: ICD-10-CM

## 2023-09-05 LAB
ALBUMIN SERPL BCP-MCNC: 4.3 G/DL (ref 3.5–5)
ALP SERPL-CCNC: 40 U/L (ref 34–104)
ALT SERPL W P-5'-P-CCNC: 20 U/L (ref 7–52)
ANION GAP SERPL CALCULATED.3IONS-SCNC: 8 MMOL/L
AST SERPL W P-5'-P-CCNC: 16 U/L (ref 13–39)
BASOPHILS # BLD AUTO: 0.04 THOUSANDS/ÂΜL (ref 0–0.1)
BASOPHILS NFR BLD AUTO: 1 % (ref 0–1)
BILIRUB SERPL-MCNC: 0.42 MG/DL (ref 0.2–1)
BUN SERPL-MCNC: 13 MG/DL (ref 5–25)
CALCIUM SERPL-MCNC: 9 MG/DL (ref 8.4–10.2)
CARDIAC TROPONIN I PNL SERPL HS: <2 NG/L
CHLORIDE SERPL-SCNC: 107 MMOL/L (ref 96–108)
CO2 SERPL-SCNC: 22 MMOL/L (ref 21–32)
CREAT SERPL-MCNC: 0.66 MG/DL (ref 0.6–1.3)
EOSINOPHIL # BLD AUTO: 0.12 THOUSAND/ÂΜL (ref 0–0.61)
EOSINOPHIL NFR BLD AUTO: 2 % (ref 0–6)
ERYTHROCYTE [DISTWIDTH] IN BLOOD BY AUTOMATED COUNT: 12.8 % (ref 11.6–15.1)
FLUAV RNA RESP QL NAA+PROBE: NEGATIVE
FLUBV RNA RESP QL NAA+PROBE: NEGATIVE
GFR SERPL CREATININE-BSD FRML MDRD: 110 ML/MIN/1.73SQ M
GLUCOSE SERPL-MCNC: 108 MG/DL (ref 65–140)
HCT VFR BLD AUTO: 40.9 % (ref 34.8–46.1)
HGB BLD-MCNC: 13.6 G/DL (ref 11.5–15.4)
IMM GRANULOCYTES # BLD AUTO: 0.01 THOUSAND/UL (ref 0–0.2)
IMM GRANULOCYTES NFR BLD AUTO: 0 % (ref 0–2)
LYMPHOCYTES # BLD AUTO: 2.05 THOUSANDS/ÂΜL (ref 0.6–4.47)
LYMPHOCYTES NFR BLD AUTO: 38 % (ref 14–44)
MCH RBC QN AUTO: 29.5 PG (ref 26.8–34.3)
MCHC RBC AUTO-ENTMCNC: 33.3 G/DL (ref 31.4–37.4)
MCV RBC AUTO: 89 FL (ref 82–98)
MONOCYTES # BLD AUTO: 0.48 THOUSAND/ÂΜL (ref 0.17–1.22)
MONOCYTES NFR BLD AUTO: 9 % (ref 4–12)
NEUTROPHILS # BLD AUTO: 2.76 THOUSANDS/ÂΜL (ref 1.85–7.62)
NEUTS SEG NFR BLD AUTO: 50 % (ref 43–75)
NRBC BLD AUTO-RTO: 0 /100 WBCS
PLATELET # BLD AUTO: 188 THOUSANDS/UL (ref 149–390)
PMV BLD AUTO: 10.5 FL (ref 8.9–12.7)
POTASSIUM SERPL-SCNC: 3.6 MMOL/L (ref 3.5–5.3)
PROCALCITONIN SERPL-MCNC: <0.05 NG/ML
PROT SERPL-MCNC: 6.9 G/DL (ref 6.4–8.4)
RBC # BLD AUTO: 4.61 MILLION/UL (ref 3.81–5.12)
RSV RNA RESP QL NAA+PROBE: NEGATIVE
SARS-COV-2 RNA RESP QL NAA+PROBE: NEGATIVE
SODIUM SERPL-SCNC: 137 MMOL/L (ref 135–147)
WBC # BLD AUTO: 5.46 THOUSAND/UL (ref 4.31–10.16)

## 2023-09-05 PROCEDURE — 84145 PROCALCITONIN (PCT): CPT | Performed by: EMERGENCY MEDICINE

## 2023-09-05 PROCEDURE — 93005 ELECTROCARDIOGRAM TRACING: CPT

## 2023-09-05 PROCEDURE — 84484 ASSAY OF TROPONIN QUANT: CPT | Performed by: EMERGENCY MEDICINE

## 2023-09-05 PROCEDURE — 86480 TB TEST CELL IMMUN MEASURE: CPT | Performed by: EMERGENCY MEDICINE

## 2023-09-05 PROCEDURE — 80053 COMPREHEN METABOLIC PANEL: CPT | Performed by: EMERGENCY MEDICINE

## 2023-09-05 PROCEDURE — 0241U HB NFCT DS VIR RESP RNA 4 TRGT: CPT | Performed by: EMERGENCY MEDICINE

## 2023-09-05 PROCEDURE — 36415 COLL VENOUS BLD VENIPUNCTURE: CPT | Performed by: EMERGENCY MEDICINE

## 2023-09-05 PROCEDURE — 85025 COMPLETE CBC W/AUTO DIFF WBC: CPT | Performed by: EMERGENCY MEDICINE

## 2023-09-05 PROCEDURE — 71046 X-RAY EXAM CHEST 2 VIEWS: CPT

## 2023-09-05 PROCEDURE — 99284 EMERGENCY DEPT VISIT MOD MDM: CPT

## 2023-09-05 PROCEDURE — 99285 EMERGENCY DEPT VISIT HI MDM: CPT | Performed by: EMERGENCY MEDICINE

## 2023-09-05 RX ORDER — FLUTICASONE PROPIONATE 50 MCG
1 SPRAY, SUSPENSION (ML) NASAL ONCE
Status: COMPLETED | OUTPATIENT
Start: 2023-09-05 | End: 2023-09-05

## 2023-09-05 RX ORDER — ALBUTEROL SULFATE 90 UG/1
2 AEROSOL, METERED RESPIRATORY (INHALATION) EVERY 4 HOURS PRN
Qty: 8.5 G | Refills: 0 | Status: SHIPPED | OUTPATIENT
Start: 2023-09-05 | End: 2023-09-13 | Stop reason: SDUPTHER

## 2023-09-05 RX ORDER — FLUTICASONE PROPIONATE 50 MCG
1 SPRAY, SUSPENSION (ML) NASAL DAILY
Status: DISCONTINUED | OUTPATIENT
Start: 2023-09-06 | End: 2023-09-05

## 2023-09-05 RX ORDER — ALBUTEROL SULFATE 90 UG/1
2 AEROSOL, METERED RESPIRATORY (INHALATION) ONCE
Status: COMPLETED | OUTPATIENT
Start: 2023-09-05 | End: 2023-09-05

## 2023-09-05 RX ORDER — FLUTICASONE PROPIONATE 50 MCG
1 SPRAY, SUSPENSION (ML) NASAL DAILY
Qty: 16 G | Refills: 0 | Status: SHIPPED | OUTPATIENT
Start: 2023-09-05 | End: 2023-09-13

## 2023-09-05 RX ADMIN — ALBUTEROL SULFATE 2 PUFF: 90 AEROSOL, METERED RESPIRATORY (INHALATION) at 22:30

## 2023-09-05 RX ADMIN — FLUTICASONE PROPIONATE 1 SPRAY: 50 SPRAY, METERED NASAL at 22:30

## 2023-09-05 NOTE — Clinical Note
Jovanna Galindo was seen and treated in our emergency department on 9/5/2023. No restrictions    Other - See Comments    N/A    Diagnosis: Viral URI with cough    Kathy  is off the rest of the shift today, may return to work on return date. She may return on this date: 09/07/2023    ? If you have any questions or concerns, please don't hesitate to call.       Kel Chen MD    ______________________________           _______________          _______________  [facility] Representative                              Date                                Time

## 2023-09-06 ENCOUNTER — DOCUMENTATION (OUTPATIENT)
Dept: PSYCHIATRY | Facility: CLINIC | Age: 38
End: 2023-09-06

## 2023-09-06 DIAGNOSIS — F33.1 MAJOR DEPRESSIVE DISORDER, RECURRENT, MODERATE (HCC): Primary | ICD-10-CM

## 2023-09-06 DIAGNOSIS — F41.1 GENERALIZED ANXIETY DISORDER: ICD-10-CM

## 2023-09-06 DIAGNOSIS — F43.21 COMPLICATED GRIEF: ICD-10-CM
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
## 2023-09-06 NOTE — ED PROVIDER NOTES
History  Chief Complaint   Patient presents with   • Cough     Patient with cough, chills, pain in back and chest x 5 days. Productive of yellow, thick sputum. States she also has latent TB.       33 y/o female presents to the ED           Prior to Admission Medications   Prescriptions Last Dose Informant Patient Reported? Taking? ALPRAZolam (XANAX) 1 mg tablet   No No   Sig: Take 1 tablet (1 mg total) by mouth 2 (two) times a day as needed for anxiety   albuterol (PROVENTIL HFA,VENTOLIN HFA) 90 mcg/act inhaler   No No   Sig: Inhale 2 puffs every 4 (four) hours as needed for wheezing or shortness of breath   albuterol (PROVENTIL HFA,VENTOLIN HFA) 90 mcg/act inhaler   No Yes   Sig: Inhale 2 puffs every 4 (four) hours as needed for wheezing or shortness of breath   buPROPion (WELLBUTRIN XL) 150 mg 24 hr tablet   No No   Sig: take 1 tablet by mouth every morning   famotidine (PEPCID) 20 mg tablet   No No   Sig: Take 1 tablet (20 mg total) by mouth 2 (two) times a day   fluticasone-salmeterol (Advair HFA) 230-21 MCG/ACT inhaler   No No   Sig: Inhale 2 puffs 2 (two) times a day Rinse mouth after use.    ibuprofen (MOTRIN) 800 mg tablet   No No   Sig: Take 1 tablet (800 mg total) by mouth every 8 (eight) hours as needed for mild pain   lisinopril (ZESTRIL) 40 mg tablet   No No   Sig: Take 1 tablet (40 mg total) by mouth daily   nicotine (NICODERM CQ) 14 mg/24hr TD 24 hr patch   No No   Sig: Place 1 patch on the skin over 24 hours every 24 hours   sucralfate (CARAFATE) 1 g/10 mL suspension   No No   Sig: Take 10 mL (1 g total) by mouth 4 (four) times a day for 7 days      Facility-Administered Medications: None       Past Medical History:   Diagnosis Date   • Acute cough 6/14/2022   • Allergies    • Anxiety 05/2018   • Female hirsutism    • GERD (gastroesophageal reflux disease)    • Head injury    • Hypertension 2018   • Papanicolaou smear for cervical cancer screening 2017    NL, no h/o of abn pap that she can recall • STD (female)    • Thyroid nodule 05/2018   • Tobacco user 05/2018       Past Surgical History:   Procedure Laterality Date   • NO PAST SURGERIES         Family History   Problem Relation Age of Onset   • Hypertension Mother    • Kidney disease Mother    • Cirrhosis Mother    • Dialysis Mother    • Diabetes Mother    • Anxiety disorder Mother    • Depression Mother    • Anxiety disorder Father    • Depression Father    • Drug abuse Father    • Melanoma Paternal Uncle    • Stomach cancer Other    • Colon cancer Other         Passed age 34     I have reviewed and agree with the history as documented.     E-Cigarette/Vaping   • E-Cigarette Use Never User      E-Cigarette/Vaping Substances     Social History     Tobacco Use   • Smoking status: Every Day     Packs/day: 1.00     Years: 25.00     Total pack years: 25.00     Types: Cigarettes   • Smokeless tobacco: Never   • Tobacco comments:     smoked since age 13   Vaping Use   • Vaping Use: Never used   Substance Use Topics   • Alcohol use: Not Currently   • Drug use: Yes     Types: Marijuana     Comment: rare       Review of Systems    Physical Exam  Physical Exam    Vital Signs  ED Triage Vitals [09/05/23 2034]   Temperature Pulse Respirations Blood Pressure SpO2   (!) 97.3 °F (36.3 °C) 88 18 136/98 100 %      Temp Source Heart Rate Source Patient Position - Orthostatic VS BP Location FiO2 (%)   Oral Monitor Sitting Left arm --      Pain Score       --           Vitals:    09/05/23 2034   BP: 136/98   Pulse: 88   Patient Position - Orthostatic VS: Sitting         Visual Acuity      ED Medications  Medications   albuterol (PROVENTIL HFA,VENTOLIN HFA) inhaler 2 puff (2 puffs Inhalation Given 9/5/23 2230)   fluticasone (FLONASE) 50 mcg/act nasal spray 1 spray (1 spray Each Nare Given 9/5/23 2230)       Diagnostic Studies  Results Reviewed     Procedure Component Value Units Date/Time    FLU/RSV/COVID - if FLU/RSV clinically relevant [[id number]]  (Normal) Collected: 09/05/23 2144    Lab Status: Final result Specimen: Nares from Nose Updated: 09/05/23 2230     SARS-CoV-2 Negative     INFLUENZA A PCR Negative     INFLUENZA B PCR Negative     RSV PCR Negative    Narrative:      FOR PEDIATRIC PATIENTS - copy/paste COVID Guidelines URL to browser: [email or web address]    SARS-CoV-2 assay is a Nucleic Acid Amplification assay intended for the  qualitative detection of nucleic acid from SARS-CoV-2 in nasopharyngeal  swabs. Results are for the presumptive identification of SARS-CoV-2 RNA. Positive results are indicative of infection with SARS-CoV-2, the virus  causing COVID-19, but do not rule out bacterial infection or co-infection  with other viruses. Laboratories within the [facility] and its  territories are required to report all positive results to the appropriate  public health authorities. Negative results do not preclude SARS-CoV-2  infection and should not be used as the sole basis for treatment or other  patient management decisions. Negative results must be combined with  clinical observations, patient history, and epidemiological information. This test has not been FDA cleared or approved. This test has been authorized by FDA under an Emergency Use Authorization  (EUA). This test is only authorized for the duration of time the  declaration that circumstances exist justifying the authorization of the  emergency use of an in vitro diagnostic tests for detection of SARS-CoV-2  virus and/or diagnosis of COVID-19 infection under section 564(b)(1) of  the Act, 21 U. S.C. 455IPX-8(S)(4), unless the authorization is terminated  or revoked sooner. The test has been validated but independent review by FDA  and CLIA is pending. Test performed using [employer]: This RT-PCR assay targets N2,  a region unique to SARS-CoV-2.  A conserved region in the E-gene was chosen  for pan-Sarbecovirus detection which includes SARS-CoV-2. According to CMS-2020-01-R, this platform meets the definition of high-throughput technology.     Procalcitonin [[id number]]  (Normal) Collected: 09/05/23 2144    Lab Status: Final result Specimen: Blood from Arm, Left Updated: 09/05/23 2219     Procalcitonin <0.05 ng/ml     HS Troponin 0hr (reflex protocol) [[id number]]  (Normal) Collected: 09/05/23 2144    Lab Status: Final result Specimen: Blood from Arm, Left Updated: 09/05/23 2217     hs TnI 0hr <2 ng/L     Comprehensive metabolic panel [[id number]] Collected: 09/05/23 2144    Lab Status: Final result Specimen: Blood from Arm, Left Updated: 09/05/23 2213     Sodium 137 mmol/L      Potassium 3.6 mmol/L      Chloride 107 mmol/L      CO2 22 mmol/L      ANION GAP 8 mmol/L      BUN 13 mg/dL      Creatinine 0.66 mg/dL      Glucose 108 mg/dL      Calcium 9.0 mg/dL      AST 16 U/L      ALT 20 U/L      Alkaline Phosphatase 40 U/L      Total Protein 6.9 g/dL      Albumin 4.3 g/dL      Total Bilirubin 0.42 mg/dL      eGFR 110 ml/min/1.73sq m     Narrative:      [place] guidelines for Chronic Kidney Disease (CKD):   •  Stage 1 with normal or high GFR (GFR > 90 mL/min/1.73 square meters)  •  Stage 2 Mild CKD (GFR = 60-89 mL/min/1.73 square meters)  •  Stage 3A Moderate CKD (GFR = 45-59 mL/min/1.73 square meters)  •  Stage 3B Moderate CKD (GFR = 30-44 mL/min/1.73 square meters)  •  Stage 4 Severe CKD (GFR = 15-29 mL/min/1.73 square meters)  •  Stage 5 End Stage CKD (GFR <15 mL/min/1.73 square meters)  Note: GFR calculation is accurate only with a steady state creatinine    CBC and differential [[id number]] Collected: 09/05/23 2144    Lab Status: Final result Specimen: Blood from Arm, Left Updated: 09/05/23 2154     WBC 5.46 Thousand/uL      RBC 4.61 Million/uL      Hemoglobin 13.6 g/dL      Hematocrit 40.9 %      MCV 89 fL      MCH 29.5 pg      MCHC 33.3 g/dL      RDW 12.8 %      MPV 10.5 fL      Platelets 487 Thousands/uL      nRBC 0 /100 WBCs      Neutrophils Relative 50 %      Immat GRANS % 0 %      Lymphocytes Relative 38 %      Monocytes Relative 9 %      Eosinophils Relative 2 %      Basophils Relative 1 %      Neutrophils Absolute 2.76 Thousands/µL      Immature Grans Absolute 0.01 Thousand/uL      Lymphocytes Absolute 2.05 Thousands/µL      Monocytes Absolute 0.48 Thousand/µL      Eosinophils Absolute 0.12 Thousand/µL      Basophils Absolute 0.04 Thousands/µL     Quantiferon TB Gold Plus [[id number]] Collected: 09/05/23 2147    Lab Status: In process Specimen: Blood from Arm, Left Updated: 09/05/23 2151                 XR chest 2 views   Final Result by Blank Matthews MD (09/05 2142)      No acute cardiopulmonary disease. Workstation performed: [id number]                    Procedures  Procedures         ED Course  ED Course as of 09/05/23 2259 Tue Sep 05, 2023   2101 Procedure Note: EKG  Date/Time: 09/05/23 8:52 PM   Interpreted by: Lena Rosenberg MD  Indications / Diagnosis: Cough, chest tightness  ECG reviewed by me, the ED Physician: yes   The EKG demonstrates:  Rhythm: normal sinus rhythm 73 bpm  Intervals: normal intervals  Axis: normal axis  QRS/Blocks: normal QRS  ST Changes: No acute ST Changes, no STD/KARLI. No STEMI. Normal ECG.      2148 XR chest 2 views  NAD   2221 CBC and differential   2221 Comprehensive metabolic panel   5954 Procalcitonin: <0.05   2221 hs TnI 0hr: <2   5147 SARS-COV-2: Negative   2244 INFLU A PCR: Negative   2244 INFLU B PCR: Negative   2244 RSV PCR: Negative             HEART Risk Score    Flowsheet Row Most Recent Value   Heart Score Risk Calculator    History 0 Filed at: 09/05/2023 2245   ECG 0 Filed at: 09/05/2023 2245   Age 0 Filed at: 09/05/2023 2245   Risk Factors 0 Filed at: 09/05/2023 2245   Troponin 0 Filed at: 09/05/2023 2245   HEART Score 0 Filed at: 09/05/2023 2245                PERC Rule for PE    Flowsheet Row Most Recent Value   PERC Rule for PE    Age >=50 0 Filed at: 09/05/2023 2245   HR >=100 0 Filed at: 09/05/2023 2245   O2 Sat on room air < 95% 0 Filed at: 09/05/2023 2245   History of PE or DVT 0 Filed at: 09/05/2023 2245   Recent trauma or surgery 0 Filed at: 09/05/2023 2245   Hemoptysis 0 Filed at: 09/05/2023 2245   Exogenous estrogen 0 Filed at: 09/05/2023 2245   Unilateral leg swelling 0 Filed at: 09/05/2023 2245   PERC Rule for PE Results 0 Filed at: 09/05/2023 2245              SBIRT 22yo+    Flowsheet Row Most Recent Value   Initial Alcohol Screen: US AUDIT-C     1. How often do you have a drink containing alcohol? 0 Filed at: 09/05/2023 2036   2. How many drinks containing alcohol do you have on a typical day you are drinking? 0 Filed at: 09/05/2023 2036   3b. FEMALE Any Age, or MALE 65+: How often do you have 4 or more drinks on one occassion? 0 Filed at: 09/05/2023 2036   Audit-C Score 0 Filed at: 09/05/2023 2036   RHETT: How many times in the past year have you. .. Used an illegal drug or used a prescription medication for non-medical reasons? Never Filed at: 09/05/2023 2036                    MDM    Disposition  Final diagnoses:   Viral URI with cough   Bronchospasm   History of latent tuberculosis     Time reflects when diagnosis was documented in both MDM as applicable and the Disposition within this note     Time User Action Codes Description Comment    9/5/2023 10:31 PM Darra Mighty Add [J06.9] Viral URI with cough     9/5/2023 10:31 PM Darra Mighty Add [J98.01] Bronchospasm     9/5/2023 10:31 PM Darra Mighty Add [Z86.15] History of latent tuberculosis     9/5/2023 10:32 PM Darra Mighty Add [J44.9] Chronic obstructive pulmonary disease, unspecified COPD type [facility])       ED Disposition     ED Disposition   Discharge    Condition   Stable    Date/Time   Tue Sep 5, 2023 10:31 PM    Comment   Baby Mealing discharge to home/self care.                Follow-up Information     Follow up With Specialties Details Why Contact Info Additional Information Shamika Darby MD Family Medicine Call in 1 week For primary care follow up [address]  [place] 1501 [facility] Emergency Department Emergency Medicine Go to  If symptoms worsen [address] [place] [address] [phone] 9733 [facility] Emergency Department, [address] [place] [address] Dr, 400 [facility]    1 [facility] Infectious Diseases Call  For Infectious Disease follow up [address] 301 [facility] [address]  3 [facility] [address] [place] [address], [place] 110 [facility] [phone]          Patient's Medications   Discharge Prescriptions    FLUTICASONE (FLONASE) 50 MCG/ACT NASAL SPRAY    1 spray into each nostril daily       Start Date: 9/5/2023  End Date: --       Order Dose: 1 spray       Quantity: 16 g    Refills: 0           PDMP Review       Value Time User    PDMP Reviewed  Yes 8/11/2023 10:51  S Josue Vidales, [address] [place] [address]          ED Provider  Electronically Signed by y/o female presents to the ED for evaluation of cough, chills, and myalgias over the last 5 days. Patient states that her cough has been productive of yellow mucus. She notes recent positive sick contacts at work. She also reports a history of "latent TB" for which she followed with infectious disease approximately 1 year ago and was prescribed a course of treatment to treat her latent TB, however she did not fill the prescription for complete the treatment due to her concerns about the side effects from the medications. She denies any documented fevers. No other symptoms or complaints. Vital signs reviewed. Overall well-appearing, nontoxic, no acute distress. No focal abnormal findings on physical exam.  Differential diagnosis includes but is not limited to COVID, other viral URI, pneumonia, bronchospasm, active TB in the setting of prior latent TB without treatment, and/or ACS. Low risk for PE, PERC negative. Will evaluate with labs including troponin, procal, and QuantiFERON gold given reported history of latent TB without treatment. We will also obtain ECG and chest x-ray. Viral testing also obtained. Work-up overall negative, see ED course for independent interpretation of results. TB QuantiFERON gold test is pending, no evidence of active TB on chest x-ray. We will treat for viral URI and bronchospasm, however advised patient to follow-up with her PCP and infectious disease given her reported history of prior latent TB without treatment. I discussed all findings, treatment, red flags/return precautions, and outpatient follow-up and the patient/family understand and agree. Stable for discharge. Amount and/or Complexity of Data Reviewed  Labs: ordered. Decision-making details documented in ED Course. Radiology: ordered. Decision-making details documented in ED Course. Risk  Prescription drug management.           Disposition  Final diagnoses:   Viral URI with cough   Bronchospasm History of latent tuberculosis     Time reflects when diagnosis was documented in both MDM as applicable and the Disposition within this note     Time User Action Codes Description Comment    9/5/2023 10:31 PM Kel Chen Add [J06.9] Viral URI with cough     9/5/2023 10:31 PM Kel Chen Add [J98.01] Bronchospasm     9/5/2023 10:31 PM Kel Chen Add [Z86.15] History of latent tuberculosis     9/5/2023 10:32 PM Kel Chen Add [J44.9] Chronic obstructive pulmonary disease, unspecified COPD type [facility])       ED Disposition     ED Disposition   Discharge    Condition   Stable    Date/Time   Tue Sep 5, 2023 10:31 PM    Comment   Jovanna Galindo discharge to home/self care.                Follow-up Information     Follow up With Specialties Details Why Contact Info Additional Information    Ed Nguyen MD Family Medicine Call in 1 week For primary care follow up 5900 [facility] ([place]) [place] [address] [place] [address] Emergency Department Emergency Medicine Go to  If symptoms worsen [address] [place] [phone]  2700 [facility] Emergency Department, [address] [facility] [address], [address] Infectious Diseases Call  For Infectious Disease follow up [address] [phone]  3 [facility] [address] [place] [address]), 110 [facility] [phone]          Patient's Medications   Discharge Prescriptions    FLUTICASONE (FLONASE) 50 MCG/ACT NASAL SPRAY    1 spray into each nostril daily       Start Date: 9/5/2023  End Date: --       Order Dose: 1 spray       Quantity: 16 g    Refills: 0           PDMP Review       Value Time User    PDMP Reviewed  Yes 8/11/2023 10:51  S Josue Vidales, [address] [place] [address]          ED Provider  Electronically Signed by           Kel Chen MD  09/21/23 5304

## 2023-09-07 LAB
GAMMA INTERFERON BACKGROUND BLD IA-ACNC: 0.17 IU/ML
M TB IFN-G BLD-IMP: POSITIVE
M TB IFN-G CD4+ BCKGRND COR BLD-ACNC: 1.72 IU/ML
M TB IFN-G CD4+ BCKGRND COR BLD-ACNC: 1.91 IU/ML
MITOGEN IGNF BCKGRD COR BLD-ACNC: >10 IU/ML

## 2023-09-08 LAB
ATRIAL RATE: 73 BPM
P AXIS: 13 DEGREES
PR INTERVAL: 146 MS
QRS AXIS: 44 DEGREES
QRSD INTERVAL: 92 MS
QT INTERVAL: 366 MS
QTC INTERVAL: 403 MS
T WAVE AXIS: 31 DEGREES
VENTRICULAR RATE: 73 BPM

## 2023-09-08 PROCEDURE — 93010 ELECTROCARDIOGRAM REPORT: CPT | Performed by: INTERNAL MEDICINE

## 2023-09-13 ENCOUNTER — TELEPHONE (OUTPATIENT)
Age: 38
End: 2023-09-13

## 2023-09-13 ENCOUNTER — OFFICE VISIT (OUTPATIENT)
Dept: FAMILY MEDICINE CLINIC | Facility: CLINIC | Age: 38
End: 2023-09-13
Payer: COMMERCIAL

## 2023-09-13 VITALS
WEIGHT: 194 LBS | RESPIRATION RATE: 18 BRPM | HEART RATE: 88 BPM | SYSTOLIC BLOOD PRESSURE: 122 MMHG | OXYGEN SATURATION: 98 % | BODY MASS INDEX: 34.38 KG/M2 | TEMPERATURE: 97.8 F | HEIGHT: 63 IN | DIASTOLIC BLOOD PRESSURE: 82 MMHG

## 2023-09-13 DIAGNOSIS — N60.01 BILATERAL BREAST CYSTS: ICD-10-CM

## 2023-09-13 DIAGNOSIS — I10 ESSENTIAL HYPERTENSION: ICD-10-CM

## 2023-09-13 DIAGNOSIS — D24.1 FIBROADENOMA OF BREAST, RIGHT: ICD-10-CM

## 2023-09-13 DIAGNOSIS — N60.02 CYST, BREAST SOLITARY, LEFT: ICD-10-CM

## 2023-09-13 DIAGNOSIS — R92.8 ABNORMAL MAMMOGRAM OF RIGHT BREAST: ICD-10-CM

## 2023-09-13 DIAGNOSIS — F41.9 ANXIETY: Primary | ICD-10-CM

## 2023-09-13 DIAGNOSIS — J44.9 CHRONIC OBSTRUCTIVE PULMONARY DISEASE, UNSPECIFIED COPD TYPE (HCC): ICD-10-CM

## 2023-09-13 DIAGNOSIS — N60.02 BILATERAL BREAST CYSTS: ICD-10-CM

## 2023-09-13 PROCEDURE — 99214 OFFICE O/P EST MOD 30 MIN: CPT | Performed by: FAMILY MEDICINE

## 2023-09-13 RX ORDER — ALBUTEROL SULFATE 90 UG/1
2 AEROSOL, METERED RESPIRATORY (INHALATION) EVERY 4 HOURS PRN
Qty: 8.5 G | Refills: 0 | Status: SHIPPED | OUTPATIENT
Start: 2023-09-13

## 2023-09-13 RX ORDER — FLUTICASONE PROPIONATE AND SALMETEROL XINAFOATE 230; 21 UG/1; UG/1
2 AEROSOL, METERED RESPIRATORY (INHALATION) 2 TIMES DAILY
Qty: 12 G | Refills: 0 | Status: SHIPPED | OUTPATIENT
Start: 2023-09-13

## 2023-09-13 RX ORDER — ALPRAZOLAM 1 MG/1
1 TABLET ORAL 2 TIMES DAILY PRN
Qty: 60 TABLET | Refills: 0 | Status: SHIPPED | OUTPATIENT
Start: 2023-09-13

## 2023-09-13 RX ORDER — LISINOPRIL 40 MG/1
40 TABLET ORAL DAILY
Qty: 90 TABLET | Refills: 1 | Status: SHIPPED | OUTPATIENT
Start: 2023-09-13

## 2023-09-13 RX ORDER — BUSPIRONE HYDROCHLORIDE 5 MG/1
5 TABLET ORAL 3 TIMES DAILY
Qty: 90 TABLET | Refills: 0 | Status: SHIPPED | OUTPATIENT
Start: 2023-09-13

## 2023-09-13 NOTE — PROGRESS NOTES
Subjective:      Patient ID: Chritsy Zaragoza is a 34 y.o. female. Doing better overall and is struggling with uncontrolled anxiety, has tried to come off xanax and reduce dose without success, did not start wellbutrin  She has started new job and will be baptized as [employer] and is excited  She wonders that she has decreased concentration and her friend mentioned to discuss with me if she has ADD  SHE did see the therapist virtually but there was some confusion with the appointment at follow up visit      Past Medical History:   Diagnosis Date   • Acute cough 6/14/2022   • Allergies    • Anxiety 05/2018   • Female hirsutism    • GERD (gastroesophageal reflux disease)    • Head injury    • Hypertension 2018   • Papanicolaou smear for cervical cancer screening 2017    NL, no h/o of abn pap that she can recall   • STD (female)    • Thyroid nodule 05/2018   • Tobacco user 05/2018       Family History   Problem Relation Age of Onset   • Hypertension Mother    • Kidney disease Mother    • Cirrhosis Mother    • Dialysis Mother    • Diabetes Mother    • Anxiety disorder Mother    • Depression Mother    • Anxiety disorder Father    • Depression Father    • Drug abuse Father    • Melanoma Paternal Uncle    • Stomach cancer Other    • Colon cancer Other         Passed age 34       Past Surgical History:   Procedure Laterality Date   • NO PAST SURGERIES          reports that she has been smoking cigarettes. She has a 25.00 pack-year smoking history. She has never used smokeless tobacco. She reports that she does not currently use alcohol. She reports current drug use. Drug: Marijuana.       Current Outpatient Medications:   •  albuterol (PROVENTIL HFA,VENTOLIN HFA) 90 mcg/act inhaler, Inhale 2 puffs every 4 (four) hours as needed for wheezing or shortness of breath, Disp: 8.5 g, Rfl: 0  •  ALPRAZolam (XANAX) 1 mg tablet, Take 1 tablet (1 mg total) by mouth 2 (two) times a day as needed for anxiety, Disp: 60 tablet, Rfl: 0  •  busPIRone (BUSPAR) 5 mg tablet, Take 1 tablet (5 mg total) by mouth 3 (three) times a day, Disp: 90 tablet, Rfl: 0  •  famotidine (PEPCID) 20 mg tablet, Take 1 tablet (20 mg total) by mouth 2 (two) times a day, Disp: 30 tablet, Rfl: 0  •  fluticasone-salmeterol (Advair HFA) 230-21 MCG/ACT inhaler, Inhale 2 puffs 2 (two) times a day Rinse mouth after use., Disp: 12 g, Rfl: 0  •  lisinopril (ZESTRIL) 40 mg tablet, Take 1 tablet (40 mg total) by mouth daily, Disp: 90 tablet, Rfl: 1  •  ibuprofen (MOTRIN) 800 mg tablet, Take 1 tablet (800 mg total) by mouth every 8 (eight) hours as needed for mild pain, Disp: 90 tablet, Rfl: 0  •  nicotine (NICODERM CQ) 14 mg/24hr TD 24 hr patch, Place 1 patch on the skin over 24 hours every 24 hours, Disp: 28 patch, Rfl: 0  •  sucralfate (CARAFATE) 1 g/10 mL suspension, Take 10 mL (1 g total) by mouth 4 (four) times a day for 7 days, Disp: 280 mL, Rfl: 0    The following portions of the patient's history were reviewed and updated as appropriate: allergies, current medications, past family history, past medical history, past social history, past surgical history and problem list.    Review of Systems   Constitutional: Negative for fatigue and fever. HENT: Negative for congestion, facial swelling, mouth sores, rhinorrhea, sore throat and trouble swallowing. Eyes: Negative for pain and redness. Respiratory: Negative for cough, shortness of breath and wheezing. Cardiovascular: Negative for chest pain, palpitations and leg swelling. Gastrointestinal: Negative for abdominal pain, blood in stool, constipation, diarrhea and nausea. Genitourinary: Negative for dysuria, hematuria and urgency. Musculoskeletal: Negative for arthralgias, back pain and myalgias. Skin: Negative for rash and wound. Neurological: Negative for seizures, syncope and headaches. Hematological: Negative for adenopathy.    Psychiatric/Behavioral: Positive for decreased concentration and sleep disturbance (USES [employer], WORKS [employer]). Negative for agitation and behavioral problems. PHQ-2/9 Depression Screening    Little interest or pleasure in doing things: 0 - not at all  Feeling down, depressed, or hopeless: 0 - not at all  Trouble falling or staying asleep, or sleeping too much: 0 - not at all  Feeling tired or having little energy: 0 - not at all  Poor appetite or overeatin - not at all  Feeling bad about yourself - or that you are a failure or have let yourself or your family down: 0 - not at all  Trouble concentrating on things, such as reading the newspaper or watching television: 0 - not at all  Moving or speaking so slowly that other people could have noticed. Or the opposite - being so fidgety or restless that you have been moving around a lot more than usual: 0 - not at all  Thoughts that you would be better off dead, or of hurting yourself in some way: 0 - not at all  PHQ-9 Score: 0   PHQ-9 Interpretation: No or Minimal depression              Objective:    /82 (BP Location: Left arm, Patient Position: Sitting, Cuff Size: Adult)   Pulse 88   Temp 97.8 °F (36.6 °C) (Tympanic)   Resp 18   Ht 5' 3" (1.6 m)   Wt 88 kg (194 lb)   LMP 2023 (Approximate)   SpO2 98%   BMI 34.37 kg/m²      Physical Exam  Vitals and nursing note reviewed. Constitutional:       Appearance: Normal appearance. She is well-developed. She is obese. She is not ill-appearing. HENT:      Head: Normocephalic and atraumatic. Right Ear: External ear normal.      Left Ear: External ear normal.      Nose: Nose normal.      Mouth/Throat:      Mouth: Mucous membranes are moist.      Pharynx: No oropharyngeal exudate or posterior oropharyngeal erythema. Eyes:      General: No scleral icterus. Right eye: No discharge. Left eye: No discharge. Conjunctiva/sclera: Conjunctivae normal.   Cardiovascular:      Rate and Rhythm: Normal rate.       Heart sounds: No murmur heard.     No gallop. Pulmonary:      Effort: Pulmonary effort is normal. No respiratory distress. Breath sounds: Normal breath sounds. No stridor. No wheezing, rhonchi or rales. Abdominal:      Palpations: Abdomen is soft. Tenderness: There is no abdominal tenderness. Musculoskeletal:         General: No tenderness or deformity. Skin:     Findings: No erythema or rash. Neurological:      Mental Status: She is alert. Mental status is at baseline. Psychiatric:         Mood and Affect: Mood is anxious.          Behavior: Behavior normal.         Judgment: Judgment normal.           Recent Results (from the past 8736 hour(s))   CBC and differential    Collection Time: 09/18/22  1:54 PM   Result Value Ref Range    WBC 8.03 4.31 - 10.16 Thousand/uL    RBC 4.71 3.81 - 5.12 Million/uL    Hemoglobin 14.0 11.5 - 15.4 g/dL    Hematocrit 42.8 34.8 - 46.1 %    MCV 91 82 - 98 fL    MCH 29.7 26.8 - 34.3 pg    MCHC 32.7 31.4 - 37.4 g/dL    RDW 12.4 11.6 - 15.1 %    MPV 10.5 8.9 - 12.7 fL    Platelets 316 201 - 717 Thousands/uL    nRBC 0 /100 WBCs    Neutrophils Relative 48 43 - 75 %    Immat GRANS % 0 0 - 2 %    Lymphocytes Relative 42 14 - 44 %    Monocytes Relative 8 4 - 12 %    Eosinophils Relative 1 0 - 6 %    Basophils Relative 1 0 - 1 %    Neutrophils Absolute 3.85 1.85 - 7.62 Thousands/µL    Immature Grans Absolute 0.02 0.00 - 0.20 Thousand/uL    Lymphocytes Absolute 3.34 0.60 - 4.47 Thousands/µL    Monocytes Absolute 0.66 0.17 - 1.22 Thousand/µL    Eosinophils Absolute 0.09 0.00 - 0.61 Thousand/µL    Basophils Absolute 0.07 0.00 - 0.10 Thousands/µL   Comprehensive metabolic panel    Collection Time: 09/18/22  1:54 PM   Result Value Ref Range    Sodium 136 135 - 147 mmol/L    Potassium 4.2 3.5 - 5.3 mmol/L    Chloride 108 96 - 108 mmol/L    CO2 22 21 - 32 mmol/L    ANION GAP 6 4 - 13 mmol/L    BUN 14 5 - 25 mg/dL    Creatinine 0.68 0.60 - 1.30 mg/dL    Glucose 101 65 - 140 mg/dL    Calcium 9.2 8.4 - 10.2 mg/dL    AST 16 13 - 39 U/L    ALT 24 7 - 52 U/L    Alkaline Phosphatase 38 34 - 104 U/L    Total Protein 7.0 6.4 - 8.4 g/dL    Albumin 4.3 3.5 - 5.0 g/dL    Total Bilirubin 0.30 0.20 - 1.00 mg/dL    eGFR 110 ml/min/1.73sq m   HS Troponin 0hr (reflex protocol)    Collection Time: 09/18/22  1:54 PM   Result Value Ref Range    hs TnI 0hr <2 "Refer to ACS Flowchart"- see link ng/L   FLU/RSV/COVID - if FLU/RSV clinically relevant    Collection Time: 09/18/22  1:54 PM    Specimen: Nose; Nares   Result Value Ref Range    SARS-CoV-2 Negative Negative    INFLUENZA A PCR Negative Negative    INFLUENZA B PCR Negative Negative    RSV PCR Negative Negative   UA w Reflex to Microscopic w Reflex to Culture    Collection Time: 09/18/22  1:59 PM    Specimen: Urine, Clean Catch   Result Value Ref Range    Color, UA Colorless     Clarity, UA Clear     Specific Gravity, UA 1.011 1.003 - 1.030    pH, UA 6.0 4.5, 5.0, 5.5, 6.0, 6.5, 7.0, 7.5, 8.0    Leukocytes, UA Negative Negative    Nitrite, UA Negative Negative    Protein, UA Negative Negative mg/dl    Glucose, UA Negative Negative mg/dl    Ketones, UA Negative Negative mg/dl    Urobilinogen, UA <2.0 <2.0 mg/dl mg/dl    Bilirubin, UA Negative Negative    Occult Blood, UA Negative Negative   POCT pregnancy, urine    Collection Time: 09/18/22  2:05 PM   Result Value Ref Range    EXT PREG TEST UR (Ref: Negative) negative     Control valid    ECG 12 lead    Collection Time: 09/18/22  2:19 PM   Result Value Ref Range    Ventricular Rate 62 BPM    Atrial Rate 62 BPM    [place] Interval 154 ms    QRSD Interval 98 ms    QT Interval 388 ms    QTC Interval 393 ms    P Axis 52 degrees    QRS Axis 63 degrees    T Wave Axis 55 degrees   ECG 12 lead    Collection Time: 07/09/23 12:32 AM   Result Value Ref Range    Ventricular Rate 82 BPM    Atrial Rate 82 BPM    [place] Interval 157 ms    QRSD Interval 104 ms    QT Interval 381 ms    QTC Interval 445 ms    P Axis 46 degrees    QRS Axis 35 degrees    T Wave Axis 39 degrees   CBC and differential    Collection Time: 07/09/23 12:49 AM   Result Value Ref Range    WBC 8.69 4.31 - 10.16 Thousand/uL    RBC 4.54 3.81 - 5.12 Million/uL    Hemoglobin 13.5 11.5 - 15.4 g/dL    Hematocrit 40.7 34.8 - 46.1 %    MCV 90 82 - 98 fL    MCH 29.7 26.8 - 34.3 pg    MCHC 33.2 31.4 - 37.4 g/dL    RDW 12.5 11.6 - 15.1 %    MPV 10.3 8.9 - 12.7 fL    Platelets 606 978 - 045 Thousands/uL    nRBC 0 /100 WBCs    Neutrophils Relative 58 43 - 75 %    Immat GRANS % 0 0 - 2 %    Lymphocytes Relative 33 14 - 44 %    Monocytes Relative 7 4 - 12 %    Eosinophils Relative 1 0 - 6 %    Basophils Relative 1 0 - 1 %    Neutrophils Absolute 5.16 1.85 - 7.62 Thousands/µL    Immature Grans Absolute 0.01 0.00 - 0.20 Thousand/uL    Lymphocytes Absolute 2.82 0.60 - 4.47 Thousands/µL    Monocytes Absolute 0.58 0.17 - 1.22 Thousand/µL    Eosinophils Absolute 0.08 0.00 - 0.61 Thousand/µL    Basophils Absolute 0.04 0.00 - 0.10 Thousands/µL   Comprehensive metabolic panel    Collection Time: 07/09/23 12:49 AM   Result Value Ref Range    Sodium 137 135 - 147 mmol/L    Potassium 3.9 3.5 - 5.3 mmol/L    Chloride 105 96 - 108 mmol/L    CO2 25 21 - 32 mmol/L    ANION GAP 7 mmol/L    BUN 12 5 - 25 mg/dL    Creatinine 0.75 0.60 - 1.30 mg/dL    Glucose 97 65 - 140 mg/dL    Calcium 9.1 8.4 - 10.2 mg/dL    AST 19 13 - 39 U/L    ALT 32 7 - 52 U/L    Alkaline Phosphatase 43 34 - 104 U/L    Total Protein 7.0 6.4 - 8.4 g/dL    Albumin 4.3 3.5 - 5.0 g/dL    Total Bilirubin 0.47 0.20 - 1.00 mg/dL    eGFR 100 ml/min/1.73sq m   Magnesium    Collection Time: 07/09/23 12:49 AM   Result Value Ref Range    Magnesium 1.8 (L) 1.9 - 2.7 mg/dL   TSH    Collection Time: 07/09/23 12:49 AM   Result Value Ref Range    TSH 3RD GENERATON 1.782 0.450 - 4.500 uIU/mL   HS Troponin 0hr (reflex protocol)    Collection Time: 07/09/23 12:49 AM   Result Value Ref Range    hs TnI 0hr <2 "Refer to ACS Flowchart"- see link ng/L   POCT pregnancy, urine Collection Time: 07/09/23  1:50 AM   Result Value Ref Range    EXT Preg Test, Ur Negative     Control Valid    ECG 12 lead    Collection Time: 09/05/23  8:52 PM   Result Value Ref Range    Ventricular Rate 73 BPM    Atrial Rate 73 BPM    DE Interval 146 ms    QRSD Interval 92 ms    QT Interval 366 ms    QTC Interval 403 ms    P [place] 13 degrees    QRS Axis 44 degrees    T Wave Axis 31 degrees   CBC and differential    Collection Time: 09/05/23  9:44 PM   Result Value Ref Range    WBC 5.46 4.31 - 10.16 Thousand/uL    RBC 4.61 3.81 - 5.12 Million/uL    Hemoglobin 13.6 11.5 - 15.4 g/dL    Hematocrit 40.9 34.8 - 46.1 %    MCV 89 82 - 98 fL    MCH 29.5 26.8 - 34.3 pg    MCHC 33.3 31.4 - 37.4 g/dL    RDW 12.8 11.6 - 15.1 %    MPV 10.5 8.9 - 12.7 fL    Platelets 052 809 - 111 Thousands/uL    nRBC 0 /100 WBCs    Neutrophils Relative 50 43 - 75 %    Immat GRANS % 0 0 - 2 %    Lymphocytes Relative 38 14 - 44 %    Monocytes Relative 9 4 - 12 %    Eosinophils Relative 2 0 - 6 %    Basophils Relative 1 0 - 1 %    Neutrophils Absolute 2.76 1.85 - 7.62 Thousands/µL    Immature Grans Absolute 0.01 0.00 - 0.20 Thousand/uL    Lymphocytes Absolute 2.05 0.60 - 4.47 Thousands/µL    Monocytes Absolute 0.48 0.17 - 1.22 Thousand/µL    Eosinophils Absolute 0.12 0.00 - 0.61 Thousand/µL    Basophils Absolute 0.04 0.00 - 0.10 Thousands/µL   Comprehensive metabolic panel    Collection Time: 09/05/23  9:44 PM   Result Value Ref Range    Sodium 137 135 - 147 mmol/L    Potassium 3.6 3.5 - 5.3 mmol/L    Chloride 107 96 - 108 mmol/L    CO2 22 21 - 32 mmol/L    ANION GAP 8 mmol/L    BUN 13 5 - 25 mg/dL    Creatinine 0.66 0.60 - 1.30 mg/dL    Glucose 108 65 - 140 mg/dL    Calcium 9.0 8.4 - 10.2 mg/dL    AST 16 13 - 39 U/L    ALT 20 7 - 52 U/L    Alkaline Phosphatase 40 34 - 104 U/L    Total Protein 6.9 6.4 - 8.4 g/dL    Albumin 4.3 3.5 - 5.0 g/dL    Total Bilirubin 0.42 0.20 - 1.00 mg/dL    eGFR 110 ml/min/1.73sq m   HS Troponin 0hr (reflex protocol) Collection Time: 09/05/23  9:44 PM   Result Value Ref Range    hs TnI 0hr <2 "Refer to ACS Flowchart"- see link ng/L   FLU/RSV/COVID - if FLU/RSV clinically relevant    Collection Time: 09/05/23  9:44 PM    Specimen: Nose; Nares   Result Value Ref Range    SARS-CoV-2 Negative Negative    INFLUENZA A PCR Negative Negative    INFLUENZA B PCR Negative Negative    RSV PCR Negative Negative   Procalcitonin    Collection Time: 09/05/23  9:44 PM   Result Value Ref Range    Procalcitonin <0.05 <=0.25 ng/ml   Quantiferon TB Gold Plus    Collection Time: 09/05/23  9:47 PM   Result Value Ref Range    QFT Nil 0.17 0 - 8.0 IU/ml    QFT TB1-NIL 1.91 IU/ml    QFT TB2-NIL 1.72 IU/ml    QFT Mitogen-NIL >10.00 IU/ml    QFT Final Interpretation Positive (A) Negative       Laboratory Results: I have personally reviewed the pertinent laboratory results/reports     Radiology/Other Diagnostic Testing Results: I have personally reviewed pertinent reports. XR chest 2 views    Result Date: 9/5/2023  CHEST INDICATION:   Cough, dyspnea. COMPARISON:  None EXAM PERFORMED/VIEWS:  XR CHEST PA & LATERAL FINDINGS: Cardiomediastinal silhouette appears unremarkable. The lungs are clear. No pneumothorax or pleural effusion. Osseous structures appear within normal limits for patient age. No acute cardiopulmonary disease.  Workstation performed: [id number]        Assessment/Plan:  Problem List Items Addressed This Visit        Respiratory    Chronic obstructive pulmonary disease (HCC)    Relevant Medications    albuterol (PROVENTIL HFA,VENTOLIN HFA) 90 mcg/act inhaler    fluticasone-salmeterol (Advair HFA) 230-21 MCG/ACT inhaler       Other    Anxiety - Primary    Relevant Medications    ALPRAZolam (XANAX) 1 mg tablet    busPIRone (BUSPAR) 5 mg tablet    Bilateral breast cysts   Other Visit Diagnoses     Essential hypertension        Relevant Medications    lisinopril (ZESTRIL) 40 mg tablet    Fibroadenoma of breast, right        Relevant Orders    Mammo diagnostic right w cad    US breast right limited (diagnostic)    Cyst, breast solitary, left        Abnormal mammogram of right breast        Relevant Orders    Mammo diagnostic right w cad    US breast right limited (diagnostic)        Continue lisinopril, has follow up Mammogram and US breast to be scheduled for 6 month follow up. Recommend starting buspar- 5 mg tid and eventual goal to wean off xanax  She has likely withdrawal from xanax and possible decreased concentration due to chronic benzodiazepine use. I do not believe she has ADD    Needs cervical cancer screening, needs to schedule Gyn follow up. Read package inserts for all medications before starting a new medications, call me if you have any questions. Patient was given opportunity to ask questions and all questions were answered. Disclaimer: Portions of the record may have been created with voice recognition software. Occasional wrong word or "sound a like" substitutions may have occurred due to the inherent limitations of voice recognition software. Read the chart carefully and recognize, using context, where substitutions have occurred. I have used the Epic copy/forward function to compose this note. I have reviewed my current note to ensure it reflects the current patient status, exam, assessment and plan.

## 2023-09-13 NOTE — TELEPHONE ENCOUNTER
PA for Advair sent through [facility] questions and sent clinicals  Awaiting determination

## 2023-09-14 DIAGNOSIS — F41.9 ANXIETY: ICD-10-CM

## 2023-09-15 ENCOUNTER — TELEPHONE (OUTPATIENT)
Dept: PSYCHIATRY | Facility: CLINIC | Age: 38
End: 2023-09-15

## 2023-09-15 NOTE — TELEPHONE ENCOUNTER
DISCHARGE LETTER for Jone Sarkar (certified) placed in outgoing mail on 9/20/2023    Article #:  Sondra Sena [id number] [facility]    Address:  [address] [place] [address] Marcelino Chairez [id number]

## 2023-09-18 RX ORDER — ALPRAZOLAM 1 MG/1
1 TABLET ORAL 2 TIMES DAILY PRN
Qty: 60 TABLET | OUTPATIENT
Start: 2023-09-18

## 2023-10-18 DIAGNOSIS — F41.9 ANXIETY: ICD-10-CM

## 2023-10-19 RX ORDER — ALPRAZOLAM 1 MG/1
TABLET ORAL
Qty: 60 TABLET | Refills: 0 | Status: SHIPPED | OUTPATIENT
Start: 2023-10-19

## 2023-11-20 DIAGNOSIS — F41.9 ANXIETY: ICD-10-CM

## 2023-11-21 RX ORDER — ALPRAZOLAM 1 MG/1
TABLET ORAL
Qty: 60 TABLET | Refills: 0 | Status: SHIPPED | OUTPATIENT
Start: 2023-11-21

## 2023-12-19 DIAGNOSIS — F41.9 ANXIETY: ICD-10-CM

## 2023-12-20 RX ORDER — ALPRAZOLAM 1 MG/1
TABLET ORAL
Qty: 60 TABLET | Refills: 0 | Status: SHIPPED | OUTPATIENT
Start: 2023-12-20

## 2024-01-18 DIAGNOSIS — F41.9 ANXIETY: ICD-10-CM

## 2024-01-18 RX ORDER — ALPRAZOLAM 1 MG/1
TABLET ORAL
Qty: 14 TABLET | Refills: 0 | OUTPATIENT
Start: 2024-01-18 | End: 2024-01-25

## 2024-01-18 RX ORDER — ALPRAZOLAM 1 MG/1
1 TABLET ORAL 2 TIMES DAILY PRN
Qty: 14 TABLET | Refills: 0 | Status: SHIPPED | OUTPATIENT
Start: 2024-01-18 | End: 2024-01-24 | Stop reason: SDUPTHER

## 2024-01-18 NOTE — TELEPHONE ENCOUNTER
She was seen 4 months ago, last refil sent as courtesy, I will do 1 week refil again as courtesy, needs to be seen, virtual ok

## 2024-01-18 NOTE — TELEPHONE ENCOUNTER
Patient calling to check on the status of the refill for alprazolam. Patient states she only has 2 half pills left. Advised patient I would check the request to urgent.

## 2024-01-18 NOTE — TELEPHONE ENCOUNTER
Pt. Called in again to find out status of her Xanax, I followed up with a call to office and spoke to Beba whom stated they have given all the messages to Dr. Antonio for approval. I relayed msg to pt.

## 2024-01-24 ENCOUNTER — TELEMEDICINE (OUTPATIENT)
Dept: FAMILY MEDICINE CLINIC | Facility: CLINIC | Age: 39
End: 2024-01-24
Payer: COMMERCIAL

## 2024-01-24 DIAGNOSIS — Z12.4 SCREENING FOR CERVICAL CANCER: ICD-10-CM

## 2024-01-24 DIAGNOSIS — F31.81 BIPOLAR 2 DISORDER, MAJOR DEPRESSIVE EPISODE (HCC): ICD-10-CM

## 2024-01-24 DIAGNOSIS — Z12.31 ENCOUNTER FOR SCREENING MAMMOGRAM FOR BREAST CANCER: ICD-10-CM

## 2024-01-24 DIAGNOSIS — F41.9 ANXIETY: ICD-10-CM

## 2024-01-24 DIAGNOSIS — J44.9 CHRONIC OBSTRUCTIVE PULMONARY DISEASE, UNSPECIFIED COPD TYPE (HCC): ICD-10-CM

## 2024-01-24 DIAGNOSIS — L20.9 ATOPIC DERMATITIS, UNSPECIFIED TYPE: Primary | ICD-10-CM

## 2024-01-24 DIAGNOSIS — I10 ESSENTIAL HYPERTENSION: ICD-10-CM

## 2024-01-24 PROCEDURE — 99214 OFFICE O/P EST MOD 30 MIN: CPT | Performed by: FAMILY MEDICINE

## 2024-01-24 RX ORDER — ALPRAZOLAM 1 MG/1
1 TABLET ORAL 2 TIMES DAILY PRN
Qty: 60 TABLET | Refills: 0 | Status: SHIPPED | OUTPATIENT
Start: 2024-01-24 | End: 2024-02-23

## 2024-01-24 RX ORDER — ALPRAZOLAM 1 MG/1
1 TABLET ORAL 2 TIMES DAILY PRN
Qty: 14 TABLET | Refills: 0 | Status: SHIPPED | OUTPATIENT
Start: 2024-01-24 | End: 2024-01-24

## 2024-01-24 RX ORDER — CLOBETASOL PROPIONATE 0.5 MG/G
OINTMENT TOPICAL 2 TIMES DAILY
Qty: 60 G | Refills: 0 | Status: SHIPPED | OUTPATIENT
Start: 2024-01-24 | End: 2024-01-31

## 2024-01-24 RX ORDER — BUPROPION HYDROCHLORIDE 150 MG/1
150 TABLET ORAL EVERY MORNING
Qty: 30 TABLET | Refills: 5 | Status: SHIPPED | OUTPATIENT
Start: 2024-01-24 | End: 2024-07-22

## 2024-01-24 RX ORDER — LISINOPRIL 40 MG/1
40 TABLET ORAL DAILY
Qty: 90 TABLET | Refills: 1 | Status: SHIPPED | OUTPATIENT
Start: 2024-01-24

## 2024-01-24 RX ORDER — FLUTICASONE PROPIONATE AND SALMETEROL XINAFOATE 230; 21 UG/1; UG/1
2 AEROSOL, METERED RESPIRATORY (INHALATION) 2 TIMES DAILY
Qty: 12 G | Refills: 3 | Status: SHIPPED | OUTPATIENT
Start: 2024-01-24

## 2024-01-24 RX ORDER — ALBUTEROL SULFATE 90 UG/1
2 AEROSOL, METERED RESPIRATORY (INHALATION) EVERY 4 HOURS PRN
Qty: 8.5 G | Refills: 2 | Status: SHIPPED | OUTPATIENT
Start: 2024-01-24

## 2024-01-24 NOTE — PROGRESS NOTES
Virtual Regular Visit    Verification of patient location:    Patient is located at Home in the following state in which I hold an active license [place]      Assessment/Plan:    Problem List Items Addressed This Visit          Respiratory    Chronic obstructive pulmonary disease (HCC)    Relevant Medications    fluticasone-salmeterol (Advair HFA) 230-21 MCG/ACT inhaler    albuterol (PROVENTIL HFA,VENTOLIN HFA) 90 mcg/act inhaler       Other    Anxiety    Relevant Medications    ALPRAZolam (XANAX) 1 mg tablet    Other Relevant Orders    Ambulatory referral to Psych Services    Bipolar 2 disorder, major depressive episode (HCC)    Relevant Medications    buPROPion (WELLBUTRIN XL) 150 mg 24 hr tablet    ALPRAZolam (XANAX) 1 mg tablet    Other Relevant Orders    Ambulatory Referral to Social Work Care Management Program    Ambulatory referral to Psych Services     Other Visit Diagnoses       Atopic dermatitis, unspecified type    -  Primary    Relevant Medications    clobetasol (TEMOVATE) 0.05 % ointment    Essential hypertension        Relevant Medications    lisinopril (ZESTRIL) 40 mg tablet    Encounter for screening mammogram for breast cancer        Relevant Orders    Mammo screening bilateral w 3d & cad    Screening for cervical cancer        Relevant Orders    Ambulatory referral to Obstetrics / Gynecology        Crying throughout the visit, we have the same conversation frequently at all her visits, she does not follow through for various reasons.  Needs to follow up with psychiatry, is behind on rent, grieving loss of her close relatives , wants to move, would like a new job, is seeing [employer] cr[employer]al,very anxious and overwhelmed.refer to , will benefit from talking to therapist  Closest family member is sister, who I recommend she reach out to for personal advise  Mammogram in 3/2024    Depression Screening and Follow-up Plan: Patient's depression screening was positive with a PHQ-9 score of 17.  Patient assessed for underlying major depression. Brief counseling provided and recommend additional follow-up/re-evaluation next office visit. Patient with underlying depression and was advised to continue current medications as prescribed.     Tobacco Cessation Counseling: Tobacco cessation counseling was provided. The patient is sincerely urged to quit consumption of tobacco. She is not ready to quit tobacco. Medication options and side effects of medication discussed. Patient refused medication. Bupropion SR was prescribed.         Reason for visit is   Chief Complaint   Patient presents with    Virtual Brief Visit    Depression    Virtual Regular Visit          Encounter provider Cheryl Antonio MD    Provider located at [facility]  [address]  [facility] [id number] [phone]      Recent Visits  No visits were found meeting these conditions.  Showing recent visits within past 7 days and meeting all other requirements  Today's Visits  Date Type Provider Dept   01/24/24 Telemedicine Cheryl Antonio MD [facility]   Showing today's visits and meeting all other requirements  Future Appointments  No visits were found meeting these conditions.  Showing future appointments within next 150 days and meeting all other requirements       The patient was identified by name and date of birth. Kathy Vazquez was informed that this is a telemedicine visit and that the visit is being conducted through the Epic Embedded platform. She agrees to proceed..  My office door was closed. No one else was in the room.  She acknowledged consent and understanding of privacy and security of the video platform. The patient has agreed to participate and understands they can discontinue the visit at any time.    Patient is aware this is a billable service.     Subjective  Kathy Vazquez is a 39 y.o. female       Doing worse overall and is struggling with uncontrolled  anxiety, has tried to come off xanax and reduce dose without success, briefly tried wellbutrin  She has a new job and will be baptized as Bahai however has been struggling to pay rent.,states martinez came in through the backdoor for rent  She wonders that she has decreased concentration and her friend mentioned to discuss with me if she has ADD which she mentions again this time  Needs to follow up with psych  She thinks she is so use to getting help from people and now she has to do everything herself, she is overwhelmed    Depression  Associated symptoms include a rash. Pertinent negatives include no abdominal pain, arthralgias, chest pain, congestion, coughing, fatigue, fever, headaches, myalgias, nausea or sore throat.        Past Medical History:   Diagnosis Date    Acute cough 6/14/2022    Allergies     Anxiety 05/2018    Female hirsutism     GERD (gastroesophageal reflux disease)     Head injury     Hypertension 2018    Papanicolaou smear for cervical cancer screening 2017    NL, no h/o of abn pap that she can recall    STD (female)     Thyroid nodule 05/2018    Tobacco user 05/2018       Past Surgical History:   Procedure Laterality Date    NO PAST SURGERIES         Current Outpatient Medications   Medication Sig Dispense Refill    albuterol (PROVENTIL HFA,VENTOLIN HFA) 90 mcg/act inhaler Inhale 2 puffs every 4 (four) hours as needed for wheezing or shortness of breath 8.5 g 2    ALPRAZolam (XANAX) 1 mg tablet Take 1 tablet (1 mg total) by mouth 2 (two) times a day as needed for anxiety 60 tablet 0    buPROPion (WELLBUTRIN XL) 150 mg 24 hr tablet Take 1 tablet (150 mg total) by mouth every morning 30 tablet 5    clobetasol (TEMOVATE) 0.05 % ointment Apply topically 2 (two) times a day for 7 days 60 g 0    famotidine (PEPCID) 20 mg tablet Take 1 tablet (20 mg total) by mouth 2 (two) times a day 30 tablet 0    fluticasone-salmeterol (Advair HFA) 230-21 MCG/ACT inhaler Inhale 2 puffs 2 (two) times a day  Rinse mouth after use. 12 g 3    lisinopril (ZESTRIL) 40 mg tablet Take 1 tablet (40 mg total) by mouth daily 90 tablet 1    ibuprofen (MOTRIN) 800 mg tablet Take 1 tablet (800 mg total) by mouth every 8 (eight) hours as needed for mild pain 90 tablet 0    sucralfate (CARAFATE) 1 g/10 mL suspension Take 10 mL (1 g total) by mouth 4 (four) times a day for 7 days 280 mL 0     No current facility-administered medications for this visit.        Allergies   Allergen Reactions    Penicillins Swelling     Rash, swelling at IV site. Was to IV penicillin       Review of Systems   Constitutional:  Negative for fatigue and fever.   HENT:  Negative for congestion, facial swelling, mouth sores, rhinorrhea, sore throat and trouble swallowing.    Eyes:  Negative for pain and redness.   Respiratory:  Negative for cough, shortness of breath and wheezing.    Cardiovascular:  Negative for chest pain, palpitations and leg swelling.   Gastrointestinal:  Negative for abdominal pain, blood in stool, constipation, diarrhea and nausea.   Genitourinary:  Negative for dysuria, hematuria and urgency.   Musculoskeletal:  Negative for arthralgias, back pain and myalgias.   Skin:  Positive for rash. Negative for wound.   Neurological:  Negative for seizures, syncope and headaches.   Hematological:  Negative for adenopathy.   Psychiatric/Behavioral:  Positive for behavioral problems, decreased concentration, depression and sleep disturbance. Negative for agitation. The patient is nervous/anxious.        Video Exam      Physical Exam  Vitals and nursing note reviewed.   Constitutional:       Appearance: She is well-developed.   HENT:      Head: Normocephalic and atraumatic.      Right Ear: External ear normal.      Left Ear: External ear normal.      Nose: Nose normal.   Eyes:      General: No scleral icterus.        Right eye: No discharge.         Left eye: No discharge.      Conjunctiva/sclera: Conjunctivae normal.      Comments: Visual  observation   Pulmonary:      Effort: No respiratory distress.   Musculoskeletal:         General: No tenderness or deformity.   Neurological:      Mental Status: She is alert. Mental status is at baseline.   Psychiatric:         Mood and Affect: Mood is depressed. Affect is tearful.         Behavior: Behavior normal.         Judgment: Judgment normal.

## 2024-01-24 NOTE — LETTER
January 24, 2024     Patient: Kathy Vazquez  YOB: 1985  Date of Visit: 1/24/2024      To Whom it May Concern:    Kathy Vazquez is under my professional care. Kathy was seen in my office on 1/24/2024. Kathy may return to work on 1/26/2024 .    If you have any questions or concerns, please don't hesitate to call.         Sincerely,          Cheryl Antnoio MD        CC: No Recipients

## 2024-01-25 ENCOUNTER — PATIENT OUTREACH (OUTPATIENT)
Dept: FAMILY MEDICINE CLINIC | Facility: CLINIC | Age: 39
End: 2024-01-25
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
## 2024-01-28 DIAGNOSIS — K08.9 CHRONIC DENTAL PAIN: ICD-10-CM

## 2024-01-28 DIAGNOSIS — G89.29 CHRONIC DENTAL PAIN: ICD-10-CM

## 2024-01-29 ENCOUNTER — PATIENT OUTREACH (OUTPATIENT)
Dept: FAMILY MEDICINE CLINIC | Facility: CLINIC | Age: 39
End: 2024-01-29

## 2024-01-29 ENCOUNTER — TELEPHONE (OUTPATIENT)
Age: 39
End: 2024-01-29

## 2024-01-29 RX ORDER — IBUPROFEN 800 MG/1
800 TABLET ORAL EVERY 8 HOURS PRN
Qty: 90 TABLET | Refills: 0 | Status: SHIPPED | OUTPATIENT
Start: 2024-01-29 | End: 2024-02-28

## 2024-01-29 NOTE — TELEPHONE ENCOUNTER
Patient has been out of work this past week and was thinking of going on short term disability through her job and asked if Dr. Antonio would support her in doing so. Please advise.

## 2024-01-29 NOTE — PROGRESS NOTES
Phone call received from patient.  Patient states employer is offering Short Term Disability that can be  back dated to January when patient took her first days off.  Patient will contact HR about this program and to obtain needed information.    Patient did reach out to [employer] and [employer].  Patient will need an eviction notice to receive assistance from [employer].  Patient states [facility] is willing to provide this to help patient.      Patient will review the list or behavioral health providers which SW emailed after last contact to make phone calls and secure an appointment.      SW will follow up in 2 weeks.

## 2024-01-31 ENCOUNTER — OFFICE VISIT (OUTPATIENT)
Dept: FAMILY MEDICINE CLINIC | Facility: CLINIC | Age: 39
End: 2024-01-31
Payer: COMMERCIAL

## 2024-01-31 ENCOUNTER — TELEPHONE (OUTPATIENT)
Dept: ADMINISTRATIVE | Facility: HOSPITAL | Age: 39
End: 2024-01-31

## 2024-01-31 ENCOUNTER — PATIENT OUTREACH (OUTPATIENT)
Dept: FAMILY MEDICINE CLINIC | Facility: CLINIC | Age: 39
End: 2024-01-31

## 2024-01-31 VITALS
DIASTOLIC BLOOD PRESSURE: 80 MMHG | TEMPERATURE: 97.3 F | OXYGEN SATURATION: 98 % | RESPIRATION RATE: 18 BRPM | HEIGHT: 63 IN | BODY MASS INDEX: 34.2 KG/M2 | HEART RATE: 90 BPM | WEIGHT: 193 LBS | SYSTOLIC BLOOD PRESSURE: 120 MMHG

## 2024-01-31 DIAGNOSIS — N64.4 MASTALGIA: ICD-10-CM

## 2024-01-31 DIAGNOSIS — I10 BENIGN ESSENTIAL HYPERTENSION: ICD-10-CM

## 2024-01-31 DIAGNOSIS — R14.2 BELCHING: ICD-10-CM

## 2024-01-31 DIAGNOSIS — F41.1 GENERALIZED ANXIETY DISORDER: ICD-10-CM

## 2024-01-31 DIAGNOSIS — F33.1 MAJOR DEPRESSIVE DISORDER, RECURRENT, MODERATE (HCC): Primary | ICD-10-CM

## 2024-01-31 PROCEDURE — 99215 OFFICE O/P EST HI 40 MIN: CPT | Performed by: FAMILY MEDICINE

## 2024-01-31 NOTE — PROGRESS NOTES
SW received voicemail from patient.  Return call placed.  Patient states that PCP will not complete the short term disability application due to the reason being mental health in nature.      Patient did reach out to insurance for guidance of providers to contact.      SW reminded patient of list of providers emailed on 1/25/24.  Patient reviewed email and provider list.      SW pointed out a few offices to call that may be helpful.      Patient states she is waiting for [facility] to call back and Dr. Durand.  [facility] does not take patient's insurance.     Patient did contact [employer] [place], unable to assist with finances, did provide food pantry list.      Patient has been in touch with Section 8.  Patient had placed an application 2 years ago.  [employer] will look into the application and contact patient.     Patient is waiting for [employer] to assign a CW to assist patient with housing.     Patient will reach out to this SW on Tuesday 2/6 when it is convenient for her due to work schedule.     Tuesday

## 2024-01-31 NOTE — TELEPHONE ENCOUNTER
Hi Dr Gunderson!       Pt returned my phone call to get her mammogram scheduled. While I had patient on the phone she stated she has a lot of pain and tenderness in the breast area and they are very warm to the touch.     Can you please place order for diagnostic mammogram for pt?    Thank you!

## 2024-01-31 NOTE — PROGRESS NOTES
"  Subjective:      Patient ID: Kathy Vazquez is a 39 y.o. female.    Here to discuss disability-she is convinced that since her work provides this benefit she can use it, she has not brought the forms to fill and has the brochure of her disability benefit with her  She states she missed a whole week at work- from 1/19/2024 and also missed the follow up with me and her HR told her that I should be able to back date it.  She works 4 days a week and 3 days a week, 10 to 12 hours /day shift and states it is too much for her.  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  She has chronic anxiety and depression and grief-  passed away in 2016 and recently lost her mother and her 97 year old estranged grand aunt/relative.she does have her sister who she reconnected with  When I tried to explain her that she likely does not qualify for disability as she has chronic illness and could do intermittent fmla, while she did not understand the details, she did say that \"there are benefits I can use, and she feels that now because she needs disability and not SSI which I told the girl on the phone, I feel being discriminated against\".She expresses displeasure as  did not get her insurance information correct initially and also states was not helpful- I did explain to her that she has to call and make the appointments herself and sicne she is off on 3 to 4 days /week that is doable. She works 6 pm to 6 am and would prefer to work day shift instead.  She does have uncontrolled anxiety and depression  Reports chronic belching /burps and also \"chest pain\"-mainly referring to her breasts  Also keeps stating that she has TB , which I have several times discussed with her latent TB is not " the same as TB and that is treated with a daily medication for 4 months and is again not a basis for disability  She has chronic benzodiazepine dependence and while she agrees to start wellbutrin or buspar at her previous visit, she never has followed through  She is aware of her lack of follow up  Also has many social issues which she brought up today-as mentioned in the  note, for which I did provide a listening ear, discussed that is still not a grounds for disability          Past Medical History:   Diagnosis Date    Acute cough 6/14/2022    Allergies     Anxiety 05/2018    Female hirsutism     GERD (gastroesophageal reflux disease)     Head injury     Hypertension 2018    Papanicolaou smear for cervical cancer screening 2017    NL, no h/o of abn pap that she can recall    STD (female)     Thyroid nodule 05/2018    Tobacco user 05/2018       Family History   Problem Relation Age of Onset    Hypertension Mother     Kidney disease Mother     Cirrhosis Mother     Dialysis Mother     Diabetes Mother     Anxiety disorder Mother     Depression Mother     Anxiety disorder Father     Depression Father     Drug abuse Father     Melanoma Paternal Uncle     Stomach cancer Other     Colon cancer Other         Passed age 40       Past Surgical History:   Procedure Laterality Date    NO PAST SURGERIES          reports that she has been smoking cigarettes. She has a 25.0 pack-year smoking history. She has never used smokeless tobacco. She reports that she does not currently use alcohol. She reports current drug use. Drug: Marijuana.      Current Outpatient Medications:     albuterol (PROVENTIL HFA,VENTOLIN HFA) 90 mcg/act inhaler, Inhale 2 puffs every 4 (four) hours as needed for wheezing or shortness of breath, Disp: 8.5 g, Rfl: 2    ALPRAZolam (XANAX) 1 mg tablet, Take 1 tablet (1 mg total) by mouth 2 (two) times a day as needed for anxiety, Disp: 60 tablet, Rfl: 0    buPROPion (WELLBUTRIN XL) 150 mg 24 hr  tablet, Take 1 tablet (150 mg total) by mouth every morning, Disp: 30 tablet, Rfl: 5    clobetasol (TEMOVATE) 0.05 % ointment, Apply topically 2 (two) times a day for 7 days, Disp: 60 g, Rfl: 0    famotidine (PEPCID) 20 mg tablet, Take 1 tablet (20 mg total) by mouth 2 (two) times a day, Disp: 30 tablet, Rfl: 0    fluticasone-salmeterol (Advair HFA) 230-21 MCG/ACT inhaler, Inhale 2 puffs 2 (two) times a day Rinse mouth after use., Disp: 12 g, Rfl: 3    ibuprofen (MOTRIN) 800 mg tablet, Take 1 tablet (800 mg total) by mouth every 8 (eight) hours as needed for mild pain, Disp: 90 tablet, Rfl: 0    lisinopril (ZESTRIL) 40 mg tablet, Take 1 tablet (40 mg total) by mouth daily, Disp: 90 tablet, Rfl: 1    sucralfate (CARAFATE) 1 g/10 mL suspension, Take 10 mL (1 g total) by mouth 4 (four) times a day for 7 days, Disp: 280 mL, Rfl: 0    The following portions of the patient's history were reviewed and updated as appropriate: allergies, current medications, past family history, past medical history, past social history, past surgical history and problem list.    Review of Systems   Constitutional:  Negative for fatigue and fever.   HENT:  Negative for congestion, facial swelling, mouth sores, rhinorrhea, sore throat and trouble swallowing.    Eyes:  Negative for pain and redness.   Respiratory:  Negative for cough, shortness of breath and wheezing.    Cardiovascular:  Negative for chest pain, palpitations and leg swelling.   Gastrointestinal:  Negative for abdominal pain, blood in stool, constipation, diarrhea and nausea.   Genitourinary:  Negative for dysuria, hematuria and urgency.   Musculoskeletal:  Negative for arthralgias, back pain and myalgias.   Skin:  Negative for rash and wound.   Neurological:  Negative for seizures, syncope and headaches.   Hematological:  Negative for adenopathy.   Psychiatric/Behavioral:  Positive for agitation and behavioral problems. The patient is nervous/anxious and is hyperactive.       "    PHQ-2/9 Depression Screening    Little interest or pleasure in doing things: 2 - more than half the days  Feeling down, depressed, or hopeless: 3 - nearly every day  Trouble falling or staying asleep, or sleeping too much: 3 - nearly every day  Feeling tired or having little energy: 2 - more than half the days  Poor appetite or overeating: 3 - nearly every day  Feeling bad about yourself - or that you are a failure or have let yourself or your family down: 2 - more than half the days  Trouble concentrating on things, such as reading the newspaper or watching television: 3 - nearly every day  Moving or speaking so slowly that other people could have noticed. Or the opposite - being so fidgety or restless that you have been moving around a lot more than usual: 3 - nearly every day  Thoughts that you would be better off dead, or of hurting yourself in some way: 0 - not at all  PHQ-9 Score: 21  PHQ-9 Interpretation: Severe depression             Objective:    /80 (BP Location: Left arm, Patient Position: Sitting, Cuff Size: Adult)   Pulse 90   Temp (!) 97.3 °F (36.3 °C) (Tympanic)   Resp 18   Ht 5' 3\" (1.6 m)   Wt 87.5 kg (193 lb)   SpO2 98%   BMI 34.19 kg/m²      Physical Exam  Vitals and nursing note reviewed.   Constitutional:       Appearance: Normal appearance. She is well-developed. She is not ill-appearing.   HENT:      Head: Normocephalic and atraumatic.      Right Ear: External ear normal.      Left Ear: External ear normal.      Nose: Nose normal.      Mouth/Throat:      Mouth: Mucous membranes are moist.      Pharynx: No oropharyngeal exudate or posterior oropharyngeal erythema.   Eyes:      General: No scleral icterus.        Right eye: No discharge.         Left eye: No discharge.      Conjunctiva/sclera: Conjunctivae normal.   Cardiovascular:      Rate and Rhythm: Normal rate.      Heart sounds: No murmur heard.     No gallop.   Pulmonary:      Effort: Pulmonary effort is normal. No " respiratory distress.      Breath sounds: Normal breath sounds. No stridor. No wheezing, rhonchi or rales.   Musculoskeletal:         General: No tenderness or deformity.   Skin:     Findings: No erythema or rash.   Neurological:      Mental Status: She is alert. Mental status is at baseline.   Psychiatric:         Behavior: Behavior normal.         Judgment: Judgment normal.           Recent Results (from the past 8736 hour(s))   ECG 12 lead    Collection Time: 07/09/23 12:32 AM   Result Value Ref Range    Ventricular Rate 82 BPM    Atrial Rate 82 BPM    PA Interval 157 ms    QRSD Interval 104 ms    QT Interval 381 ms    QTC Interval 445 ms    P Axis 46 degrees    QRS Axis 35 degrees    T Wave Axis 39 degrees   CBC and differential    Collection Time: 07/09/23 12:49 AM   Result Value Ref Range    WBC 8.69 4.31 - 10.16 Thousand/uL    RBC 4.54 3.81 - 5.12 Million/uL    Hemoglobin 13.5 11.5 - 15.4 g/dL    Hematocrit 40.7 34.8 - 46.1 %    MCV 90 82 - 98 fL    MCH 29.7 26.8 - 34.3 pg    MCHC 33.2 31.4 - 37.4 g/dL    RDW 12.5 11.6 - 15.1 %    MPV 10.3 8.9 - 12.7 fL    Platelets 236 149 - 390 Thousands/uL    nRBC 0 /100 WBCs    Neutrophils Relative 58 43 - 75 %    Immat GRANS % 0 0 - 2 %    Lymphocytes Relative 33 14 - 44 %    Monocytes Relative 7 4 - 12 %    Eosinophils Relative 1 0 - 6 %    Basophils Relative 1 0 - 1 %    Neutrophils Absolute 5.16 1.85 - 7.62 Thousands/µL    Immature Grans Absolute 0.01 0.00 - 0.20 Thousand/uL    Lymphocytes Absolute 2.82 0.60 - 4.47 Thousands/µL    Monocytes Absolute 0.58 0.17 - 1.22 Thousand/µL    Eosinophils Absolute 0.08 0.00 - 0.61 Thousand/µL    Basophils Absolute 0.04 0.00 - 0.10 Thousands/µL   Comprehensive metabolic panel    Collection Time: 07/09/23 12:49 AM   Result Value Ref Range    Sodium 137 135 - 147 mmol/L    Potassium 3.9 3.5 - 5.3 mmol/L    Chloride 105 96 - 108 mmol/L    CO2 25 21 - 32 mmol/L    ANION GAP 7 mmol/L    BUN 12 5 - 25 mg/dL    Creatinine 0.75 0.60 - 1.30  "mg/dL    Glucose 97 65 - 140 mg/dL    Calcium 9.1 8.4 - 10.2 mg/dL    AST 19 13 - 39 U/L    ALT 32 7 - 52 U/L    Alkaline Phosphatase 43 34 - 104 U/L    Total Protein 7.0 6.4 - 8.4 g/dL    Albumin 4.3 3.5 - 5.0 g/dL    Total Bilirubin 0.47 0.20 - 1.00 mg/dL    eGFR 100 ml/min/1.73sq m   Magnesium    Collection Time: 07/09/23 12:49 AM   Result Value Ref Range    Magnesium 1.8 (L) 1.9 - 2.7 mg/dL   TSH    Collection Time: 07/09/23 12:49 AM   Result Value Ref Range    TSH 3RD GENERATON 1.782 0.450 - 4.500 uIU/mL   HS Troponin 0hr (reflex protocol)    Collection Time: 07/09/23 12:49 AM   Result Value Ref Range    hs TnI 0hr <2 \"Refer to ACS Flowchart\"- see link ng/L   POCT pregnancy, urine    Collection Time: 07/09/23  1:50 AM   Result Value Ref Range    EXT Preg Test, Ur Negative     Control Valid    ECG 12 lead    Collection Time: 09/05/23  8:52 PM   Result Value Ref Range    Ventricular Rate 73 BPM    Atrial Rate 73 BPM    AK Interval 146 ms    QRSD Interval 92 ms    QT Interval 366 ms    QTC Interval 403 ms    P [place] 13 degrees    QRS Axis 44 degrees    T Wave Axis 31 degrees   CBC and differential    Collection Time: 09/05/23  9:44 PM   Result Value Ref Range    WBC 5.46 4.31 - 10.16 Thousand/uL    RBC 4.61 3.81 - 5.12 Million/uL    Hemoglobin 13.6 11.5 - 15.4 g/dL    Hematocrit 40.9 34.8 - 46.1 %    MCV 89 82 - 98 fL    MCH 29.5 26.8 - 34.3 pg    MCHC 33.3 31.4 - 37.4 g/dL    RDW 12.8 11.6 - 15.1 %    MPV 10.5 8.9 - 12.7 fL    Platelets 188 149 - 390 Thousands/uL    nRBC 0 /100 WBCs    Neutrophils Relative 50 43 - 75 %    Immat GRANS % 0 0 - 2 %    Lymphocytes Relative 38 14 - 44 %    Monocytes Relative 9 4 - 12 %    Eosinophils Relative 2 0 - 6 %    Basophils Relative 1 0 - 1 %    Neutrophils Absolute 2.76 1.85 - 7.62 Thousands/µL    Immature Grans Absolute 0.01 0.00 - 0.20 Thousand/uL    Lymphocytes Absolute 2.05 0.60 - 4.47 Thousands/µL    Monocytes Absolute 0.48 0.17 - 1.22 Thousand/µL    Eosinophils Absolute " "0.12 0.00 - 0.61 Thousand/µL    Basophils Absolute 0.04 0.00 - 0.10 Thousands/µL   Comprehensive metabolic panel    Collection Time: 09/05/23  9:44 PM   Result Value Ref Range    Sodium 137 135 - 147 mmol/L    Potassium 3.6 3.5 - 5.3 mmol/L    Chloride 107 96 - 108 mmol/L    CO2 22 21 - 32 mmol/L    ANION GAP 8 mmol/L    BUN 13 5 - 25 mg/dL    Creatinine 0.66 0.60 - 1.30 mg/dL    Glucose 108 65 - 140 mg/dL    Calcium 9.0 8.4 - 10.2 mg/dL    AST 16 13 - 39 U/L    ALT 20 7 - 52 U/L    Alkaline Phosphatase 40 34 - 104 U/L    Total Protein 6.9 6.4 - 8.4 g/dL    Albumin 4.3 3.5 - 5.0 g/dL    Total Bilirubin 0.42 0.20 - 1.00 mg/dL    eGFR 110 ml/min/1.73sq m   HS Troponin 0hr (reflex protocol)    Collection Time: 09/05/23  9:44 PM   Result Value Ref Range    hs TnI 0hr <2 \"Refer to ACS Flowchart\"- see link ng/L   FLU/RSV/COVID - if FLU/RSV clinically relevant    Collection Time: 09/05/23  9:44 PM    Specimen: Nose; Nares   Result Value Ref Range    SARS-CoV-2 Negative Negative    INFLUENZA A PCR Negative Negative    INFLUENZA B PCR Negative Negative    RSV PCR Negative Negative   Procalcitonin    Collection Time: 09/05/23  9:44 PM   Result Value Ref Range    Procalcitonin <0.05 <=0.25 ng/ml   Quantiferon TB Gold Plus    Collection Time: 09/05/23  9:47 PM   Result Value Ref Range    QFT Nil 0.17 0 - 8.0 IU/ml    QFT TB1-NIL 1.91 IU/ml    QFT TB2-NIL 1.72 IU/ml    QFT Mitogen-NIL >10.00 IU/ml    QFT Final Interpretation Positive (A) Negative       Laboratory Results: I have personally reviewed the pertinent laboratory results/reports     Radiology/Other Diagnostic Testing Results: I have personally reviewed pertinent reports.      XR chest 2 views    Result Date: 9/5/2023  CHEST INDICATION:   Cough, dyspnea. COMPARISON:  None EXAM PERFORMED/VIEWS:  XR CHEST PA & LATERAL FINDINGS: Cardiomediastinal silhouette appears unremarkable. The lungs are clear.  No pneumothorax or pleural effusion. Osseous structures appear within " normal limits for patient age.     No acute cardiopulmonary disease. Workstation performed: [id number]        Assessment/Plan:  Problem List Items Addressed This Visit          Cardiovascular and Mediastinum    Benign essential hypertension       Other    Major depressive disorder, recurrent, moderate (HCC) - Primary    Relevant Orders    Ambulatory referral to Psych Services    Generalized anxiety disorder    Relevant Orders    Ambulatory referral to Psych Services     I had a very length conversation for her, while I agree to fill out the papers for FMLA and disability if she provides, that does not means she qualifies. Based on her history of generalized anxiety and chronic recurrent major depression which is untreated, chronic benzodiazepine dependence, recommend she see psychiatry and her social situation does exacerbate her condition  She can start latent TB treatment at anytime and that is not grounds for disability.  Discussed that she does have controlled hypertension and her routine 3 months follow up can be scheduled on her days off so it does not interfere with work.  She is accepting of the suggestions.  She does have a fragile emotional state and may benefit from therapy and psych follow up , short term leave of absence- 2 weeks , I have referred her for psych services several times, but she has not been able to follow up. I am also aware of 11 months wait times for these services.  I have previously given her the number to crisis, [phone].  She should start wellbutrin as previously discussed.  She should bring the forms as discussed  Schedule her follow up in 3 months and also schedule her mammogram.  Needs Gyn follow up for cervical cancer screening.  I have spent >45 min in patient's care and , >50%                Read package inserts for all medications before starting a new medications, call me if you have any questions.    Patient was given opportunity to ask questions and all questions were  "answered.    Disclaimer: Portions of the record may have been created with voice recognition software. Occasional wrong word or \"sound a like\" substitutions may have occurred due to the inherent limitations of voice recognition software. Read the chart carefully and recognize, using context, where substitutions have occurred. I have used the Epic copy/forward function to compose this note. I have reviewed my current note to ensure it reflects the current patient status, exam, assessment and plan.    "

## 2024-02-05 ENCOUNTER — TELEPHONE (OUTPATIENT)
Dept: PSYCHIATRY | Facility: CLINIC | Age: 39
End: 2024-02-05

## 2024-02-06 ENCOUNTER — TELEPHONE (OUTPATIENT)
Dept: PSYCHIATRY | Facility: CLINIC | Age: 39
End: 2024-02-06

## 2024-02-06 NOTE — TELEPHONE ENCOUNTER
Patient LVM returning intake departments call.    Writer made  aware to review that chart and reach out to the patient to go over the next steps in our scheduling process.

## 2024-02-06 NOTE — TELEPHONE ENCOUNTER
The patient contacted the office, requesting to speak with the . The writer transferred the call to the  for assistance with scheduling.

## 2024-02-12 ENCOUNTER — TELEPHONE (OUTPATIENT)
Age: 39
End: 2024-02-12

## 2024-02-12 NOTE — TELEPHONE ENCOUNTER
Pt called to say she needs a f/u on her short term disability. At her last visit with Dr Antonio she was referred to a  Lien Gonzalez and she is trying to get in with a psychiatrist. The earliest appt she could get was 4/2/24. Pt states Dr Antonio said she is not able to fill out her short term disability paper work because it would be fraud and the pt needs to see a psychiatrist. Pt is looking for help with this now, she knows Dr Antonio is way for two weeks.

## 2024-02-15 NOTE — TELEPHONE ENCOUNTER
Pt called in regards to paperwork for short term disability.  She is upset because she cannot get into psych fro several months.  She is not working right now, she has no income and she does not know how to fix the problem.  She is asking to speak with manager.  She is asking for help from her provider and she feels she is not getting it.

## 2024-02-19 ENCOUNTER — PATIENT OUTREACH (OUTPATIENT)
Dept: FAMILY MEDICINE CLINIC | Facility: CLINIC | Age: 39
End: 2024-02-19

## 2024-02-19 NOTE — PROGRESS NOTES
SW received voicemail from pt.  SW returned phone call for update.      Pt reports juan cut hot water off 2 days ago.      Pt has been on the phone with HR.  Pt has 15 more days to provide signed paperwork for leave.      Pt reports finding [facility] for counseling appointment.  They were not able to confirm pt's insurance and pt had to provide her debit card if not covered.  Pt has her phone appt on Wed 2/21 at 10AM.  Pt will see the psychiatrist on 3/6/24.      Pt also called [facility] through her insurance after getting set up with [facility].  Pt was forwarded to crisis person.      Pt states she is finding it hard to help herself.  Pt has been noticing trends in her moods over the last 2 years.      Pt reports going to the food pantry Friday.      Pt states he has been reassessing life.  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     Pt reports that she knows that she has missed medical appointments and it has been part of her mental health and work schedule.  Pt owns her responsibility in this.      Pt currently owes $2200 in rent to juan.      Pt is expressing depression.  Pt is not showering.  Pt is not going outside.      Pt has been living in [place] and also works in [place].  Pt is not eligible for the [facility] wait list that is open this week.      Pt reports speaking with [employer] and her Section 8 has been reopened.      Pt has not heard from [employer] regarding financial assistance.     Pt states she will be going to the [facility] to apply for food stamps tomorrow. MA was terminated in December and when renewed was over income.     LUZ MARIA will discuss pt in huddle with [facility] for additional resources.

## 2024-02-21 ENCOUNTER — PATIENT OUTREACH (OUTPATIENT)
Dept: FAMILY MEDICINE CLINIC | Facility: CLINIC | Age: 39
End: 2024-02-21

## 2024-02-21 PROBLEM — H66.002 ACUTE SUPPURATIVE OTITIS MEDIA OF LEFT EAR WITHOUT SPONTANEOUS RUPTURE OF TYMPANIC MEMBRANE: Status: RESOLVED | Noted: 2020-08-06 | Resolved: 2024-02-21

## 2024-02-21 NOTE — PROGRESS NOTES
LUZ MARIACM received phone call from pt.  Pt is audibly upset.  Pt was to have [employer] appointment at 10AM this morning.  Appointment was cancelled due to not being in network with insurance.      Pt feels that her problems keeping her out of work are not just mental but also medical.  Pt reports experiencing pain across her chest.      Pt is attempting to hold her job and needs short term disability papers signed.  PCP is not comfortable signing documents for the mental issue reasons.  Pt reports she feels that no one wants to help her keep her job or remain in her apartment.      Pt is filing for Section 8 through [employer].  [employer] has not contacted pt about the rental assistance she applied for.      Pt mentioned going to the ER.  Pt upset and crying.  Pt disconnected call before SW was able to suggest ER for evaluation of the chest pain.      SW provided supportive listening.  SW will return call to pt later in the day.

## 2024-02-22 DIAGNOSIS — F41.9 ANXIETY: ICD-10-CM

## 2024-02-23 RX ORDER — ALPRAZOLAM 1 MG/1
TABLET ORAL
Qty: 60 TABLET | Refills: 0 | Status: SHIPPED | OUTPATIENT
Start: 2024-02-23

## 2024-02-26 ENCOUNTER — TELEPHONE (OUTPATIENT)
Dept: FAMILY MEDICINE CLINIC | Facility: CLINIC | Age: 39
End: 2024-02-26

## 2024-02-28 NOTE — TELEPHONE ENCOUNTER
Calling to see if there was any way the short term disability could be filled out. I explained that Dr. Antonio wanted her to see psychiatry to have forms filled out. I asked her if she went to the [facility] walk in located in [place] to have be seen. She said she was unable to get up there. Asked if there was anyone else in the practice willing to fill out her paperwork. I told her I could schedule her but I don't know if they would fill it out. (I am unable to make that determination) Said she would be seeing what her option are as far as help and that she is entitled to this through her job and is not being fraudulent.   
Kathy called in, she spoke with Meagan on Monday, regarding her current situation with the disability paperwork. She thinks she found some isurance that would possibly benefit her and help her. She's asking for a call back as soon as possible. Please advise   
Implemented All Fall Risk Interventions:  [place] to call system. Call bell, personal items and telephone within reach. Instruct patient to call for assistance. Room bathroom lighting operational. Non-slip footwear when patient is off stretcher. Physically safe environment: no spills, clutter or unnecessary equipment. Stretcher in lowest position, wheels locked, appropriate side rails in place. Provide visual cue, wrist band, yellow gown, etc. Monitor gait and stability. Monitor for mental status changes and reorient to person, place, and time. Review medications for side effects contributing to fall risk. Reinforce activity limits and safety measures with patient and family.

## 2024-03-11 ENCOUNTER — OFFICE VISIT (OUTPATIENT)
Dept: FAMILY MEDICINE CLINIC | Facility: CLINIC | Age: 39
End: 2024-03-11
Payer: COMMERCIAL

## 2024-03-11 DIAGNOSIS — Z12.4 ENCOUNTER FOR SCREENING FOR CERVICAL CANCER: ICD-10-CM

## 2024-03-11 DIAGNOSIS — J44.9 CHRONIC OBSTRUCTIVE PULMONARY DISEASE, UNSPECIFIED COPD TYPE (HCC): ICD-10-CM

## 2024-03-11 DIAGNOSIS — D25.9 LEIOMYOMA OF BODY OF UTERUS: ICD-10-CM

## 2024-03-11 DIAGNOSIS — F31.81 BIPOLAR 2 DISORDER, MAJOR DEPRESSIVE EPISODE (HCC): ICD-10-CM

## 2024-03-11 DIAGNOSIS — F41.9 ANXIETY: ICD-10-CM

## 2024-03-11 DIAGNOSIS — K52.9 COLITIS: ICD-10-CM

## 2024-03-11 DIAGNOSIS — F33.1 MAJOR DEPRESSIVE DISORDER, RECURRENT, MODERATE (HCC): ICD-10-CM

## 2024-03-11 DIAGNOSIS — L20.9 ATOPIC DERMATITIS, UNSPECIFIED TYPE: ICD-10-CM

## 2024-03-11 DIAGNOSIS — I10 ESSENTIAL HYPERTENSION: ICD-10-CM

## 2024-03-11 DIAGNOSIS — E03.8 SUBCLINICAL HYPOTHYROIDISM: ICD-10-CM

## 2024-03-11 DIAGNOSIS — Z00.01 ENCOUNTER FOR GENERAL ADULT MEDICAL EXAMINATION WITH ABNORMAL FINDINGS: Primary | ICD-10-CM

## 2024-03-11 DIAGNOSIS — I10 BENIGN ESSENTIAL HYPERTENSION: ICD-10-CM

## 2024-03-11 DIAGNOSIS — F41.1 GENERALIZED ANXIETY DISORDER: ICD-10-CM

## 2024-03-11 PROCEDURE — 99396 PREV VISIT EST AGE 40-64: CPT | Performed by: FAMILY MEDICINE

## 2024-03-11 PROCEDURE — 99214 OFFICE O/P EST MOD 30 MIN: CPT | Performed by: FAMILY MEDICINE

## 2024-03-11 RX ORDER — ALPRAZOLAM 1 MG/1
1 TABLET ORAL 2 TIMES DAILY PRN
Qty: 60 TABLET | Refills: 0 | Status: SHIPPED | OUTPATIENT
Start: 2024-03-11

## 2024-03-11 RX ORDER — CLOBETASOL PROPIONATE 0.5 MG/G
OINTMENT TOPICAL 2 TIMES DAILY
Qty: 60 G | Refills: 0 | Status: SHIPPED | OUTPATIENT
Start: 2024-03-11 | End: 2024-03-18

## 2024-03-11 RX ORDER — LISINOPRIL 40 MG/1
40 TABLET ORAL DAILY
Qty: 90 TABLET | Refills: 1 | Status: SHIPPED | OUTPATIENT
Start: 2024-03-11

## 2024-03-11 RX ORDER — BUPROPION HYDROCHLORIDE 150 MG/1
150 TABLET ORAL EVERY MORNING
Qty: 30 TABLET | Refills: 5 | Status: SHIPPED | OUTPATIENT
Start: 2024-03-11 | End: 2024-09-07

## 2024-03-11 RX ORDER — ALBUTEROL SULFATE 90 UG/1
2 AEROSOL, METERED RESPIRATORY (INHALATION) EVERY 4 HOURS PRN
Qty: 8.5 G | Refills: 2 | Status: SHIPPED | OUTPATIENT
Start: 2024-03-11

## 2024-03-11 RX ORDER — FLUTICASONE PROPIONATE AND SALMETEROL XINAFOATE 230; 21 UG/1; UG/1
2 AEROSOL, METERED RESPIRATORY (INHALATION) 2 TIMES DAILY
Qty: 12 G | Refills: 3 | Status: SHIPPED | OUTPATIENT
Start: 2024-03-11

## 2024-03-11 RX ORDER — TRIAMCINOLONE ACETONIDE 1 MG/G
CREAM TOPICAL 2 TIMES DAILY
Qty: 45 G | Refills: 1 | Status: SHIPPED | OUTPATIENT
Start: 2024-03-11 | End: 2024-03-11

## 2024-03-11 NOTE — PROGRESS NOTES
"  Subjective:      Patient ID: Kathy Vazquez is a 39 y.o. female.    Sister is on the phone    Did not bring   Did not come to the appointment -2 pm   \"You messed me up, I am homeless because of you\".  Staying with her girlfriend, going  [facility] paperwork  This office prescribed me Xanax  Could not get FMLA with [employer]  [facility] would not take marvin MARIE  was helpful,     [facility]- has 4/2024 psychiatry  6 pm to 6 am, 4 days a week and then 3 days a week    Terminated on 2/8/2024           Past Medical History:   Diagnosis Date    Acute cough 6/14/2022    Allergies     Anxiety 05/2018    Female hirsutism     GERD (gastroesophageal reflux disease)     Head injury     Hypertension 2018    Papanicolaou smear for cervical cancer screening 2017    NL, no h/o of abn pap that she can recall    STD (female)     Thyroid nodule 05/2018    Tobacco user 05/2018       Family History   Problem Relation Age of Onset    Hypertension Mother     Kidney disease Mother     Cirrhosis Mother     Dialysis Mother     Diabetes Mother     Anxiety disorder Mother     Depression Mother     Anxiety disorder Father     Depression Father     Drug abuse Father     Melanoma Paternal Uncle     Stomach cancer Other     Colon cancer Other         Passed age 38       Past Surgical History:   Procedure Laterality Date    NO PAST SURGERIES          reports that she has been smoking cigarettes. She has a 25 pack-year smoking history. She has never used smokeless tobacco. She reports that she does not currently use alcohol. She reports that she does not currently use drugs after having used the following drugs: Marijuana.      Current Outpatient Medications:     albuterol (PROVENTIL HFA,VENTOLIN HFA) 90 mcg/act inhaler, Inhale 2 puffs every 4 (four) hours as needed for wheezing or shortness of breath, Disp: 8.5 g, Rfl: 2    ALPRAZolam (XANAX) 1 mg tablet, TAKE 1 TABLET(1 MG) BY MOUTH TWICE DAILY AS NEEDED FOR ANXIETY, Disp: 60 " tablet, Rfl: 0    buPROPion (WELLBUTRIN XL) 150 mg 24 hr tablet, Take 1 tablet (150 mg total) by mouth every morning, Disp: 30 tablet, Rfl: 5    clobetasol (TEMOVATE) 0.05 % ointment, Apply topically 2 (two) times a day for 7 days, Disp: 60 g, Rfl: 0    fluticasone-salmeterol (Advair HFA) 230-21 MCG/ACT inhaler, Inhale 2 puffs 2 (two) times a day Rinse mouth after use., Disp: 12 g, Rfl: 3    lisinopril (ZESTRIL) 40 mg tablet, Take 1 tablet (40 mg total) by mouth daily, Disp: 90 tablet, Rfl: 1    famotidine (PEPCID) 20 mg tablet, Take 1 tablet (20 mg total) by mouth 2 (two) times a day (Patient not taking: Reported on 3/11/2024), Disp: 30 tablet, Rfl: 0    ibuprofen (MOTRIN) 800 mg tablet, Take 1 tablet (800 mg total) by mouth every 8 (eight) hours as needed for mild pain, Disp: 90 tablet, Rfl: 0    sucralfate (CARAFATE) 1 g/10 mL suspension, Take 10 mL (1 g total) by mouth 4 (four) times a day for 7 days, Disp: 280 mL, Rfl: 0    The following portions of the patient's history were reviewed and updated as appropriate: allergies, current medications, past family history, past medical history, past social history, past surgical history and problem list.    Review of Systems   Constitutional:  Negative for fatigue and fever.   HENT:  Negative for congestion, facial swelling, mouth sores, rhinorrhea, sore throat and trouble swallowing.    Eyes:  Negative for pain and redness.   Respiratory:  Negative for cough, shortness of breath and wheezing.    Cardiovascular:  Negative for chest pain, palpitations and leg swelling.   Gastrointestinal:  Negative for abdominal pain, blood in stool, constipation, diarrhea and nausea.   Genitourinary:  Negative for dysuria, hematuria and urgency.   Musculoskeletal:  Negative for arthralgias, back pain and myalgias.   Skin:  Negative for rash and wound.   Neurological:  Negative for seizures, syncope and headaches.   Hematological:  Negative for adenopathy.   Psychiatric/Behavioral:   "Negative for agitation and behavioral problems.          PHQ-2/9 Depression Screening    Little interest or pleasure in doing things: 0 - not at all  Feeling down, depressed, or hopeless: 0 - not at all  Trouble falling or staying asleep, or sleeping too much: 3 - nearly every day  Feeling tired or having little energy: 3 - nearly every day  Poor appetite or overeating: 3 - nearly every day  Feeling bad about yourself - or that you are a failure or have let yourself or your family down: 3 - nearly every day  Trouble concentrating on things, such as reading the newspaper or watching television: 3 - nearly every day  Moving or speaking so slowly that other people could have noticed. Or the opposite - being so fidgety or restless that you have been moving around a lot more than usual: 2 - more than half the days  Thoughts that you would be better off dead, or of hurting yourself in some way: 0 - not at all  PHQ-9 Score: 17  PHQ-9 Interpretation: Moderately severe depression             Objective:    Pulse 97   Temp 98 °F (36.7 °C) (Tympanic)   Ht 5' 3\" (1.6 m)   Wt 89.4 kg (197 lb)   SpO2 98%   BMI 34.90 kg/m²      Physical Exam      Recent Results (from the past 8736 hour(s))   ECG 12 lead    Collection Time: 07/09/23 12:32 AM   Result Value Ref Range    Ventricular Rate 82 BPM    Atrial Rate 82 BPM    MD Interval 157 ms    QRSD Interval 104 ms    QT Interval 381 ms    QTC Interval 445 ms    P Axis 46 degrees    QRS Axis 35 degrees    T Wave Axis 39 degrees   CBC and differential    Collection Time: 07/09/23 12:49 AM   Result Value Ref Range    WBC 8.69 4.31 - 10.16 Thousand/uL    RBC 4.54 3.81 - 5.12 Million/uL    Hemoglobin 13.5 11.5 - 15.4 g/dL    Hematocrit 40.7 34.8 - 46.1 %    MCV 90 82 - 98 fL    MCH 29.7 26.8 - 34.3 pg    MCHC 33.2 31.4 - 37.4 g/dL    RDW 12.5 11.6 - 15.1 %    MPV 10.3 8.9 - 12.7 fL    Platelets 236 149 - 390 Thousands/uL    nRBC 0 /100 WBCs    Neutrophils Relative 58 43 - 75 %    Immat " "GRANS % 0 0 - 2 %    Lymphocytes Relative 33 14 - 44 %    Monocytes Relative 7 4 - 12 %    Eosinophils Relative 1 0 - 6 %    Basophils Relative 1 0 - 1 %    Neutrophils Absolute 5.16 1.85 - 7.62 Thousands/µL    Immature Grans Absolute 0.01 0.00 - 0.20 Thousand/uL    Lymphocytes Absolute 2.82 0.60 - 4.47 Thousands/µL    Monocytes Absolute 0.58 0.17 - 1.22 Thousand/µL    Eosinophils Absolute 0.08 0.00 - 0.61 Thousand/µL    Basophils Absolute 0.04 0.00 - 0.10 Thousands/µL   Comprehensive metabolic panel    Collection Time: 07/09/23 12:49 AM   Result Value Ref Range    Sodium 137 135 - 147 mmol/L    Potassium 3.9 3.5 - 5.3 mmol/L    Chloride 105 96 - 108 mmol/L    CO2 25 21 - 32 mmol/L    ANION GAP 7 mmol/L    BUN 12 5 - 25 mg/dL    Creatinine 0.75 0.60 - 1.30 mg/dL    Glucose 97 65 - 140 mg/dL    Calcium 9.1 8.4 - 10.2 mg/dL    AST 19 13 - 39 U/L    ALT 32 7 - 52 U/L    Alkaline Phosphatase 43 34 - 104 U/L    Total Protein 7.0 6.4 - 8.4 g/dL    Albumin 4.3 3.5 - 5.0 g/dL    Total Bilirubin 0.47 0.20 - 1.00 mg/dL    eGFR 100 ml/min/1.73sq m   Magnesium    Collection Time: 07/09/23 12:49 AM   Result Value Ref Range    Magnesium 1.8 (L) 1.9 - 2.7 mg/dL   TSH    Collection Time: 07/09/23 12:49 AM   Result Value Ref Range    TSH 3RD GENERATON 1.782 0.450 - 4.500 uIU/mL   HS Troponin 0hr (reflex protocol)    Collection Time: 07/09/23 12:49 AM   Result Value Ref Range    hs TnI 0hr <2 \"Refer to ACS Flowchart\"- see link ng/L   POCT pregnancy, urine    Collection Time: 07/09/23  1:50 AM   Result Value Ref Range    EXT Preg Test, Ur Negative     Control Valid    ECG 12 lead    Collection Time: 09/05/23  8:52 PM   Result Value Ref Range    Ventricular Rate 73 BPM    Atrial Rate 73 BPM    WV Interval 146 ms    QRSD Interval 92 ms    QT Interval 366 ms    QTC Interval 403 ms    P [place] 13 degrees    QRS Axis 44 degrees    T Wave Axis 31 degrees   CBC and differential    Collection Time: 09/05/23  9:44 PM   Result Value Ref Range    " "WBC 5.46 4.31 - 10.16 Thousand/uL    RBC 4.61 3.81 - 5.12 Million/uL    Hemoglobin 13.6 11.5 - 15.4 g/dL    Hematocrit 40.9 34.8 - 46.1 %    MCV 89 82 - 98 fL    MCH 29.5 26.8 - 34.3 pg    MCHC 33.3 31.4 - 37.4 g/dL    RDW 12.8 11.6 - 15.1 %    MPV 10.5 8.9 - 12.7 fL    Platelets 188 149 - 390 Thousands/uL    nRBC 0 /100 WBCs    Neutrophils Relative 50 43 - 75 %    Immat GRANS % 0 0 - 2 %    Lymphocytes Relative 38 14 - 44 %    Monocytes Relative 9 4 - 12 %    Eosinophils Relative 2 0 - 6 %    Basophils Relative 1 0 - 1 %    Neutrophils Absolute 2.76 1.85 - 7.62 Thousands/µL    Immature Grans Absolute 0.01 0.00 - 0.20 Thousand/uL    Lymphocytes Absolute 2.05 0.60 - 4.47 Thousands/µL    Monocytes Absolute 0.48 0.17 - 1.22 Thousand/µL    Eosinophils Absolute 0.12 0.00 - 0.61 Thousand/µL    Basophils Absolute 0.04 0.00 - 0.10 Thousands/µL   Comprehensive metabolic panel    Collection Time: 09/05/23  9:44 PM   Result Value Ref Range    Sodium 137 135 - 147 mmol/L    Potassium 3.6 3.5 - 5.3 mmol/L    Chloride 107 96 - 108 mmol/L    CO2 22 21 - 32 mmol/L    ANION GAP 8 mmol/L    BUN 13 5 - 25 mg/dL    Creatinine 0.66 0.60 - 1.30 mg/dL    Glucose 108 65 - 140 mg/dL    Calcium 9.0 8.4 - 10.2 mg/dL    AST 16 13 - 39 U/L    ALT 20 7 - 52 U/L    Alkaline Phosphatase 40 34 - 104 U/L    Total Protein 6.9 6.4 - 8.4 g/dL    Albumin 4.3 3.5 - 5.0 g/dL    Total Bilirubin 0.42 0.20 - 1.00 mg/dL    eGFR 110 ml/min/1.73sq m   HS Troponin 0hr (reflex protocol)    Collection Time: 09/05/23  9:44 PM   Result Value Ref Range    hs TnI 0hr <2 \"Refer to ACS Flowchart\"- see link ng/L   FLU/RSV/COVID - if FLU/RSV clinically relevant    Collection Time: 09/05/23  9:44 PM    Specimen: Nose; Nares   Result Value Ref Range    SARS-CoV-2 Negative Negative    INFLUENZA A PCR Negative Negative    INFLUENZA B PCR Negative Negative    RSV PCR Negative Negative   Procalcitonin    Collection Time: 09/05/23  9:44 PM   Result Value Ref Range    " "Procalcitonin <0.05 <=0.25 ng/ml   Quantiferon TB Gold Plus    Collection Time: 09/05/23  9:47 PM   Result Value Ref Range    QFT Nil 0.17 0 - 8.0 IU/ml    QFT TB1-NIL 1.91 IU/ml    QFT TB2-NIL 1.72 IU/ml    QFT Mitogen-NIL >10.00 IU/ml    QFT Final Interpretation Positive (A) Negative       Laboratory Results: {Labs reviewed:98814}    Radiology/Other Diagnostic Testing Results: {Results Review Statement:39211}    XR chest 2 views    Result Date: 9/5/2023  CHEST INDICATION:   Cough, dyspnea. COMPARISON:  None EXAM PERFORMED/VIEWS:  XR CHEST PA & LATERAL FINDINGS: Cardiomediastinal silhouette appears unremarkable. The lungs are clear.  No pneumothorax or pleural effusion. Osseous structures appear within normal limits for patient age.     No acute cardiopulmonary disease. Workstation performed: [id number]        Assessment/Plan:  Problem List Items Addressed This Visit          Respiratory    Chronic obstructive pulmonary disease (HCC)    Relevant Medications    albuterol (PROVENTIL HFA,VENTOLIN HFA) 90 mcg/act inhaler    fluticasone-salmeterol (Advair HFA) 230-21 MCG/ACT inhaler       Other    Bipolar 2 disorder, major depressive episode (HCC)    Relevant Medications    buPROPion (WELLBUTRIN XL) 150 mg 24 hr tablet     Other Visit Diagnoses       Atopic dermatitis, unspecified type        Relevant Medications    clobetasol (TEMOVATE) 0.05 % ointment                         Read package inserts for all medications before starting a new medications, call me if you have any questions.    Patient was given opportunity to ask questions and all questions were answered.    Disclaimer: Portions of the record may have been created with voice recognition software. Occasional wrong word or \"sound a like\" substitutions may have occurred due to the inherent limitations of voice recognition software. Read the chart carefully and recognize, using context, where substitutions have occurred. I have used the Epic copy/forward function " to compose this note. I have reviewed my current note to ensure it reflects the current patient status, exam, assessment and plan.
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
## 2024-03-12 ENCOUNTER — TELEPHONE (OUTPATIENT)
Dept: FAMILY MEDICINE CLINIC | Facility: CLINIC | Age: 39
End: 2024-03-12

## 2024-03-12 VITALS
WEIGHT: 197 LBS | SYSTOLIC BLOOD PRESSURE: 160 MMHG | HEIGHT: 63 IN | BODY MASS INDEX: 34.91 KG/M2 | DIASTOLIC BLOOD PRESSURE: 80 MMHG | TEMPERATURE: 98 F | HEART RATE: 97 BPM | OXYGEN SATURATION: 98 %

## 2024-03-12 NOTE — TELEPHONE ENCOUNTER
"Lolly Mendez was seen by you yesterday.  She is calling about her disability.  She spoke with HR & her supervisor & they suggested she take a leave starting with 2/3/24 forward, due to her \"anxiety & severe depression\" (said it's worse during holidays).  She has  payroll & [employer] insurance that secures her job.  She is asking if you are willing to do forms (when she gets them) & to facilitate this?  Also wanted to provide her HR info.  Jaida Velez [phone].  "

## 2024-03-13 ENCOUNTER — PATIENT OUTREACH (OUTPATIENT)
Dept: FAMILY MEDICINE CLINIC | Facility: CLINIC | Age: 39
End: 2024-03-13

## 2024-03-25 ENCOUNTER — TELEPHONE (OUTPATIENT)
Dept: FAMILY MEDICINE CLINIC | Facility: CLINIC | Age: 39
End: 2024-03-25

## 2024-03-25 NOTE — TELEPHONE ENCOUNTER
"Yumiko is calling about the \"additional\" [employer] \"perfection questions\" form that was faxed to our office on 3/21/24.  She's asking for the status of this.  "

## 2024-03-28 NOTE — TELEPHONE ENCOUNTER
Patient calling to explain what is missing from the form that was faxed.  She stated that certain things were not answered and records were not attached to the form.  Call Beba to see if she could go over with patient what was needed.  Transferred call.

## 2024-04-02 ENCOUNTER — SOCIAL WORK (OUTPATIENT)
Dept: BEHAVIORAL/MENTAL HEALTH CLINIC | Facility: CLINIC | Age: 39
End: 2024-04-02
Payer: COMMERCIAL

## 2024-04-02 DIAGNOSIS — F41.9 ANXIETY: ICD-10-CM

## 2024-04-02 DIAGNOSIS — F31.81 BIPOLAR 2 DISORDER, MAJOR DEPRESSIVE EPISODE (HCC): Primary | ICD-10-CM

## 2024-04-02 PROCEDURE — 90791 PSYCH DIAGNOSTIC EVALUATION: CPT | Performed by: SOCIAL WORKER
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
## 2024-04-10 ENCOUNTER — TELEPHONE (OUTPATIENT)
Dept: PSYCHIATRY | Facility: CLINIC | Age: 39
End: 2024-04-10

## 2024-04-10 ENCOUNTER — SOCIAL WORK (OUTPATIENT)
Dept: BEHAVIORAL/MENTAL HEALTH CLINIC | Facility: CLINIC | Age: 39
End: 2024-04-10

## 2024-04-10 DIAGNOSIS — F31.81 BIPOLAR 2 DISORDER, MAJOR DEPRESSIVE EPISODE (HCC): Primary | ICD-10-CM

## 2024-04-10 DIAGNOSIS — F41.9 ANXIETY: ICD-10-CM
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
## 2024-04-10 NOTE — TELEPHONE ENCOUNTER
----- Message from Yuki Andrews LCSW sent at 4/10/2024 11:20 AM EDT -----  Kathy is requesting emotional support letters for her cats (Josue and Fariba).

## 2024-04-11 ENCOUNTER — PATIENT OUTREACH (OUTPATIENT)
Dept: FAMILY MEDICINE CLINIC | Facility: CLINIC | Age: 39
End: 2024-04-11

## 2024-04-11 NOTE — PROGRESS NOTES
SW reviewed chart.  Pt has established psychiatric care and is working with psych for need.     Pt is currently homeless and living in car.      SW has been unable to reach pt.  Referral closed.

## 2024-04-15 ENCOUNTER — SOCIAL WORK (OUTPATIENT)
Dept: BEHAVIORAL/MENTAL HEALTH CLINIC | Facility: CLINIC | Age: 39
End: 2024-04-15

## 2024-04-15 DIAGNOSIS — F41.9 ANXIETY: ICD-10-CM

## 2024-04-15 DIAGNOSIS — F31.81 BIPOLAR 2 DISORDER, MAJOR DEPRESSIVE EPISODE (HCC): Primary | ICD-10-CM

## 2024-04-15 NOTE — PSYCH
Behavioral Health Psychotherapy Progress Note    Psychotherapy Provided: Individual Psychotherapy     1. Bipolar 2 disorder, major depressive episode (HCC)        2. Anxiety            Goals addressed in session: Treatment Plan not completed today; we agreed to develop in future session. Focus of today's session was on completion of crisis plan and update on housing concerns.     DATA: ASHLY met with Kathy Vazquez. Session was conducted In-person. she was able to set an agenda which included reporting that she and her sister went to see a 2-bedroom apartment in [place] which she has coordinated with her Section 8  about ($1275/month which is within her allotted amount). She describes feeling hopeful about this and picturing herself living in this apartment. She is continuing to couch surf at friend's home rather than living in her car as she was previously considering and reports things have been going more smoothly there. ASHLY and Kathy worked to complete her crisis plan. Kathy was tangential throughout and there were three instances during which she asked what she was talking about in order to re-direct to the topic at hand. She does also note having a positive few days due to being able to open her mother's urn and carry some of her ashes with her. Briefly discussed emotional support animal letter (provided) and ICM referral; did not discuss CPS referral due to clinician oversight and will plan to discuss at next scheduled session. Specific techniques used in this session were: empathetic listening and validation.    During this session, this clinician used the following therapeutic modalities: Client-centered Therapy and Supportive Psychotherapy    Substance Abuse was not addressed during this session. If the client is diagnosed with a co-occurring substance use disorder, please indicate any changes in the frequency or amount of use: N/A. Stage of change for addressing substance use  "diagnoses: No substance use/Not applicable    ASSESSMENT:  Kathy Vazquez presents with a Labile mood. Kathy Vazquez was oriented to person, place, time, and situation. Grooming and Hygiene were good  and clothing was casually dressed and appropriate for weather. Ability to perform ADLs has not changed. she was cooperative.      her affect is intermittently Bright and Tearful, which is congruent, with her mood and the content of the session. The client has not yet made progress on their goals- treatment plan not yet established.    Kathy Vazquez presents with a none risk of suicide, none risk of self-harm, and none risk of harm to others.    For any risk assessment that surpasses a \"low\" rating, a safety plan must be developed.    A safety plan was indicated: no  If yes, describe in detail: N/A    PLAN: Between sessions, Kathy Vazquez will meet with psychiatry for initial appointment; she is aware that this will be an intake appointment roughly one hour long consisting of a review of her history/current symptoms and medication recommendations. At the next session, the therapist will use Client-centered Therapy and Supportive Psychotherapy to address symptoms and ongoing concerns as they arise. Kathy Vazquez will return to outpatient therapy on 4/24/24.     Behavioral Health Treatment Plan and Discharge Planning: Kathy Vazquez is aware of and agrees to continue to work on their treatment plan. They have identified and are working toward their discharge goals. yes    Visit start and stop times:    04/15/24  Start Time: 0103  Stop Time: 0155  Total Visit Time: 52 minutes  "

## 2024-04-15 NOTE — BH CRISIS PLAN
"Client Name: Kathy Vazquez       Client YOB: 1985    [facility] Safety Plan      Creation Date: 4/10/24 Update Date: 10/12/24   Created By: Yuki Andrews LCSW Last Updated By: Yuki Andrews LCSW      Step 1: Warning Signs:   Warning Signs   Monthly menstrual cycle contributes to emotional changes consistently- easily triggered to cry   Physical pains in chest when anxious   Feelings of fear, \"freeze\" response            Step 2: Internal Coping Strategies:   Internal Coping Strategies   Self-talk, reassurance - \"You're okay\"   Negative coping skill of smoking - hoping to quit (previously tried smoking cessation program)   Getting \"signs\" from late mom that randomly come up in dreams or in day-to-day life (numbers, etc)   Talking with mom/what would she think or suggest   Cooking   Gardening   Hiking, being in nature   Journaling   Breathwork   Spa or meditation music            Step 3: People and social settings that provide distraction:   Name Contact Information   Roxi Kong, and her spouse [phone] /  [phone]   [place] In cell phone   Cousin, Yola In cell phone            Step 4: People whom I can ask for help during a crisis:      Name Contact Information    Roxi Kong, and her spouse [phone] / [phone]    [place] In cell phone    Cousin, Yola In cell phone      Step 5: Professionals or agencies I can contact during a crisis:      Clinican/Agency Name Phone Emergency Contact    [facility] (business hours) [phone] Therapist, Yuki Andrews    [facility] [phone] PCP, Dr. Antonio      Local Emergency Department Emergency Department Phone Emergency Department Address    [facility] [phone] [address], [place] [address]    [facility] [phone] [facility], [place] [address]        Crisis Phone Numbers:   Suicide Prevention Lifeline: " Call or Text  005 Crisis Text Line: Text HOME to 220-944   Please note: Some [facility] do not have a separate number for Child/Adolescent specific crisis. If your county is not listed under Child/Adolescent, please call the adult number for your county      Adult Crisis Numbers: Child/Adolescent Crisis Numbers   [facility]: [phone] [facility]: [phone]   [facility]: [phone] [facility]: [phone]   [facility]: [phone] [place]: [phone]   [facility]: [phone] [place] [facility]: [phone]   [place] [facility]: [phone]   [facility]: [phone]   [facility]: [phone]   [facility]: [phone] (daytime) [phone] (after hours, weekends, holidays)      Step 6: Making the environment safer (plan for lethal means safety):   Plan: Obtained a license to conceal a firearm in 2020- gun currently stored locked in storage unit.      Optional: What is most important to me and worth living for?   Life itself is beautiful. I love to live, to thrive, to exist and breathe the air.      [place] Safety Plan. Elizabeth James and Roque Sams. Used with permission of the authors.

## 2024-04-19 ENCOUNTER — TELEPHONE (OUTPATIENT)
Dept: PSYCHIATRY | Facility: CLINIC | Age: 39
End: 2024-04-19

## 2024-04-19 NOTE — PSYCH
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    Generalized anxiety disorder  -     Ambulatory referral to Psych Services  -     Pregnancy Test (HCG Qualitative); Future  -     Drug Screen Routine w /Conf and Adulteration, urine.    Complicated grief    Insomnia due to medical condition  -     Pregnancy Test (HCG Qualitative); Future  -     Drug Screen Routine w /Conf and Adulteration, urine.    Anxiety  -     ALPRAZolam (XANAX) 1 mg tablet; Take 1 tab po qAM and 1/2 tab po qhs          Plan:  Pt is having moderate depressive and anxiety Sxs with recent manic/hypomanic type symptomatology and presentation today.   She has h/o panic attacks but none in recent months.  She endorses a h/o trauma and will explore for PTSD next visit.  The interview was extensive and Pt was circumstantial and tangential at times, requiring redirection.  Working Dxs are as listed above.  Tx options discussed and she feels the Bupropion ER/XL is helping but had a lag in taking it due to losing this medicine while moving to her friend's home.  She would like to wean off of Alprazolam but also has worries over withdrawal and this writer explained that I would  gradually reduce her, and she was agreeable.  She has refills of her Bupropion ER from the PCP and can restart at next opportunity, and for now, this is fine since she exhibits some hypomania.  I offered Aripiprazole for mood control and she accepted to start this. Treatment plan done and Pt accepts the plan.  A Safety Plan was done by Pt's therapist on 4/15/2024.  Start Aripiprazole 5mg (1) tab po qhs # 30   Reduce Alprazolam 1mg (1) tab po qAM and (1/2) tab po qhs # 45   Continue:  Bupropion ER/XL 150mg (1) tab po qAM -- Pt given a Rx for Qty 30 with R5 by PCP on 3/11/2024  Get Serum Beta HCG, UDS  Pt to get CMP, CBC with diff, TSH, Lipids and HgbA1C -- per PCP  Continue counseling with Yuki Andrews LCSW  Return 5/7/2024 as scheduled.  Can call any time sooner prn.  Pt made aware of the 24-7 on call service line.    Risks/Benefits/Precautions:      Risks, Benefits And Possible Side Effects Of Medications:    Risks, benefits, and possible side effects of medications explained to Kathy and she verbalizes understanding and agreement for treatment.  Risks of medications in pregnancy explained to Kathy. She verbalizes understanding and agrees to notify her doctor if she becomes pregnant.    Controlled Medication Discussion:     Kathy has been filling controlled prescriptions on time as prescribed according to [place] Prescription Drug Monitoring Program  Discussed with Kathy the risks of sedation, respiratory depression, impairment of ability to drive and potential for abuse and addiction related to treatment with benzodiazepine medications. She understands risk of treatment with benzodiazepine medications, agrees to not drive if feels impaired and agrees to take medications as prescribed    Rhona Espinoza PA-C    Visit Time    Visit Start Time: 1:00PM  Visit Stop Time: 2:52PM  Total Visit Duration:  112 minutes

## 2024-04-22 ENCOUNTER — TELEPHONE (OUTPATIENT)
Dept: BEHAVIORAL/MENTAL HEALTH CLINIC | Facility: CLINIC | Age: 39
End: 2024-04-22

## 2024-04-22 NOTE — TELEPHONE ENCOUNTER
Spoke with Kathy regarding voicemail received from her about disability extension paperwork (through ~May 15th) from [employer]. Relayed that the extension paperwork likely needs to be completed by the doctor who completed initial paperwork, though I am unsure. Also noted that I had not received any communication from [employer] as of yet but would be happy to review the paperwork to assist in determining next steps. Of note, Kathy plans to contact Dr. Antonio about completing this and is scheduled to meet with psychiatry tomorrow, 4/23.

## 2024-04-23 ENCOUNTER — OFFICE VISIT (OUTPATIENT)
Dept: PSYCHIATRY | Facility: CLINIC | Age: 39
End: 2024-04-23

## 2024-04-23 ENCOUNTER — TELEPHONE (OUTPATIENT)
Dept: PSYCHIATRY | Facility: CLINIC | Age: 39
End: 2024-04-23

## 2024-04-23 VITALS — HEIGHT: 63 IN | WEIGHT: 184.8 LBS | BODY MASS INDEX: 32.74 KG/M2

## 2024-04-23 DIAGNOSIS — G47.01 INSOMNIA DUE TO MEDICAL CONDITION: ICD-10-CM

## 2024-04-23 DIAGNOSIS — F41.9 ANXIETY: ICD-10-CM

## 2024-04-23 DIAGNOSIS — F41.1 GENERALIZED ANXIETY DISORDER: ICD-10-CM

## 2024-04-23 DIAGNOSIS — F31.81 BIPOLAR II DISORDER (HCC): Primary | ICD-10-CM

## 2024-04-23 DIAGNOSIS — F43.21 COMPLICATED GRIEF: ICD-10-CM

## 2024-04-23 RX ORDER — ARIPIPRAZOLE 5 MG/1
5 TABLET ORAL DAILY
Qty: 30 TABLET | Refills: 0 | Status: SHIPPED | OUTPATIENT
Start: 2024-04-23 | End: 2024-05-07 | Stop reason: SDUPTHER

## 2024-04-23 RX ORDER — ALPRAZOLAM 1 MG/1
TABLET ORAL
Qty: 45 TABLET | Refills: 0 | Status: SHIPPED | OUTPATIENT
Start: 2024-04-23 | End: 2024-05-07 | Stop reason: SDUPTHER

## 2024-04-23 NOTE — TELEPHONE ENCOUNTER
Patient came to front to fill out AVE. She spoke with case management last week. Scanned into chart.

## 2024-04-24 ENCOUNTER — SOCIAL WORK (OUTPATIENT)
Dept: BEHAVIORAL/MENTAL HEALTH CLINIC | Facility: CLINIC | Age: 39
End: 2024-04-24
Payer: COMMERCIAL

## 2024-04-24 DIAGNOSIS — F41.1 GENERALIZED ANXIETY DISORDER: ICD-10-CM

## 2024-04-24 DIAGNOSIS — F31.81 BIPOLAR 2 DISORDER, MAJOR DEPRESSIVE EPISODE (HCC): Primary | ICD-10-CM

## 2024-04-24 DIAGNOSIS — F43.21 COMPLICATED GRIEF: ICD-10-CM

## 2024-04-24 DIAGNOSIS — G47.01 INSOMNIA DUE TO MEDICAL CONDITION: ICD-10-CM

## 2024-04-24 DIAGNOSIS — J44.9 CHRONIC OBSTRUCTIVE PULMONARY DISEASE, UNSPECIFIED COPD TYPE (HCC): ICD-10-CM

## 2024-04-24 PROCEDURE — 90834 PSYTX W PT 45 MINUTES: CPT | Performed by: SOCIAL WORKER

## 2024-04-24 RX ORDER — FLUTICASONE PROPIONATE AND SALMETEROL XINAFOATE 230; 21 UG/1; UG/1
2 AEROSOL, METERED RESPIRATORY (INHALATION) 2 TIMES DAILY
Qty: 12 G | Refills: 3 | Status: SHIPPED | OUTPATIENT
Start: 2024-04-24

## 2024-04-24 NOTE — TELEPHONE ENCOUNTER
Medication: fluticasone-salmeterol (Advair HFA) 230-21 MCG/ACT inhaler     Dose/Frequency: 230-21 MCG /   Summary: Inhale 2 puffs 2 (two) times a day Rinse mouth after use       Quantity: PATIENT REQUESTING 90 DAY SUPPLY SO IT WILL BE COVERED BY INSURANCE    Pharmacy: [id number] - [place] - [id number]     Office:   [x] PCP/Provider -   [] Speciality/Provider -     Does the patient have enough for 3 days?   [] Yes   [x] No - Send as HP to POD

## 2024-04-24 NOTE — PSYCH
Behavioral Health Psychotherapy Progress Note    Psychotherapy Provided: Individual Psychotherapy     1. Bipolar 2 disorder, major depressive episode (HCC)        2. Generalized anxiety disorder        3. Complicated grief        4. Insomnia due to medical condition            Goals addressed in session: Goal 1     DATA: LCSW met with Kathy Vazquez. Session was conducted In-person. Session today focused on development of treatment plan goals/objectives. Kathy does feel very positive about the apartment she has found and hopes to move in within the next 2-3 weeks. In her current living situation, the adult male in the house has moved out- she is unsure whether this is permanent or temporary but describes this has given her some sense of relief. We followed up on referrals for ICM/CPS and she is in agreement to considering both. She expressed interest specifically in peer support. She was glad to have been seen recently by psychiatry despite feeling some nerves about changing her medications. She reiterates her goal to be off of her Xanax. Specific techniques used in this session were: empathetic listening and validation.    During this session, this clinician used the following therapeutic modalities: Engagement Strategies, Client-centered Therapy, and Supportive Psychotherapy    Substance Abuse was not addressed during this session. If the client is diagnosed with a co-occurring substance use disorder, please indicate any changes in the frequency or amount of use: N/A. Stage of change for addressing substance use diagnoses: No substance use/Not applicable    ASSESSMENT:  Kathy Vazquez presents with a Labile mood. Kathy Vazquez was oriented to person, place, time, and situation. Grooming and Hygiene were good  and clothing was casually dressed and appropriate for weather. Ability to perform ADLs has not changed. she was cooperative.      her affect is Normal range and intensity, bright at times, which is  "congruent, with her mood and the content of the session. The client has made progress on their goals.    Kathy Vazquez presents with a none risk of suicide, none risk of self-harm, and none risk of harm to others.    For any risk assessment that surpasses a \"low\" rating, a safety plan must be developed.    A safety plan was indicated: no  If yes, describe in detail: N/A    PLAN: Between sessions, Kathy Vazquez will continue to focus on establishing safe/secure housing; we reviewed again the concept of a hierarchy of needs and that this security in housing may provide her with the ability to begin exploring other important topics in therapy in the future. At the next session, the therapist will use Engagement Strategies, Client-centered Therapy, and Supportive Psychotherapy to address symptoms as they arise. Kathy Vazquez will return to outpatient therapy on 5/8/24.     Behavioral Health Treatment Plan and Discharge Planning: Kathy Vazquez is aware of and agrees to continue to work on their treatment plan. They have identified and are working toward their discharge goals. yes    Visit start and stop times:    04/24/24  Start Time: 1013  Stop Time: 1059  Total Visit Time: 46 minutes  "

## 2024-04-24 NOTE — TELEPHONE ENCOUNTER
Patient returned call and [facility] was at pharmacy and [facility] pharmacy informed her prescription was cancelled. Would like to know why. [phone]

## 2024-04-25 ENCOUNTER — TELEPHONE (OUTPATIENT)
Dept: PSYCHIATRY | Facility: CLINIC | Age: 39
End: 2024-04-25

## 2024-04-25 NOTE — TELEPHONE ENCOUNTER
Medical record request was received from [place] for the time frame of 4/2/2024 to Present. Will be place in Rhona Espinoza's mailbox by the end of the day to be sign by:    PERI Scruggs, ASHLY

## 2024-04-25 NOTE — TELEPHONE ENCOUNTER
MRO request received by [place] on behalf of Kathy Vazquez -- AVE in place.  I filled out the request and placed the paperwork in therapist, Yuki Andrews's mailbox for her to sign as well.

## 2024-05-03 ENCOUNTER — TELEPHONE (OUTPATIENT)
Dept: PSYCHIATRY | Facility: CLINIC | Age: 39
End: 2024-05-03

## 2024-05-03 NOTE — TELEPHONE ENCOUNTER
I called Kathy via her cell phone # of record and she reports she will be getting a pill splitter tonight for her Alprazolam.  She asked about what made this provider feel she had the mood Dx I decided upon at the recent evaluation and I discussed this.  She admits she has not taken the Aripiprazole quite yet.  I discussed the purpose of my prescribing Aripiprazole-- ie  can help even out her mood and she accepted to try it.  She states she actually has not taken Bupropion ER/XL recently due to having lost the bottle.  She does mention happily that she was able to get housing set up and she will have an apt in the near future.  Pt will return for f/u 5/7/2024.

## 2024-05-03 NOTE — TELEPHONE ENCOUNTER
"Nurse spoke with Kathy.     Concerned about being accurate when splitting the Xanax 1 mg tablets to get 1.5 mg dose. \"I do not have a pill cutter and might not be able to afford one right now. Could this be ordered as 1 mg and 0.50 mg so I do not need to split the tablets or ordered?\"        2. I have not started the Abilify yet - I want to talk with Rhona more about this at my next appointment to better understand my diagnosis and treatment.       3. I have disability paperwork to bring to my next appointment.   "
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
## 2024-05-03 NOTE — TELEPHONE ENCOUNTER
Kathy Vazquez and/or patient requested a call back to discuss Insurance letter for [facility] And also medication change for 1/2 Xanax dosage, patient stated she is getting by with half dosage but there are some stressors she has had. Patient also stated she has some concerns with the Abilify medication.    They can be reached at P# [phone].       Thank you.

## 2024-05-07 ENCOUNTER — OFFICE VISIT (OUTPATIENT)
Dept: PSYCHIATRY | Facility: CLINIC | Age: 39
End: 2024-05-07
Payer: COMMERCIAL

## 2024-05-07 ENCOUNTER — TELEPHONE (OUTPATIENT)
Age: 39
End: 2024-05-07

## 2024-05-07 VITALS — HEIGHT: 63 IN | WEIGHT: 186.5 LBS | BODY MASS INDEX: 33.04 KG/M2

## 2024-05-07 DIAGNOSIS — F31.81 BIPOLAR II DISORDER (HCC): Primary | ICD-10-CM

## 2024-05-07 DIAGNOSIS — F41.1 GENERALIZED ANXIETY DISORDER: ICD-10-CM

## 2024-05-07 PROBLEM — Z72.0 TOBACCO ABUSE: Status: RESOLVED | Noted: 2020-08-11 | Resolved: 2024-05-07

## 2024-05-07 PROBLEM — F41.9 ANXIETY: Status: RESOLVED | Noted: 2021-03-15 | Resolved: 2024-05-07

## 2024-05-07 PROBLEM — F33.1 MAJOR DEPRESSIVE DISORDER, RECURRENT, MODERATE (HCC): Status: RESOLVED | Noted: 2020-08-24 | Resolved: 2024-05-07

## 2024-05-07 PROCEDURE — 99215 OFFICE O/P EST HI 40 MIN: CPT | Performed by: PHYSICIAN ASSISTANT

## 2024-05-07 RX ORDER — ALPRAZOLAM 0.25 MG/1
TABLET ORAL
Qty: 15 TABLET | Refills: 0 | Status: SHIPPED | OUTPATIENT
Start: 2024-05-07 | End: 2024-05-17 | Stop reason: SDUPTHER

## 2024-05-07 RX ORDER — ALPRAZOLAM 1 MG/1
TABLET ORAL
Qty: 45 TABLET | Refills: 0 | Status: SHIPPED | OUTPATIENT
Start: 2024-05-07

## 2024-05-07 RX ORDER — ARIPIPRAZOLE 5 MG/1
5 TABLET ORAL DAILY
Qty: 30 TABLET | Refills: 0 | Status: SHIPPED | OUTPATIENT
Start: 2024-05-07

## 2024-05-07 NOTE — TELEPHONE ENCOUNTER
Patient calling office in regards to Advair. Review of chart shows that a prior authorization is needed.

## 2024-05-07 NOTE — TELEPHONE ENCOUNTER
Patient calling in regards to the refill for Advair as her pharmacy has not yet received the prescription. Did inform patient that a prior authorization is needed prior to the medication being sent the the pharmacy. Informed patient that a prior authorization has been started and the office is awaiting a decision from her insurance. Patient understood and had no further questions or concerns.

## 2024-05-08 ENCOUNTER — TELEMEDICINE (OUTPATIENT)
Dept: BEHAVIORAL/MENTAL HEALTH CLINIC | Facility: CLINIC | Age: 39
End: 2024-05-08

## 2024-05-08 ENCOUNTER — TELEPHONE (OUTPATIENT)
Dept: PSYCHIATRY | Facility: CLINIC | Age: 39
End: 2024-05-08

## 2024-05-08 DIAGNOSIS — F41.1 GENERALIZED ANXIETY DISORDER: ICD-10-CM

## 2024-05-08 DIAGNOSIS — G47.01 INSOMNIA DUE TO MEDICAL CONDITION: ICD-10-CM

## 2024-05-08 DIAGNOSIS — F43.21 COMPLICATED GRIEF: ICD-10-CM

## 2024-05-08 DIAGNOSIS — F31.81 BIPOLAR 2 DISORDER, MAJOR DEPRESSIVE EPISODE (HCC): Primary | ICD-10-CM

## 2024-05-13 NOTE — TELEPHONE ENCOUNTER
PA for fluticasone-salmeterol (Advair HFA) 230-21 MCG/ACT inhaler not required     Reason: BRAND ADVAIR PA NOT REQUIRED

## 2024-05-14 NOTE — PSYCH
"Behavioral Health Psychotherapy Progress Note    Psychotherapy Provided: Individual Psychotherapy     1. Bipolar 2 disorder, major depressive episode (HCC)        2. Generalized anxiety disorder        3. Complicated grief        4. Insomnia due to medical condition            Goals addressed in session: Goal 1     DATA: LCSW met with Kathy Vazquez. Session was conducted Virtual. Session start was delayed unfortunately due to some technical difficulty/late notice regarding request to transition to virtual rather than in-office. she was able to set an agenda which included housing update- she is currently staying with her late mother's friend, Senthil, in his apartment temporarily until she can move into her new apartment on the 15th. She notes feeling \"emotional\" right now,\" having some trouble focusing, not yet starting Abilify prescribed by psychiatry. She notes some nerves regarding side effects. LCSW strongly advised reaching out to the office to review any concerns. Kathy does feel positive about tapering off of Xanax with psychiatrist support. She spoke about increasing her use of coping skills and reflecting on skills given handout provided at last session. She is working on goals including organizing her belongings to prepare for her move. Kathy expressed some gratitude about her life and remarked \"If you look at the clock, it's always now\" noting that her focus is on the present moment. Specific techniques used in this session were: empathetic listening, validation, and coping skills.    During this session, this clinician used the following therapeutic modalities: Client-centered Therapy and Supportive Psychotherapy    Substance Abuse was not addressed during this session. If the client is diagnosed with a co-occurring substance use disorder, please indicate any changes in the frequency or amount of use: N/A. Stage of change for addressing substance use diagnoses: No substance use/Not " "applicable    ASSESSMENT:  Kathy Vazquez presents with a Labile mood. Kathy Vazquez was oriented to person, place, time, and situation. Grooming and Hygiene were good  and clothing was casually dressed and appropriate for weather. Ability to perform ADLs has not changed. she was cooperative.      her affect is Normal range and intensity, bright at times, which is congruent, with her mood and the content of the session. The client has made progress on their goals.    Kathy Vazquez presents with a none risk of suicide, none risk of self-harm, and none risk of harm to others.    For any risk assessment that surpasses a \"low\" rating, a safety plan must be developed.    A safety plan was indicated: no  If yes, describe in detail: N/A    PLAN: Between sessions, Kathy Vazquez will continue to focus on current goals/priorities while incorporating use of coping skills/trialing of additional skills as able. At the next session, the therapist will use Client-centered Therapy and Supportive Psychotherapy to address symptoms as they arise. Kathy Vazquez will return to outpatient therapy on 5/22/24.     Behavioral Health Treatment Plan and Discharge Planning: Kathy Vazquez is aware of and agrees to continue to work on their treatment plan. They have identified and are working toward their discharge goals. yes    Visit start and stop times:    05/08/24  Start Time: 1018  Stop Time: 1054  Total Visit Time: 36 minutes    Virtual Regular Visit    Verification of patient location:    Patient [facility] in the following state in which I hold an active license [place]    Encounter provider Yuki Andrews LCSW    The patient was identified by name and date of birth. Kathy Vazquez was informed that this is a telemedicine visit and that the visit is being conducted through the Epic Embedded platform. She agrees to proceed. My office door was closed. No one else was in the room.  She " acknowledged consent and understanding of privacy and security of the video platform. The patient has agreed to participate and understands they can discontinue the visit at any time.    Patient is aware this is a billable service.

## 2024-05-16 ENCOUNTER — OFFICE VISIT (OUTPATIENT)
Dept: FAMILY MEDICINE CLINIC | Facility: CLINIC | Age: 39
End: 2024-05-16
Payer: COMMERCIAL

## 2024-05-16 ENCOUNTER — TELEPHONE (OUTPATIENT)
Dept: PSYCHIATRY | Facility: CLINIC | Age: 39
End: 2024-05-16

## 2024-05-16 VITALS
OXYGEN SATURATION: 96 % | HEIGHT: 63 IN | HEART RATE: 84 BPM | BODY MASS INDEX: 33.81 KG/M2 | DIASTOLIC BLOOD PRESSURE: 80 MMHG | SYSTOLIC BLOOD PRESSURE: 120 MMHG | WEIGHT: 190.8 LBS | RESPIRATION RATE: 18 BRPM | TEMPERATURE: 97.6 F

## 2024-05-16 DIAGNOSIS — Z72.0 TOBACCO ABUSE: Primary | ICD-10-CM

## 2024-05-16 DIAGNOSIS — Z22.7 LATENT TUBERCULOSIS DIAGNOSED BY BLOOD TEST: ICD-10-CM

## 2024-05-16 DIAGNOSIS — F41.1 GENERALIZED ANXIETY DISORDER: ICD-10-CM

## 2024-05-16 DIAGNOSIS — E03.8 SUBCLINICAL HYPOTHYROIDISM: ICD-10-CM

## 2024-05-16 DIAGNOSIS — Z79.899 CHRONIC USE OF BENZODIAZEPINE FOR THERAPEUTIC PURPOSE: ICD-10-CM

## 2024-05-16 DIAGNOSIS — F31.81 BIPOLAR 2 DISORDER, MAJOR DEPRESSIVE EPISODE (HCC): ICD-10-CM

## 2024-05-16 DIAGNOSIS — J44.9 CHRONIC OBSTRUCTIVE PULMONARY DISEASE, UNSPECIFIED COPD TYPE (HCC): ICD-10-CM

## 2024-05-16 DIAGNOSIS — I10 ESSENTIAL HYPERTENSION: ICD-10-CM

## 2024-05-16 PROCEDURE — 99214 OFFICE O/P EST MOD 30 MIN: CPT | Performed by: FAMILY MEDICINE

## 2024-05-16 RX ORDER — FLUTICASONE PROPIONATE AND SALMETEROL XINAFOATE 230; 21 UG/1; UG/1
2 AEROSOL, METERED RESPIRATORY (INHALATION) 2 TIMES DAILY
Qty: 12 G | Refills: 3 | Status: SHIPPED | OUTPATIENT
Start: 2024-05-16

## 2024-05-16 RX ORDER — LISINOPRIL 40 MG/1
40 TABLET ORAL DAILY
Qty: 90 TABLET | Refills: 1 | Status: SHIPPED | OUTPATIENT
Start: 2024-05-16

## 2024-05-16 NOTE — TELEPHONE ENCOUNTER
Patient LVM requesting a provider call back or to get a message to provider.    Writer called and spoke to patient. Patient stated that they are looking to discuss medications. Writer stated provider would be made aware of the request. Patient stated they would wait for a provider call back.

## 2024-05-16 NOTE — PROGRESS NOTES
Subjective:      Patient ID: Kathy Vazquez is a 39 y.o. female.    Here for follow up-has multiple complaints- mainly chills, fatigue, anxiety, states it is due to xanax withdrawal as she has cut back to 1.5 mg daily and 0.25 mg prn from 2 mg daily (1 mg bid) and has now established with psychiatry  She has been compliant with her medications     Due to missed appointments was dismissed from infectious disease and would like to re-establish  COPD- has cut back on cigarettes,   moved to new house and is excited    Shortness of Breath  The current episode started 1 to 4 weeks ago. The problem occurs daily. The problem has been gradually worsening since onset. Associated symptoms include chest pain and fatigue. Pertinent negatives include no leg swelling, orthopnea, rhinorrhea, sore throat or wheezing. The symptoms are aggravated by emotional upset, smoke and fumes.       Past Medical History:   Diagnosis Date    Acute cough 6/14/2022    Allergies     Anxiety 05/2018    Female hirsutism     GERD (gastroesophageal reflux disease)     Head injury     Hypertension 2018    Papanicolaou smear for cervical cancer screening 2017    NL, no h/o of abn pap that she can recall    STD (female)     Thyroid nodule 05/2018    Tobacco user 05/2018       Family History   Problem Relation Age of Onset    Hypertension Mother     Kidney disease Mother     Cirrhosis Mother     Dialysis Mother     Diabetes Mother     Anxiety disorder Mother     Depression Mother     Anxiety disorder Father     Depression Father     Drug abuse Father     Bipolar disorder Father     Melanoma Paternal Uncle     Stomach cancer Other     Colon cancer Other         Passed age 38       Past Surgical History:   Procedure Laterality Date    NO PAST SURGERIES          reports that she has been smoking cigarettes. She has a 25 pack-year smoking history. She has never used smokeless tobacco. She reports that she does not currently use alcohol. She reports that she  does not currently use drugs after having used the following drugs: Benzodiazepines and Marijuana.      Current Outpatient Medications:     albuterol (PROVENTIL HFA,VENTOLIN HFA) 90 mcg/act inhaler, Inhale 2 puffs every 4 (four) hours as needed for wheezing or shortness of breath, Disp: 8.5 g, Rfl: 2    ALPRAZolam (XANAX) 1 mg tablet, Take 1 tab po qAM and 1/2 tab po qhs, Disp: 45 tablet, Rfl: 0    ARIPiprazole (ABILIFY) 5 mg tablet, Take 1 tablet (5 mg total) by mouth daily, Disp: 30 tablet, Rfl: 0    fluticasone-salmeterol (Advair HFA) 230-21 MCG/ACT inhaler, Inhale 2 puffs 2 (two) times a day Rinse mouth after use., Disp: 12 g, Rfl: 3    lisinopril (ZESTRIL) 40 mg tablet, Take 1 tablet (40 mg total) by mouth daily, Disp: 90 tablet, Rfl: 1    ALPRAZolam (XANAX) 0.25 mg tablet, Take 1 tablet (0.25 mg total) by mouth daily, Disp: 30 tablet, Rfl: 0    buPROPion (WELLBUTRIN XL) 150 mg 24 hr tablet, Take 1 tablet (150 mg total) by mouth every morning, Disp: 30 tablet, Rfl: 5    clobetasol (TEMOVATE) 0.05 % ointment, Apply topically 2 (two) times a day for 7 days, Disp: 60 g, Rfl: 0    famotidine (PEPCID) 20 mg tablet, Take 1 tablet (20 mg total) by mouth 2 (two) times a day (Patient not taking: Reported on 3/11/2024), Disp: 30 tablet, Rfl: 0    ibuprofen (MOTRIN) 800 mg tablet, Take 1 tablet (800 mg total) by mouth every 8 (eight) hours as needed for mild pain, Disp: 90 tablet, Rfl: 0    sucralfate (CARAFATE) 1 g/10 mL suspension, Take 10 mL (1 g total) by mouth 4 (four) times a day for 7 days, Disp: 280 mL, Rfl: 0    The following portions of the patient's history were reviewed and updated as appropriate: allergies, current medications, past family history, past medical history, past social history, past surgical history and problem list.    Review of Systems   Constitutional:  Positive for fatigue. Negative for fever.   HENT:  Negative for ear pain, rhinorrhea and sore throat.    Respiratory:  Positive for shortness  "of breath. Negative for wheezing.    Cardiovascular:  Positive for chest pain. Negative for orthopnea and leg swelling.   Gastrointestinal:  Negative for abdominal pain and vomiting.   Musculoskeletal:  Negative for neck pain.   Skin:  Negative for rash.   Neurological:  Positive for headaches.         PHQ-2/9 Depression Screening    Little interest or pleasure in doing things: 1 - several days  Feeling down, depressed, or hopeless: 1 - several days  Trouble falling or staying asleep, or sleeping too much: 3 - nearly every day  Feeling tired or having little energy: 2 - more than half the days  Poor appetite or overeatin - more than half the days  Feeling bad about yourself - or that you are a failure or have let yourself or your family down: 1 - several days  Trouble concentrating on things, such as reading the newspaper or watching television: 3 - nearly every day  Moving or speaking so slowly that other people could have noticed. Or the opposite - being so fidgety or restless that you have been moving around a lot more than usual: 2 - more than half the days  Thoughts that you would be better off dead, or of hurting yourself in some way: 0 - not at all  PHQ-9 Score: 15  PHQ-9 Interpretation: Moderately severe depression             Objective:    /80 (BP Location: Left arm, Patient Position: Sitting, Cuff Size: Adult)   Pulse 84   Temp 97.6 °F (36.4 °C) (Tympanic)   Resp 18   Ht 5' 3\" (1.6 m)   Wt 86.5 kg (190 lb 12.8 oz)   LMP 2024 (Approximate)   SpO2 96%   BMI 33.80 kg/m²      Physical Exam  Vitals and nursing note reviewed.   Constitutional:       Appearance: Normal appearance. She is well-developed. She is obese.   HENT:      Head: Normocephalic and atraumatic.      Right Ear: Tympanic membrane, ear canal and external ear normal.      Left Ear: Tympanic membrane, ear canal and external ear normal.      Nose: Nose normal.   Eyes:      General: No scleral icterus.        Right eye: No " discharge.         Left eye: No discharge.      Conjunctiva/sclera: Conjunctivae normal.      Pupils: Pupils are equal, round, and reactive to light.   Neck:      Thyroid: No thyromegaly.   Cardiovascular:      Rate and Rhythm: Normal rate and regular rhythm.      Heart sounds: Normal heart sounds. No murmur heard.     No gallop.   Pulmonary:      Effort: Pulmonary effort is normal.      Breath sounds: Normal breath sounds. No wheezing or rales.   Abdominal:      General: There is no distension.      Palpations: Abdomen is soft.      Tenderness: There is no abdominal tenderness.   Musculoskeletal:         General: No tenderness or deformity.      Cervical back: Normal range of motion and neck supple.   Lymphadenopathy:      Cervical: No cervical adenopathy.   Skin:     Findings: No erythema or rash.   Neurological:      General: No focal deficit present.      Mental Status: She is alert. Mental status is at baseline.   Psychiatric:         Mood and Affect: Mood normal.         Behavior: Behavior normal.           Recent Results (from the past 8736 hour(s))   ECG 12 lead    Collection Time: 07/09/23 12:32 AM   Result Value Ref Range    Ventricular Rate 82 BPM    Atrial Rate 82 BPM    NV Interval 157 ms    QRSD Interval 104 ms    QT Interval 381 ms    QTC Interval 445 ms    P Axis 46 degrees    QRS Axis 35 degrees    T Wave Axis 39 degrees   CBC and differential    Collection Time: 07/09/23 12:49 AM   Result Value Ref Range    WBC 8.69 4.31 - 10.16 Thousand/uL    RBC 4.54 3.81 - 5.12 Million/uL    Hemoglobin 13.5 11.5 - 15.4 g/dL    Hematocrit 40.7 34.8 - 46.1 %    MCV 90 82 - 98 fL    MCH 29.7 26.8 - 34.3 pg    MCHC 33.2 31.4 - 37.4 g/dL    RDW 12.5 11.6 - 15.1 %    MPV 10.3 8.9 - 12.7 fL    Platelets 236 149 - 390 Thousands/uL    nRBC 0 /100 WBCs    Segmented % 58 43 - 75 %    Immature Grans % 0 0 - 2 %    Lymphocytes % 33 14 - 44 %    Monocytes % 7 4 - 12 %    Eosinophils Relative 1 0 - 6 %    Basophils Relative 1  "0 - 1 %    Absolute Neutrophils 5.16 1.85 - 7.62 Thousands/µL    Absolute Immature Grans 0.01 0.00 - 0.20 Thousand/uL    Absolute Lymphocytes 2.82 0.60 - 4.47 Thousands/µL    Absolute Monocytes 0.58 0.17 - 1.22 Thousand/µL    Eosinophils Absolute 0.08 0.00 - 0.61 Thousand/µL    Basophils Absolute 0.04 0.00 - 0.10 Thousands/µL   Comprehensive metabolic panel    Collection Time: 07/09/23 12:49 AM   Result Value Ref Range    Sodium 137 135 - 147 mmol/L    Potassium 3.9 3.5 - 5.3 mmol/L    Chloride 105 96 - 108 mmol/L    CO2 25 21 - 32 mmol/L    ANION GAP 7 mmol/L    BUN 12 5 - 25 mg/dL    Creatinine 0.75 0.60 - 1.30 mg/dL    Glucose 97 65 - 140 mg/dL    Calcium 9.1 8.4 - 10.2 mg/dL    AST 19 13 - 39 U/L    ALT 32 7 - 52 U/L    Alkaline Phosphatase 43 34 - 104 U/L    Total Protein 7.0 6.4 - 8.4 g/dL    Albumin 4.3 3.5 - 5.0 g/dL    Total Bilirubin 0.47 0.20 - 1.00 mg/dL    eGFR 100 ml/min/1.73sq m   Magnesium    Collection Time: 07/09/23 12:49 AM   Result Value Ref Range    Magnesium 1.8 (L) 1.9 - 2.7 mg/dL   TSH    Collection Time: 07/09/23 12:49 AM   Result Value Ref Range    TSH 3RD GENERATON 1.782 0.450 - 4.500 uIU/mL   HS Troponin 0hr (reflex protocol)    Collection Time: 07/09/23 12:49 AM   Result Value Ref Range    hs TnI 0hr <2 \"Refer to ACS Flowchart\"- see link ng/L   POCT pregnancy, urine    Collection Time: 07/09/23  1:50 AM   Result Value Ref Range    EXT Preg Test, Ur Negative     Control Valid    ECG 12 lead    Collection Time: 09/05/23  8:52 PM   Result Value Ref Range    Ventricular Rate 73 BPM    Atrial Rate 73 BPM    WI Interval 146 ms    QRSD Interval 92 ms    QT Interval 366 ms    QTC Interval 403 ms    P [place] 13 degrees    QRS Axis 44 degrees    T Wave Axis 31 degrees   CBC and differential    Collection Time: 09/05/23  9:44 PM   Result Value Ref Range    WBC 5.46 4.31 - 10.16 Thousand/uL    RBC 4.61 3.81 - 5.12 Million/uL    Hemoglobin 13.6 11.5 - 15.4 g/dL    Hematocrit 40.9 34.8 - 46.1 %    MCV " "89 82 - 98 fL    MCH 29.5 26.8 - 34.3 pg    MCHC 33.3 31.4 - 37.4 g/dL    RDW 12.8 11.6 - 15.1 %    MPV 10.5 8.9 - 12.7 fL    Platelets 188 149 - 390 Thousands/uL    nRBC 0 /100 WBCs    Segmented % 50 43 - 75 %    Immature Grans % 0 0 - 2 %    Lymphocytes % 38 14 - 44 %    Monocytes % 9 4 - 12 %    Eosinophils Relative 2 0 - 6 %    Basophils Relative 1 0 - 1 %    Absolute Neutrophils 2.76 1.85 - 7.62 Thousands/µL    Absolute Immature Grans 0.01 0.00 - 0.20 Thousand/uL    Absolute Lymphocytes 2.05 0.60 - 4.47 Thousands/µL    Absolute Monocytes 0.48 0.17 - 1.22 Thousand/µL    Eosinophils Absolute 0.12 0.00 - 0.61 Thousand/µL    Basophils Absolute 0.04 0.00 - 0.10 Thousands/µL   Comprehensive metabolic panel    Collection Time: 09/05/23  9:44 PM   Result Value Ref Range    Sodium 137 135 - 147 mmol/L    Potassium 3.6 3.5 - 5.3 mmol/L    Chloride 107 96 - 108 mmol/L    CO2 22 21 - 32 mmol/L    ANION GAP 8 mmol/L    BUN 13 5 - 25 mg/dL    Creatinine 0.66 0.60 - 1.30 mg/dL    Glucose 108 65 - 140 mg/dL    Calcium 9.0 8.4 - 10.2 mg/dL    AST 16 13 - 39 U/L    ALT 20 7 - 52 U/L    Alkaline Phosphatase 40 34 - 104 U/L    Total Protein 6.9 6.4 - 8.4 g/dL    Albumin 4.3 3.5 - 5.0 g/dL    Total Bilirubin 0.42 0.20 - 1.00 mg/dL    eGFR 110 ml/min/1.73sq m   HS Troponin 0hr (reflex protocol)    Collection Time: 09/05/23  9:44 PM   Result Value Ref Range    hs TnI 0hr <2 \"Refer to ACS Flowchart\"- see link ng/L   FLU/RSV/COVID - if FLU/RSV clinically relevant    Collection Time: 09/05/23  9:44 PM    Specimen: Nose; Nares   Result Value Ref Range    SARS-CoV-2 Negative Negative    INFLUENZA A PCR Negative Negative    INFLUENZA B PCR Negative Negative    RSV PCR Negative Negative   Procalcitonin    Collection Time: 09/05/23  9:44 PM   Result Value Ref Range    Procalcitonin <0.05 <=0.25 ng/ml   Quantiferon TB Gold Plus    Collection Time: 09/05/23  9:47 PM   Result Value Ref Range    QFT Nil 0.17 0 - 8.0 IU/ml    QFT TB1-NIL 1.91 " "IU/ml    QFT TB2-NIL 1.72 IU/ml    QFT Mitogen-NIL >10.00 IU/ml    QFT Final Interpretation Positive (A) Negative       Laboratory Results: I have personally reviewed the pertinent laboratory results/reports     Radiology/Other Diagnostic Testing Results: I have personally reviewed pertinent reports.      XR chest 2 views    Result Date: 9/5/2023  CHEST INDICATION:   Cough, dyspnea. COMPARISON:  None EXAM PERFORMED/VIEWS:  XR CHEST PA & LATERAL FINDINGS: Cardiomediastinal silhouette appears unremarkable. The lungs are clear.  No pneumothorax or pleural effusion. Osseous structures appear within normal limits for patient age.     No acute cardiopulmonary disease. Workstation performed: [id number]        Assessment/Plan:  1. Tobacco abuse  2. Chronic obstructive pulmonary disease, unspecified COPD type (HCC)  -     fluticasone-salmeterol (Advair HFA) 230-21 MCG/ACT inhaler; Inhale 2 puffs 2 (two) times a day Rinse mouth after use.  3. Essential hypertension  -     lisinopril (ZESTRIL) 40 mg tablet; Take 1 tablet (40 mg total) by mouth daily  4. Generalized anxiety disorder  5. Bipolar 2 disorder, major depressive episode (HCC)  6. Latent tuberculosis diagnosed by blood test  7. Subclinical hypothyroidism  8. Chronic use of benzodiazepine for therapeutic purpose      Discussed to follow up with psychiatrist -preferably physician due to her benzodiazepine dependence.  She was advised to take abilify at night per Psych PA, recommend generally works better in morning.  Recommend continuing advair and lisinopril.  Obtain labs prior to next appointment as previously ordered.  F/u in 3months             Read package inserts for all medications before starting a new medications, call me if you have any questions.    Patient was given opportunity to ask questions and all questions were answered.    Disclaimer: Portions of the record may have been created with voice recognition software. Occasional wrong word or \"sound a like\" " substitutions may have occurred due to the inherent limitations of voice recognition software. Read the chart carefully and recognize, using context, where substitutions have occurred. I have used the Epic copy/forward function to compose this note. I have reviewed my current note to ensure it reflects the current patient status, exam, assessment and plan.    Answers submitted by the patient for this visit:  Shortness of Breath Questionnaire (Submitted on 5/16/2024)  Chief Complaint: Shortness of breath  Chronicity: recurrent  Episode duration: 7 Days  hemoptysis: No  sputum production: No  PND: No  syncope: No  claudication: No  leg pain: No  coryza: No  swollen glands: No

## 2024-05-17 ENCOUNTER — TELEPHONE (OUTPATIENT)
Dept: PSYCHIATRY | Facility: CLINIC | Age: 39
End: 2024-05-17

## 2024-05-17 ENCOUNTER — OFFICE VISIT (OUTPATIENT)
Dept: PSYCHIATRY | Facility: CLINIC | Age: 39
End: 2024-05-17

## 2024-05-17 DIAGNOSIS — F41.1 GENERALIZED ANXIETY DISORDER: ICD-10-CM

## 2024-05-17 RX ORDER — ALPRAZOLAM 0.25 MG/1
0.25 TABLET ORAL DAILY
Qty: 30 TABLET | Refills: 0 | Status: SHIPPED | OUTPATIENT
Start: 2024-05-17

## 2024-05-17 RX ORDER — ALPRAZOLAM 0.25 MG/1
TABLET ORAL
Qty: 30 TABLET | Refills: 0 | Status: SHIPPED | OUTPATIENT
Start: 2024-05-17 | End: 2024-05-17 | Stop reason: SDUPTHER
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
## 2024-05-17 NOTE — TELEPHONE ENCOUNTER
Writer called and LVM for patient offering a sooner appointment to talk with provider today (5/17 @3:30)     Writer left office number for patient to respond back.

## 2024-05-17 NOTE — TELEPHONE ENCOUNTER
Received Mental Health Physcian's statement via fax and scanned into the patients chart and then placed into the providers mailbox to be completed.   in no acute distress , ambulating without difficulty , normal communication ability

## 2024-05-18 PROBLEM — Z79.899 CHRONIC USE OF BENZODIAZEPINE FOR THERAPEUTIC PURPOSE: Status: ACTIVE | Noted: 2024-05-18

## 2024-05-20 ENCOUNTER — TELEPHONE (OUTPATIENT)
Dept: PSYCHIATRY | Facility: CLINIC | Age: 39
End: 2024-05-20

## 2024-05-20 NOTE — TELEPHONE ENCOUNTER
Patient was in  office on 5/17/24 for an appt and sign an AVE for [facility].   A Mental Health Attending Physician's Statement form will be placed in provider's mail bin for completion.   Once done it will be fax top  [phone]      Another AVE is also sign  for medical record ( all from our office) release for [facility].

## 2024-05-21 ENCOUNTER — TELEPHONE (OUTPATIENT)
Dept: PSYCHIATRY | Facility: CLINIC | Age: 39
End: 2024-05-21

## 2024-05-21 NOTE — TELEPHONE ENCOUNTER
Medical record request was received from [facility] for the time frame of 1/2024 to Present. Will be place in Rhona Espinoza's mailbox by the end of the day, to be sign by:    PERI Scruggs LCSW

## 2024-05-21 NOTE — TELEPHONE ENCOUNTER
Patient called the office seeking to find out if Yuki Andrews had any later appts open for 5/22/2024, writer checked and was not able to find any. Patient will keep current date and time.

## 2024-05-22 NOTE — TELEPHONE ENCOUNTER
MRO request by Kathy to provide records for the time frame of 1/2024 - the present, to [facility].  Form filled out and placed in therapist/LCSW  Yuki Andrews's mailbox for her signature as well.

## 2024-05-22 NOTE — TELEPHONE ENCOUNTER
[facility] Attending Physician Statement was completed and placed in the 's mailbox to be faxed to the insurance company as indicated on the form [phone].

## 2024-05-23 ENCOUNTER — SOCIAL WORK (OUTPATIENT)
Dept: BEHAVIORAL/MENTAL HEALTH CLINIC | Facility: CLINIC | Age: 39
End: 2024-05-23
Payer: COMMERCIAL

## 2024-05-23 DIAGNOSIS — F41.1 GENERALIZED ANXIETY DISORDER: ICD-10-CM

## 2024-05-23 DIAGNOSIS — F43.21 COMPLICATED GRIEF: ICD-10-CM

## 2024-05-23 DIAGNOSIS — G47.01 INSOMNIA DUE TO MEDICAL CONDITION: ICD-10-CM

## 2024-05-23 DIAGNOSIS — F31.81 BIPOLAR 2 DISORDER, MAJOR DEPRESSIVE EPISODE (HCC): Primary | ICD-10-CM

## 2024-05-23 PROCEDURE — 90837 PSYTX W PT 60 MINUTES: CPT | Performed by: SOCIAL WORKER

## 2024-05-23 NOTE — PSYCH
Behavioral Health Psychotherapy Progress Note    Psychotherapy Provided: Individual Psychotherapy     1. Bipolar 2 disorder, major depressive episode (HCC)        2. Generalized anxiety disorder        3. Complicated grief        4. Insomnia due to medical condition            Goals addressed in session: Goal 1     DATA: LCSW met with Kathy Vazquez. Session was conducted In-person. she was able to set an agenda which included discussion of building rapport with psychiatry provider, sharing about moving into/adjusting to new apartment, and having coordinated with [place] re [facility] services (meeting next week). Kathy spoke about the positive feeling of moving into her apartment and proudly shared video of the space. She is hopeful that this will allow her to re-build feelings of safety/security and independence; ASHLY and Kathy also discussed that meeting the goal of having her own housing may allow her to process other painful topics such as her grief. Referral provided for adult bereavement group - Kathy will be on a waitlist, however, is open to this resource and appreciates the additional support. Specific techniques used in this session were: empathetic listening and validation    During this session, this clinician used the following therapeutic modalities: Client-centered Therapy and Supportive Psychotherapy    Substance Abuse was not addressed during this session. If the client is diagnosed with a co-occurring substance use disorder, please indicate any changes in the frequency or amount of use: N/A. Stage of change for addressing substance use diagnoses: No substance use/Not applicable    ASSESSMENT:  Kathy Vazquez presents with a Anxious mood. Kathy Vazquez was oriented to person, place, time, and situation. Grooming and Hygiene were good  and clothing was casually dressed and appropriate for weather. Ability to perform ADLs has not changed. she was cooperative.     her affect is Normal  "range and intensity, which is congruent, with her mood and the content of the session. The client has made some progress on their goals.    Kathy Vazquez presents with a none risk of suicide, none risk of self-harm, and none risk of harm to others.    For any risk assessment that surpasses a \"low\" rating, a safety plan must be developed.    A safety plan was indicated: no  If yes, describe in detail: N/A    PLAN: Between sessions, Kathy Vazquez will continue working on adjusting to her new apartment, slowly obtaining needed items and allowing herself to begin some emotional reflection/improved self-care. At the next session, the therapist will use Client-centered Therapy and Supportive Psychotherapy to address symptoms as they arise. Kathy Vazquez will return to outpatient therapy on 6/5/2024.     Behavioral Health Treatment Plan and Discharge Planning: Kathy Vazquez is aware of and agrees to continue to work on their treatment plan. They have identified and are working toward their discharge goals. yes    Visit start and stop times:    05/23/24  Start Time: 0203  Stop Time: 0257  Total Visit Time: 54 minutes  "

## 2024-06-04 ENCOUNTER — TELEPHONE (OUTPATIENT)
Age: 39
End: 2024-06-04

## 2024-06-04 DIAGNOSIS — J44.9 CHRONIC OBSTRUCTIVE PULMONARY DISEASE, UNSPECIFIED COPD TYPE (HCC): ICD-10-CM

## 2024-06-04 RX ORDER — FLUTICASONE PROPIONATE AND SALMETEROL XINAFOATE 230; 21 UG/1; UG/1
2 AEROSOL, METERED RESPIRATORY (INHALATION) 2 TIMES DAILY
Qty: 36 G | Refills: 1 | Status: SHIPPED | OUTPATIENT
Start: 2024-06-04

## 2024-06-04 NOTE — TELEPHONE ENCOUNTER
Rcvd call from patient wanted to know status of Disability Docs, unfortunately I wasn't able to view docs transferred call to office.

## 2024-06-04 NOTE — TELEPHONE ENCOUNTER
fluticasone-salmeterol (Advair HFA) 230-21 MCG/ACT inhaler needs to be sent as a 90 day supply for her insurance to cover please
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
## 2024-06-05 ENCOUNTER — TELEMEDICINE (OUTPATIENT)
Dept: BEHAVIORAL/MENTAL HEALTH CLINIC | Facility: CLINIC | Age: 39
End: 2024-06-05
Payer: COMMERCIAL

## 2024-06-05 DIAGNOSIS — F31.81 BIPOLAR 2 DISORDER, MAJOR DEPRESSIVE EPISODE (HCC): Primary | ICD-10-CM

## 2024-06-05 DIAGNOSIS — F41.1 GENERALIZED ANXIETY DISORDER: ICD-10-CM

## 2024-06-05 DIAGNOSIS — G47.01 INSOMNIA DUE TO MEDICAL CONDITION: ICD-10-CM

## 2024-06-05 DIAGNOSIS — F43.21 COMPLICATED GRIEF: ICD-10-CM

## 2024-06-05 PROCEDURE — 90832 PSYTX W PT 30 MINUTES: CPT | Performed by: SOCIAL WORKER

## 2024-06-05 NOTE — PSYCH
Behavioral Health Psychotherapy Progress Note    Psychotherapy Provided: Individual Psychotherapy     1. Bipolar 2 disorder, major depressive episode (HCC)        2. Generalized anxiety disorder        3. Complicated grief        4. Insomnia due to medical condition            Goals addressed in session: Goal 1     DATA: LCSW met with Kathy Vazquez. Session was conducted Virtual. Session unfortunately started late today due to LCSW having run late which was briefly discussed/processed with Kathy. She expressed understanding. she was able to set an agenda which included experiencing anxiety around moving into her new apartment; she noted feeling some pressure to unpack quickly and settle in but also feeling some level of exhaustion. We again reviewed Maslow's Hierarchy briefly and LCSW empathized as Kathy may be just finding a sense of security in this apartment and her mind/body are adjusting to this. Encouraged being patient with herself and consideration of setting small/short-term goals (ex: rather than unpacking a whole room, starting with one box or identifying tobin items). Kathy was receptive to this approach and expressed appreciation for LCSW understanding. We also discussed importance of balance in her days including some time outdoors or time being social whether on the phone/in person. She does note she was assigned to meet with Kailyn (mentor) though they have not met yet. Also reviewed that she had been referred to adult bereavement group and is on a short waitlist before scheduling. Specific techniques used in this session were: problem solving approach, empathetic listening, validation, and cognitive restructuring.    During this session, this clinician used the following therapeutic modalities: Client-centered Therapy, Cognitive Behavioral Therapy, Solution-Focused Therapy, and Supportive Psychotherapy    Substance Abuse was not addressed during this session. If the client is  "diagnosed with a co-occurring substance use disorder, please indicate any changes in the frequency or amount of use: N/A. Stage of change for addressing substance use diagnoses: No substance use/Not applicable    ASSESSMENT:  Kathy Vazquez presents with a Anxious and Depressed mood. Kathy Vazquez was oriented to person, place, time, and situation. Grooming and Hygiene were good  and clothing was casually dressed and appropriate for weather. Ability to perform ADLs has not changed. she was cooperative.     her affect is Normal range and intensity, which is congruent, with her mood and the content of the session. The client has made progress on their goals.    Kathy Vazquez presents with a none risk of suicide, none risk of self-harm, and none risk of harm to others.    For any risk assessment that surpasses a \"low\" rating, a safety plan must be developed.    A safety plan was indicated: no  If yes, describe in detail: N/A    PLAN: Between sessions, Kathy Vazquez will consider trying to set smaller goals for herself to encourage feelings of success while also allowing for rest and self-care. At the next session, the therapist will use Client-centered Therapy, Cognitive Behavioral Therapy, Solution-Focused Therapy, and Supportive Psychotherapy to address symptoms as they arise. Kathy Vazquez will return to outpatient therapy on 6/11/2024.     Behavioral Health Treatment Plan and Discharge Planning: Kathy Vazquez is aware of and agrees to continue to work on their treatment plan. They have identified and are working toward their discharge goals. yes    Visit start and stop times:    06/05/24  Start Time: 1022  Stop Time: 1056  Total Visit Time: 34 minutes    Virtual Regular Visit    Verification of patient location:    Patient is located at Home in the following state in which I hold an active license [place]    The patient was identified by name and date of birth. Kathy Vazquez was informed that this is " a telemedicine visit and that the visit is being conducted through the Epic Embedded platform. She agrees to proceed. My office door was closed. No one else was in the room.  She acknowledged consent and understanding of privacy and security of the video platform. The patient has agreed to participate and understands they can discontinue the visit at any time.    Patient is aware this is a billable service.

## 2024-06-10 ENCOUNTER — TELEPHONE (OUTPATIENT)
Dept: PSYCHIATRY | Facility: CLINIC | Age: 39
End: 2024-06-10

## 2024-06-10 ENCOUNTER — TELEPHONE (OUTPATIENT)
Age: 39
End: 2024-06-10

## 2024-06-10 NOTE — TELEPHONE ENCOUNTER
Left voicemail informing patient and/or parent/guardian of the Psych Encounter form needing to be signed as a requirement from the insurance company for billing purposes. Patient can access form via [employer] and sign electronically.     Please make patient aware this form must be signed for each visit as a requirement to continue future visits with provider.

## 2024-06-10 NOTE — TELEPHONE ENCOUNTER
CARMEN: Yan at [facility] confirm that the form was received was send on 6/3/24. He was told it was not received yet. He will refax and call bk for confirmation of form.

## 2024-06-11 ENCOUNTER — OFFICE VISIT (OUTPATIENT)
Dept: PSYCHIATRY | Facility: CLINIC | Age: 39
End: 2024-06-11
Payer: COMMERCIAL

## 2024-06-11 ENCOUNTER — TELEMEDICINE (OUTPATIENT)
Dept: BEHAVIORAL/MENTAL HEALTH CLINIC | Facility: CLINIC | Age: 39
End: 2024-06-11
Payer: COMMERCIAL

## 2024-06-11 VITALS
HEART RATE: 69 BPM | DIASTOLIC BLOOD PRESSURE: 85 MMHG | BODY MASS INDEX: 34.16 KG/M2 | WEIGHT: 192.8 LBS | SYSTOLIC BLOOD PRESSURE: 125 MMHG | HEIGHT: 63 IN

## 2024-06-11 DIAGNOSIS — F41.1 GENERALIZED ANXIETY DISORDER: ICD-10-CM

## 2024-06-11 DIAGNOSIS — F31.81 BIPOLAR 2 DISORDER, MAJOR DEPRESSIVE EPISODE (HCC): Primary | ICD-10-CM

## 2024-06-11 DIAGNOSIS — F31.81 BIPOLAR II DISORDER (HCC): Primary | ICD-10-CM

## 2024-06-11 DIAGNOSIS — G47.01 INSOMNIA DUE TO MEDICAL CONDITION: ICD-10-CM

## 2024-06-11 DIAGNOSIS — F43.21 COMPLICATED GRIEF: ICD-10-CM

## 2024-06-11 PROCEDURE — 90837 PSYTX W PT 60 MINUTES: CPT | Performed by: SOCIAL WORKER

## 2024-06-11 PROCEDURE — 99214 OFFICE O/P EST MOD 30 MIN: CPT | Performed by: PHYSICIAN ASSISTANT

## 2024-06-11 RX ORDER — ARIPIPRAZOLE 15 MG/1
7.5 TABLET ORAL DAILY
Qty: 15 TABLET | Refills: 2 | Status: SHIPPED | OUTPATIENT
Start: 2024-06-11

## 2024-06-11 RX ORDER — ALPRAZOLAM 1 MG/1
TABLET ORAL
Qty: 45 TABLET | Refills: 0 | Status: SHIPPED | OUTPATIENT
Start: 2024-06-11

## 2024-06-11 NOTE — BH TREATMENT PLAN
"TREATMENT PLAN (Medication Management Only)        [facility]    Name/Date of Birth/MRN:  Kathy Vazquez 39 y.o. 1985 MRN: [id number]  Date of Treatment Plan: June 11, 2024  Diagnosis/Diagnoses:   1. Bipolar II disorder (HCC)    2. Generalized anxiety disorder    3. Insomnia due to medical condition      Strengths/Personal Resources for Self-Care: \"My self worth, I know I'm amazing; I'm a great person, I'm a great human, I help people, I exert myself, I'm kind, I'm compassionate, I'm very loving.\"  Area/Areas of need (in own words): \"Anxiety, it is, I'm anxious, it's a new life experience but I'm accepting a lot more \".  1. Long Term Goal:   Maintain mood stability and control of anxiety  Target Date:  3-6 months  Person/Persons responsible for completion of goal: Yuki Terrell LCSW (therapist)  2.  Short Term Objective (s) - How will we reach this goal?:   A.  Provider new recommended medication/dosage changes and/or continue medication(s):  Pt is having depressive and anxious but no recent panic attacks or current c/o withdrawal Sxs.  She does not appear to show signs of withdrawal, /85 and Pulse is 69.   Tx options discussed and Pt accepts to increase Aripiprazole for overall mood control. Continue Alprazolam at a total of 1.75mg per day until the current Rxs for the 0.25mg tab are finished, and Pt to continue on at 1.5mg per day thereafter, until seeing me again.  Continue the Bupropion ER unchanged.  Sleep hygiene discussed-- make a routine bedtime and wake time.   Treatment plan done and Pt accepts the plan. Safety Plan was done 4/15/2024 by Pt's therapist.    Increase Aripiprazole 15mg (1/2) tab po qhs -- Pt given Qty 30 on 5/7/2024  Continue:  Alprazolam 1mg (1) tab po qAM and (1/2) tab po qhs # 45 -- last filled 5/22/2024 for Qty 45 per PDMP  Alprazolam 0.25mg (1) tab po qhs prn severe anxiety-- Pt to finish out the " 5/7/2024 and 5/17/2024 Rxs    Bupropion ER 150mg (1) tab p qAM -- Pt has refills from a 3/11/2025 Rx by PCP  Pt still to get done: CMP, CBC with diff, TSH, Lipids, Serum Beta HCG, HgbA1c, UDS -- orders are between myself and PCP  Continue counseling with Yuki Andrews LCSW  Return 7/11/2024 as scheduled, and make one for 8/15/2024 at 4:30PM, call sooner prn    Target Date:  3-6 months  Person/Persons Responsible for Completion of Goal: Yuki Terrell   Progress Towards Goals: stable, continuing treatment  Treatment Modality:  Medication mgt and psychotherapy  Review due 180 days from date of this plan: 6 months - 12/11/2024  Expected length of service: ongoing treatment unless revised  My Physician/PA/NP and I have developed this plan together and I agree to work on the goals and objectives. I understand the treatment goals that were developed for my treatment.  Electronic Signatures: on file (unless signed below)    Rhona Epsinoza PA-C 06/11/24

## 2024-06-11 NOTE — PSYCH
Behavioral Health Psychotherapy Progress Note    Psychotherapy Provided: Individual Psychotherapy     1. Bipolar 2 disorder, major depressive episode (HCC)        2. Generalized anxiety disorder        3. Complicated grief        4. Insomnia due to medical condition            Goals addressed in session: Goal 1     DATA: LCSW met with Kathy Vazquez. Session was conducted Virtual. she was able to set an agenda which included feeling increased productivity since last session following discussion of attainable goal-setting. Kathy discussed continuing to feel some frustration that she is not at a place she used to be in terms of her productivity/having social supports/looking and feeling like herself. ASHLY and Kathy developed a list of important things to her including rest, relaxation, productivity, socialization, physical activity. Reviewed that meeting all of these criteria not be possible on a daily basis or right away, however, Kathy can set general intentions and work to meet 2-3 of these areas daily. LCSW noted importance of allowing herself to acknowledge her successes and her progress in obtaining this apartment. She discussed some stress/pressure from family members such as her uncle to settle in and try to find a job. She notes she hopes to maintain focus on herself at this time and sets a goal to take a small walk. She notes that having her period currently has contributed to feeling more emotional and she is looking forward to its conclusion. Specific techniques used in this session were: problem solving approach, empathetic listening, validation, and cognitive restructuring.    During this session, this clinician used the following therapeutic modalities: Client-centered Therapy, Solution-Focused Therapy, and Supportive Psychotherapy    Substance Abuse was not addressed during this session. If the client is diagnosed with a co-occurring substance use disorder, please indicate any changes in  "the frequency or amount of use: N/A. Stage of change for addressing substance use diagnoses: No substance use/Not applicable    ASSESSMENT:  Kathy Vazquez presents with a Labile mood. Kathy Vazquez was oriented to person, place, time, and situation. Grooming and Hygiene were good  and clothing was casually dressed and appropriate for weather. Ability to perform ADLs has not changed. she was cooperative.      her affect is intermittently Bright and Tearful, which is congruent, with her mood and the content of the session. The client has made progress on their goals.    Kathy Vazquez presents with a none risk of suicide, none risk of self-harm, and none risk of harm to others.    For any risk assessment that surpasses a \"low\" rating, a safety plan must be developed.    A safety plan was indicated: no  If yes, describe in detail: N/A    PLAN: Between sessions, Kathy Vazquez will continue working toward attainable goals and setting daily intentions for herself to guide her progress/increase feelings of success. At the next session, the therapist will use Client-centered Therapy, Solution-Focused Therapy, and Supportive Psychotherapy to address symptoms as they arise. Kathy Vazquez will return to outpatient therapy on 6/20/2024.    Behavioral Health Treatment Plan and Discharge Planning: Kathy Vazquez is aware of and agrees to continue to work on their treatment plan. They have identified and are working toward their discharge goals. yes    Visit start and stop times:    06/11/24  Start Time: 1003  Stop Time: 1056  Total Visit Time: 53 minutes    Virtual Regular Visit    Verification of patient location:    Patient is located at Home in the following state in which I hold an active license [place]    The patient was identified by name and date of birth. Kathy Vazquez was informed that this is a telemedicine visit and that the visit is being conducted through the Epic Embedded platform. She agrees to " proceed. My office door was closed. No one else was in the room.  She acknowledged consent and understanding of privacy and security of the video platform. The patient has agreed to participate and understands they can discontinue the visit at any time.    Patient is aware this is a billable service.

## 2024-06-12 ENCOUNTER — TELEPHONE (OUTPATIENT)
Dept: PSYCHIATRY | Facility: CLINIC | Age: 39
End: 2024-06-12

## 2024-06-12 ENCOUNTER — TELEPHONE (OUTPATIENT)
Dept: RADIOLOGY | Facility: HOSPITAL | Age: 39
End: 2024-06-12

## 2024-06-12 NOTE — TELEPHONE ENCOUNTER
Patient is calling to f/u on the status of the [employer] paperwork request for patients long term disability, as she would like to  a copy of the filled out paperwork once it is completed, please let patient know when paperwork is completed

## 2024-06-12 NOTE — TELEPHONE ENCOUNTER
Patient is requesting a copy of the paperwork that was filled out for the [place] for patients long term disability, please let patient know when paperwork is ready to be picked up

## 2024-06-13 ENCOUNTER — TELEPHONE (OUTPATIENT)
Age: 39
End: 2024-06-13

## 2024-06-13 NOTE — TELEPHONE ENCOUNTER
According to 5/20/2024 encounter, the form was sent via fax to [employer] by Kourtney Bagley. Form was scanned into pt's media.

## 2024-06-17 ENCOUNTER — TELEPHONE (OUTPATIENT)
Dept: BEHAVIORAL/MENTAL HEALTH CLINIC | Facility: CLINIC | Age: 39
End: 2024-06-17

## 2024-06-17 ENCOUNTER — TELEPHONE (OUTPATIENT)
Dept: PSYCHIATRY | Facility: CLINIC | Age: 39
End: 2024-06-17

## 2024-06-17 NOTE — TELEPHONE ENCOUNTER
Kathy was reached via phone.  Kathy verbalized that she feels lost without having her people.   Kathy verbalized that she used to be able to hike, walk, cook, and do things that take care of herself.   Kathy verbalized that she was caregiver for three things.   Kathy verbalized that she is worried about her sister who is going in for a surgery today.   LSW reviewed information on the group including expectations and need for weekly attendance.   Patient verbalized interest. LSW sent scheduling request to MRs.

## 2024-06-17 NOTE — TELEPHONE ENCOUNTER
Writer contacted patient and scheduled temp [facility] appointment for 7/30@ 3 pm. Original date and time did not work for patient. Patient will discuss follow up appointments at 7/30 apt.

## 2024-06-17 NOTE — TELEPHONE ENCOUNTER
Per phone discussion with Kathy 6/14 and additional voicemail received later that day, Kathy additionally requests all notes from med management appointments with Rhona Espinoza PA-C for her personal records. She also notes the [facility] requests her treatment plan from 6/11/24, medication list/side effects, and office notes with physical/mental examination findings. She can be reached with any questions at [phone].     If possible, please prepare any necessary ROIs/paperwork by 6/20 as she will be in office that day for an appointment. Thank you!

## 2024-06-17 NOTE — TELEPHONE ENCOUNTER
Kathy said she's getting requests from the [facility] [employer] asking specifically for records from 5/25/24 through 6/3/24 from PCP.  She asked if we have this request and can we please send them what they're asking for.

## 2024-06-20 ENCOUNTER — SOCIAL WORK (OUTPATIENT)
Dept: BEHAVIORAL/MENTAL HEALTH CLINIC | Facility: CLINIC | Age: 39
End: 2024-06-20
Payer: COMMERCIAL

## 2024-06-20 DIAGNOSIS — G47.01 INSOMNIA DUE TO MEDICAL CONDITION: ICD-10-CM

## 2024-06-20 DIAGNOSIS — F43.21 COMPLICATED GRIEF: ICD-10-CM

## 2024-06-20 DIAGNOSIS — F41.1 GENERALIZED ANXIETY DISORDER: ICD-10-CM

## 2024-06-20 DIAGNOSIS — F31.81 BIPOLAR 2 DISORDER, MAJOR DEPRESSIVE EPISODE (HCC): Primary | ICD-10-CM

## 2024-06-20 PROCEDURE — 90837 PSYTX W PT 60 MINUTES: CPT | Performed by: SOCIAL WORKER

## 2024-06-21 ENCOUNTER — TELEPHONE (OUTPATIENT)
Dept: PSYCHIATRY | Facility: CLINIC | Age: 39
End: 2024-06-21

## 2024-06-21 NOTE — TELEPHONE ENCOUNTER
Lolly left an additional message that she has a question about the Xanax she picked up yesterday.

## 2024-06-21 NOTE — TELEPHONE ENCOUNTER
Patient called the office requesting to speak with a nurse or her provider in regards ton her medication. She explained that she is trying to wean off the medication but was moved down in dosage too fast so that was addressed. She requested to speak with a nurse in regards to this.       #[phone]    Writer transferred patient to nurses line for assistance,.

## 2024-06-24 ENCOUNTER — TELEPHONE (OUTPATIENT)
Age: 39
End: 2024-06-24

## 2024-06-24 NOTE — TELEPHONE ENCOUNTER
Spoke to pt and she stated that she was decreased from taking Xanax 2mg, to 1.5mg and then increased to 1.75mg, Pt states she does not feel ready to be taking the 1.5 mg, and would like to go back to taking the 1.75 mg. She is also inquiring as to why her Abilify was increased. She would like to speak directly with her provider.

## 2024-06-24 NOTE — TELEPHONE ENCOUNTER
Pt calling.  She feels like her therapist is weaning her off her xanax too quickly.  She just lowered her dose from 1.75mg in the morning and 0.5mg at bedtime  to 1.5mg in the morning with 0.5mg at beditme.  Pt does not feel like she is ready to lower the dose and it is giving her increased anxiety since there are a lot of transitions happening in her life right now. She feels like her therapist isn't listening to her and is wondering if Dr. Antonio has any suggestions.  Please advise.

## 2024-06-24 NOTE — TELEPHONE ENCOUNTER
"I called Kathy and she requested more Alprazolam-- felt that she still needed to be on 1.75mg total per day of the drug.  I had discussed Hydroxyzine at the 5/7/2024 visit and again now, to help her transition off of the Alprazolam since she is anxious but not in withdrawal, and if she did have withdrawal or great difficulty coming off of the 0.25mg dose of the BZD after 2 months, she should have help with further weaning in a detox environment.  (Note I have had 3 total visits with Kathy and at every visit she reported wanting to be off of the Alprazolam as a goal).  Pt  presently adamantly refuses detox, stating that is not necessary and that she is anxious.  She contradicts herself at times and said that Alprazolam has been helpful, but then stated that after time it had not helped as much-- implying that her body was used to the drug and it's effect was waning.  I stated that the waning of effect is another good reason to try to transition off of the Alprazolam.  I discussed the risks, benefits and possible SE of the Hydroxyzine and that this is a non-addictive medicine whose effect would not wane the way the BZD would, but she again refused this option.   She stated she did not want any other medicines and also that I had \"Not dealt with\" her \"Anxiety, or PTSD or ADHD.\"  I stated I was not going to provide an additional 0.25mg tab of the Alprazolam and she stated she was going to ask her PCP instead.   I reminded her that I had offered her Hydroxyzine this past visit in 5/2024 but she declined, and right now I also offered to review other potential adjunctive anxiety medicines and she refused.     "

## 2024-06-25 NOTE — TELEPHONE ENCOUNTER
She was on 1 mg BID when I had her on it, may be she should go back on that dose and suggest this to her Psych NP.

## 2024-06-25 NOTE — TELEPHONE ENCOUNTER
Patient returned call, gave Dr Antonio message, patient wanted appointment to discuss what is happening with Psychiatrist and discuss medication concerns. Is scheduled for 6/26/2024

## 2024-06-26 ENCOUNTER — OFFICE VISIT (OUTPATIENT)
Dept: FAMILY MEDICINE CLINIC | Facility: CLINIC | Age: 39
End: 2024-06-26
Payer: COMMERCIAL

## 2024-06-26 ENCOUNTER — TELEPHONE (OUTPATIENT)
Dept: BEHAVIORAL/MENTAL HEALTH CLINIC | Facility: CLINIC | Age: 39
End: 2024-06-26

## 2024-06-26 VITALS
OXYGEN SATURATION: 98 % | BODY MASS INDEX: 35.08 KG/M2 | SYSTOLIC BLOOD PRESSURE: 120 MMHG | TEMPERATURE: 98.2 F | WEIGHT: 198 LBS | RESPIRATION RATE: 16 BRPM | HEART RATE: 84 BPM | DIASTOLIC BLOOD PRESSURE: 80 MMHG | HEIGHT: 63 IN

## 2024-06-26 DIAGNOSIS — Z22.7 LATENT TUBERCULOSIS DIAGNOSED BY BLOOD TEST: ICD-10-CM

## 2024-06-26 DIAGNOSIS — F41.1 GENERALIZED ANXIETY DISORDER: Primary | ICD-10-CM

## 2024-06-26 DIAGNOSIS — I10 ESSENTIAL HYPERTENSION: ICD-10-CM

## 2024-06-26 DIAGNOSIS — J44.9 CHRONIC OBSTRUCTIVE PULMONARY DISEASE, UNSPECIFIED COPD TYPE (HCC): ICD-10-CM

## 2024-06-26 DIAGNOSIS — F31.81 BIPOLAR 2 DISORDER, MAJOR DEPRESSIVE EPISODE (HCC): ICD-10-CM

## 2024-06-26 DIAGNOSIS — Z79.899 CHRONIC USE OF BENZODIAZEPINE FOR THERAPEUTIC PURPOSE: ICD-10-CM

## 2024-06-26 DIAGNOSIS — Z72.0 TOBACCO ABUSE: ICD-10-CM

## 2024-06-26 DIAGNOSIS — F33.1 MAJOR DEPRESSIVE DISORDER, RECURRENT, MODERATE (HCC): ICD-10-CM

## 2024-06-26 PROCEDURE — 99214 OFFICE O/P EST MOD 30 MIN: CPT | Performed by: FAMILY MEDICINE

## 2024-06-26 RX ORDER — ALBUTEROL SULFATE 90 UG/1
2 AEROSOL, METERED RESPIRATORY (INHALATION) EVERY 4 HOURS PRN
Qty: 8.5 G | Refills: 2 | Status: SHIPPED | OUTPATIENT
Start: 2024-06-26

## 2024-06-26 RX ORDER — LISINOPRIL 40 MG/1
40 TABLET ORAL DAILY
Qty: 90 TABLET | Refills: 1 | Status: SHIPPED | OUTPATIENT
Start: 2024-06-26

## 2024-06-26 RX ORDER — FLUTICASONE PROPIONATE AND SALMETEROL XINAFOATE 230; 21 UG/1; UG/1
2 AEROSOL, METERED RESPIRATORY (INHALATION) 2 TIMES DAILY
Qty: 36 G | Refills: 1 | Status: SHIPPED | OUTPATIENT
Start: 2024-06-26

## 2024-06-26 NOTE — LETTER
June 26, 2024     Rhona Espinoza PA-C  [address] [place] [id number]    Patient: Kathy Vazquez   YOB: 1985   Date of Visit: 6/26/2024       Dear Dr. Espinoza:    Thank you for referring Kathy Vazquez to me for evaluation. Below are my notes for this consultation.    If you have questions, please do not hesitate to call me. I look forward to following your patient along with you.         Sincerely,        Cheryl Antonio MD        CC: No Recipients    Cheryl Antonio MD  6/26/2024  5:17 PM  Signed    Subjective:      Patient ID: Kathy Vazquez is a 39 y.o. female.    Generalized anxiety disorder-following psychiatry PERI, states that she has been having difficulty with uncontrolled anxiety and panic attacks due to rapid decrease in her xanax from 1 mg bid to 1.5 mg /day  would like to discuss this  Borderline personality disorder  Bipolar 2 disorder- on abilify 7.5 mg , wellbutrin  Hypertension controlled with losartan compliant with medications  Smoking has been trying to cut back  COPD controlled no recent exacerbations  Latent tuberculosis desires treatment  She does have difficulty focusing decreased concentration and flight of thoughts        Past Medical History:   Diagnosis Date   • Acute cough 6/14/2022   • Allergies    • Anxiety 05/2018   • Female hirsutism    • GERD (gastroesophageal reflux disease)    • Head injury    • Hypertension 2018   • Papanicolaou smear for cervical cancer screening 2017    NL, no h/o of abn pap that she can recall   • STD (female)    • Thyroid nodule 05/2018   • Tobacco user 05/2018       Family History   Problem Relation Age of Onset   • Hypertension Mother    • Kidney disease Mother    • Cirrhosis Mother    • Dialysis Mother    • Diabetes Mother    • Anxiety disorder Mother    • Depression Mother    • Anxiety disorder Father    • Depression Father    • Drug abuse Father    • Bipolar disorder Father    • Melanoma Paternal Uncle    • Stomach  cancer Other    • Colon cancer Other         Passed age 40       Past Surgical History:   Procedure Laterality Date   • NO PAST SURGERIES          reports that she has been smoking cigarettes. She has a 25 pack-year smoking history. She has never used smokeless tobacco. She reports that she does not currently use alcohol. She reports that she does not currently use drugs after having used the following drugs: Benzodiazepines and Marijuana.      Current Outpatient Medications:   •  albuterol (PROVENTIL HFA,VENTOLIN HFA) 90 mcg/act inhaler, Inhale 2 puffs every 4 (four) hours as needed for wheezing or shortness of breath, Disp: 8.5 g, Rfl: 2  •  ALPRAZolam (XANAX) 0.25 mg tablet, Take 1 tablet (0.25 mg total) by mouth daily, Disp: 30 tablet, Rfl: 0  •  ALPRAZolam (XANAX) 1 mg tablet, Take 1 tab po qAM and 1/2 tab po qhs, Disp: 45 tablet, Rfl: 0  •  ARIPiprazole (ABILIFY) 15 mg tablet, Take 0.5 tablets (7.5 mg total) by mouth daily, Disp: 15 tablet, Rfl: 2  •  buPROPion (WELLBUTRIN XL) 150 mg 24 hr tablet, Take 1 tablet (150 mg total) by mouth every morning, Disp: 30 tablet, Rfl: 5  •  fluticasone-salmeterol (Advair HFA) 230-21 MCG/ACT inhaler, Inhale 2 puffs 2 (two) times a day Rinse mouth after use., Disp: 36 g, Rfl: 1  •  lisinopril (ZESTRIL) 40 mg tablet, Take 1 tablet (40 mg total) by mouth daily, Disp: 90 tablet, Rfl: 1  •  ibuprofen (MOTRIN) 800 mg tablet, Take 1 tablet (800 mg total) by mouth every 8 (eight) hours as needed for mild pain, Disp: 90 tablet, Rfl: 0    The following portions of the patient's history were reviewed and updated as appropriate: allergies, current medications, past family history, past medical history, past social history, past surgical history and problem list.    Review of Systems   Constitutional:  Positive for chills. Negative for fatigue and fever.   HENT:  Negative for congestion, facial swelling, mouth sores, rhinorrhea, sore throat and trouble swallowing.    Eyes:  Negative for  pain and redness.   Respiratory:  Negative for cough, shortness of breath and wheezing.    Cardiovascular:  Positive for palpitations. Negative for chest pain and leg swelling.   Gastrointestinal:  Negative for abdominal pain, blood in stool, constipation, diarrhea and nausea.   Genitourinary:  Negative for dysuria, hematuria and urgency.   Musculoskeletal:  Negative for arthralgias, back pain and myalgias.   Skin:  Negative for rash and wound.   Neurological:  Negative for seizures, syncope and headaches.   Hematological:  Negative for adenopathy.   Psychiatric/Behavioral:  Positive for sleep disturbance. Negative for agitation and behavioral problems. The patient is nervous/anxious.          PHQ-2/9 Depression Screening    Little interest or pleasure in doing things: 0 - not at all  Feeling down, depressed, or hopeless: 1 - several days  Trouble falling or staying asleep, or sleeping too much: 3 - nearly every day  Feeling tired or having little energy: 0 - not at all  Poor appetite or overeatin - not at all  Feeling bad about yourself - or that you are a failure or have let yourself or your family down: 0 - not at all  Trouble concentrating on things, such as reading the newspaper or watching television: 3 - nearly every day  Moving or speaking so slowly that other people could have noticed. Or the opposite - being so fidgety or restless that you have been moving around a lot more than usual: 3 - nearly every day  Thoughts that you would be better off dead, or of hurting yourself in some way: 0 - not at all  PHQ-9 Score: 10  PHQ-9 Interpretation: Moderate depression       EMILY-7 Flowsheet Screening      Flowsheet Row Most Recent Value   Over the last 2 weeks, how often have you been bothered by any of the following problems?    Feeling nervous, anxious, or on edge 3   Not being able to stop or control worrying 1   Worrying too much about different things 1   Trouble relaxing 1   Being so restless that it is  "hard to sit still 0   Becoming easily annoyed or irritable 0   Feeling afraid as if something awful might happen 0   EMILY-7 Total Score 6              Objective:    /80 (BP Location: Left arm, Patient Position: Sitting, Cuff Size: Adult)   Pulse 84   Temp 98.2 °F (36.8 °C) (Tympanic)   Resp 16   Ht 5' 3\" (1.6 m)   Wt 89.8 kg (198 lb)   LMP 06/11/2024   SpO2 98%   BMI 35.07 kg/m²      Physical Exam  Vitals and nursing note reviewed.   Constitutional:       Appearance: Normal appearance. She is well-developed. She is not ill-appearing.   HENT:      Head: Normocephalic and atraumatic.      Right Ear: External ear normal.      Left Ear: External ear normal.      Nose: Nose normal.      Mouth/Throat:      Mouth: Mucous membranes are moist.      Pharynx: No oropharyngeal exudate or posterior oropharyngeal erythema.   Eyes:      General: No scleral icterus.        Right eye: No discharge.         Left eye: No discharge.      Conjunctiva/sclera: Conjunctivae normal.   Cardiovascular:      Rate and Rhythm: Normal rate.      Heart sounds: No murmur heard.     No gallop.   Pulmonary:      Effort: Pulmonary effort is normal. No respiratory distress.      Breath sounds: Normal breath sounds. No stridor. No wheezing, rhonchi or rales.   Abdominal:      Palpations: Abdomen is soft.      Tenderness: There is no abdominal tenderness.   Musculoskeletal:         General: No tenderness or deformity.   Skin:     Findings: No erythema or rash.   Neurological:      Mental Status: She is alert. Mental status is at baseline.   Psychiatric:         Mood and Affect: Mood is anxious.         Behavior: Behavior normal.         Judgment: Judgment normal.           Recent Results (from the past 8736 hour(s))   ECG 12 lead    Collection Time: 07/09/23 12:32 AM   Result Value Ref Range    Ventricular Rate 82 BPM    Atrial Rate 82 BPM    LA Interval 157 ms    QRSD Interval 104 ms    QT Interval 381 ms    QTC Interval 445 ms    P Axis 46 " "degrees    QRS Axis 35 degrees    T Wave Axis 39 degrees   CBC and differential    Collection Time: 07/09/23 12:49 AM   Result Value Ref Range    WBC 8.69 4.31 - 10.16 Thousand/uL    RBC 4.54 3.81 - 5.12 Million/uL    Hemoglobin 13.5 11.5 - 15.4 g/dL    Hematocrit 40.7 34.8 - 46.1 %    MCV 90 82 - 98 fL    MCH 29.7 26.8 - 34.3 pg    MCHC 33.2 31.4 - 37.4 g/dL    RDW 12.5 11.6 - 15.1 %    MPV 10.3 8.9 - 12.7 fL    Platelets 236 149 - 390 Thousands/uL    nRBC 0 /100 WBCs    Segmented % 58 43 - 75 %    Immature Grans % 0 0 - 2 %    Lymphocytes % 33 14 - 44 %    Monocytes % 7 4 - 12 %    Eosinophils Relative 1 0 - 6 %    Basophils Relative 1 0 - 1 %    Absolute Neutrophils 5.16 1.85 - 7.62 Thousands/µL    Absolute Immature Grans 0.01 0.00 - 0.20 Thousand/uL    Absolute Lymphocytes 2.82 0.60 - 4.47 Thousands/µL    Absolute Monocytes 0.58 0.17 - 1.22 Thousand/µL    Eosinophils Absolute 0.08 0.00 - 0.61 Thousand/µL    Basophils Absolute 0.04 0.00 - 0.10 Thousands/µL   Comprehensive metabolic panel    Collection Time: 07/09/23 12:49 AM   Result Value Ref Range    Sodium 137 135 - 147 mmol/L    Potassium 3.9 3.5 - 5.3 mmol/L    Chloride 105 96 - 108 mmol/L    CO2 25 21 - 32 mmol/L    ANION GAP 7 mmol/L    BUN 12 5 - 25 mg/dL    Creatinine 0.75 0.60 - 1.30 mg/dL    Glucose 97 65 - 140 mg/dL    Calcium 9.1 8.4 - 10.2 mg/dL    AST 19 13 - 39 U/L    ALT 32 7 - 52 U/L    Alkaline Phosphatase 43 34 - 104 U/L    Total Protein 7.0 6.4 - 8.4 g/dL    Albumin 4.3 3.5 - 5.0 g/dL    Total Bilirubin 0.47 0.20 - 1.00 mg/dL    eGFR 100 ml/min/1.73sq m   Magnesium    Collection Time: 07/09/23 12:49 AM   Result Value Ref Range    Magnesium 1.8 (L) 1.9 - 2.7 mg/dL   TSH    Collection Time: 07/09/23 12:49 AM   Result Value Ref Range    TSH 3RD GENERATON 1.782 0.450 - 4.500 uIU/mL   HS Troponin 0hr (reflex protocol)    Collection Time: 07/09/23 12:49 AM   Result Value Ref Range    hs TnI 0hr <2 \"Refer to ACS Flowchart\"- see link ng/L   POCT " pregnancy, urine    Collection Time: 07/09/23  1:50 AM   Result Value Ref Range    EXT Preg Test, Ur Negative     Control Valid    ECG 12 lead    Collection Time: 09/05/23  8:52 PM   Result Value Ref Range    Ventricular Rate 73 BPM    Atrial Rate 73 BPM    [place] Interval 146 ms    QRSD Interval 92 ms    QT Interval 366 ms    QTC Interval 403 ms    P [place] 13 degrees    QRS Axis 44 degrees    T Wave Axis 31 degrees   CBC and differential    Collection Time: 09/05/23  9:44 PM   Result Value Ref Range    WBC 5.46 4.31 - 10.16 Thousand/uL    RBC 4.61 3.81 - 5.12 Million/uL    Hemoglobin 13.6 11.5 - 15.4 g/dL    Hematocrit 40.9 34.8 - 46.1 %    MCV 89 82 - 98 fL    MCH 29.5 26.8 - 34.3 pg    MCHC 33.3 31.4 - 37.4 g/dL    RDW 12.8 11.6 - 15.1 %    MPV 10.5 8.9 - 12.7 fL    Platelets 188 149 - 390 Thousands/uL    nRBC 0 /100 WBCs    Segmented % 50 43 - 75 %    Immature Grans % 0 0 - 2 %    Lymphocytes % 38 14 - 44 %    Monocytes % 9 4 - 12 %    Eosinophils Relative 2 0 - 6 %    Basophils Relative 1 0 - 1 %    Absolute Neutrophils 2.76 1.85 - 7.62 Thousands/µL    Absolute Immature Grans 0.01 0.00 - 0.20 Thousand/uL    Absolute Lymphocytes 2.05 0.60 - 4.47 Thousands/µL    Absolute Monocytes 0.48 0.17 - 1.22 Thousand/µL    Eosinophils Absolute 0.12 0.00 - 0.61 Thousand/µL    Basophils Absolute 0.04 0.00 - 0.10 Thousands/µL   Comprehensive metabolic panel    Collection Time: 09/05/23  9:44 PM   Result Value Ref Range    Sodium 137 135 - 147 mmol/L    Potassium 3.6 3.5 - 5.3 mmol/L    Chloride 107 96 - 108 mmol/L    CO2 22 21 - 32 mmol/L    ANION GAP 8 mmol/L    BUN 13 5 - 25 mg/dL    Creatinine 0.66 0.60 - 1.30 mg/dL    Glucose 108 65 - 140 mg/dL    Calcium 9.0 8.4 - 10.2 mg/dL    AST 16 13 - 39 U/L    ALT 20 7 - 52 U/L    Alkaline Phosphatase 40 34 - 104 U/L    Total Protein 6.9 6.4 - 8.4 g/dL    Albumin 4.3 3.5 - 5.0 g/dL    Total Bilirubin 0.42 0.20 - 1.00 mg/dL    eGFR 110 ml/min/1.73sq m   HS Troponin 0hr (reflex protocol)  "   Collection Time: 09/05/23  9:44 PM   Result Value Ref Range    hs TnI 0hr <2 \"Refer to ACS Flowchart\"- see link ng/L   FLU/RSV/COVID - if FLU/RSV clinically relevant    Collection Time: 09/05/23  9:44 PM    Specimen: Nose; Nares   Result Value Ref Range    SARS-CoV-2 Negative Negative    INFLUENZA A PCR Negative Negative    INFLUENZA B PCR Negative Negative    RSV PCR Negative Negative   Procalcitonin    Collection Time: 09/05/23  9:44 PM   Result Value Ref Range    Procalcitonin <0.05 <=0.25 ng/ml   Quantiferon TB Gold Plus    Collection Time: 09/05/23  9:47 PM   Result Value Ref Range    QFT Nil 0.17 0 - 8.0 IU/ml    QFT TB1-NIL 1.91 IU/ml    QFT TB2-NIL 1.72 IU/ml    QFT Mitogen-NIL >10.00 IU/ml    QFT Final Interpretation Positive (A) Negative       Laboratory Results: I have personally reviewed the pertinent laboratory results/reports     Radiology/Other Diagnostic Testing Results: I have personally reviewed pertinent reports.      XR chest 2 views    Result Date: 9/5/2023  CHEST INDICATION:   Cough, dyspnea. COMPARISON:  None EXAM PERFORMED/VIEWS:  XR CHEST PA & LATERAL FINDINGS: Cardiomediastinal silhouette appears unremarkable. The lungs are clear.  No pneumothorax or pleural effusion. Osseous structures appear within normal limits for patient age.     No acute cardiopulmonary disease. Workstation performed: [id number]        Assessment/Plan:  1. Generalized anxiety disorder  2. Bipolar 2 disorder, major depressive episode (HCC)  3. Chronic use of benzodiazepine for therapeutic purpose  4. Major depressive disorder, recurrent, moderate (HCC)  5. Essential hypertension  -     lisinopril (ZESTRIL) 40 mg tablet; Take 1 tablet (40 mg total) by mouth daily  6. Chronic obstructive pulmonary disease, unspecified COPD type ([facility])  -     albuterol (PROVENTIL HFA,VENTOLIN HFA) 90 mcg/act inhaler; Inhale 2 puffs every 4 (four) hours as needed for wheezing or shortness of breath  -     fluticasone-salmeterol (Advair " "HFA) 230-21 MCG/ACT inhaler; Inhale 2 puffs 2 (two) times a day Rinse mouth after use.  7. Tobacco abuse  8. Latent tuberculosis diagnosed by blood test      She has been on benzodiazepines for many years.  I do suspect she will need gradual dose reduction and slower taper.  I discussed with her discussed the same with her psychiatrist.  Given she is receiving Xanax from psychiatry I explained to her that I would not comanage this.  I have written a letter to the psychiatrist explaining the possible need for longer taper.    She will continue behavioral therapy.  Overall doing well on Abilify and Wellbutrin.  She may benefit from increasing Wellbutrin due to help with smoking cessation as well as will help her with ADD symptoms.  Discussed that I will forward my note to the psychiatrist with the recommendations.             Read package inserts for all medications before starting a new medications, call me if you have any questions.    Patient was given opportunity to ask questions and all questions were answered.    Disclaimer: Portions of the record may have been created with voice recognition software. Occasional wrong word or \"sound a like\" substitutions may have occurred due to the inherent limitations of voice recognition software. Read the chart carefully and recognize, using context, where substitutions have occurred. I have used the Epic copy/forward function to compose this note. I have reviewed my current note to ensure it reflects the current patient status, exam, assessment and plan.    "

## 2024-06-26 NOTE — TELEPHONE ENCOUNTER
While speaking with patient about signing the Psych Encounter Form from her 6/20 appt, she requested the name of the doctor overseeing the practice. I asked her for some details. Her response was in regards to weaning off of Xanax and feels the process is going too fast. She went to see her PCP who agrees and wrote a letter which I assume is in her chart.     I assured her I would communicate her concerns.

## 2024-06-26 NOTE — LETTER
June 26, 2024     Patient: Kathy Vazquez  YOB: 1985  Date of Visit: 6/26/2024        Kathy Vazquez is under my professional care. Kathy was seen in my office on 6/26/2024. Kathy would benefit from slower benzodiazepine taper as tolerated due to severe dependence and uncontrolled anxiety during gradual dose reduction.    If you have any questions or concerns, please don't hesitate to call.         Sincerely,          Cheryl Antonio MD        CC: No Recipients

## 2024-06-26 NOTE — PROGRESS NOTES
Subjective:      Patient ID: Kathy Vazquez is a 39 y.o. female.    Generalized anxiety disorder-following psychiatry PERI, states that she has been having difficulty with uncontrolled anxiety and panic attacks due to rapid decrease in her xanax from 1 mg bid to 1.5 mg /day  would like to discuss this  Borderline personality disorder  Bipolar 2 disorder- on abilify 7.5 mg , wellbutrin  Hypertension controlled with losartan compliant with medications  Smoking has been trying to cut back  COPD controlled no recent exacerbations  Latent tuberculosis desires treatment  She does have difficulty focusing decreased concentration and flight of thoughts        Past Medical History:   Diagnosis Date    Acute cough 6/14/2022    Allergies     Anxiety 05/2018    Female hirsutism     GERD (gastroesophageal reflux disease)     Head injury     Hypertension 2018    Papanicolaou smear for cervical cancer screening 2017    NL, no h/o of abn pap that she can recall    STD (female)     Thyroid nodule 05/2018    Tobacco user 05/2018       Family History   Problem Relation Age of Onset    Hypertension Mother     Kidney disease Mother     Cirrhosis Mother     Dialysis Mother     Diabetes Mother     Anxiety disorder Mother     Depression Mother     Anxiety disorder Father     Depression Father     Drug abuse Father     Bipolar disorder Father     Melanoma Paternal Uncle     Stomach cancer Other     Colon cancer Other         Passed age 38       Past Surgical History:   Procedure Laterality Date    NO PAST SURGERIES          reports that she has been smoking cigarettes. She has a 25 pack-year smoking history. She has never used smokeless tobacco. She reports that she does not currently use alcohol. She reports that she does not currently use drugs after having used the following drugs: Benzodiazepines and Marijuana.      Current Outpatient Medications:     albuterol (PROVENTIL HFA,VENTOLIN HFA) 90 mcg/act inhaler, Inhale 2 puffs every 4  (four) hours as needed for wheezing or shortness of breath, Disp: 8.5 g, Rfl: 2    ALPRAZolam (XANAX) 0.25 mg tablet, Take 1 tablet (0.25 mg total) by mouth daily, Disp: 30 tablet, Rfl: 0    ALPRAZolam (XANAX) 1 mg tablet, Take 1 tab po qAM and 1/2 tab po qhs, Disp: 45 tablet, Rfl: 0    ARIPiprazole (ABILIFY) 15 mg tablet, Take 0.5 tablets (7.5 mg total) by mouth daily, Disp: 15 tablet, Rfl: 2    buPROPion (WELLBUTRIN XL) 150 mg 24 hr tablet, Take 1 tablet (150 mg total) by mouth every morning, Disp: 30 tablet, Rfl: 5    fluticasone-salmeterol (Advair HFA) 230-21 MCG/ACT inhaler, Inhale 2 puffs 2 (two) times a day Rinse mouth after use., Disp: 36 g, Rfl: 1    lisinopril (ZESTRIL) 40 mg tablet, Take 1 tablet (40 mg total) by mouth daily, Disp: 90 tablet, Rfl: 1    ibuprofen (MOTRIN) 800 mg tablet, Take 1 tablet (800 mg total) by mouth every 8 (eight) hours as needed for mild pain, Disp: 90 tablet, Rfl: 0    The following portions of the patient's history were reviewed and updated as appropriate: allergies, current medications, past family history, past medical history, past social history, past surgical history and problem list.    Review of Systems   Constitutional:  Positive for chills. Negative for fatigue and fever.   HENT:  Negative for congestion, facial swelling, mouth sores, rhinorrhea, sore throat and trouble swallowing.    Eyes:  Negative for pain and redness.   Respiratory:  Negative for cough, shortness of breath and wheezing.    Cardiovascular:  Positive for palpitations. Negative for chest pain and leg swelling.   Gastrointestinal:  Negative for abdominal pain, blood in stool, constipation, diarrhea and nausea.   Genitourinary:  Negative for dysuria, hematuria and urgency.   Musculoskeletal:  Negative for arthralgias, back pain and myalgias.   Skin:  Negative for rash and wound.   Neurological:  Negative for seizures, syncope and headaches.   Hematological:  Negative for adenopathy.  "  Psychiatric/Behavioral:  Positive for sleep disturbance. Negative for agitation and behavioral problems. The patient is nervous/anxious.          PHQ-2/9 Depression Screening    Little interest or pleasure in doing things: 0 - not at all  Feeling down, depressed, or hopeless: 1 - several days  Trouble falling or staying asleep, or sleeping too much: 3 - nearly every day  Feeling tired or having little energy: 0 - not at all  Poor appetite or overeatin - not at all  Feeling bad about yourself - or that you are a failure or have let yourself or your family down: 0 - not at all  Trouble concentrating on things, such as reading the newspaper or watching television: 3 - nearly every day  Moving or speaking so slowly that other people could have noticed. Or the opposite - being so fidgety or restless that you have been moving around a lot more than usual: 3 - nearly every day  Thoughts that you would be better off dead, or of hurting yourself in some way: 0 - not at all  PHQ-9 Score: 10  PHQ-9 Interpretation: Moderate depression       EMILY-7 Flowsheet Screening      Flowsheet Row Most Recent Value   Over the last 2 weeks, how often have you been bothered by any of the following problems?    Feeling nervous, anxious, or on edge 3   Not being able to stop or control worrying 1   Worrying too much about different things 1   Trouble relaxing 1   Being so restless that it is hard to sit still 0   Becoming easily annoyed or irritable 0   Feeling afraid as if something awful might happen 0   EMILY-7 Total Score 6              Objective:    /80 (BP Location: Left arm, Patient Position: Sitting, Cuff Size: Adult)   Pulse 84   Temp 98.2 °F (36.8 °C) (Tympanic)   Resp 16   Ht 5' 3\" (1.6 m)   Wt 89.8 kg (198 lb)   LMP 2024   SpO2 98%   BMI 35.07 kg/m²      Physical Exam  Vitals and nursing note reviewed.   Constitutional:       Appearance: Normal appearance. She is well-developed. She is not ill-appearing. "   HENT:      Head: Normocephalic and atraumatic.      Right Ear: External ear normal.      Left Ear: External ear normal.      Nose: Nose normal.      Mouth/Throat:      Mouth: Mucous membranes are moist.      Pharynx: No oropharyngeal exudate or posterior oropharyngeal erythema.   Eyes:      General: No scleral icterus.        Right eye: No discharge.         Left eye: No discharge.      Conjunctiva/sclera: Conjunctivae normal.   Cardiovascular:      Rate and Rhythm: Normal rate.      Heart sounds: No murmur heard.     No gallop.   Pulmonary:      Effort: Pulmonary effort is normal. No respiratory distress.      Breath sounds: Normal breath sounds. No stridor. No wheezing, rhonchi or rales.   Abdominal:      Palpations: Abdomen is soft.      Tenderness: There is no abdominal tenderness.   Musculoskeletal:         General: No tenderness or deformity.   Skin:     Findings: No erythema or rash.   Neurological:      Mental Status: She is alert. Mental status is at baseline.   Psychiatric:         Mood and Affect: Mood is anxious.         Behavior: Behavior normal.         Judgment: Judgment normal.           Recent Results (from the past 8736 hour(s))   ECG 12 lead    Collection Time: 07/09/23 12:32 AM   Result Value Ref Range    Ventricular Rate 82 BPM    Atrial Rate 82 BPM    MA Interval 157 ms    QRSD Interval 104 ms    QT Interval 381 ms    QTC Interval 445 ms    P Axis 46 degrees    QRS Axis 35 degrees    T Wave Axis 39 degrees   CBC and differential    Collection Time: 07/09/23 12:49 AM   Result Value Ref Range    WBC 8.69 4.31 - 10.16 Thousand/uL    RBC 4.54 3.81 - 5.12 Million/uL    Hemoglobin 13.5 11.5 - 15.4 g/dL    Hematocrit 40.7 34.8 - 46.1 %    MCV 90 82 - 98 fL    MCH 29.7 26.8 - 34.3 pg    MCHC 33.2 31.4 - 37.4 g/dL    RDW 12.5 11.6 - 15.1 %    MPV 10.3 8.9 - 12.7 fL    Platelets 236 149 - 390 Thousands/uL    nRBC 0 /100 WBCs    Segmented % 58 43 - 75 %    Immature Grans % 0 0 - 2 %    Lymphocytes % 33  "14 - 44 %    Monocytes % 7 4 - 12 %    Eosinophils Relative 1 0 - 6 %    Basophils Relative 1 0 - 1 %    Absolute Neutrophils 5.16 1.85 - 7.62 Thousands/µL    Absolute Immature Grans 0.01 0.00 - 0.20 Thousand/uL    Absolute Lymphocytes 2.82 0.60 - 4.47 Thousands/µL    Absolute Monocytes 0.58 0.17 - 1.22 Thousand/µL    Eosinophils Absolute 0.08 0.00 - 0.61 Thousand/µL    Basophils Absolute 0.04 0.00 - 0.10 Thousands/µL   Comprehensive metabolic panel    Collection Time: 07/09/23 12:49 AM   Result Value Ref Range    Sodium 137 135 - 147 mmol/L    Potassium 3.9 3.5 - 5.3 mmol/L    Chloride 105 96 - 108 mmol/L    CO2 25 21 - 32 mmol/L    ANION GAP 7 mmol/L    BUN 12 5 - 25 mg/dL    Creatinine 0.75 0.60 - 1.30 mg/dL    Glucose 97 65 - 140 mg/dL    Calcium 9.1 8.4 - 10.2 mg/dL    AST 19 13 - 39 U/L    ALT 32 7 - 52 U/L    Alkaline Phosphatase 43 34 - 104 U/L    Total Protein 7.0 6.4 - 8.4 g/dL    Albumin 4.3 3.5 - 5.0 g/dL    Total Bilirubin 0.47 0.20 - 1.00 mg/dL    eGFR 100 ml/min/1.73sq m   Magnesium    Collection Time: 07/09/23 12:49 AM   Result Value Ref Range    Magnesium 1.8 (L) 1.9 - 2.7 mg/dL   TSH    Collection Time: 07/09/23 12:49 AM   Result Value Ref Range    TSH 3RD GENERATON 1.782 0.450 - 4.500 uIU/mL   HS Troponin 0hr (reflex protocol)    Collection Time: 07/09/23 12:49 AM   Result Value Ref Range    hs TnI 0hr <2 \"Refer to ACS Flowchart\"- see link ng/L   POCT pregnancy, urine    Collection Time: 07/09/23  1:50 AM   Result Value Ref Range    EXT Preg Test, Ur Negative     Control Valid    ECG 12 lead    Collection Time: 09/05/23  8:52 PM   Result Value Ref Range    Ventricular Rate 73 BPM    Atrial Rate 73 BPM    NE Interval 146 ms    QRSD Interval 92 ms    QT Interval 366 ms    QTC Interval 403 ms    P Ashley 13 degrees    QRS Axis 44 degrees    T Wave Axis 31 degrees   CBC and differential    Collection Time: 09/05/23  9:44 PM   Result Value Ref Range    WBC 5.46 4.31 - 10.16 Thousand/uL    RBC 4.61 3.81 - " "5.12 Million/uL    Hemoglobin 13.6 11.5 - 15.4 g/dL    Hematocrit 40.9 34.8 - 46.1 %    MCV 89 82 - 98 fL    MCH 29.5 26.8 - 34.3 pg    MCHC 33.3 31.4 - 37.4 g/dL    RDW 12.8 11.6 - 15.1 %    MPV 10.5 8.9 - 12.7 fL    Platelets 188 149 - 390 Thousands/uL    nRBC 0 /100 WBCs    Segmented % 50 43 - 75 %    Immature Grans % 0 0 - 2 %    Lymphocytes % 38 14 - 44 %    Monocytes % 9 4 - 12 %    Eosinophils Relative 2 0 - 6 %    Basophils Relative 1 0 - 1 %    Absolute Neutrophils 2.76 1.85 - 7.62 Thousands/µL    Absolute Immature Grans 0.01 0.00 - 0.20 Thousand/uL    Absolute Lymphocytes 2.05 0.60 - 4.47 Thousands/µL    Absolute Monocytes 0.48 0.17 - 1.22 Thousand/µL    Eosinophils Absolute 0.12 0.00 - 0.61 Thousand/µL    Basophils Absolute 0.04 0.00 - 0.10 Thousands/µL   Comprehensive metabolic panel    Collection Time: 09/05/23  9:44 PM   Result Value Ref Range    Sodium 137 135 - 147 mmol/L    Potassium 3.6 3.5 - 5.3 mmol/L    Chloride 107 96 - 108 mmol/L    CO2 22 21 - 32 mmol/L    ANION GAP 8 mmol/L    BUN 13 5 - 25 mg/dL    Creatinine 0.66 0.60 - 1.30 mg/dL    Glucose 108 65 - 140 mg/dL    Calcium 9.0 8.4 - 10.2 mg/dL    AST 16 13 - 39 U/L    ALT 20 7 - 52 U/L    Alkaline Phosphatase 40 34 - 104 U/L    Total Protein 6.9 6.4 - 8.4 g/dL    Albumin 4.3 3.5 - 5.0 g/dL    Total Bilirubin 0.42 0.20 - 1.00 mg/dL    eGFR 110 ml/min/1.73sq m   HS Troponin 0hr (reflex protocol)    Collection Time: 09/05/23  9:44 PM   Result Value Ref Range    hs TnI 0hr <2 \"Refer to ACS Flowchart\"- see link ng/L   FLU/RSV/COVID - if FLU/RSV clinically relevant    Collection Time: 09/05/23  9:44 PM    Specimen: Nose; Nares   Result Value Ref Range    SARS-CoV-2 Negative Negative    INFLUENZA A PCR Negative Negative    INFLUENZA B PCR Negative Negative    RSV PCR Negative Negative   Procalcitonin    Collection Time: 09/05/23  9:44 PM   Result Value Ref Range    Procalcitonin <0.05 <=0.25 ng/ml   Quantiferon TB Gold Plus    Collection Time: " 09/05/23  9:47 PM   Result Value Ref Range    QFT Nil 0.17 0 - 8.0 IU/ml    QFT TB1-NIL 1.91 IU/ml    QFT TB2-NIL 1.72 IU/ml    QFT Mitogen-NIL >10.00 IU/ml    QFT Final Interpretation Positive (A) Negative       Laboratory Results: I have personally reviewed the pertinent laboratory results/reports     Radiology/Other Diagnostic Testing Results: I have personally reviewed pertinent reports.      XR chest 2 views    Result Date: 9/5/2023  CHEST INDICATION:   Cough, dyspnea. COMPARISON:  None EXAM PERFORMED/VIEWS:  XR CHEST PA & LATERAL FINDINGS: Cardiomediastinal silhouette appears unremarkable. The lungs are clear.  No pneumothorax or pleural effusion. Osseous structures appear within normal limits for patient age.     No acute cardiopulmonary disease. Workstation performed: [id number]        Assessment/Plan:  1. Generalized anxiety disorder  2. Bipolar 2 disorder, major depressive episode (HCC)  3. Chronic use of benzodiazepine for therapeutic purpose  4. Major depressive disorder, recurrent, moderate (HCC)  5. Essential hypertension  -     lisinopril (ZESTRIL) 40 mg tablet; Take 1 tablet (40 mg total) by mouth daily  6. Chronic obstructive pulmonary disease, unspecified COPD type (HCC)  -     albuterol (PROVENTIL HFA,VENTOLIN HFA) 90 mcg/act inhaler; Inhale 2 puffs every 4 (four) hours as needed for wheezing or shortness of breath  -     fluticasone-salmeterol (Advair HFA) 230-21 MCG/ACT inhaler; Inhale 2 puffs 2 (two) times a day Rinse mouth after use.  7. Tobacco abuse  8. Latent tuberculosis diagnosed by blood test      She has been on benzodiazepines for many years.  I do suspect she will need gradual dose reduction and slower taper.  I discussed with her discussed the same with her psychiatrist.  Given she is receiving Xanax from psychiatry I explained to her that I would not comanage this.  I have written a letter to the psychiatrist explaining the possible need for longer taper.    She will continue  "behavioral therapy.  Overall doing well on Abilify and Wellbutrin.  She may benefit from increasing Wellbutrin due to help with smoking cessation as well as will help her with ADD symptoms.  Discussed that I will forward my note to the psychiatrist with the recommendations.             Read package inserts for all medications before starting a new medications, call me if you have any questions.    Patient was given opportunity to ask questions and all questions were answered.    Disclaimer: Portions of the record may have been created with voice recognition software. Occasional wrong word or \"sound a like\" substitutions may have occurred due to the inherent limitations of voice recognition software. Read the chart carefully and recognize, using context, where substitutions have occurred. I have used the Epic copy/forward function to compose this note. I have reviewed my current note to ensure it reflects the current patient status, exam, assessment and plan.    "

## 2024-06-28 ENCOUNTER — TELEPHONE (OUTPATIENT)
Dept: OTHER | Facility: OTHER | Age: 39
End: 2024-06-28

## 2024-06-28 ENCOUNTER — TELEPHONE (OUTPATIENT)
Dept: PSYCHIATRY | Facility: CLINIC | Age: 39
End: 2024-06-28

## 2024-06-28 NOTE — PSYCH
Behavioral Health Psychotherapy Progress Note    Psychotherapy Provided: Individual Psychotherapy     1. Bipolar 2 disorder, major depressive episode (HCC)        2. Generalized anxiety disorder        3. Complicated grief        4. Insomnia due to medical condition            Goals addressed in session: Goal 1     DATA: LCSW met with Kathy Vazquez. Session was conducted In-person. she was able to set an agenda which included expressing concerns regarding her relationship/rapport and trust with psychiatry provider. Kathy voiced concern that she is not taken seriously and feeling provider may believe she has an addiction issue given potential recommendation for detox. Kathy also reports being told she should prepare to move out of her [address] 8 housing due to living in a 2 bedroom apartment (coming previously from living in a studio). LCSW and Kathy discussed that while her feelings are valid, she can determine at this point how to proceed and can consider options including processing her concerns with the provider directly, processing in therapy session, or if she feels uncomfortable continuing with psychiatry could discontinue services at this time. LCSW strongly encouraged Kathy to consider advocating for herself, voicing her concerns as a way to feel empowered given what she has gone through. Kathy is receptive to this idea and hopes to speak with provider at next session. Discussed additional considerations such as creating a list of questions/concerns prior to appointments, asking about side effects and what to expect/look for when taking a new medication or changing existing medication dose. Specific techniques used in this session were: problem solving approach, empathetic listening, and validation.    During this session, this clinician used the following therapeutic modalities: Client-centered Therapy, Solution-Focused Therapy, and Supportive Psychotherapy    Substance Abuse was not  "addressed during this session. If the client is diagnosed with a co-occurring substance use disorder, please indicate any changes in the frequency or amount of use: N/A. Stage of change for addressing substance use diagnoses: No substance use/Not applicable    ASSESSMENT:  Kathy Vazquez presents with a Angry and Labile mood. Kathy Vazquez was oriented to person, place, time, and situation. Grooming and Hygiene were good  and clothing was casually dressed and appropriate for weather. Ability to perform ADLs has not changed. she was irritable and defensive.      her affect is Normal range and intensity, which is congruent, with her mood and the content of the session. The client has made some progress on their goals.    Kathy Vazquez presents with a none risk of suicide, none risk of self-harm, and none risk of harm to others.    For any risk assessment that surpasses a \"low\" rating, a safety plan must be developed.    A safety plan was indicated: no  If yes, describe in detail: N/A    PLAN: Between sessions, Kathy Vazquez will consider above discussion and continue moving into her apartment as she works toward increased feelings of stability/independence. At the next session, the therapist will use Client-centered Therapy and Supportive Psychotherapy to address symptoms as they arise. Kathy Vazquez will return to outpatient therapy on 7/3/2024.    Behavioral Health Treatment Plan and Discharge Planning: Kathy Vazquez is aware of and agrees to continue to work on their treatment plan. They have identified and are working toward their discharge goals. yes    Visit start and stop times:    06/20/24  Start Time: 0315  Stop Time: 0415  Total Visit Time: 60 minutes  "

## 2024-06-28 NOTE — TELEPHONE ENCOUNTER
I spoke with Kathy shortly before this message. Her concerns have been reviewed by the PAAD and attending doctor. The taper is apporpriate. She has an appointment 7/11/24 for follow up to discuss further.

## 2024-06-28 NOTE — TELEPHONE ENCOUNTER
Kathy went on to say she has a lot of stress and listed her diagnosis, including ADHD, which she has not been diagnosed with, but she is educating herself in many things. She feels her depression is situational. She feels she's not being heard and loosely mentioned seeking out . When asked to clarify that statement, she said she will inquire where ever she she needs to.

## 2024-06-28 NOTE — TELEPHONE ENCOUNTER
I agree with Rhona's decision - Xanax taper is very slow by 0.25 mg per month. If patient is not agreeable with that plan, she would need to be referred out of this practice to follow up with a different provider. I recommend continuing Xanax taper by 0.25 mg per month.

## 2024-06-28 NOTE — TELEPHONE ENCOUNTER
Patient requesting to speak with a supervisor regarding her xanax taper.    Reporting that she was informed that her was discussed but what was relayed to her does not address all of her concerns and would like to discuss further with a supervisor.    Best call back [phone]

## 2024-06-28 NOTE — TELEPHONE ENCOUNTER
Patient called upset regarding the taper off her medication, patient stated she would like to speak to supervising provider as she feels her DR is not hearing her, patient stated she has been calling for days and would also like to speak to a nurse regarding the taper off the medication

## 2024-06-28 NOTE — TELEPHONE ENCOUNTER
Kathy left message regarding tapering off her medication, she feels it is being tapered too fast and  her provider is not willing to help her or listening to her.   She would like to speak to supervising doctor today.

## 2024-06-28 NOTE — TELEPHONE ENCOUNTER
Spoke at length with Kathy. Informed her supervising doctors have reviewed the plan. It is agreed the plan is safe and appropriate. She wanted to know provider names and then said those are doctors in the same office and she expects they would agree. Inquired if she was interested in a HELGA or an appointment/ consultation with another provider. She ultimately was not interested in that and said she will keep the appointment with Rhona on 7/11/24. She does not feel weaning off Xanax is her primary problem or concern at this time.

## 2024-07-02 ENCOUNTER — TELEPHONE (OUTPATIENT)
Dept: PSYCHIATRY | Facility: CLINIC | Age: 39
End: 2024-07-02

## 2024-07-02 NOTE — TELEPHONE ENCOUNTER
"Per communication of Dr. Cook, she will call Kathy later today when she has finished with patient appointments after 5 PM. She thought it would be good if Rhona could be part of the call, since Dr. Cook doesn't know her. Revieweds Rhona has patient appointment until 7 PM tonight and wouldn't be able to take a call.     Kathy reports abdominal distension and having a headache since the increase in Abilify. She is looking for recommendations about her symptoms and possible going back to the previous dose of Abilify. She feels the taper of Xanax is the focus in this office and that Rhona is \"working of an old diagnosis (Bipolar) and not hearing her. She came here voluntarily and does not like feeling coerced or that she has no control in the taper. From what she's reading, the taper or a 6 month time frame was never told to her or she wouldn't have agreed. She also says she's not for the meds and does want to get off them.     She's looking to be treated with patience and compassion, but expects Dr. Cook will say the same thing.   "

## 2024-07-02 NOTE — TELEPHONE ENCOUNTER
"Received three voicemails from Kathy to my direct line, two on Fri 6/28 and one yesterday 7/1 regarding medication concerns.     -Voicemail 6/28 at 10:15 referenced discussion with Luz Espinoza PA-C 6/24 re: offering hydroxyzine as an option. Noted concern that \"every other professional\" she speaks to believes her Xanax taper should be slower than it has been thus far. She noted tapering off Xanax was a \"long term goal\" but she was \"not ready completely.\" Requesting to hear from supervising doctor and notes that she also left a message on the main office line and the nursing line requesting the same. Noted this message was to provide \"a rundown\" and did not request return call.  -Voicemail 6/28 at 3:48pm noted having spoken with Meagan re: review of her chart by Dr. Gary and Dr. Gonzalez/their feeling that taper plan was appropriate as is. She expressed that she disagrees and again states \"tapering is not my main issue, that is not why I'm with you guys\" and expressed considering stopping psychiatry appointments and continuing with therapy only. She stated \"She has her protocol but it's my body, I'm not a robot.\" Expressed stress about medication concerns. Noted again hoping to just provide an update and did not request return call.  -Voicemail 7/1 at 1:10pm noting not having heard response to two voicemails on Friday. Noted having left a message on the nursing line re: stomach hurting and wondering if this is due to Abilify increase. Noted again feeling uncomfortable with Xanax taper. Stated she may need to transition from in-person to virtual session for 7/3 therapy. Reviewed information from previous message and stated that \"none of them spoke to me\" referring to Dr. Gary and Dr. Gonzalez.     LCSW returned call today and apologized for delay in returning call as I was out of office on Friday 6/28. Provided direct line for return call and noted availability from around 11:20-1pm today as well as " "confirming scheduled session for tomorrow, 7/3 at 10am. Requested return call if this session needs to be held virtually rather than in person.     Received return call from Kathy and spoke with her for 25 minutes. She discussed above information at length and notes that she would like to speak with Dr. Gary and/or Dr. Gonzalez as tapering is \"not my main objective,\" she feels the tapering plan is very fast and she is also concerned about Abilify increase. She expressed concern that Rhona PERI Espinoza, is making medication recommendations based on an old diagnosis of bipolar disorder from 2607-1713. She states Rhona is \"incompetent\" and does not understand, \"only wants to do things her way\" and she feels she is not being closely monitored. She also states \"it doesn't seem like she's very concerned about my feelings.\" She expressed concern that during Xanax taper she does not have anything to use in the event of a panic attack. [facility] and Kathy states she does not want to incorporate any new medications. She states having left several messages on other office lines requesting to speak with Dr. Gary/Dr. Gonzalez as she feles it is \"common sense to involve the patient in the discussion.\" LCSW noted plan to route her concerns to clinical team/providers as requested. Noted will also follow up with bereavement group scheduling and also relayed date/time of [facility] appointment with [facility]. Kathy expressed appreciation and confirmed needing to transition to virtual session tomorrow.  "

## 2024-07-02 NOTE — TELEPHONE ENCOUNTER
Kathy left a VM 7/1/24 (message not retrieved immediately). She reviewed information in Encounters from 6/28, regarding the taper of Xanax again noting her case was reviewed without anyone (the doctors that reviewed) talking to her. This message notes having stomach discomfort. Asking if this is related to taper or increase in Abilify?

## 2024-07-02 NOTE — TELEPHONE ENCOUNTER
Kathy said she is learning about treatment and her rights and how to navigate MyChart. She did say maybe Rhona isn't the provider for her.

## 2024-07-02 NOTE — TELEPHONE ENCOUNTER
Called twice, at 1700 and 1702, no answer.  Called back at 1727 and left VM introducing myself, spelled my name, introduced my role and title, and offered to answer any questions she may have regarding medications.  Offered that I will also be available at 0800 tomorrow, between [id number], and after 1700.

## 2024-07-03 ENCOUNTER — TELEMEDICINE (OUTPATIENT)
Dept: BEHAVIORAL/MENTAL HEALTH CLINIC | Facility: CLINIC | Age: 39
End: 2024-07-03
Payer: COMMERCIAL

## 2024-07-03 DIAGNOSIS — F43.21 COMPLICATED GRIEF: ICD-10-CM

## 2024-07-03 DIAGNOSIS — F31.81 BIPOLAR 2 DISORDER, MAJOR DEPRESSIVE EPISODE (HCC): Primary | ICD-10-CM

## 2024-07-03 DIAGNOSIS — F41.1 GENERALIZED ANXIETY DISORDER: ICD-10-CM

## 2024-07-03 DIAGNOSIS — G47.01 INSOMNIA DUE TO MEDICAL CONDITION: ICD-10-CM

## 2024-07-03 PROCEDURE — 90834 PSYTX W PT 45 MINUTES: CPT | Performed by: SOCIAL WORKER

## 2024-07-03 NOTE — TELEPHONE ENCOUNTER
Received TEAMS message from  around 1205, was not seen until 1255 due to this provider being called to other clinical duties.  Attempted to call patient at 1712, no answer, and left VM at 1730 advising I would be in the office until 1800, and that she may try calling our main line [phone], or try the [facility] [place] [phone].  Advised I would be out of the office after 1800, but that I will call her Friday if I do not hear from her.

## 2024-07-03 NOTE — PSYCH
"Behavioral Health Psychotherapy Progress Note    Psychotherapy Provided: Individual Psychotherapy     1. Bipolar 2 disorder, major depressive episode (HCC)        2. Generalized anxiety disorder        3. Complicated grief        4. Insomnia due to medical condition            Goals addressed in session: Goal 1     DATA: LCSW met with Kathy Vazquez. Session was conducted Virtual. she was able to set an agenda which included sharing positive news that she [place] a [place]. She discussed a potential interest in IOP, noting that she feels more intensive therapy may be needed at this time for her to maintain momentum in symptom improvement. She is hopeful to make increased progress toward stabilization, for example, obtaining a job. LCSW to explore possible referral/wait times. Kathy Vazquez discussed an increase in psychosocial stressors relating to medication management concerns as she disagrees with Xanax taper plan; reviewed concrete steps she has taken to express these concerns and also upcoming planned call to review plan with Dr. Cook. Clarified when asked that Kathy does not have to maintain both medication management and therapy services; she may choose to continue with one or the other or both, as desired. Encouraged further exploration of her concerns as in reviewing intake Kathy did express a Xanax reduction as a priority of hers. Kathy clarifies that perhaps her priorities have shifted and notes specifically wanting to \"organize thoughts\" and be \"able to focus.\" Discussed concept of cognitive dissonance and Kathy is receptive to the fact that she may be experiencing internal conflict (wanting to reduce her Xanax dose vs having fears about this process/the pace). Reviewed that scheduling for bereavement group is pending at this time and message was sent to co-facilitator Vinnie to follow up. Specific techniques used in this session were: empathetic " "listening, validation, and motivational interviewing.    During this session, this clinician used the following therapeutic modalities: Client-centered Therapy, Motivational Interviewing, and Supportive Psychotherapy    Substance Abuse was not addressed during this session. If the client is diagnosed with a co-occurring substance use disorder, please indicate any changes in the frequency or amount of use: N/A. Stage of change for addressing substance use diagnoses: No substance use/Not applicable    ASSESSMENT:  Kathy Vazquez presents with a Anxious mood. Kathy Vazquez was oriented to person, place, time, and situation. Grooming and Hygiene were good  and clothing was casually dressed and appropriate for weather. Ability to perform ADLs has not changed. she was cooperative.      her affect is Normal range and intensity and Tearful, which is congruent, with her mood and the content of the session. The client has made some progress on their goals.    Kathy Vazquez presents with a none risk of suicide, none risk of self-harm, and none risk of harm to others.    For any risk assessment that surpasses a \"low\" rating, a safety plan must be developed.    A safety plan was indicated: no  If yes, describe in detail: N/A    PLAN: Between sessions, Kathy Vazquez will continue to work on short-term goals including unpacking at her apartment, identifying time for leisure/self-care, and being social as able. Next scheduled medication management appointment is on 7/11. At the next session, the therapist will use Client-centered Therapy, Motivational Interviewing, and Supportive Psychotherapy to address symptoms as they arise. Kathy Vazquez will return to outpatient therapy on 7/12/2024.     Behavioral Health Treatment Plan and Discharge Planning: Kathy Vazquez is aware of and agrees to continue to work on their treatment plan. They have identified and are working toward their discharge goals. yes    Visit start " and stop times:    07/03/24  Start Time: 1012  Stop Time: 1058  Total Visit Time: 46 minutes      Virtual Regular Visit    Verification of patient location:    Patient is located at Home in the following state in which I hold an active license [place]    The patient was identified by name and date of birth. Kathy Vazquez was informed that this is a telemedicine visit and that the visit is being conducted through the Epic Embedded platform. She agrees to proceed. My office door was closed. No one else was in the room.  She acknowledged consent and understanding of privacy and security of the video platform. The patient has agreed to participate and understands they can discontinue the visit at any time.    Patient is aware this is a billable service.

## 2024-07-05 NOTE — TELEPHONE ENCOUNTER
Called patient, no answer, left another VM introducing myself and offered to discuss any medication concerns she may have.  Offered that she can call me back to discuss these, or she can wait until her appointment with Luz Espinoza PA-C next week.  Left number for my office [phone].
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
## 2024-07-09 ENCOUNTER — TELEPHONE (OUTPATIENT)
Dept: PSYCHIATRY | Facility: CLINIC | Age: 39
End: 2024-07-09

## 2024-07-10 ENCOUNTER — TELEPHONE (OUTPATIENT)
Dept: PSYCHIATRY | Facility: CLINIC | Age: 39
End: 2024-07-10

## 2024-07-10 NOTE — TELEPHONE ENCOUNTER
Patient returned call to writer and requested sooner HEL[facility] appointment due to a lot going on right nowl. Patient is now scheduled for 7/23@ 2 pm in office.

## 2024-07-10 NOTE — TELEPHONE ENCOUNTER
Writer AMBER informing patient their July appointments have been cancelled out due to provider being out on maternity leave effective Monday. Informed her of her gita apt on 7/30@ 3 pm.

## 2024-07-11 ENCOUNTER — OFFICE VISIT (OUTPATIENT)
Dept: PSYCHIATRY | Facility: CLINIC | Age: 39
End: 2024-07-11
Payer: COMMERCIAL

## 2024-07-11 ENCOUNTER — TELEPHONE (OUTPATIENT)
Dept: FAMILY MEDICINE CLINIC | Facility: CLINIC | Age: 39
End: 2024-07-11

## 2024-07-11 VITALS
HEART RATE: 60 BPM | DIASTOLIC BLOOD PRESSURE: 72 MMHG | WEIGHT: 199.3 LBS | SYSTOLIC BLOOD PRESSURE: 117 MMHG | BODY MASS INDEX: 35.31 KG/M2 | HEIGHT: 63 IN

## 2024-07-11 DIAGNOSIS — F41.1 GENERALIZED ANXIETY DISORDER: ICD-10-CM

## 2024-07-11 DIAGNOSIS — G47.00 INSOMNIA, UNSPECIFIED TYPE: ICD-10-CM

## 2024-07-11 DIAGNOSIS — F31.81 BIPOLAR II DISORDER (HCC): Primary | ICD-10-CM

## 2024-07-11 PROCEDURE — 99214 OFFICE O/P EST MOD 30 MIN: CPT | Performed by: PHYSICIAN ASSISTANT

## 2024-07-11 RX ORDER — CLONIDINE HYDROCHLORIDE 0.1 MG/1
0.1 TABLET ORAL EVERY 12 HOURS SCHEDULED
Qty: 30 TABLET | Refills: 1 | Status: SHIPPED | OUTPATIENT
Start: 2024-07-11

## 2024-07-11 RX ORDER — LAMOTRIGINE 100 MG/1
TABLET ORAL
Qty: 30 TABLET | Refills: 1 | Status: SHIPPED | OUTPATIENT
Start: 2024-07-11

## 2024-07-11 RX ORDER — ALPRAZOLAM 1 MG/1
TABLET ORAL
Qty: 45 TABLET | Refills: 1 | Status: SHIPPED | OUTPATIENT
Start: 2024-07-11

## 2024-07-11 RX ORDER — LAMOTRIGINE 25 MG/1
TABLET ORAL
Qty: 14 TABLET | Refills: 0 | Status: SHIPPED | OUTPATIENT
Start: 2024-07-11

## 2024-07-11 RX ORDER — ARIPIPRAZOLE 5 MG/1
TABLET ORAL
Qty: 7 TABLET | Refills: 0 | Status: SHIPPED | OUTPATIENT
Start: 2024-07-11

## 2024-07-17 ENCOUNTER — TELEPHONE (OUTPATIENT)
Dept: PSYCHOLOGY | Facility: CLINIC | Age: 39
End: 2024-07-17

## 2024-07-17 ENCOUNTER — TELEPHONE (OUTPATIENT)
Age: 39
End: 2024-07-17

## 2024-07-17 NOTE — TELEPHONE ENCOUNTER
"Patient called and stated that last week she dropped off forms for the [facility] to be signed by Dr. Antonio and sent back.  No message in chart and only see a faxed confirmation sheet on 7/3/2024 that's states \"faxed form to [facility] to [place]-requested this on 7/3/24)  But patient states she came in last week with new form.  Please call patient back with update of form and call when faxed to [facility].    Please note once patients is callled  Thank you  "

## 2024-07-17 NOTE — TELEPHONE ENCOUNTER
Called pt and discussed PHP referral but pt stated she is still searching for a job and also she doesn't think she is a mental state to do this program at this time and declined PHP.

## 2024-07-18 NOTE — TELEPHONE ENCOUNTER
She did not give me any forms to me when I saw her, I am not sure if we have any forms. Her mental health disability forms are being completed by her psychiatrist not me.

## 2024-07-22 NOTE — TELEPHONE ENCOUNTER
Pt said she spoke to the [facility] and they did not receive the OV notes from 5/2023 that Suly was going to fax over for her. Also, medication list. Please, check into this and call pt back. Pt said she also dropped off the information of what was needed.

## 2024-07-23 ENCOUNTER — OFFICE VISIT (OUTPATIENT)
Dept: BEHAVIORAL/MENTAL HEALTH CLINIC | Facility: CLINIC | Age: 39
End: 2024-07-23
Payer: COMMERCIAL

## 2024-07-23 ENCOUNTER — DOCUMENTATION (OUTPATIENT)
Dept: PSYCHIATRY | Facility: CLINIC | Age: 39
End: 2024-07-23

## 2024-07-23 ENCOUNTER — TELEPHONE (OUTPATIENT)
Dept: PSYCHIATRY | Facility: CLINIC | Age: 39
End: 2024-07-23

## 2024-07-23 DIAGNOSIS — F41.1 GENERALIZED ANXIETY DISORDER: ICD-10-CM

## 2024-07-23 DIAGNOSIS — F43.21 COMPLICATED GRIEF: ICD-10-CM

## 2024-07-23 DIAGNOSIS — F31.81 BIPOLAR 2 DISORDER, MAJOR DEPRESSIVE EPISODE (HCC): Primary | ICD-10-CM

## 2024-07-23 PROCEDURE — 90837 PSYTX W PT 60 MINUTES: CPT | Performed by: SOCIAL WORKER

## 2024-07-23 NOTE — TELEPHONE ENCOUNTER
A medical records request was received on 7/23/24 from the [place] [employer]. The date range is 2/2/2023-Present.    Paperwork was placed in Rhona's mailbox.

## 2024-07-23 NOTE — PSYCH
"Behavioral Health Psychotherapy Progress Note    Psychotherapy Provided: Individual Psychotherapy     1. Bipolar 2 disorder, major depressive episode (HCC)        2. Generalized anxiety disorder        3. Complicated grief              Goals addressed in session: Goal 1     DATA: Kathy met with this worker for her initial transition of care session while her therapist is out on leave.  She shared what it has been like to be homeless and now in her own apt.  Kathy reported being uncertain if she is emotionally stable enough to seek out employment while also aware of the progress she has been making to heal.   Specific techniques used in this session were: psychoeducation, empathetic listening, validation, and motivational interviewing.    During this session, this clinician used the following therapeutic modalities: Client-centered Therapy, Motivational Interviewing, and Supportive Psychotherapy    Substance Abuse was not addressed during this session. If the client is diagnosed with a co-occurring substance use disorder, please indicate any changes in the frequency or amount of use: N/A. Stage of change for addressing substance use diagnoses: No substance use/Not applicable    ASSESSMENT:  Kathy Vazquez presents with a Euthymic/ normal mood. Kathy Vazquez was oriented to person, place, time, and situation. Grooming and Hygiene were good  and clothing was casually dressed and appropriate for weather. Ability to perform ADLs has not changed. she was cooperative.  her affect is Normal range and intensity and Tearful, which is congruent, with her mood and the content of the session. The client has made some progress on their goals.    Kathy Vazquez presents with a none risk of suicide, none risk of self-harm, and none risk of harm to others.    For any risk assessment that surpasses a \"low\" rating, a safety plan must be developed.    A safety plan was indicated: no  If yes, describe in detail: " N/A    PLAN: Between sessions, Kathy Vazquez will continue to work on short-term goals including unpacking at her apartment, identifying time for leisure/self-care, and being social as able. Next scheduled medication management appointment is on 7/11. At the next session, the therapist will use Client-centered Therapy, Motivational Interviewing, and Supportive Psychotherapy to address symptoms as they arise. Kathy Vazquez will return to outpatient therapy on 7/12/2024.     Behavioral Health Treatment Plan and Discharge Planning: Kathy Vazquez is aware of and agrees to continue to work on their treatment plan. They have identified and are working toward their discharge goals. yes    Visit start and stop times:    07/23/24  Start Time: 1355  Stop Time: 1455  Total Visit Time: 60 minutes

## 2024-07-24 NOTE — PSYCH
A medical records request was received today.  The requestor: [place] [employer] [place] of Disability Determination form with [employer] on behalf of Kathy, for records from 2/2/2023 to the present.  I filled out the MRO request form and placed all paperwork in the appropriate staff member's (Amirah's) mailbox for processing.

## 2024-07-30 ENCOUNTER — TELEMEDICINE (OUTPATIENT)
Dept: BEHAVIORAL/MENTAL HEALTH CLINIC | Facility: CLINIC | Age: 39
End: 2024-07-30
Payer: COMMERCIAL

## 2024-07-30 DIAGNOSIS — F31.81 BIPOLAR 2 DISORDER, MAJOR DEPRESSIVE EPISODE (HCC): Primary | ICD-10-CM

## 2024-07-30 PROCEDURE — 90834 PSYTX W PT 45 MINUTES: CPT | Performed by: SOCIAL WORKER

## 2024-07-30 NOTE — PSYCH
"Behavioral Health Psychotherapy Progress Note    Psychotherapy Provided: Individual Psychotherapy     1. Bipolar 2 disorder, major depressive episode (HCC)                Goals addressed in session: Goal 1     DATA: Kathy spoke with this worker re: how much she has been struggling over the past week with so much to do, but being overcome with anxiety.  She admitted that she has not followed through with treatment for her latent TB and today reported feeling so poorly that she \"should go to the ER.\"     Specific techniques used in this session were: psychoeducation, empathetic listening, validation, and motivational interviewing.    During this session, this clinician used the following therapeutic modalities: Client-centered Therapy, Motivational Interviewing, and Supportive Psychotherapy    Substance Abuse was not addressed during this session. If the client is diagnosed with a co-occurring substance use disorder, please indicate any changes in the frequency or amount of use: N/A. Stage of change for addressing substance use diagnoses: No substance use/Not applicable    ASSESSMENT:  Kathy Vazquez presents with a Anxious mood. Kathy Vazquez was oriented to person, place, time, and situation. Grooming and Hygiene were poor and she had not gotten dressed for the day.  Ability to perform ADLs has declined. she was cooperative.  her affect is Normal range and intensity and Tearful, which is congruent, with her mood and the content of the session. She acknowledged that she wants to get better, wants to wean off Xanax but has been struggling with physical and mental struggles with her decreased dosage.  The client has made some progress on their goals.    Kathy Vazquez presents with a none risk of suicide, none risk of self-harm, and none risk of harm to others.    For any risk assessment that surpasses a \"low\" rating, a safety plan must be developed.    A safety plan was indicated: no  If yes, describe in " detail: N/A    PLAN: Between sessions, Kathy Vazquez will continue to work on short-term goals including following up with medical issues At the next session, the therapist will use Client-centered Therapy, Motivational Interviewing, and Supportive Psychotherapy to address symptoms as they arise. Kathy Vazquez will return to outpatient therapy on 7/12/2024.     Behavioral Health Treatment Plan and Discharge Planning: Kathy Vazquez is aware of and agrees to continue to work on their treatment plan. They have identified and are working toward their discharge goals. yes    Visit start and stop times:    07/30/24  Start Time: 1500  Stop Time: 1550  Total Visit Time: 50 minutes      Virtual Regular Visit    Verification of patient location:    Patient is located at Home in the following state in which I hold an active license [place]      Assessment/Plan:    Problem List Items Addressed This Visit        Behavioral Health    Bipolar 2 disorder, major depressive episode (HCC) - Primary         Goals addressed in session: Goal 1          Reason for visit is   Chief Complaint   Patient presents with   • Virtual Regular Visit          Encounter provider Yusra Sawyer      Recent Visits  Date Type Provider Dept   07/30/24 Telemedicine Yusra Sawyer [facility] Therapist [place]   Showing recent visits within past 7 days and meeting all other requirements  Future Appointments  No visits were found meeting these conditions.  Showing future appointments within next 150 days and meeting all other requirements       The patient was identified by name and date of birth. Kathy Vazquez was informed that this is a telemedicine visit and that the visit is being conducted throughthe Epic Embedded platform. She agrees to proceed..  My office door was closed. No one else was in the room.  She acknowledged consent and understanding of privacy and security of the video platform. The patient has agreed to participate and  understands they can discontinue the visit at any time.    Patient is aware this is a billable service.     Subjective  Kathy Vazquez is a 39 y.o. female  .      HPI     Past Medical History:   Diagnosis Date   • Acute cough 6/14/2022   • Allergies    • Anxiety 05/2018   • Female hirsutism    • GERD (gastroesophageal reflux disease)    • Head injury    • Hypertension 2018   • Papanicolaou smear for cervical cancer screening 2017    NL, no h/o of abn pap that she can recall   • STD (female)    • Thyroid nodule 05/2018   • Tobacco user 05/2018       Past Surgical History:   Procedure Laterality Date   • NO PAST SURGERIES         Current Outpatient Medications   Medication Sig Dispense Refill   • albuterol (PROVENTIL HFA,VENTOLIN HFA) 90 mcg/act inhaler Inhale 2 puffs every 4 (four) hours as needed for wheezing or shortness of breath 8.5 g 2   • ALPRAZolam (XANAX) 1 mg tablet Take 1 tab po qAM and 1/2 tab po qhs 45 tablet 1   • ARIPiprazole (ABILIFY) 5 mg tablet Take 1 tab po qhs x 1 week, then stop this medicine 7 tablet 0   • buPROPion (WELLBUTRIN XL) 150 mg 24 hr tablet Take 1 tablet (150 mg total) by mouth every morning 30 tablet 5   • cloNIDine (Catapres) 0.1 mg tablet Take 1 tablet (0.1 mg total) by mouth every 12 (twelve) hours 30 tablet 1   • fluticasone-salmeterol (Advair HFA) 230-21 MCG/ACT inhaler Inhale 2 puffs 2 (two) times a day Rinse mouth after use. 36 g 1   • ibuprofen (MOTRIN) 800 mg tablet Take 1 tablet (800 mg total) by mouth every 8 (eight) hours as needed for mild pain 90 tablet 0   • lamoTRIgine (LaMICtal) 100 mg tablet Start this dose after the 25mg tabs are finished.  Take 1/2 tab po qhs x 2 weeks, then go up to 1 full tab po qhs 30 tablet 1   • lamoTRIgine (LaMICtal) 25 mg tablet Take 1 tab po qhs x 2 weeks. Note: START THIS IN 1 WEEK (7/18/2024), and then finish this completely before starting the 100mg tab 14 tablet 0   • lisinopril (ZESTRIL) 40 mg tablet Take 1 tablet (40 mg total) by  mouth daily 90 tablet 1     No current facility-administered medications for this visit.        Allergies   Allergen Reactions   • Penicillins Swelling     Rash, swelling at IV site. Was to IV penicillin            Visit Time    Visit Start Time: 1500  Visit Stop Time: 1550  Total Visit Duration:  50 minutes

## 2024-07-31 ENCOUNTER — TELEPHONE (OUTPATIENT)
Dept: PSYCHIATRY | Facility: CLINIC | Age: 39
End: 2024-07-31

## 2024-07-31 ENCOUNTER — TELEMEDICINE (OUTPATIENT)
Dept: FAMILY MEDICINE CLINIC | Facility: CLINIC | Age: 39
End: 2024-07-31
Payer: COMMERCIAL

## 2024-07-31 ENCOUNTER — TELEPHONE (OUTPATIENT)
Age: 39
End: 2024-07-31

## 2024-07-31 DIAGNOSIS — Z22.7 LATENT TUBERCULOSIS DIAGNOSED BY BLOOD TEST: Primary | ICD-10-CM

## 2024-07-31 DIAGNOSIS — F31.81 BIPOLAR 2 DISORDER, MAJOR DEPRESSIVE EPISODE (HCC): ICD-10-CM

## 2024-07-31 DIAGNOSIS — F17.210 TOBACCO DEPENDENCE DUE TO CIGARETTES: ICD-10-CM

## 2024-07-31 DIAGNOSIS — Z79.899 CHRONIC USE OF BENZODIAZEPINE FOR THERAPEUTIC PURPOSE: ICD-10-CM

## 2024-07-31 DIAGNOSIS — F41.1 GAD (GENERALIZED ANXIETY DISORDER): ICD-10-CM

## 2024-07-31 DIAGNOSIS — I10 BENIGN ESSENTIAL HYPERTENSION: ICD-10-CM

## 2024-07-31 DIAGNOSIS — J44.9 CHRONIC OBSTRUCTIVE PULMONARY DISEASE, UNSPECIFIED COPD TYPE (HCC): ICD-10-CM

## 2024-07-31 PROCEDURE — 99214 OFFICE O/P EST MOD 30 MIN: CPT | Performed by: FAMILY MEDICINE

## 2024-07-31 RX ORDER — LANOLIN ALCOHOL/MO/W.PET/CERES
400 CREAM (GRAM) TOPICAL DAILY
Qty: 30 TABLET | Refills: 1 | Status: SHIPPED | OUTPATIENT
Start: 2024-07-31

## 2024-07-31 RX ORDER — BUPROPION HYDROCHLORIDE 150 MG/1
150 TABLET ORAL EVERY MORNING
Qty: 30 TABLET | Refills: 5 | Status: SHIPPED | OUTPATIENT
Start: 2024-07-31 | End: 2025-01-27

## 2024-07-31 NOTE — LETTER
July 31, 2024     Patient: Kathy Vazquez  YOB: 1985  Date of Visit: 7/31/2024      To Whom it May Concern:    Kathy Vazquez is under my professional care. Kathy was seen in my office on 7/31/2024. Kathy {Return to school/sport/work:[id number]}.    If you have any questions or concerns, please don't hesitate to call.         Sincerely,          Cheryl Antonio MD        CC:   No Recipients

## 2024-07-31 NOTE — TELEPHONE ENCOUNTER
Pt calling to schedule an apt for latent TB. Since referral was just received, advised pt our nurses would have to review and give her a cb to reschedule.    Pt also wants office to know that she is sorry for missing her apts back in 2022 as she was dealing with high anxiety, and she states she hopes that will not stop her from getting an apt now

## 2024-07-31 NOTE — PROGRESS NOTES
Virtual Regular Visit  Name: Kathy Vazquez      : 1985      MRN: [id number]  Encounter Provider: Cheryl Antonio MD  Encounter Date: 2024   Encounter department: [facility]    Verification of patient location:    Patient is located at Home in the following state in which I hold an active license [place]    Assessment & Plan   1. Latent tuberculosis diagnosed by blood test  -     Ambulatory Referral to Infectious Disease; Future  2. EMILY (generalized anxiety disorder)  -     magnesium Oxide (MAG-OX) 400 mg TABS; Take 1 tablet (400 mg total) by mouth daily  3. Chronic obstructive pulmonary disease, unspecified COPD type (HCC)  4. Benign essential hypertension  5. Chronic use of benzodiazepine for therapeutic purpose  6. Tobacco dependence due to cigarettes  7. Bipolar 2 disorder, major depressive episode (HCC)  -     buPROPion (WELLBUTRIN XL) 150 mg 24 hr tablet; Take 1 tablet (150 mg total) by mouth every morning      Tobacco Cessation Counseling: Tobacco cessation counseling was provided. The patient is sincerely urged to quit consumption of tobacco. She is ready to quit tobacco.       I referred her to infectious disease and personally discussed the case with Ana Maria Guillory and we will schedule her for latent tuberculosis treatment.    [employer]at[employer] forms were already filled and faxed by my medical assistant with all the information that was requested.    Blood pressures currently controlled continue lisinopril.  She is on multiple psych medications including clonidine, Abilify, Wellbutrin, Lamictal.  Wellbutrin was started for smoking cessation  Smoking cessation advised.  Advised to follow-up with psychiatry.    Encounter provider Cheryl Antonio MD    The patient was identified by name and date of birth. Kathy Vazquez was informed that this is a telemedicine visit and that the visit is being conducted through the Epic Embedded platform. She agrees to proceed..  My office door was  closed. No one else was in the room.  She acknowledged consent and understanding of privacy and security of the video platform. The patient has agreed to participate and understands they can discontinue the visit at any time.    Patient is aware this is a billable service.     History of Present Illness     With generalized anxiety, essential hypertension, COPD, breast cyst presents for virtual follow-up with a chief complaint of feels zapping over the chest , arms.  She feels that these are related to benzodiazepine withdrawals currently taking 1 and 0.5 mg as needed.  Feels that she occasionally feels short of breath but overall has been using albuterol less frequently.  She continues to smoke.  She is anxious and nervous during the visit.  She still gets teary  She has requested a change in psychiatrist that she was seeing a nurse practitioner and will be changing to another member of the team.  She also likes her new therapist and finds her very resourceful.  She also needs some information regarding heart for disability forms, claims          Review of Systems   Constitutional:  Negative for fatigue and fever.   HENT:  Negative for congestion, facial swelling, mouth sores, rhinorrhea, sore throat and trouble swallowing.    Eyes:  Negative for pain and redness.   Respiratory:  Negative for cough, shortness of breath and wheezing.    Cardiovascular:  Negative for chest pain, palpitations and leg swelling.   Gastrointestinal:  Negative for abdominal pain, blood in stool, constipation, diarrhea and nausea.   Genitourinary:  Negative for dysuria, hematuria and urgency.   Musculoskeletal:  Negative for arthralgias, back pain and myalgias.   Skin:  Negative for rash and wound.   Neurological:  Negative for seizures, syncope and headaches.   Hematological:  Negative for adenopathy.   Psychiatric/Behavioral:  Negative for agitation and behavioral problems. The patient is nervous/anxious.      Medical History Reviewed by  provider this encounter:  Tobacco  Allergies  Meds  Problems  Med Hx  Surg Hx  Fam Hx       Current Outpatient Medications on File Prior to Visit   Medication Sig Dispense Refill    albuterol (PROVENTIL HFA,VENTOLIN HFA) 90 mcg/act inhaler Inhale 2 puffs every 4 (four) hours as needed for wheezing or shortness of breath 8.5 g 2    ALPRAZolam (XANAX) 1 mg tablet Take 1 tab po qAM and 1/2 tab po qhs 45 tablet 1    ARIPiprazole (ABILIFY) 5 mg tablet Take 1 tab po qhs x 1 week, then stop this medicine 7 tablet 0    fluticasone-salmeterol (Advair HFA) 230-21 MCG/ACT inhaler Inhale 2 puffs 2 (two) times a day Rinse mouth after use. 36 g 1    lisinopril (ZESTRIL) 40 mg tablet Take 1 tablet (40 mg total) by mouth daily 90 tablet 1    [DISCONTINUED] buPROPion (WELLBUTRIN XL) 150 mg 24 hr tablet Take 1 tablet (150 mg total) by mouth every morning 30 tablet 5    cloNIDine (Catapres) 0.1 mg tablet Take 1 tablet (0.1 mg total) by mouth every 12 (twelve) hours 30 tablet 1    ibuprofen (MOTRIN) 800 mg tablet Take 1 tablet (800 mg total) by mouth every 8 (eight) hours as needed for mild pain 90 tablet 0    lamoTRIgine (LaMICtal) 100 mg tablet Start this dose after the 25mg tabs are finished.  Take 1/2 tab po qhs x 2 weeks, then go up to 1 full tab po qhs 30 tablet 1    lamoTRIgine (LaMICtal) 25 mg tablet Take 1 tab po qhs x 2 weeks. Note: START THIS IN 1 WEEK (7/18/2024), and then finish this completely before starting the 100mg tab 14 tablet 0     No current facility-administered medications on file prior to visit.      Social History     Tobacco Use    Smoking status: Every Day     Current packs/day: 1.00     Average packs/day: 1 pack/day for 25.0 years (25.0 ttl pk-yrs)     Types: Cigarettes    Smokeless tobacco: Never    Tobacco comments:     smoked since age 10   Vaping Use    Vaping status: Never Used   Substance and Sexual Activity    Alcohol use: Not Currently    Drug use: Not Currently     Types: Benzodiazepines,  Marijuana     Comment: rare    Sexual activity: Not Currently     Birth control/protection: Abstinence     Objective     LMP 07/11/2024 (Exact Date)   Physical Exam  Vitals and nursing note reviewed.   Constitutional:       Appearance: She is well-developed.   HENT:      Head: Normocephalic and atraumatic.      Right Ear: External ear normal.      Left Ear: External ear normal.   Eyes:      General: No scleral icterus.     Conjunctiva/sclera: Conjunctivae normal.   Pulmonary:      Effort: Pulmonary effort is normal. No respiratory distress.   Skin:     Coloration: Skin is not jaundiced or pale.   Neurological:      General: No focal deficit present.      Mental Status: She is alert. Mental status is at baseline.   Psychiatric:         Mood and Affect: Mood is anxious.         Behavior: Behavior normal.

## 2024-07-31 NOTE — TELEPHONE ENCOUNTER
A medical records request  was received for medical records from [facility]. The date range is 5/20/24-7/30/24. A Physician Statement Progress Report Form was also attached.     Paperwork was placed in Rhona's mailbox for review.

## 2024-08-01 ENCOUNTER — TELEPHONE (OUTPATIENT)
Age: 39
End: 2024-08-01

## 2024-08-01 ENCOUNTER — HOSPITAL ENCOUNTER (OUTPATIENT)
Dept: RADIOLOGY | Facility: HOSPITAL | Age: 39
Discharge: HOME | End: 2024-08-01
Payer: COMMERCIAL

## 2024-08-01 DIAGNOSIS — D24.1 FIBROADENOMA OF BREAST, RIGHT: ICD-10-CM

## 2024-08-01 DIAGNOSIS — R76.11 POSITIVE PPD: Primary | ICD-10-CM

## 2024-08-01 DIAGNOSIS — R92.8 ABNORMAL MAMMOGRAM OF RIGHT BREAST: ICD-10-CM

## 2024-08-01 PROCEDURE — 77066 DX MAMMO INCL CAD BI: CPT

## 2024-08-01 PROCEDURE — 76642 ULTRASOUND BREAST LIMITED: CPT

## 2024-08-01 PROCEDURE — G0279 TOMOSYNTHESIS, MAMMO: HCPCS

## 2024-08-01 NOTE — TELEPHONE ENCOUNTER
Lolly from  Infectious Disease called to say they will be seeing the pt soon but she needs a more recent chest x-ray. The pt is aware of this. Please order.

## 2024-08-01 NOTE — TELEPHONE ENCOUNTER
Spoke with pt made aware that CXR is needed prior to being scheduled again. Pt asked if we could inform PCP to order. She will get it completed and is aware that can be scheduled after completion.     __________________________________  Spoke with from [facility] PCP office and made aware CXR needed to be ordered and completed by pt prior to scheduling consult.

## 2024-08-02 ENCOUNTER — APPOINTMENT (OUTPATIENT)
Dept: LAB | Facility: CLINIC | Age: 39
End: 2024-08-02
Payer: COMMERCIAL

## 2024-08-02 ENCOUNTER — OFFICE VISIT (OUTPATIENT)
Dept: URGENT CARE | Facility: CLINIC | Age: 39
End: 2024-08-02
Payer: COMMERCIAL

## 2024-08-02 ENCOUNTER — TELEPHONE (OUTPATIENT)
Age: 39
End: 2024-08-02

## 2024-08-02 ENCOUNTER — DOCUMENTATION (OUTPATIENT)
Dept: PSYCHIATRY | Facility: CLINIC | Age: 39
End: 2024-08-02

## 2024-08-02 ENCOUNTER — APPOINTMENT (OUTPATIENT)
Dept: RADIOLOGY | Facility: CLINIC | Age: 39
End: 2024-08-02
Payer: COMMERCIAL

## 2024-08-02 VITALS
OXYGEN SATURATION: 99 % | RESPIRATION RATE: 18 BRPM | HEIGHT: 63 IN | BODY MASS INDEX: 35.08 KG/M2 | WEIGHT: 198 LBS | HEART RATE: 63 BPM | TEMPERATURE: 97.7 F

## 2024-08-02 DIAGNOSIS — F41.1 GENERALIZED ANXIETY DISORDER: Primary | ICD-10-CM

## 2024-08-02 DIAGNOSIS — G47.01 INSOMNIA DUE TO MEDICAL CONDITION: ICD-10-CM

## 2024-08-02 DIAGNOSIS — R76.11 POSITIVE PPD: ICD-10-CM

## 2024-08-02 DIAGNOSIS — F31.81 BIPOLAR II DISORDER (HCC): ICD-10-CM

## 2024-08-02 DIAGNOSIS — Z00.01 ENCOUNTER FOR GENERAL ADULT MEDICAL EXAMINATION WITH ABNORMAL FINDINGS: ICD-10-CM

## 2024-08-02 DIAGNOSIS — F41.1 GENERALIZED ANXIETY DISORDER: ICD-10-CM

## 2024-08-02 LAB
ALBUMIN SERPL BCG-MCNC: 4.1 G/DL (ref 3.5–5)
ALP SERPL-CCNC: 44 U/L (ref 34–104)
ALT SERPL W P-5'-P-CCNC: 23 U/L (ref 7–52)
ANION GAP SERPL CALCULATED.3IONS-SCNC: 9 MMOL/L (ref 4–13)
AST SERPL W P-5'-P-CCNC: 19 U/L (ref 13–39)
BASOPHILS # BLD AUTO: 0.06 THOUSANDS/ÂΜL (ref 0–0.1)
BASOPHILS NFR BLD AUTO: 1 % (ref 0–1)
BILIRUB SERPL-MCNC: 0.46 MG/DL (ref 0.2–1)
BUN SERPL-MCNC: 11 MG/DL (ref 5–25)
CALCIUM SERPL-MCNC: 9.2 MG/DL (ref 8.4–10.2)
CHLORIDE SERPL-SCNC: 107 MMOL/L (ref 96–108)
CHOLEST SERPL-MCNC: 186 MG/DL
CO2 SERPL-SCNC: 22 MMOL/L (ref 21–32)
CREAT SERPL-MCNC: 0.7 MG/DL (ref 0.6–1.3)
EOSINOPHIL # BLD AUTO: 0.19 THOUSAND/ÂΜL (ref 0–0.61)
EOSINOPHIL NFR BLD AUTO: 3 % (ref 0–6)
ERYTHROCYTE [DISTWIDTH] IN BLOOD BY AUTOMATED COUNT: 12.6 % (ref 11.6–15.1)
EST. AVERAGE GLUCOSE BLD GHB EST-MCNC: 114 MG/DL
GFR SERPL CREATININE-BSD FRML MDRD: 107 ML/MIN/1.73SQ M
GLUCOSE SERPL-MCNC: 87 MG/DL (ref 65–140)
HBA1C MFR BLD: 5.6 %
HCG SERPL QL: NEGATIVE
HCT VFR BLD AUTO: 41.4 % (ref 34.8–46.1)
HDLC SERPL-MCNC: 42 MG/DL
HGB BLD-MCNC: 13.3 G/DL (ref 11.5–15.4)
IMM GRANULOCYTES # BLD AUTO: 0.02 THOUSAND/UL (ref 0–0.2)
IMM GRANULOCYTES NFR BLD AUTO: 0 % (ref 0–2)
LDLC SERPL CALC-MCNC: 112 MG/DL (ref 0–100)
LYMPHOCYTES # BLD AUTO: 3.19 THOUSANDS/ÂΜL (ref 0.6–4.47)
LYMPHOCYTES NFR BLD AUTO: 43 % (ref 14–44)
MCH RBC QN AUTO: 29.3 PG (ref 26.8–34.3)
MCHC RBC AUTO-ENTMCNC: 32.1 G/DL (ref 31.4–37.4)
MCV RBC AUTO: 91 FL (ref 82–98)
MONOCYTES # BLD AUTO: 0.57 THOUSAND/ÂΜL (ref 0.17–1.22)
MONOCYTES NFR BLD AUTO: 8 % (ref 4–12)
NEUTROPHILS # BLD AUTO: 3.32 THOUSANDS/ÂΜL (ref 1.85–7.62)
NEUTS SEG NFR BLD AUTO: 45 % (ref 43–75)
NONHDLC SERPL-MCNC: 144 MG/DL
NRBC BLD AUTO-RTO: 0 /100 WBCS
PLATELET # BLD AUTO: 221 THOUSANDS/UL (ref 149–390)
PMV BLD AUTO: 11 FL (ref 8.9–12.7)
POTASSIUM SERPL-SCNC: 4.3 MMOL/L (ref 3.5–5.3)
PROT SERPL-MCNC: 6.9 G/DL (ref 6.4–8.4)
RBC # BLD AUTO: 4.54 MILLION/UL (ref 3.81–5.12)
SODIUM SERPL-SCNC: 138 MMOL/L (ref 135–147)
TRIGL SERPL-MCNC: 159 MG/DL
TSH SERPL DL<=0.05 MIU/L-ACNC: 1.67 UIU/ML (ref 0.45–4.5)
WBC # BLD AUTO: 7.35 THOUSAND/UL (ref 4.31–10.16)

## 2024-08-02 PROCEDURE — 84443 ASSAY THYROID STIM HORMONE: CPT

## 2024-08-02 PROCEDURE — 83036 HEMOGLOBIN GLYCOSYLATED A1C: CPT

## 2024-08-02 PROCEDURE — 71046 X-RAY EXAM CHEST 2 VIEWS: CPT

## 2024-08-02 PROCEDURE — 99283 EMERGENCY DEPT VISIT LOW MDM: CPT | Performed by: PHYSICIAN ASSISTANT

## 2024-08-02 PROCEDURE — 80053 COMPREHEN METABOLIC PANEL: CPT

## 2024-08-02 PROCEDURE — 36415 COLL VENOUS BLD VENIPUNCTURE: CPT

## 2024-08-02 PROCEDURE — 84703 CHORIONIC GONADOTROPIN ASSAY: CPT

## 2024-08-02 PROCEDURE — 80061 LIPID PANEL: CPT

## 2024-08-02 PROCEDURE — 85025 COMPLETE CBC W/AUTO DIFF WBC: CPT

## 2024-08-02 PROCEDURE — G0382 LEV 3 HOSP TYPE B ED VISIT: HCPCS | Performed by: PHYSICIAN ASSISTANT

## 2024-08-02 NOTE — PATIENT INSTRUCTIONS
Continue psychotherapy and CBT program with therapist.  Discussed tapering of medications further with psychiatrist and determine if he can slowly taper versus quickly tapering.  Recommend long discussion regarding side effects and potential adverse reactions of medications and what are tolerable all for you versus intolerable.  If you develop any thoughts of hurting yourself or others report to the emergency room immediately or call [phone].

## 2024-08-02 NOTE — PROGRESS NOTES
[facility]        NAME: Kathy Vazquez is a 39 y.o. female  : 1985    MRN: [id number]  DATE: 2024  TIME: 1:41 PM    Assessment and Plan   Generalized anxiety disorder [F41.1]  1. Generalized anxiety disorder        Patient presents with symptoms and exam nation consistent with anxiety.  We had a long discussion about her breast pain that this is likely the result of fibroadenomas or fibrocystic breast as her symptoms are worse around and during her period this is common with these types of conditions.  Her back pain is reproduced with palpation which is consistent with musculoskeletal origin versus pulmonary or cardiac cause.  Recommend having open discussion with her psychiatrist and therapist about adjusting her medications and recommended taper.  She should attempt to coordinate this conversation between her psychiatrist and PCP if at all possible.  Discussed with patient that she has the right to advocate for herself and does have the right to choose her care and if she feels that she is not being adequately cared for understood with her current psychiatrist she does have the right to be seen by a different 1.      Patient Instructions     Patient Instructions   Continue psychotherapy and CBT program with therapist.  Discussed tapering of medications further with psychiatrist and determine if he can slowly taper versus quickly tapering.  Recommend long discussion regarding side effects and potential adverse reactions of medications and what are tolerable all for you versus intolerable.  If you develop any thoughts of hurting yourself or others report to the emergency room immediately or call [phone].     Follow up with PCP in 3-5 days.  Proceed to  ER if symptoms worsen.    If tests have been performed at [facility], our office will contact you with results if changes need to be made to the care plan discussed with you at the visit. You can review your full results on [facility] MyChart.      Chief Complaint     Chief Complaint   Patient presents with    Anxiety     Pt reports of worsening anxiety         History of Present Illness       39-year-old female presents with multiple complaints.  She believes she is among of them as anxiety issues.  She states that she has some pain in her breasts and recently had a mammogram and ultrasound as a result of that.  She also notes that she has had some chest pain that radiates into her back ongoing since before March.  Patient states that she been seeing a new Psychiatrist and had wanted to wean from her Xanax but she believes she may be weaning too quickly and this may be causing some of her symptoms along with increased anxiety.  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  Patient states she is unsure how best to advocate for herself and felt that she should be evaluated to ensure that nothing else was going on that might be being missed.        Review of Systems   Review of Systems   Constitutional:  Negative for chills and fever.   Respiratory:  Positive for shortness of breath. Negative for cough.    Cardiovascular:  Positive for chest pain. Negative for palpitations.   Musculoskeletal:  Positive for myalgias, neck pain and neck stiffness.   Psychiatric/Behavioral:  Positive for dysphoric mood. The patient is nervous/anxious.          Current Medications       Current Outpatient Medications:     albuterol (PROVENTIL HFA,VENTOLIN HFA) 90 mcg/act inhaler, Inhale 2 puffs every 4 (four) hours as needed for wheezing or shortness of breath, Disp: 8.5 g, Rfl: 2    ALPRAZolam (XANAX) 1 mg tablet, Take 1 tab po qAM and 1/2 tab po qhs, Disp: 45 tablet, Rfl: 1    ARIPiprazole (ABILIFY) 5 mg tablet, Take 1 tab po qhs x 1 week, then stop this medicine, Disp: 7 tablet, Rfl: 0    buPROPion (WELLBUTRIN XL) 150 mg 24 hr tablet, Take 1  tablet (150 mg total) by mouth every morning, Disp: 30 tablet, Rfl: 5    cloNIDine (Catapres) 0.1 mg tablet, Take 1 tablet (0.1 mg total) by mouth every 12 (twelve) hours, Disp: 30 tablet, Rfl: 1    fluticasone-salmeterol (Advair HFA) 230-21 MCG/ACT inhaler, Inhale 2 puffs 2 (two) times a day Rinse mouth after use., Disp: 36 g, Rfl: 1    ibuprofen (MOTRIN) 800 mg tablet, Take 1 tablet (800 mg total) by mouth every 8 (eight) hours as needed for mild pain, Disp: 90 tablet, Rfl: 0    lamoTRIgine (LaMICtal) 100 mg tablet, Start this dose after the 25mg tabs are finished.  Take 1/2 tab po qhs x 2 weeks, then go up to 1 full tab po qhs, Disp: 30 tablet, Rfl: 1    lamoTRIgine (LaMICtal) 25 mg tablet, Take 1 tab po qhs x 2 weeks. Note: START THIS IN 1 WEEK (7/18/2024), and then finish this completely before starting the 100mg tab, Disp: 14 tablet, Rfl: 0    lisinopril (ZESTRIL) 40 mg tablet, Take 1 tablet (40 mg total) by mouth daily, Disp: 90 tablet, Rfl: 1    magnesium Oxide (MAG-OX) 400 mg TABS, Take 1 tablet (400 mg total) by mouth daily, Disp: 30 tablet, Rfl: 1    Current Allergies     Allergies as of 08/02/2024 - Reviewed 08/02/2024   Allergen Reaction Noted    Penicillins Swelling 04/28/2016            The following portions of the patient's history were reviewed and updated as appropriate: allergies, current medications, past family history, past medical history, past social history, past surgical history and problem list.     Past Medical History:   Diagnosis Date    Acute cough 6/14/2022    Allergies     Anxiety 05/2018    Female hirsutism     GERD (gastroesophageal reflux disease)     Head injury     Hypertension 2018    Papanicolaou smear for cervical cancer screening 2017    NL, no h/o of abn pap that she can recall    STD (female)     Thyroid nodule 05/2018    Tobacco user 05/2018       Past Surgical History:   Procedure Laterality Date    NO PAST SURGERIES         Family History   Problem Relation Age of  "Onset    Hypertension Mother     Kidney disease Mother     Cirrhosis Mother     Dialysis Mother     Diabetes Mother     Anxiety disorder Mother     Depression Mother     Anxiety disorder Father     Depression Father     Drug abuse Father     Bipolar disorder Father     No Known Problems Sister     No Known Problems Sister     Melanoma Paternal Uncle     Stomach cancer Other     Colon cancer Other         Passed age 38         Medications have been verified.        Objective   Pulse 63   Temp 97.7 °F (36.5 °C)   Resp 18   Ht 5' 3\" (1.6 m)   Wt 89.8 kg (198 lb)   LMP 08/01/2024 (Exact Date)   SpO2 99%   BMI 35.07 kg/m²   Patient's last menstrual period was 08/01/2024 (exact date).       Physical Exam     Physical Exam  Vitals and nursing note reviewed.   Constitutional:       General: She is awake. She is not in acute distress.     Appearance: Normal appearance. She is well-developed and well-groomed. She is not ill-appearing, toxic-appearing or diaphoretic.   HENT:      Head: Normocephalic and atraumatic.      Right Ear: Hearing and external ear normal.      Left Ear: Hearing and external ear normal.   Eyes:      General: Lids are normal. Vision grossly intact. Gaze aligned appropriately.   Cardiovascular:      Rate and Rhythm: Normal rate and regular rhythm.      Heart sounds: Normal heart sounds, S1 normal and S2 normal.   Pulmonary:      Effort: Pulmonary effort is normal.      Breath sounds: Normal breath sounds.      Comments: Patient is speaking in full sentences with no increased respiratory effort. No audible wheezing or stridor.   Musculoskeletal:      Cervical back: Normal range of motion.        Back:       Comments: Noted to palpation to bilateral scapular area.   Skin:     General: Skin is warm and dry.   Neurological:      Mental Status: She is alert and oriented to person, place, and time.      Coordination: Coordination is intact.      Gait: Gait is intact.   Psychiatric:         Attention and " "Perception: Attention and perception normal.         Mood and Affect: Mood and affect normal.         Speech: Speech normal.         Behavior: Behavior normal. Behavior is cooperative.               Note: Portions of this record may have been created with voice recognition software. Occasional wrong word or \"sound a like\" substitutions may have occurred due to the inherent limitations of voice recognition software. Please read the chart carefully and recognize, using context, where substitutions have occurred.*      "

## 2024-08-02 NOTE — TELEPHONE ENCOUNTER
Yumiko Sawyer    Patient called  in today she is having extreme anxiety, she was told she was getting a new Psyc Dr to talk do, but the old Dr took her to a lower dose of  XANAX and she is having an extremely hard time.  She is asking for someone to call her back today please    CB: [phone]

## 2024-08-02 NOTE — TELEPHONE ENCOUNTER
Called and spoke with patient to schedule a follow up with provider. Scheduled for 8/16 @ 4:30pm. Also placed on cancellation list.

## 2024-08-02 NOTE — TELEPHONE ENCOUNTER
Pt called in asking if she could do a Virtual appt for Long Term Disability papers. Writer looked for earlier date appt and informed Pt that this is the earliest appt.    Pt asking if this appt can be changed to Virtual

## 2024-08-03 NOTE — PSYCH
I received [facility] Attending Physician's Statement paperwork in addition to an MRO request by the company, for Kathy's records from 5/20/2204 - 7/30/2204.   I filled out the MRO request form and placed in Amirah's mailbox for processing.  I kept the Physician's Statement form for when Pt comes in to be seen -- appt was set for 8/16/2024.

## 2024-08-05 ENCOUNTER — TELEPHONE (OUTPATIENT)
Dept: INFECTIOUS DISEASES | Facility: CLINIC | Age: 39
End: 2024-08-05

## 2024-08-05 NOTE — PSYCH
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 Effects Of Medications:    Risks, benefits, and possible side effects of medications explained to Kathy and she verbalizes understanding and agreement for treatment.  Risks of medications in pregnancy explained to Kathy. She verbalizes understanding and agrees to notify her doctor if she becomes pregnant.    Controlled Medication Discussion:     Kathy has been filling controlled prescriptions on time as prescribed according to [place] Prescription Drug Monitoring Program  Discussed with Kathy the risks of sedation, respiratory depression, impairment of ability to drive and potential for abuse and addiction related to treatment with benzodiazepine medications. She understands risk of treatment with benzodiazepine medications, agrees to not drive if feels impaired and agrees to take medications as prescribed    Visit Time    Visit Start Time: 6:35PM  Visit Stop Time: 8:54PM    Total Visit Duration:  As above minutes    Addendum 9:06PM  Pt needs to get her insured ID # to be written on the [employer] [employer] form, before it is faxed back to that company.

## 2024-08-05 NOTE — TELEPHONE ENCOUNTER
Called and spoke to patient to notify of appointment not being able to be virtual. Patient requested sooner appointment. I rescheduled to tomorrow 8/6 @ 6:30pm.

## 2024-08-06 ENCOUNTER — OFFICE VISIT (OUTPATIENT)
Dept: PSYCHIATRY | Facility: CLINIC | Age: 39
End: 2024-08-06
Payer: COMMERCIAL

## 2024-08-06 ENCOUNTER — TELEPHONE (OUTPATIENT)
Dept: PSYCHIATRY | Facility: CLINIC | Age: 39
End: 2024-08-06

## 2024-08-06 VITALS
DIASTOLIC BLOOD PRESSURE: 88 MMHG | HEIGHT: 63 IN | WEIGHT: 199.5 LBS | BODY MASS INDEX: 35.35 KG/M2 | SYSTOLIC BLOOD PRESSURE: 143 MMHG | HEART RATE: 60 BPM

## 2024-08-06 DIAGNOSIS — F41.0 PANIC ATTACKS: ICD-10-CM

## 2024-08-06 DIAGNOSIS — F31.81 BIPOLAR II DISORDER (HCC): Primary | ICD-10-CM

## 2024-08-06 DIAGNOSIS — F41.1 GENERALIZED ANXIETY DISORDER: ICD-10-CM

## 2024-08-06 DIAGNOSIS — F43.12 CHRONIC POST-TRAUMATIC STRESS DISORDER (PTSD): ICD-10-CM

## 2024-08-06 LAB
1OH-MIDAZOLAM UR CFM-MCNC: NOT DETECTED NG/MG CREAT
6MAM UR QL SCN: NEGATIVE NG/ML
7AMINOCLONAZEPAM UR CFM-MCNC: NOT DETECTED NG/MG CREAT
A-OH ALPRAZ UR QL CFM: 302 NG/MG CREAT
ACCEPTABLE CREAT UR QL: 51 MG/DL
ALPRAZ/CREAT UR CFM: 133 NG/MG CREAT
AMPHET UR QL SCN: NEGATIVE NG/ML
BARBITURATES UR QL SCN: NEGATIVE NG/ML
BENZODIAZ UR QL SCN: ABNORMAL NG/ML
BUPRENORPHINE UR QL CFM: NEGATIVE NG/ML
CANNABINOIDS UR QL SCN: ABNORMAL NG/ML
CANNABINOIDS/CREAT UR: 649 NG/MG CREAT
CARISOPRODOL UR QL: NEGATIVE NG/ML
CLONAZEPAM/CREAT UR CFM: NOT DETECTED NG/MG CREAT
COCAINE+BZE UR QL SCN: NEGATIVE NG/ML
DIAZEPAM/CREAT UR: NOT DETECTED NG/MG CREAT
ETHYL GLUCURONIDE UR QL SCN: NEGATIVE NG/ML
FENTANYL UR QL SCN: NEGATIVE NG/ML
FLUNITRAZEPAM UR-MCNC: NOT DETECTED NG/MG CREAT
GABAPENTIN SERPLBLD QL SCN: NEGATIVE UG/ML
LORAZEPAM UR CFM-MCNC: NOT DETECTED NG/MG CREAT
METHADONE UR QL SCN: NEGATIVE NG/ML
MIDAZOLAM/CREAT UR CFM: NOT DETECTED NG/MG CREAT
NITRITE UR QL STRIP: NEGATIVE UG/ML
NORDIAZEPAM UR CFM-MCNC: NOT DETECTED NG/MG CREAT
NORFLUNITRAZEPAM UR-MCNC: NOT DETECTED NG/MG CREAT
OH-ETHYLFLURAZ UR CFM-MCNC: NOT DETECTED NG/MG CREAT
OH-TRIAZOLAM UR CFM-MCNC: NOT DETECTED NG/MG CREAT
OPIATES UR QL SCN: NEGATIVE NG/ML
OXAZEPAM CTO UR CFM-MCNC: NOT DETECTED NG/MG CREAT
OXYCODONE+OXYMORPHONE UR QL SCN: NEGATIVE NG/ML
PCP UR QL SCN: NEGATIVE NG/ML
PROPOXYPH UR QL SCN: NEGATIVE NG/ML
SPECIMEN PH ACCEPTABLE UR: 7.6 (ref 4.5–8.9)
TAPENTADOL UR QL SCN: NEGATIVE NG/ML
TEMAZEPAM UR CFM-MCNC: NOT DETECTED NG/MG CREAT
TRAMADOL UR QL SCN: NEGATIVE NG/ML

## 2024-08-06 PROCEDURE — 99215 OFFICE O/P EST HI 40 MIN: CPT | Performed by: PHYSICIAN ASSISTANT

## 2024-08-06 RX ORDER — HYDROXYZINE 50 MG/1
TABLET, FILM COATED ORAL
Qty: 90 TABLET | Refills: 2 | Status: SHIPPED | OUTPATIENT
Start: 2024-08-06

## 2024-08-06 NOTE — TELEPHONE ENCOUNTER
Left voicemail informing patient and/or parent/guardian of the Psych Encounter form needing to be signed as a requirement from the insurance company for billing purposes. Patient can access form via real trends and sign electronically.     Please make patient aware this form must be signed for each visit as a requirement to continue future visits with provider.

## 2024-08-07 ENCOUNTER — TELEPHONE (OUTPATIENT)
Age: 39
End: 2024-08-07

## 2024-08-07 NOTE — TELEPHONE ENCOUNTER
Madhuri galicia inquired f the office received the medical records request form. She is requesting medical records from 05/23/24 - 07/03/24. She was provided with the fax and phone number of the medical records dept.     A

## 2024-08-08 ENCOUNTER — TELEPHONE (OUTPATIENT)
Dept: PSYCHIATRY | Facility: CLINIC | Age: 39
End: 2024-08-08

## 2024-08-08 NOTE — TELEPHONE ENCOUNTER
The [place] Disability form was completed and faxed to the number designated on the form: [phone], then a copy was made and placed in the 's mailbox to scan into [facility]'s EMR.

## 2024-08-13 ENCOUNTER — TELEMEDICINE (OUTPATIENT)
Dept: BEHAVIORAL/MENTAL HEALTH CLINIC | Facility: CLINIC | Age: 39
End: 2024-08-13
Payer: COMMERCIAL

## 2024-08-13 DIAGNOSIS — F41.1 GENERALIZED ANXIETY DISORDER: ICD-10-CM

## 2024-08-13 DIAGNOSIS — F31.81 BIPOLAR 2 DISORDER, MAJOR DEPRESSIVE EPISODE (HCC): Primary | ICD-10-CM

## 2024-08-13 PROCEDURE — 90834 PSYTX W PT 45 MINUTES: CPT | Performed by: SOCIAL WORKER

## 2024-08-13 NOTE — PSYCH
"Behavioral Health Psychotherapy Progress Note    Psychotherapy Provided: Individual Psychotherapy     1. Bipolar 2 disorder, major depressive episode (HCC)        2. Generalized anxiety disorder                  Goals addressed in session: Goal 1     DATA: Kathy spoke further with this worker re: how much she has been struggling with breast pain, anxiety, forgetfulness, distractibility, and a lack of financial resources.  She reported that her STD claim remains outstanding, she has re-applied for Unemployment, and is now beginning the process of applying for SSD.  Specific techniques used in this session were: psychoeducation, empathetic listening, validation, and motivational interviewing.    During this session, this clinician used the following therapeutic modalities: Client-centered Therapy, Motivational Interviewing, and Supportive Psychotherapy    Substance Abuse was not addressed during this session. If the client is diagnosed with a co-occurring substance use disorder, please indicate any changes in the frequency or amount of use: N/A. Stage of change for addressing substance use diagnoses: No substance use/Not applicable    ASSESSMENT:  Kathy Vazquez presents with a Anxious mood. Kathy Vazquez was oriented to person, place, time, and situation. Grooming and Hygiene were fair  but she could not remember if she took her medication an hour ago or not.  Ability to perform ADLs has declined. she was cooperative.  her affect is Normal range and intensity and Tearful, which is congruent, with her mood and the content of the session. She acknowledged that she needs to get a job, but was told that her Psychiatrist informed her that she \"cannot work\" at this time. .  The client has made some progress on their goals.    Kathy Vazquez presents with a none risk of suicide, none risk of self-harm, and none risk of harm to others.    For any risk assessment that surpasses a \"low\" rating, a safety plan must be " developed.    A safety plan was indicated: no  If yes, describe in detail: N/A    PLAN: Between sessions, Kathy Vazquez will continue to work on short-term goals including following up with medical issues At the next session, the therapist will use Client-centered Therapy, Motivational Interviewing, and Supportive Psychotherapy to address symptoms as they arise. Kathy Vazquez will return to outpatient therapy on 7/12/2024.     Behavioral Health Treatment Plan and Discharge Planning: Kathy Vazquez is aware of and agrees to continue to work on their treatment plan. They have identified and are working toward their discharge goals. yes    Visit start and stop times:    08/13/24  Start Time: 1230  Stop Time: 1315  Total Visit Time: 45 minutes      Virtual Regular Visit    Verification of patient location:    Patient is located at Home in the following state in which I hold an active license [place]      Assessment/Plan:    Problem List Items Addressed This Visit        Behavioral Health    Generalized anxiety disorder    Bipolar 2 disorder, major depressive episode (HCC) - Primary             Goals addressed in session: Goal 1          Reason for visit is   No chief complaint on file.         Encounter provider Yusra Sawyer      Recent Visits  Date Type Provider Dept   08/06/24 Telephone Yusra Sawyer [facility] [place]   Showing recent visits within past 7 days and meeting all other requirements  Today's Visits  Date Type Provider Dept   08/13/24 Telemedicine Yusra Sawyer [facility] [place]   Showing today's visits and meeting all other requirements  Future Appointments  No visits were found meeting these conditions.  Showing future appointments within next 150 days and meeting all other requirements       The patient was identified by name and date of birth. Kathy Vazquez was informed that this is a telemedicine visit and [facility]. She agrees to proceed..  My office door was closed. No one else was in the room.  She acknowledged consent and understanding of privacy and security of the video platform. The patient has agreed to participate and understands they can discontinue the visit at any time.    Patient is aware this is a billable service.     Subjective  Kathy Vazquez is a 39 y.o. female  .      HPI     Past Medical History:   Diagnosis Date   • Acute cough 6/14/2022   • Allergies    • Anxiety 05/2018   • Female hirsutism    • GERD (gastroesophageal reflux disease)    • Head injury    • Hypertension 2018   • Papanicolaou smear for cervical cancer screening 2017    NL, no h/o of abn pap that she can recall   • STD (female)    • Thyroid nodule 05/2018   • Tobacco user 05/2018       Past Surgical History:   Procedure Laterality Date   • NO PAST SURGERIES         Current Outpatient Medications   Medication Sig Dispense Refill   • albuterol (PROVENTIL HFA,VENTOLIN HFA) 90 mcg/act inhaler Inhale 2 puffs every 4 (four) hours as needed for wheezing or shortness of breath 8.5 g 2   • ALPRAZolam (XANAX) 1 mg tablet Take 1 tab po qAM and 1/2 tab po qhs 45 tablet 1   • buPROPion (WELLBUTRIN XL) 150 mg 24 hr tablet Take 1 tablet (150 mg total) by mouth every morning 30 tablet 5   • fluticasone-salmeterol (Advair HFA) 230-21 MCG/ACT inhaler Inhale 2 puffs 2 (two) times a day Rinse mouth after use. 36 g 1   • hydrOXYzine HCL (ATARAX) 50 mg tablet Take 1/2 - 1 tab po qd-tid prn anxiety 90 tablet 2   • ibuprofen (MOTRIN) 800 mg tablet Take 1 tablet (800 mg total) by mouth every 8 (eight) hours as needed for mild pain 90 tablet 0   • lamoTRIgine (LaMICtal) 100 mg tablet Start this dose after the 25mg tabs are finished.  Take 1/2 tab po qhs x 2 weeks, then go up to 1 full tab po qhs 30 tablet 1   • lisinopril (ZESTRIL) 40 mg tablet Take 1 tablet (40 mg total) by mouth daily 90 tablet 1   • magnesium Oxide (MAG-OX) 400 mg TABS Take 1  tablet (400 mg total) by mouth daily 30 tablet 1     No current facility-administered medications for this visit.        Allergies   Allergen Reactions   • Penicillins Swelling     Rash, swelling at IV site. Was to IV penicillin            Visit Time    Visit Start Time: 1500  Visit Stop Time: 1550  Total Visit Duration:  50 minutes

## 2024-08-16 ENCOUNTER — TELEPHONE (OUTPATIENT)
Dept: PSYCHIATRY | Facility: CLINIC | Age: 39
End: 2024-08-16

## 2024-08-16 ENCOUNTER — TELEPHONE (OUTPATIENT)
Age: 39
End: 2024-08-16

## 2024-08-16 NOTE — TELEPHONE ENCOUNTER
Madhuri from [employer] [facility] was calling requesting records from 05/25/23- 07/20/24 and stated they fax over this request on 7/30/24. Madhuri re faxed these forms again and would like these forms faxed back over to [phone]. To contact Madhuri she can be reached at [phone] extension [phone] thank you.

## 2024-08-21 ENCOUNTER — CONSULT (OUTPATIENT)
Dept: INFECTIOUS DISEASES | Facility: CLINIC | Age: 39
End: 2024-08-21
Payer: COMMERCIAL

## 2024-08-21 VITALS
DIASTOLIC BLOOD PRESSURE: 80 MMHG | SYSTOLIC BLOOD PRESSURE: 134 MMHG | BODY MASS INDEX: 37.21 KG/M2 | WEIGHT: 202.2 LBS | HEART RATE: 77 BPM | RESPIRATION RATE: 18 BRPM | TEMPERATURE: 96.7 F | OXYGEN SATURATION: 98 % | HEIGHT: 62 IN

## 2024-08-21 DIAGNOSIS — Z22.7 LATENT TUBERCULOSIS DIAGNOSED BY BLOOD TEST: Primary | ICD-10-CM

## 2024-08-21 DIAGNOSIS — F41.1 GENERALIZED ANXIETY DISORDER: ICD-10-CM

## 2024-08-21 PROCEDURE — 99244 OFF/OP CNSLTJ NEW/EST MOD 40: CPT | Performed by: STUDENT IN AN ORGANIZED HEALTH CARE EDUCATION/TRAINING PROGRAM

## 2024-08-21 RX ORDER — ISONIAZID 300 MG/1
900 TABLET ORAL WEEKLY
Qty: 36 TABLET | Refills: 0 | Status: SHIPPED | OUTPATIENT
Start: 2024-08-21 | End: 2024-11-07

## 2024-08-21 RX ORDER — LANOLIN ALCOHOL/MO/W.PET/CERES
50 CREAM (GRAM) TOPICAL WEEKLY
Qty: 12 TABLET | Refills: 0 | Status: SHIPPED | OUTPATIENT
Start: 2024-08-21 | End: 2024-11-07

## 2024-08-21 NOTE — PROGRESS NOTES
Outpatient Consultation - Infectious Disease   Kathy Vazquez 39 y.o. female MRN: [id number]  Unit/Bed#:  Encounter: [id number]      IMPRESSION & RECOMMENDATIONS:     1. Latent tuberculosis diagnosed by blood test  Overview:  Positive Quantiferon Gold x 2 and PPD. No symptoms or radiographic evidence of active TB. Counseled patient on nature of latent TB, risk of reactivation and indication for treatment.  She is agreeable to start treatment at this time.    Assessment & Plan:   -Reviewed drug drug interactions and will avoid rifampin   -Start once weekly rifapentine 900 mg daily, isoniazid 900 mg daily, B6 50 mg daily x 3 months (12 weekly doses)   -Counseled on side effects and drug administration   -Avoid alcohol, Tylenol, herbal supplements on latent TB treatment   -If rifapentine is not covered by insurance we will need isoniazid 300 mg daily plus vitamin B6 x 9 months   -Rifapentine may reduce levels of alprazolam so treatment efficacy needs to be monitored on antibiotics and alprazolam dose may need to be temporarily increased   -Check monthly CBC and CMP   -ID follow up 4 weeks  Orders:  -     Rifapentine 150 MG TABS; Take 6 tablets (900 mg total) by mouth once a week for 12 doses  -     isoniazid (NYDRAZID) 300 mg tablet; Take 3 tablets (900 mg total) by mouth once a week for 12 doses  -     pyridoxine (VITAMIN B6) 50 mg tablet; Take 1 tablet (50 mg total) by mouth once a week for 12 doses  -     Ambulatory Referral to Infectious Disease  -     Comprehensive metabolic panel; Standing  -     CBC and differential; Standing  2. Generalized anxiety disorder  Overview:  Follows with psychiatry, on multiple psychiatric medications.  Drug-drug interactions with rifapentine reviewed with ID pharmacist, the only interaction is with alprazolam and doses may be reduced on therapy.      HISTORY OF PRESENT ILLNESS:  Reason for Consult: latent TB  HPI: Kathy Vazquez is a 39 y.o. woman with a history of bipolar  disorder, anxiety referred by her PCP for a positive Quantiferon Gold. The patient initially underwent TB testing 7/2022 as part of pre-employment screening in 2022. Quantiferon Gold was positive and PPD was reported as positive with 18 mm induration. She was seen by ID and rifampin was recommended but she only took one dose due to dealing with other health issues at the time. The patient was born [place] the [place] and has never traveled outside the country. She has worked in healthcare. She has never been incarcerated, no time spent in homeless shelters. The patient denies IVDU, no alcohol use, does use marijuana.  She reports her father was treated for latent or active TB for 6-12 months in the 1980s.     REVIEW OF SYSTEMS:  A complete review of systems are negative other than that noted in the HPI     PAST MEDICAL HISTORY:  Past Medical History:   Diagnosis Date    Acute cough 6/14/2022    Allergies     Anxiety 05/2018    Female hirsutism     GERD (gastroesophageal reflux disease)     Head injury     Hypertension 2018    Papanicolaou smear for cervical cancer screening 2017    NL, no h/o of abn pap that she can recall    STD (female)     Thyroid nodule 05/2018    Tobacco user 05/2018     Past Surgical History:   Procedure Laterality Date    NO PAST SURGERIES         FAMILY HISTORY:  Non-contributory    SOCIAL HISTORY:  Social History   Social History     Substance and Sexual Activity   Alcohol Use Not Currently     Social History     Substance and Sexual Activity   Drug Use Not Currently    Types: Benzodiazepines, Marijuana    Comment: rare     Social History     Tobacco Use   Smoking Status Every Day    Current packs/day: 1.00    Average packs/day: 1 pack/day for 25.0 years (25.0 ttl pk-yrs)    Types: Cigarettes   Smokeless Tobacco Never   Tobacco Comments    smoked since age 10       ALLERGIES:  Allergies   Allergen Reactions    Penicillins Swelling     Rash, swelling at IV site. Was to IV penicillin  "      MEDICATIONS:  All current active medications have been reviewed.  Antibiotics:    PHYSICAL EXAM:  Vitals:    08/21/24 0919   BP: 134/80   Pulse: 77   Resp: 18   Temp: (!) 96.7 °F (35.9 °C)   SpO2: 98%   Weight: 91.7 kg (202 lb 3.2 oz)   Height: 5' 2\" (1.575 m)         General Appearance:  Appearing well, nontoxic, and in no distress   Head:  Normocephalic, without obvious abnormality, atraumatic   Eyes:  Conjunctiva pink and sclera anicteric, both eyes   Nose: Nares normal, mucosa normal, no drainage   Throat: Oropharynx moist without lesions   Neck: Supple, symmetrical, no adenopathy, no tenderness/mass/nodules   Back:   Symmetric, no curvature, ROM normal, no CVA tenderness   Lungs:   Clear to auscultation bilaterally, respirations unlabored   Chest Wall:  No tenderness or deformity   Heart:  RRR; no murmur, rub or gallop   Abdomen:   Soft, non-tender, non-distended, positive bowel sounds,    Extremities: No cyanosis, clubbing or edema   Skin: No rashes or lesions. No draining wounds noted.   Lymph nodes: Cervical, supraclavicular nodes normal   Neurologic: Alert and oriented times 3, extremity strength 5/5 and symmetric       LABS, IMAGING, & OTHER STUDIES:  Lab Results:  I have personally reviewed pertinent labs.  Lab Results   Component Value Date    K 4.3 08/02/2024     08/02/2024    CO2 22 08/02/2024    BUN 11 08/02/2024    CREATININE 0.70 08/02/2024    GLUF 94 03/26/2018    CALCIUM 9.2 08/02/2024    AST 19 08/02/2024    ALT 23 08/02/2024    ALKPHOS 44 08/02/2024    EGFR 107 08/02/2024     Lab Results   Component Value Date    WBC 7.35 08/02/2024    HGB 13.3 08/02/2024    HCT 41.4 08/02/2024    MCV 91 08/02/2024     08/02/2024   No results found for: \"SEDRATE\"    Imaging Studies:   I have personally reviewed pertinent imaging study reports and images in PACS. CXR without consolidations or signs of active TB       "

## 2024-08-21 NOTE — ASSESSMENT & PLAN NOTE
-Reviewed drug drug interactions and will avoid rifampin   -Start once weekly rifapentine 900 mg daily, isoniazid 900 mg daily, B6 50 mg daily x 3 months (12 weekly doses)   -Counseled on side effects and drug administration   -Avoid alcohol, Tylenol, herbal supplements on latent TB treatment   -If rifapentine is not covered by insurance we will need isoniazid 300 mg daily plus vitamin B6 x 9 months   -Rifapentine may reduce levels of alprazolam so treatment efficacy needs to be monitored on antibiotics and alprazolam dose may need to be temporarily increased   -Check monthly CBC and CMP   -ID follow up 4 weeks
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
## 2024-08-21 NOTE — PATIENT INSTRUCTIONS
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 This is not contagious but we recommend treatment to prevent reactivation to active disease in the future  -we will start a weekly regimen with Rifapentine 900mg, isoniazid, and vitamin B6 which you will take once a week for 12 doses (3 months)  -rifapentine may cause discoloration of body fluids  -avoid alcohol, tylenol, herbal supplements. Let us know if you start new medications  -the rifapentine can sometimes cause a decrease in the xanax level. The effect will need to be monitored and the xanax may need to be increased while on treatment  -check monthly labs  -follow up 4 weeks

## 2024-08-22 ENCOUNTER — TELEPHONE (OUTPATIENT)
Age: 39
End: 2024-08-22

## 2024-08-22 ENCOUNTER — TELEMEDICINE (OUTPATIENT)
Dept: PSYCHIATRY | Facility: CLINIC | Age: 39
End: 2024-08-22
Payer: COMMERCIAL

## 2024-08-22 DIAGNOSIS — F31.81 BIPOLAR II DISORDER (HCC): ICD-10-CM

## 2024-08-22 DIAGNOSIS — F43.12 CHRONIC POST-TRAUMATIC STRESS DISORDER (PTSD): ICD-10-CM

## 2024-08-22 DIAGNOSIS — F41.1 GENERALIZED ANXIETY DISORDER: Primary | ICD-10-CM

## 2024-08-22 DIAGNOSIS — G47.00 INSOMNIA, UNSPECIFIED TYPE: ICD-10-CM

## 2024-08-22 DIAGNOSIS — F41.0 PANIC ATTACKS: ICD-10-CM

## 2024-08-22 PROCEDURE — 99214 OFFICE O/P EST MOD 30 MIN: CPT | Performed by: PHYSICIAN ASSISTANT

## 2024-08-22 RX ORDER — LAMOTRIGINE 100 MG/1
TABLET ORAL
Qty: 30 TABLET | Refills: 1 | Status: SHIPPED | OUTPATIENT
Start: 2024-08-22

## 2024-08-22 RX ORDER — LAMOTRIGINE 25 MG/1
25 TABLET ORAL DAILY
Qty: 14 TABLET | Refills: 0 | Status: SHIPPED | OUTPATIENT
Start: 2024-08-22

## 2024-08-22 RX ORDER — BUSPIRONE HYDROCHLORIDE 10 MG/1
TABLET ORAL
Qty: 60 TABLET | Refills: 1 | Status: SHIPPED | OUTPATIENT
Start: 2024-08-22

## 2024-08-22 NOTE — TELEPHONE ENCOUNTER
Patient called office requesting to change appointment to virtual. Per provider okay to change appointment and sending link via text

## 2024-08-23 ENCOUNTER — TELEPHONE (OUTPATIENT)
Dept: PSYCHIATRY | Facility: CLINIC | Age: 39
End: 2024-08-23

## 2024-08-23 NOTE — TELEPHONE ENCOUNTER
Called and spoke with patient and scheduled HELGA appt for medication management to MARLY Moreno. In office appt scheduled 10/9 430PM.

## 2024-08-27 ENCOUNTER — OFFICE VISIT (OUTPATIENT)
Dept: FAMILY MEDICINE CLINIC | Facility: CLINIC | Age: 39
End: 2024-08-27
Payer: COMMERCIAL

## 2024-08-27 VITALS
RESPIRATION RATE: 15 BRPM | WEIGHT: 204 LBS | SYSTOLIC BLOOD PRESSURE: 128 MMHG | BODY MASS INDEX: 37.54 KG/M2 | HEART RATE: 87 BPM | HEIGHT: 62 IN | DIASTOLIC BLOOD PRESSURE: 64 MMHG | OXYGEN SATURATION: 98 %

## 2024-08-27 DIAGNOSIS — F43.12 CHRONIC POST-TRAUMATIC STRESS DISORDER (PTSD): ICD-10-CM

## 2024-08-27 DIAGNOSIS — F41.0 PANIC ATTACKS: ICD-10-CM

## 2024-08-27 DIAGNOSIS — R92.8 ABNORMAL MAMMOGRAM OF BOTH BREASTS: ICD-10-CM

## 2024-08-27 DIAGNOSIS — L72.3 SEBACEOUS CYST: ICD-10-CM

## 2024-08-27 DIAGNOSIS — L82.1 SEBORRHEIC KERATOSES: ICD-10-CM

## 2024-08-27 DIAGNOSIS — F31.81 BIPOLAR 2 DISORDER, MAJOR DEPRESSIVE EPISODE (HCC): ICD-10-CM

## 2024-08-27 DIAGNOSIS — N60.02 CYST, BREAST SOLITARY, LEFT: Primary | ICD-10-CM

## 2024-08-27 PROCEDURE — 99214 OFFICE O/P EST MOD 30 MIN: CPT | Performed by: FAMILY MEDICINE

## 2024-08-27 NOTE — PROGRESS NOTES
Subjective:      Patient ID: Kathy Vazquez is a 39 y.o. female.    Severe uncontrolled anxiety, chronic breast pain, zaps in brain, chronic shortness of breath, nodule on thigh, lesion on abdomen  Anxiety is uncontrolled-states that the psychiatry NPP that she sees has not helped her uncontrolled anxiety and refuses to increase the dose of Xanax.  Patient recently saw infectious disease and will be starting treatment with isoniazid and rifampin.  Isoniazid will lower the effective dose of Xanax and she has requested to increase the dose.  She had discussed this with her psychiatry NPP who did not suggest a dose increase.  Patient would like to discuss this further with me today.  She does have a controlled drug substance agreement with psychiatry and does not wish for me to fill her Xanax.          Past Medical History:   Diagnosis Date    Acute cough 6/14/2022    Allergies     Anxiety 05/2018    Female hirsutism     GERD (gastroesophageal reflux disease)     Head injury     Hypertension 2018    Papanicolaou smear for cervical cancer screening 2017    NL, no h/o of abn pap that she can recall    STD (female)     Thyroid nodule 05/2018    Tobacco user 05/2018       Family History   Problem Relation Age of Onset    Hypertension Mother     Kidney disease Mother     Cirrhosis Mother     Dialysis Mother     Diabetes Mother     Anxiety disorder Mother     Depression Mother     Anxiety disorder Father     Depression Father     Drug abuse Father     Bipolar disorder Father     No Known Problems Sister     No Known Problems Sister     Melanoma Paternal Uncle     Stomach cancer Other     Colon cancer Other         Passed age 40       Past Surgical History:   Procedure Laterality Date    NO PAST SURGERIES          reports that she has been smoking cigarettes. She has a 25 pack-year smoking history. She has never used smokeless tobacco. She reports that she does not currently use alcohol. She reports that she does not  currently use drugs after having used the following drugs: Benzodiazepines and Marijuana.      Current Outpatient Medications:     albuterol (PROVENTIL HFA,VENTOLIN HFA) 90 mcg/act inhaler, Inhale 2 puffs every 4 (four) hours as needed for wheezing or shortness of breath, Disp: 8.5 g, Rfl: 2    ALPRAZolam (XANAX) 1 mg tablet, Take 1 tab po qAM and 1/2 tab po qhs, Disp: 45 tablet, Rfl: 1    buPROPion (WELLBUTRIN XL) 150 mg 24 hr tablet, Take 1 tablet (150 mg total) by mouth every morning, Disp: 30 tablet, Rfl: 5    busPIRone (BUSPAR) 10 mg tablet, Take 1/2 - 1 tab po bid, Disp: 60 tablet, Rfl: 1    fluticasone-salmeterol (Advair HFA) 230-21 MCG/ACT inhaler, Inhale 2 puffs 2 (two) times a day Rinse mouth after use., Disp: 36 g, Rfl: 1    hydrOXYzine HCL (ATARAX) 50 mg tablet, Take 1/2 - 1 tab po qd-tid prn anxiety, Disp: 90 tablet, Rfl: 2    ibuprofen (MOTRIN) 800 mg tablet, Take 1 tablet (800 mg total) by mouth every 8 (eight) hours as needed for mild pain, Disp: 90 tablet, Rfl: 0    isoniazid (NYDRAZID) 300 mg tablet, Take 3 tablets (900 mg total) by mouth once a week for 12 doses, Disp: 36 tablet, Rfl: 0    lamoTRIgine (LaMICtal) 100 mg tablet, Start this dose after the 25mg tabs are finished.  Take 1/2 tab po qhs x 2 weeks, then go up to 1 full tab po qhs, Disp: 30 tablet, Rfl: 1    lamoTRIgine (LaMICtal) 25 mg tablet, Take 1 tablet (25 mg total) by mouth daily Start with this dose first, Disp: 14 tablet, Rfl: 0    lisinopril (ZESTRIL) 40 mg tablet, Take 1 tablet (40 mg total) by mouth daily, Disp: 90 tablet, Rfl: 1    magnesium Oxide (MAG-OX) 400 mg TABS, Take 1 tablet (400 mg total) by mouth daily, Disp: 30 tablet, Rfl: 1    pyridoxine (VITAMIN B6) 50 mg tablet, Take 1 tablet (50 mg total) by mouth once a week for 12 doses, Disp: 12 tablet, Rfl: 0    Rifapentine 150 MG TABS, Take 6 tablets (900 mg total) by mouth once a week for 12 doses, Disp: 72 tablet, Rfl: 0    The following portions of the patient's history  "were reviewed and updated as appropriate: allergies, current medications, past family history, past medical history, past social history, past surgical history and problem list.    Review of Systems   Constitutional:  Negative for fatigue and fever.   HENT:  Negative for congestion, facial swelling, mouth sores, rhinorrhea, sore throat and trouble swallowing.    Eyes:  Negative for pain and redness.   Respiratory:  Positive for chest tightness and shortness of breath. Negative for cough and wheezing.    Cardiovascular:  Positive for chest pain and palpitations. Negative for leg swelling.   Gastrointestinal:  Negative for abdominal pain, blood in stool, constipation, diarrhea and nausea.   Genitourinary:  Negative for dysuria, hematuria and urgency.   Musculoskeletal:  Negative for arthralgias, back pain and myalgias.   Skin:  Positive for rash. Negative for wound.   Neurological:  Negative for seizures, syncope and headaches.   Hematological:  Negative for adenopathy.   Psychiatric/Behavioral:  Negative for agitation and behavioral problems. The patient is nervous/anxious.                Objective:    /64 (BP Location: Left arm, Patient Position: Sitting, Cuff Size: Standard)   Pulse 87   Resp 15   Ht 5' 2\" (1.575 m)   Wt 92.5 kg (204 lb)   LMP 08/01/2024 (Exact Date)   SpO2 98%   BMI 37.31 kg/m²      Physical Exam  Vitals and nursing note reviewed.   Constitutional:       Appearance: Normal appearance. She is well-developed. She is not ill-appearing.   HENT:      Head: Normocephalic and atraumatic.      Right Ear: External ear normal.      Left Ear: External ear normal.      Nose: Nose normal.      Mouth/Throat:      Mouth: Mucous membranes are moist.      Pharynx: No oropharyngeal exudate or posterior oropharyngeal erythema.   Eyes:      General: No scleral icterus.        Right eye: No discharge.         Left eye: No discharge.      Conjunctiva/sclera: Conjunctivae normal.   Cardiovascular:      Rate " and Rhythm: Normal rate.      Heart sounds: No murmur heard.     No gallop.   Pulmonary:      Effort: Pulmonary effort is normal. No respiratory distress.      Breath sounds: Normal breath sounds. No stridor. No wheezing, rhonchi or rales.   Abdominal:      Palpations: Abdomen is soft.      Tenderness: There is no abdominal tenderness.   Musculoskeletal:         General: No tenderness or deformity.   Skin:     Findings: No erythema or rash.          Neurological:      Mental Status: She is alert. Mental status is at baseline.   Psychiatric:         Mood and Affect: Mood is anxious and depressed. Affect is tearful.         Behavior: Behavior normal.         Judgment: Judgment normal.           Recent Results (from the past 8736 hour(s))   ECG 12 lead    Collection Time: 09/05/23  8:52 PM   Result Value Ref Range    Ventricular Rate 73 BPM    Atrial Rate 73 BPM    LA Interval 146 ms    QRSD Interval 92 ms    QT Interval 366 ms    QTC Interval 403 ms    P [place] 13 degrees    QRS Axis 44 degrees    T Wave Axis 31 degrees   CBC and differential    Collection Time: 09/05/23  9:44 PM   Result Value Ref Range    WBC 5.46 4.31 - 10.16 Thousand/uL    RBC 4.61 3.81 - 5.12 Million/uL    Hemoglobin 13.6 11.5 - 15.4 g/dL    Hematocrit 40.9 34.8 - 46.1 %    MCV 89 82 - 98 fL    MCH 29.5 26.8 - 34.3 pg    MCHC 33.3 31.4 - 37.4 g/dL    RDW 12.8 11.6 - 15.1 %    MPV 10.5 8.9 - 12.7 fL    Platelets 188 149 - 390 Thousands/uL    nRBC 0 /100 WBCs    Segmented % 50 43 - 75 %    Immature Grans % 0 0 - 2 %    Lymphocytes % 38 14 - 44 %    Monocytes % 9 4 - 12 %    Eosinophils Relative 2 0 - 6 %    Basophils Relative 1 0 - 1 %    Absolute Neutrophils 2.76 1.85 - 7.62 Thousands/µL    Absolute Immature Grans 0.01 0.00 - 0.20 Thousand/uL    Absolute Lymphocytes 2.05 0.60 - 4.47 Thousands/µL    Absolute Monocytes 0.48 0.17 - 1.22 Thousand/µL    Eosinophils Absolute 0.12 0.00 - 0.61 Thousand/µL    Basophils Absolute 0.04 0.00 - 0.10 Thousands/µL  "  Comprehensive metabolic panel    Collection Time: 09/05/23  9:44 PM   Result Value Ref Range    Sodium 137 135 - 147 mmol/L    Potassium 3.6 3.5 - 5.3 mmol/L    Chloride 107 96 - 108 mmol/L    CO2 22 21 - 32 mmol/L    ANION GAP 8 mmol/L    BUN 13 5 - 25 mg/dL    Creatinine 0.66 0.60 - 1.30 mg/dL    Glucose 108 65 - 140 mg/dL    Calcium 9.0 8.4 - 10.2 mg/dL    AST 16 13 - 39 U/L    ALT 20 7 - 52 U/L    Alkaline Phosphatase 40 34 - 104 U/L    Total Protein 6.9 6.4 - 8.4 g/dL    Albumin 4.3 3.5 - 5.0 g/dL    Total Bilirubin 0.42 0.20 - 1.00 mg/dL    eGFR 110 ml/min/1.73sq m   HS Troponin 0hr (reflex protocol)    Collection Time: 09/05/23  9:44 PM   Result Value Ref Range    hs TnI 0hr <2 \"Refer to ACS Flowchart\"- see link ng/L   FLU/RSV/COVID - if FLU/RSV clinically relevant    Collection Time: 09/05/23  9:44 PM    Specimen: Nose; Nares   Result Value Ref Range    SARS-CoV-2 Negative Negative    INFLUENZA A PCR Negative Negative    INFLUENZA B PCR Negative Negative    RSV PCR Negative Negative   Procalcitonin    Collection Time: 09/05/23  9:44 PM   Result Value Ref Range    Procalcitonin <0.05 <=0.25 ng/ml   Quantiferon TB Gold Plus    Collection Time: 09/05/23  9:47 PM   Result Value Ref Range    QFT Nil 0.17 0 - 8.0 IU/ml    QFT TB1-NIL 1.91 IU/ml    QFT TB2-NIL 1.72 IU/ml    QFT Mitogen-NIL >10.00 IU/ml    QFT Final Interpretation Positive (A) Negative   Drug Screen Routine w /Conf and Adulteration, urine.    Collection Time: 08/02/24  1:20 PM   Result Value Ref Range    Buprenorphine Negative CUTOFF:5 ng/mL    ETHYL GLUCURONIDE Negative CUTOFF:500 ng/mL    Amphetamine Screen, Urine Negative CUTOFF:300 ng/mL    Barbiturate Screen, Ur Negative CUTOFF:200 ng/mL    Benzodiazepine Screen, Urine ++POSITIVE++ (A) CUTOFF:50 ng/mL    Cocaine Metabolite Negative CUTOFF:150 ng/mL    PCP Scrn, Ur Negative CUTOFF:25 ng/mL    Cannabinoid Scrn, Ur ++POSITIVE++ (A) CUTOFF:20 ng/mL    6-Acetylmorphine, Urine Negative CUTOFF:10 " ng/mL    Opiates Screen, Urine Negative CUTOFF:100 ng/mL    OXYCODONE/OXYMORPHONE Negative CUTOFF:100 ng/mL    Tapentadol Negative CUTOFF:200 ng/mL    FENTANYL Negative CUTOFF:2.0 ng/mL    Methadone Screen, Urine Negative CUTOFF:100 ng/mL    Propoxyphene Screen, Urine Negative CUTOFF:300 ng/mL    Tramadol Scrn, Ur Negative CUTOFF:200 ng/mL    CARISOPRODOL Negative CUTOFF:100 ng/mL    GABAPENTIN, IA Negative CUTOFF:1.0 ug/mL    CREATININE 51 mg/dL    pH 7.6 4.5 - 8.9    Nitrites Urine Negative NEGATIVE ug/mL   CBC and differential    Collection Time: 08/02/24  1:20 PM   Result Value Ref Range    WBC 7.35 4.31 - 10.16 Thousand/uL    RBC 4.54 3.81 - 5.12 Million/uL    Hemoglobin 13.3 11.5 - 15.4 g/dL    Hematocrit 41.4 34.8 - 46.1 %    MCV 91 82 - 98 fL    MCH 29.3 26.8 - 34.3 pg    MCHC 32.1 31.4 - 37.4 g/dL    RDW 12.6 11.6 - 15.1 %    MPV 11.0 8.9 - 12.7 fL    Platelets 221 149 - 390 Thousands/uL    nRBC 0 /100 WBCs    Segmented % 45 43 - 75 %    Immature Grans % 0 0 - 2 %    Lymphocytes % 43 14 - 44 %    Monocytes % 8 4 - 12 %    Eosinophils Relative 3 0 - 6 %    Basophils Relative 1 0 - 1 %    Absolute Neutrophils 3.32 1.85 - 7.62 Thousands/µL    Absolute Immature Grans 0.02 0.00 - 0.20 Thousand/uL    Absolute Lymphocytes 3.19 0.60 - 4.47 Thousands/µL    Absolute Monocytes 0.57 0.17 - 1.22 Thousand/µL    Eosinophils Absolute 0.19 0.00 - 0.61 Thousand/µL    Basophils Absolute 0.06 0.00 - 0.10 Thousands/µL   Comprehensive metabolic panel    Collection Time: 08/02/24  1:20 PM   Result Value Ref Range    Sodium 138 135 - 147 mmol/L    Potassium 4.3 3.5 - 5.3 mmol/L    Chloride 107 96 - 108 mmol/L    CO2 22 21 - 32 mmol/L    ANION GAP 9 4 - 13 mmol/L    BUN 11 5 - 25 mg/dL    Creatinine 0.70 0.60 - 1.30 mg/dL    Glucose 87 65 - 140 mg/dL    Calcium 9.2 8.4 - 10.2 mg/dL    AST 19 13 - 39 U/L    ALT 23 7 - 52 U/L    Alkaline Phosphatase 44 34 - 104 U/L    Total Protein 6.9 6.4 - 8.4 g/dL    Albumin 4.1 3.5 - 5.0 g/dL     Total Bilirubin 0.46 0.20 - 1.00 mg/dL    eGFR 107 ml/min/1.73sq m   Lipid panel    Collection Time: 08/02/24  1:20 PM   Result Value Ref Range    Cholesterol 186 See Comment mg/dL    Triglycerides 159 (H) See Comment mg/dL    HDL, Direct 42 (L) >=50 mg/dL    LDL Calculated 112 (H) 0 - 100 mg/dL    Non-HDL-Chol (CHOL-HDL) 144 mg/dl   TSH, 3rd generation with Free T4 reflex    Collection Time: 08/02/24  1:20 PM   Result Value Ref Range    TSH 3RD GENERATON 1.666 0.450 - 4.500 uIU/mL   Hemoglobin A1C    Collection Time: 08/02/24  1:20 PM   Result Value Ref Range    Hemoglobin A1C 5.6 Normal 4.0-5.6%; PreDiabetic 5.7-6.4%; Diabetic >=6.5%; Glycemic control for adults with diabetes <7.0% %     mg/dl   Pregnancy Test (HCG Qualitative)    Collection Time: 08/02/24  1:20 PM   Result Value Ref Range    Preg, Serum Negative Negative   EXPANDED BENZODIAZEPINE CONF    Collection Time: 08/02/24  1:20 PM   Result Value Ref Range    DESMETHYLDIAZEPAM Not Detected ng/mg creat    OXAZEPAM Not Detected ng/mg creat    Temazepam Quantification-Measured Result Not Detected ng/mg creat    alpha-Hydroxyalprazolam  ng/mg creat    HYDROXYETHYLFLURAZEPAM Not Detected ng/mg creat    Lorazepam Quantification-Measured Result Not Detected ng/mg creat    ALPHA-HYDROXYTRIAZOLAM Not Detected ng/mg creat    ALPHA-HYDROXYMIDAZOLAM Not Detected ng/mg creat    7-Amino-Clonazepam Quantification-Measured Result UR Not Detected ng/mg creat    DIAZEPAM Not Detected ng/mg creat    ALPRAZOLAM 133 ng/mg creat    CLONAZEPAM Not Detected ng/mg creat    MIDAZOLAM Not Detected ng/mg creat    FLUNITRAZEPAM Not Detected ng/mg creat    DESMETHYLFLUNITRAZEPAM Not Detected ng/mg creat   CARBOXY-THC CONF, NORMALIZED    Collection Time: 08/02/24  1:20 PM   Result Value Ref Range    THC/CR RATIO 649 ng/mg creat       Laboratory Results: I have personally reviewed the pertinent laboratory results/reports     Radiology/Other Diagnostic Testing Results: I have  personally reviewed pertinent reports.      XR chest pa & lateral    Result Date: 8/2/2024  CHEST INDICATION:   Nonspecific reaction to tuberculin skin test without active tuberculosis. COMPARISON:  None. EXAM PERFORMED/VIEWS:  XR CHEST PA & LATERAL FINDINGS: Cardiomediastinal silhouette appears unremarkable. The lungs are clear.  No pneumothorax or pleural effusion. Osseous structures appear within normal limits for patient age.     No acute cardiopulmonary disease. No significant change from 9/5/2023 Electronically signed: 08/02/2024 05:34 PM Petey Pineda MD    Mammo diagnostic bilateral w 3d & cad    Result Date: 8/1/2024  DIAGNOSIS: Abnormal mammogram of right breast TECHNIQUE: Digital diagnostic mammography was performed. Computer Aided Detection (CAD) analyzed all applicable images. Right breast ultrasound was performed. COMPARISONS: Prior breast imaging dated: 03/30/2023, 03/30/2023, 12/20/2019, 05/03/2019, and 03/18/2015 RELEVANT HISTORY: Family Breast Cancer History: No known family history of breast cancer. Family Medical History: Family medical history includes colon cancer in other. Personal History: No known relevant hormone history. No known relevant surgical history. No known relevant medical history. RISK ASSESSMENT: 5 Year Tyrer-Cuzick: 0.59% 10 Year Tyrer-Cuzick: 1.47% Lifetime Tyrer-Cuzick: 10.89% TISSUE DENSITY: There are scattered areas of fibroglandular density. INDICATION: Kathy Vazquez is a 39 y.o. female presenting for patient is due for annual bilateral breast imaging, and also overdue for 6 month follow-up or prior Right breast abnormalities noted on 03/30/23 diagnostic breast imaging. FINDINGS: RIGHT 1) MASS [C] Mammo diagnostic bilateral w 3d & cad: There is a 14 mm oval mass with circumscribed margins seen in the outer central region of the right breast at 9 o'clock in the posterior depth. Compared to the previous study, there are no significant changes. US breast right limited  (diagnostic): There is a 13 mm x 7 mm x 11 mm oval, parallel, hypoechoic mass with circumscribed margins with no posterior features seen in the outer central region of the right breast at 9 o'clock in the posterior depth, 10 cm from the nipple. Compared to the previous study, there are no significant changes. 2) MASS [E] Mammo diagnostic bilateral w 3d & cad: There is a 14 mm mass seen in the lower outer quadrant of the right breast in the posterior depth. Compared to the previous study, there are no significant changes. US breast right limited (diagnostic): There is a mass seen in the right breast at 7 o'clock.  There is a oval patch of mixed echogenicity fibroglandular tissue in the 7:00 9 cm location which would be a reasonable potential correlate for the mammographic finding. Left Mammo diagnostic bilateral w 3d & cad There are no suspicious masses, grouped microcalcifications or areas of unexplained architectural distortion. The skin and nipple areolar complex are unremarkable.  Stable small circumscribed nodule anterior left outer breast.     Circumscribed nodule in the 9 o'clock position likely representing fibroadenoma.  Additional nodular asymmetry, possibly 7 o'clock position.  Both showing no suspicious interval change over a span of 1.5 years.  Favor benign etiology.  Recommend follow-up diagnostic imaging in 1 year to reassess. ASSESSMENT/BI-RADS CATEGORY:  Overall: 3 - Probably Benign RECOMMENDATION:      - Diagnostic mammogram in 1 year for both breasts.      - Ultrasound in 1 year for both breasts. Workstation ID: [id number]     US breast right limited (diagnostic)    Result Date: 8/1/2024  DIAGNOSIS: Abnormal mammogram of right breast TECHNIQUE: Digital diagnostic mammography was performed. Computer Aided Detection (CAD) analyzed all applicable images. Right breast ultrasound was performed. COMPARISONS: Prior breast imaging dated: 03/30/2023, 03/30/2023, 12/20/2019, 05/03/2019, and 03/18/2015  RELEVANT HISTORY: Family Breast Cancer History: No known family history of breast cancer. Family Medical History: Family medical history includes colon cancer in other. Personal History: No known relevant hormone history. No known relevant surgical history. No known relevant medical history. RISK ASSESSMENT: 5 Year Tyrer-Cuzick: 0.59% 10 Year Tyrer-Cuzick: 1.47% Lifetime Tyrer-Cuzick: 10.89% TISSUE DENSITY: There are scattered areas of fibroglandular density. INDICATION: Kathy Vazquez is a 39 y.o. female presenting for patient is due for annual bilateral breast imaging, and also overdue for 6 month follow-up or prior Right breast abnormalities noted on 03/30/23 diagnostic breast imaging. FINDINGS: RIGHT 1) MASS [C] Mammo diagnostic bilateral w 3d & cad: There is a 14 mm oval mass with circumscribed margins seen in the outer central region of the right breast at 9 o'clock in the posterior depth. Compared to the previous study, there are no significant changes. US breast right limited (diagnostic): There is a 13 mm x 7 mm x 11 mm oval, parallel, hypoechoic mass with circumscribed margins with no posterior features seen in the outer central region of the right breast at 9 o'clock in the posterior depth, 10 cm from the nipple. Compared to the previous study, there are no significant changes. 2) MASS [E] Mammo diagnostic bilateral w 3d & cad: There is a 14 mm mass seen in the lower outer quadrant of the right breast in the posterior depth. Compared to the previous study, there are no significant changes. US breast right limited (diagnostic): There is a mass seen in the right breast at 7 o'clock.  There is a oval patch of mixed echogenicity fibroglandular tissue in the 7:00 9 cm location which would be a reasonable potential correlate for the mammographic finding. Left Mammo diagnostic bilateral w 3d & cad There are no suspicious masses, grouped microcalcifications or areas of unexplained architectural distortion. The  skin and nipple areolar complex are unremarkable.  Stable small circumscribed nodule anterior left outer breast.     Circumscribed nodule in the 9 o'clock position likely representing fibroadenoma.  Additional nodular asymmetry, possibly 7 o'clock position.  Both showing no suspicious interval change over a span of 1.5 years.  Favor benign etiology.  Recommend follow-up diagnostic imaging in 1 year to reassess. ASSESSMENT/BI-RADS CATEGORY:  Overall: 3 - Probably Benign RECOMMENDATION:      - Diagnostic mammogram in 1 year for both breasts.      - Ultrasound in 1 year for both breasts. Workstation ID: [id number]        Assessment/Plan:  1. Cyst, breast solitary, left  -     Mammo diagnostic bilateral w 3d & cad; Future; Expected date: 08/01/2025  -     US Breast Axilla Bilateral; Future; Expected date: 08/01/2025  2. Abnormal mammogram of both breasts  -     Mammo diagnostic bilateral w 3d & cad; Future; Expected date: 08/01/2025  -     US Breast Axilla Bilateral; Future; Expected date: 08/01/2025  3. Sebaceous cyst  4. Seborrheic keratoses  5. Bipolar 2 disorder, major depressive episode (HCC)  6. Chronic post-traumatic stress disorder (PTSD)  7. Panic attacks      Given she will be on isoniazid and rifampin for up to 9 months recommend that she discuss with psychiatry and increase the dose of her Xanax given uncontrolled severe anxiety.  She has been on it since 2018 and has been experiencing withdrawal symptoms.  She is on Lamictal ,buspar and bupropion and compliant with the regimen.  She has requested to see another psychiatrist and is scheduled with NPP in the same practice.  I have reviewed and discussed the results of her mammogram she needs a diagnostic mammogram and ultrasound of the breast.  Discussed cyst on the thigh is likely sebaceous cyst and does not warrant any further treatment at this time.  She can opt for excisional biopsy if it does get irritated or bigger.            Read package inserts for  "all medications before starting a new medications, call me if you have any questions.    Patient was given opportunity to ask questions and all questions were answered.    Disclaimer: Portions of the record may have been created with voice recognition software. Occasional wrong word or \"sound a like\" substitutions may have occurred due to the inherent limitations of voice recognition software. Read the chart carefully and recognize, using context, where substitutions have occurred. I have used the Epic copy/forward function to compose this note. I have reviewed my current note to ensure it reflects the current patient status, exam, assessment and plan.    "

## 2024-08-28 ENCOUNTER — TELEPHONE (OUTPATIENT)
Age: 39
End: 2024-08-28

## 2024-08-28 NOTE — TELEPHONE ENCOUNTER
"Pt called in and is demanding to speak with office management regarding Rhona Espinoza.   Pt states that if she does not get a call back she will be reaching out to \"higher ups\". Pt requests call back [phone] or next step she will come in the office.    "

## 2024-08-28 NOTE — TELEPHONE ENCOUNTER
Patient called seeking to speak with an  or Dr Cook in regards to her provider Rhona Espinoza. Writer sent an in basket to the proper person for a return call to the patient.

## 2024-08-29 NOTE — TELEPHONE ENCOUNTER
Pt called refill line again regarding getting a call back from Rhona Espinoza. Pt is upset that she is not getting a call back. Pt is going through withdrawals. Pt would like a call back from either the  or the doctor.

## 2024-09-03 DIAGNOSIS — F41.1 GENERALIZED ANXIETY DISORDER: ICD-10-CM

## 2024-09-03 RX ORDER — ALPRAZOLAM 1 MG
1 TABLET ORAL 2 TIMES DAILY PRN
Qty: 60 TABLET | Refills: 1 | Status: SHIPPED | OUTPATIENT
Start: 2024-09-11

## 2024-09-03 NOTE — PROGRESS NOTES
"Spoke with patient regarding her concerns.  Patient reports she is feeling anxious, concerned about her Xanax.  Reviewed that when she has started the Rifapentin, she may experience a decreased blood level of xanax due the drug interaction (rifapentin increasing clearance of medication xanax).  Advised that this was mentioned and acknowledged by Rhona Espinoza PA-C.  Also discussed that general recommendations for benzodiazepine reduction include reducing medication by 25% every two-four weeks as tolerated to reduce return of anxiety and safety remove medication from regimen.  Patient expressed fears of \"too quickly\" being taken off of xanax; advised that option of inpatient rehab is also possibility. Patient declined at this time, but is open to increasing medication xanax to adjust for drug-drug interaction with Rifapentin.  Will increase to Xanax 1mg BID, and refill sent in for patient to  on 9/11.  Patient expressed understanding of medication regimen and what day she is able to  her next refill.  Patient also expressed concern for long-term benzodiazepine use, and agrees to long-term coming off of medication.  Call ended mutually.    "

## 2024-09-04 NOTE — TELEPHONE ENCOUNTER
Office received a call from [facility] Center, stating that the patient wanted to talk to the manager. Writer told Call Center that the manger is not in the office at the moment. Writer also told Call Center that they will make manger known of the call back and patient should expect a call back tomorrow.

## 2024-09-04 NOTE — TELEPHONE ENCOUNTER
Pt returned call to Manager. Pt still wants to speak with Manager. Writer spoke to office. They stated that Manager is out of office for afternoon and put a message through to Manager to reach back out to Patient again.  Please return call to Pt at [phone]

## 2024-09-10 ENCOUNTER — TELEPHONE (OUTPATIENT)
Dept: PSYCHIATRY | Facility: CLINIC | Age: 39
End: 2024-09-10

## 2024-09-10 ENCOUNTER — TELEMEDICINE (OUTPATIENT)
Dept: BEHAVIORAL/MENTAL HEALTH CLINIC | Facility: CLINIC | Age: 39
End: 2024-09-10
Payer: COMMERCIAL

## 2024-09-10 DIAGNOSIS — F41.1 GENERALIZED ANXIETY DISORDER: ICD-10-CM

## 2024-09-10 DIAGNOSIS — F43.21 COMPLICATED GRIEF: ICD-10-CM

## 2024-09-10 DIAGNOSIS — F31.81 BIPOLAR 2 DISORDER, MAJOR DEPRESSIVE EPISODE (HCC): Primary | ICD-10-CM

## 2024-09-10 PROCEDURE — 90834 PSYTX W PT 45 MINUTES: CPT | Performed by: SOCIAL WORKER

## 2024-09-10 NOTE — TELEPHONE ENCOUNTER
CM received internal referral request for assistance with [employer] disability forms.     Patient added to CM work queue.    
Received packet from [employer] and information stating that necessary info was missing in order to process patient's claim. Outreached [employer] and was made aware that only visit notes from 5/20 - 7/30    Made [employer] aware that we received info from [employer] stating that an invoice was sent to [employer] but payment was not received. Due to payment not being received, records were not released. Kindly encouraged that [employer]  follow-up with [employer] in order to discuss needs and expedite the process. During this call, [employer] denied any additional info being needed.    Referring provider was made aware of the above information.  
hide

## 2024-09-10 NOTE — PSYCH
"Behavioral Health Psychotherapy Progress Note    Psychotherapy Provided: Individual Psychotherapy     No diagnosis found.            Goals addressed in session: Goal 1     DATA: Kathy spoke further with this worker re: how \"stressed out\" she has been re: her relationship with her treating provider.  She stated that she feels misunderstood, judged, and overall frustrated.    Reasons for this were reviewed in detail.  Specific techniques used in this session were: psychoeducation, empathetic listening, validation, and motivational interviewing.    During this session, this clinician used the following therapeutic modalities: Client-centered Therapy, Motivational Interviewing, and Supportive Psychotherapy    Substance Abuse was not addressed during this session. If the client is diagnosed with a co-occurring substance use disorder, please indicate any changes in the frequency or amount of use: N/A. Stage of change for addressing substance use diagnoses: No substance use/Not applicable    ASSESSMENT:  Kathy Vazquez presents with a Anxious mood. Kathy Vazquez was oriented to person, place, time, and situation. Grooming and Hygiene were fair  but she admitted that she has not yet showered today.  Ability to perform ADLs has declined. she was cooperative.  her affect is Normal range and intensity and Tearful, which is congruent, with her mood and the content of the session. She has not been able to reach anyone at this office in order to process her concerns and her STD paperwork was incomplete which has resulted in a delay of her claim, lack of any income.   The client has made some progress on their goals.    Kathy Vazquez presents with a none risk of suicide, none risk of self-harm, and none risk of harm to others.    For any risk assessment that surpasses a \"low\" rating, a safety plan must be developed.    A safety plan was indicated: no  If yes, describe in detail: N/A    PLAN: Between sessions, Kathy" Taylor will continue to work on short-term goals including following up with medical issues At the next session, the therapist will use Client-centered Therapy, Motivational Interviewing, and Supportive Psychotherapy to address symptoms as they arise. Kathy Vazquez will return to outpatient therapy on 7/12/2024.     Behavioral Health Treatment Plan and Discharge Planning: Kathy Vazquez is aware of and agrees to continue to work on their treatment plan. They have identified and are working toward their discharge goals. yes    Visit start and stop times:    08/13/24         Virtual Regular Visit    Verification of patient location:    Patient is located at Home in the following state in which I hold an active license [place]      Assessment/Plan:    Problem List Items Addressed This Visit    None              Goals addressed in session: Goal 1          Reason for visit is   No chief complaint on file.         Encounter provider Yusra Sawyer      Recent Visits  No visits were found meeting these conditions.  Showing recent visits within past 7 days and meeting all other requirements  Future Appointments  No visits were found meeting these conditions.  Showing future appointments within next 150 days and meeting all other requirements       The patient was identified by name and date of birth. Kathy Vazquez was informed that this is a telemedicine visit and that the visit is being conducted throughthe Epic Embedded platform. She agrees to proceed..  My office door was closed. No one else was in the room.  She acknowledged consent and understanding of privacy and security of the video platform. The patient has agreed to participate and understands they can discontinue the visit at any time.    Patient is aware this is a billable service.     Subjective  Kathy Vazquez is a 39 y.o. female  .      HPI     Past Medical History:   Diagnosis Date    Acute cough 6/14/2022    Allergies     Anxiety 05/2018    Female  hirsutism     GERD (gastroesophageal reflux disease)     Head injury     Hypertension 2018    Papanicolaou smear for cervical cancer screening 2017    NL, no h/o of abn pap that she can recall    STD (female)     Thyroid nodule 05/2018    Tobacco user 05/2018       Past Surgical History:   Procedure Laterality Date    NO PAST SURGERIES         Current Outpatient Medications   Medication Sig Dispense Refill    albuterol (PROVENTIL HFA,VENTOLIN HFA) 90 mcg/act inhaler Inhale 2 puffs every 4 (four) hours as needed for wheezing or shortness of breath 8.5 g 2    [START ON 9/11/2024] ALPRAZolam (XANAX) 1 mg tablet Take 1 tablet (1 mg total) by mouth 2 (two) times a day as needed for anxiety Do not start before September 11, 2024. 60 tablet 1    buPROPion (WELLBUTRIN XL) 150 mg 24 hr tablet Take 1 tablet (150 mg total) by mouth every morning 30 tablet 5    busPIRone (BUSPAR) 10 mg tablet Take 1/2 - 1 tab po bid 60 tablet 1    fluticasone-salmeterol (Advair HFA) 230-21 MCG/ACT inhaler Inhale 2 puffs 2 (two) times a day Rinse mouth after use. 36 g 1    hydrOXYzine HCL (ATARAX) 50 mg tablet Take 1/2 - 1 tab po qd-tid prn anxiety 90 tablet 2    ibuprofen (MOTRIN) 800 mg tablet Take 1 tablet (800 mg total) by mouth every 8 (eight) hours as needed for mild pain 90 tablet 0    isoniazid (NYDRAZID) 300 mg tablet Take 3 tablets (900 mg total) by mouth once a week for 12 doses 36 tablet 0    lamoTRIgine (LaMICtal) 100 mg tablet Start this dose after the 25mg tabs are finished.  Take 1/2 tab po qhs x 2 weeks, then go up to 1 full tab po qhs 30 tablet 1    lamoTRIgine (LaMICtal) 25 mg tablet Take 1 tablet (25 mg total) by mouth daily Start with this dose first 14 tablet 0    lisinopril (ZESTRIL) 40 mg tablet Take 1 tablet (40 mg total) by mouth daily 90 tablet 1    magnesium Oxide (MAG-OX) 400 mg TABS Take 1 tablet (400 mg total) by mouth daily 30 tablet 1    pyridoxine (VITAMIN B6) 50 mg tablet Take 1 tablet (50 mg total) by mouth  once a week for 12 doses 12 tablet 0    Rifapentine 150 MG TABS Take 6 tablets (900 mg total) by mouth once a week for 12 doses 72 tablet 0     No current facility-administered medications for this visit.        Allergies   Allergen Reactions    Penicillins Swelling     Rash, swelling at IV site. Was to IV penicillin            Visit Time    Visit Start Time: 1300  Visit Stop Time: 1350  Total Visit Duration:  50 minutes

## 2024-09-11 ENCOUNTER — TELEPHONE (OUTPATIENT)
Age: 39
End: 2024-09-11

## 2024-09-11 NOTE — TELEPHONE ENCOUNTER
Kathy Vazquez called and had received a return voicemail from [facility] for the complaint against Rhona Espinoza regarding Kathy's medication. She has called several times since she received the voicemail and has not received a return call. She still needs to talk to the  in regards to Rhona Olga. Kathy went over the list [facility] It looks like there are a couple encounters that are missing from her calls that were made. Pt has also been helped by Dr Cook and she is very grateful for this. However, this situation has been going on since back into June and the Manager is aware of the problem. Kathy needs to speak to the Manager, Fritz.    Please return call at P# [phone]

## 2024-09-17 NOTE — TELEPHONE ENCOUNTER
Pt called in stating that she spoke with Anatoly and they had not received the office note/clinical documentation that was requested from 5/2023-present. Please advise and f/u with pt when completed.

## 2024-09-23 ENCOUNTER — TELEPHONE (OUTPATIENT)
Dept: INFECTIOUS DISEASES | Facility: CLINIC | Age: 39
End: 2024-09-23

## 2024-09-23 NOTE — TELEPHONE ENCOUNTER
LM for pt to CB    Contacted patient with reminder to obtain non fasting labs prior to appointment this week.

## 2024-09-24 ENCOUNTER — SOCIAL WORK (OUTPATIENT)
Dept: BEHAVIORAL/MENTAL HEALTH CLINIC | Facility: CLINIC | Age: 39
End: 2024-09-24
Payer: COMMERCIAL

## 2024-09-24 DIAGNOSIS — F41.1 GENERALIZED ANXIETY DISORDER: ICD-10-CM

## 2024-09-24 DIAGNOSIS — F31.81 BIPOLAR 2 DISORDER, MAJOR DEPRESSIVE EPISODE (HCC): Primary | ICD-10-CM

## 2024-09-24 PROCEDURE — 90834 PSYTX W PT 45 MINUTES: CPT | Performed by: SOCIAL WORKER

## 2024-09-25 ENCOUNTER — OFFICE VISIT (OUTPATIENT)
Dept: INFECTIOUS DISEASES | Facility: CLINIC | Age: 39
End: 2024-09-25
Payer: COMMERCIAL

## 2024-09-25 VITALS
HEIGHT: 62 IN | BODY MASS INDEX: 38.64 KG/M2 | SYSTOLIC BLOOD PRESSURE: 145 MMHG | OXYGEN SATURATION: 99 % | TEMPERATURE: 96.5 F | DIASTOLIC BLOOD PRESSURE: 80 MMHG | WEIGHT: 210 LBS | RESPIRATION RATE: 17 BRPM | HEART RATE: 90 BPM

## 2024-09-25 DIAGNOSIS — R76.11 POSITIVE TB TEST: Primary | ICD-10-CM

## 2024-09-25 DIAGNOSIS — E66.01 SEVERE OBESITY (BMI 35.0-39.9) WITH COMORBIDITY (HCC): ICD-10-CM

## 2024-09-25 DIAGNOSIS — F41.1 GENERALIZED ANXIETY DISORDER: ICD-10-CM

## 2024-09-25 PROCEDURE — 99213 OFFICE O/P EST LOW 20 MIN: CPT | Performed by: PHYSICIAN ASSISTANT

## 2024-09-25 NOTE — ASSESSMENT & PLAN NOTE
Follows with psychiatry, on multiple psychiatric medications. Drug-drug interactions with rifapentine reviewed with ID pharmacist, the only interaction is with alprazolam and doses may be reduced on therapy.

## 2024-09-25 NOTE — PATIENT INSTRUCTIONS
-Start once weekly rifapentine 900 mg daily, isoniazid 900 mg daily, B6 50 mg daily x 3 months (12 weekly doses)  -Avoid alcohol, Tylenol, herbal supplements on latent TB   - labs prior to next appt  - RTO 4 weeks  - call with any concerns

## 2024-09-25 NOTE — PROGRESS NOTES
Ambulatory Visit  Name: Kathy Vazquez      : 1985      MRN: [id number]  Encounter Provider: Arian Yo PA-C  Encounter Date: 2024   Encounter department: [facility]    Assessment & Plan  Positive TB test  Positive Quantiferon Gold x 2 and PPD. No symptoms or radiographic evidence of active TB. Counseled patient on nature of latent TB, risk of reactivation and indication for treatment.      Patient has yet to start her treatment.  She has just gotten psych to adjust her xanax and is now ready to start.  We reviewed her regimen again.  She inquired about taking pyridoxal-5-phosphate instead of pyridoxine.  I reviewed this with the clinical pharmacist who states that is acceptable.       Assessment & Plan:  -Start once weekly rifapentine 900 mg daily, isoniazid 900 mg daily, B6 50 mg daily x 3 months (12 weekly doses)  -Counseled on side effects and drug administration  -Avoid alcohol, Tylenol, herbal supplements on latent TB treatment            -Rifapentine may reduce levels of alprazolam so treatment efficacy needs to be monitored on antibiotics and alprazolam dose may need to be temporarily increased  -Check monthly CBC and CMP  -will defer pregnancy test as patient states she is not sexually active  -ID follow up 4 weeks         Generalized anxiety disorder  Follows with psychiatry, on multiple psychiatric medications. Drug-drug interactions with rifapentine reviewed with ID pharmacist, the only interaction is with alprazolam and doses may be reduced on therapy.          Severe obesity (BMI 35.0-39.9) with comorbidity (HCC)  BMI 38.41           History of Present Illness     Kathy Vazquez is a 39 y.o. female who presents for office follow up today regarding latent Tb.  Patient was seen on our office last month and prescribed rifapentine/INH/vitamin B6.  She did fill the prescriptions but she did not start taking the regimen at this time.  She was worried  "regarding the interaction with her xanax.  She did get psych to up her current dose of xanax and now feels she is ready to start her latent Tb treatment.  She has no new complaints today.        Review of Systems   Constitutional:  Negative for chills and fever.   Respiratory:  Negative for cough and shortness of breath.    Gastrointestinal:  Negative for abdominal pain, diarrhea, nausea and vomiting.   Skin:  Negative for rash.   Psychiatric/Behavioral:  Negative for behavioral problems and confusion.            Objective     /80   Pulse 90   Temp (!) 96.5 °F (35.8 °C)   Resp 17   Ht 5' 2\" (1.575 m)   Wt 95.3 kg (210 lb)   SpO2 99%   BMI 38.41 kg/m²     Physical Exam  Vitals reviewed.   Constitutional:       General: She is not in acute distress.     Appearance: Normal appearance. She is not ill-appearing, toxic-appearing or diaphoretic.   HENT:      Head: Normocephalic and atraumatic.   Eyes:      General: No scleral icterus.        Right eye: No discharge.         Left eye: No discharge.      Conjunctiva/sclera: Conjunctivae normal.   Cardiovascular:      Rate and Rhythm: Normal rate.   Pulmonary:      Effort: Pulmonary effort is normal. No respiratory distress.      Breath sounds: Normal breath sounds. No stridor. No wheezing, rhonchi or rales.   Chest:      Chest wall: No tenderness.   Abdominal:      General: Bowel sounds are normal. There is no distension.      Palpations: Abdomen is soft.      Tenderness: There is no abdominal tenderness.   Skin:     General: Skin is warm.      Coloration: Skin is not jaundiced or pale.      Findings: No erythema or rash.   Neurological:      Mental Status: She is alert and oriented to person, place, and time.   Psychiatric:         Mood and Affect: Mood normal.         Behavior: Behavior normal.         No new labs    "

## 2024-09-25 NOTE — ASSESSMENT & PLAN NOTE
Positive Quantiferon Gold x 2 and PPD. No symptoms or radiographic evidence of active TB. Counseled patient on nature of latent TB, risk of reactivation and indication for treatment.      Patient has yet to start her treatment.  She has just gotten psych to adjust her xanax and is now ready to start.  We reviewed her regimen again.  She inquired about taking pyridoxal-5-phosphate instead of pyridoxine.  I reviewed this with the clinical pharmacist who states that is acceptable.       Assessment & Plan:  -Start once weekly rifapentine 900 mg daily, isoniazid 900 mg daily, B6 50 mg daily x 3 months (12 weekly doses)  -Counseled on side effects and drug administration  -Avoid alcohol, Tylenol, herbal supplements on latent TB treatment            -Rifapentine may reduce levels of alprazolam so treatment efficacy needs to be monitored on antibiotics and alprazolam dose may need to be temporarily increased  -Check monthly CBC and CMP  -will defer pregnancy test as patient states she is not sexually active  -ID follow up 4 weeks

## 2024-09-25 NOTE — PSYCH
"Behavioral Health Psychotherapy Progress Note    Psychotherapy Provided: Individual Psychotherapy     No diagnosis found.      Goals addressed in session: Goal 1     DATA: Kathy spoke  with this worker re: her feelings about her health, her desire to obtain employment.  She shared the relief she experienced in getting approved for Unemployment so that she can begin to pay some of her bills.   Specific techniques used in this session were: psychoeducation, empathetic listening, validation, and motivational interviewing.    During this session, this clinician used the following therapeutic modalities: Client-centered Therapy, Motivational Interviewing, and Supportive Psychotherapy    Substance Abuse was not addressed during this session. If the client is diagnosed with a co-occurring substance use disorder, please indicate any changes in the frequency or amount of use: N/A. Stage of change for addressing substance use diagnoses: No substance use/Not applicable    ASSESSMENT:  Kathy Vazquez presents with a Anxious mood. Kathy Vazquez was oriented to person, place, time, and situation. Grooming and Hygiene were fair  as she expressed concern about not being able to detangle her hair. Ability to perform ADLs has declined. she was cooperative.  her affect is Tearful, which is congruent, with her mood and the content of the session.    The client has made some progress on their goals.    Kathy Vazquez presents with a none risk of suicide, none risk of self-harm, and none risk of harm to others.    For any risk assessment that surpasses a \"low\" rating, a safety plan must be developed.    A safety plan was indicated: no  If yes, describe in detail: N/A    PLAN: Between sessions, Kathy Vazquez will continue to work on short-term goals including following up with medical issues At the next session, the therapist will use Client-centered Therapy, Motivational Interviewing, and Supportive Psychotherapy to address " symptoms as they arise. Kahty Vazquez will return to outpatient therapy on 7/12/2024.     Behavioral Health Treatment Plan and Discharge Planning: Kathy Vazquez is aware of and agrees to continue to work on their treatment plan. They have identified and are working toward their discharge goals. yes    Visit start and stop times:    09/24/24         Visit Time    Visit Start Time: 1300  Visit Stop Time: 1350  Total Visit Duration:  50 minutes

## 2024-10-08 NOTE — PSYCH
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 at this current time. Kathy understands that if they can no longer contract for safety, they need to call the office or report to their nearest Emergency Room for immediate evaluation and are aware of the [facility] mental health hotline [phone]. At this juncture, inpatient hospitalization is not currently warranted. To mitigate future risk, patient should adhere to treatment recommendations, avoid alcohol/illicit substance use, utilize community-based resources and familiar support, and prioritize mental health treatment.     Assessment/Plan:   Kathy Vazquez is a 39 y.o. female, , lives alone, currently unemployed, w/ PMH of HTN, thyroid nodule, COPD, tobacco abuse, positive TB test with latent TB, and PPH of EMILY with panic attacks, BPAD type 2, CPTSD, Insomnia, 1x prior psychiatric admissions, no prior SA, who presented to the mental health clinic for the initial intake and psychiatric evaluation. Kathy presents reporting continued anxiety and \"wanting to get better\" specifically hoping to target anxiety, PTSD, and grief.  Patient presents with a complex psychiatric history, including a 2015 diagnosis of bipolar affective disorder type II and suspected ADHD.  Her " current symptoms primarily manifest as situational depression, sleep disturbances, anxiety, and intrusive thoughts related to past trauma.  Her history reveals academic and behavioral challenges, possibly stemming from undiagnosed neurodevelopment issues and traumatic upbringing.  She struggles to clearly identify manic symptoms, and her presenting symptoms could be indicative of ADHD, or possibly cluster B personality pathology, particularly borderline personality disorder, given her black and white thinking, emotional dysregulation, and impulsivity.  Current functioning is impaired by depressive symptoms, affecting sleep, appetite, energy levels, and self-care.  Cannabis use appears to play a role in managing sleep issues but may contribute to weight gain.  Despite current challenges, she maintains positive a positive attitude and hope for improvement.  Protective factors include spirituality and self worth. PHQ-9 score: 18; EMILY-7 score: 15.  Her current presentation meets criteria for bipolar affective disorder type II vs Mood disorder, panic attacks, generalized anxiety disorder, chronic posttraumatic stress disorder, insomnia, R/O ADHD with further evaluation.  Currently she is not at risk for suicide, homicide, self-injury, aggressive behaviors, self-neglect, or neglect of dependents or children. Given this presentation, the patient will benefit from further outpatient follow up for management of her symptoms.       Today's Plan/Medical Decision Making:    Psychopharmacologically, I spoke at length with Kathy about the bio-psycho-social approach to treatment and avenues for intervention. I stressed the importance of making better dietary choices, expanding exercise regimen, and reestablishing a sense of purpose and connectivity in life.  Discussed establishing routine/structure with the homework assignment to be completed prior to next session: Wake by 10 AM, coffee, care for litter box, shower, dressed in  day clothing, leave the home, eat 1 fresh fruit/vegetable minimum daily, cook dinner, read a book, drink herbal tea, bed.  Discussed medication options including clonidine, Lexapro, Strattera.  Patient will research past experiences with clonidine and Lexapro.  We will discuss all options in greater detail next session.  No medication changes.         Plan:  Continue Xanax 1 mg twice daily as needed for severe anxiety and panic  Continue Wellbutrin  mg daily for depression  Psychotherapy continue individual psychotherapy  Follow up with primary care provider for ongoing medical care  Follow up with this provider in 1 month    Assessment & Plan  Generalized anxiety disorder  Treatment status: Not at goal, improving  Considerations: Driven by psychosocial stressors  Treatment plan: Continue Xanax 1 mg twice daily, individual psychotherapy       Panic attacks  Treatment status: Not at goal, improving  Considerations: Driven by psychosocial stressors  Treatment plan: Continue Xanax 1 mg twice daily, individual psychotherapy       Complicated grief  Treatment status: Not at goal  Treatment plan: Continue individual psychotherapy       Chronic post-traumatic stress disorder (PTSD)  Treatment status: Not at goal  Treatment plan: Continue individual psychotherapy       Bipolar 2 disorder, major depressive episode (HCC)  Treatment status: Not at goal  Considerations: Driven by psychosocial factors, rule out mood disorder vs personality pathology  Treatment plan: Continue Wellbutrin  mg daily, continue individual psychotherapy       Attention and concentration deficit  Treatment status: Not at goal  Considerations: Rule out ADHD versus trauma  Treatment plan: Consider Strattera versus clonidine       Chronic use of benzodiazepine for therapeutic purpose  Treatment status: Not at goal  Considerations: Newly initiated TB treatment  Treatment plan: Discussed benzodiazepine slow taper in future visits       Other  insomnia  Treatment status: Not at goal  Treatment considerations: Lack of structure/routine  Treatment plan: Continue melatonin, continue individual psychotherapy                      Treatment Recommendations/Precautions:    Aware of 24 hour and weekend coverage for urgent situations accessed by calling [facility] main practice number      Medications Risks/Benefits:      Risks, Benefits And Possible Side Effects Of Medications:    Risks, benefits, and possible side effects of medications explained to Kathy and she verbalizes understanding and agreement for treatment.    Controlled Medication Discussion:     Kathy has been filling controlled prescriptions on time as prescribed according to [place] Prescription Drug Monitoring Program  Discussed with Kathy the risks of sedation, respiratory depression, impairment of ability to drive and potential for abuse and addiction related to treatment with benzodiazepine medications. She understands risk of treatment with benzodiazepine medications, agrees to not drive if feels impaired and agrees to take medications as prescribed  Discussed with Kathy need to slowly taper off benzodiazepines as recommended by [place] Prescription Drug Monitoring Program, due to concurrent use of opioid medications and increased risk of sedation, respiratory depression, coma and death with that combination    Treatment Plan:    Completed and signed during the session: Not applicable - Treatment Plan not due at this session    Visit Time    Visit Start Time: 4:30 PM  Visit Stop Time: 6:00 PM  Total Visit Duration:  90 minutes    The total visit duration detailed above includes: patient engagement, medication management, psychotherapy/counseling, discussion regarding treatment goals, documentation, review of past medical records, and coordination of care.    Note Share Disclaimer:     This note was not shared with the patient due to reasonable  likelihood of causing patient harm      YOSI Lucero 10/09/24    This note was completed in part utilizing [employer]. Grammatical, translation, syntax errors, random word insertions, spelling mistakes, and incomplete sentences may be an occasional consequence of this system secondary to software limitations with voice recognition, ambient noise, and hardware issues. If you have any questions or concerns about the content, text, or information contained within the body of this dictation, please contact the provider for clarification.

## 2024-10-09 ENCOUNTER — OFFICE VISIT (OUTPATIENT)
Dept: PSYCHIATRY | Facility: CLINIC | Age: 39
End: 2024-10-09
Payer: COMMERCIAL

## 2024-10-09 DIAGNOSIS — Z79.899 CHRONIC USE OF BENZODIAZEPINE FOR THERAPEUTIC PURPOSE: ICD-10-CM

## 2024-10-09 DIAGNOSIS — F31.81 BIPOLAR 2 DISORDER, MAJOR DEPRESSIVE EPISODE (HCC): ICD-10-CM

## 2024-10-09 DIAGNOSIS — R41.840 ATTENTION AND CONCENTRATION DEFICIT: ICD-10-CM

## 2024-10-09 DIAGNOSIS — G47.09 OTHER INSOMNIA: ICD-10-CM

## 2024-10-09 DIAGNOSIS — F41.0 PANIC ATTACKS: ICD-10-CM

## 2024-10-09 DIAGNOSIS — F41.1 GENERALIZED ANXIETY DISORDER: Primary | ICD-10-CM

## 2024-10-09 DIAGNOSIS — F43.21 COMPLICATED GRIEF: ICD-10-CM

## 2024-10-09 DIAGNOSIS — F43.12 CHRONIC POST-TRAUMATIC STRESS DISORDER (PTSD): ICD-10-CM

## 2024-10-09 PROCEDURE — 90792 PSYCH DIAG EVAL W/MED SRVCS: CPT | Performed by: NURSE PRACTITIONER

## 2024-10-09 NOTE — ASSESSMENT & PLAN NOTE
Treatment status: Not at goal  Considerations: Newly initiated TB treatment  Treatment plan: Discussed benzodiazepine slow taper in future visits

## 2024-10-09 NOTE — ASSESSMENT & PLAN NOTE
Treatment status: Not at goal  Treatment considerations: Lack of structure/routine  Treatment plan: Continue melatonin, continue individual psychotherapy

## 2024-10-09 NOTE — ASSESSMENT & PLAN NOTE
Treatment status: Not at goal, improving  Considerations: Driven by psychosocial stressors  Treatment plan: Continue Xanax 1 mg twice daily, individual psychotherapy

## 2024-10-09 NOTE — ASSESSMENT & PLAN NOTE
Treatment status: Not at goal  Considerations: Driven by psychosocial factors, rule out mood disorder vs personality pathology  Treatment plan: Continue Wellbutrin  mg daily, continue individual psychotherapy

## 2024-10-10 ENCOUNTER — TELEPHONE (OUTPATIENT)
Dept: PSYCHIATRY | Facility: CLINIC | Age: 39
End: 2024-10-10

## 2024-10-10 DIAGNOSIS — I10 ESSENTIAL HYPERTENSION: ICD-10-CM

## 2024-10-10 RX ORDER — LISINOPRIL 40 MG/1
40 TABLET ORAL DAILY
Qty: 90 TABLET | Refills: 1 | Status: SHIPPED | OUTPATIENT
Start: 2024-10-10

## 2024-10-10 NOTE — TELEPHONE ENCOUNTER
Writer contacted patient to schedule follow up appts with provider as they will be returning to office on 10/14, but patient requested [facility] to temporarily provider she was seeing. Writer informed her that bot providers will be notified. All appts remain on schedule

## 2024-10-17 ENCOUNTER — DOCUMENTATION (OUTPATIENT)
Dept: BEHAVIORAL/MENTAL HEALTH CLINIC | Facility: CLINIC | Age: 39
End: 2024-10-17

## 2024-10-17 DIAGNOSIS — F41.1 GENERALIZED ANXIETY DISORDER: ICD-10-CM

## 2024-10-17 DIAGNOSIS — F31.81 BIPOLAR 2 DISORDER, MAJOR DEPRESSIVE EPISODE (HCC): Primary | ICD-10-CM

## 2024-10-17 NOTE — PROGRESS NOTES
TRANSFER SUMMARY    [facility]    Patient Name Kathy Vazquez     Date of Birth: 39 y.o. 1985      MRN: [id number]    Admission Date:  4/2/24    Date of Transfer: October 17, 2024    Admission Diagnosis:     Bipolar Disorder, type II  Anxiety    Current Diagnosis:     1. Bipolar 2 disorder, major depressive episode (HCC)        2. Generalized anxiety disorder            Reason for Admission: Kathy presented for treatment due primarily to anxiety. Primary complaints included DEPRESSIVE SYMPTOMS: depressed mood, excessive crying, social isolation, fatigue, indecision, poor concentration, weight gain, sleep disturbance and insomnia and ANXIETY SYMPTOMS: excessive worry, fatigue, muscle tension, difficulty controlling worry, chest tightness, racing thoughts, shortness of breath and chills . She also noted her experiences with grief and loss, concerns about stable housing, and concern with medication management (specifically Xanax prescribed by PCP).    Progress in Treatment: Kathy was seen for individual therapy. Group therapy was also offered to her for additional bereavement support. At one time, Kathy did express interest in partial hospitalization program for more intensive therapy, however, when offered  (7/17/24), she ultimately declined. She was supported by case management with referrals for [facility] and recommendation to work with [facility] [place]. Emotional support animal letter was also provided. During the course of treatment she primarily worked through stressors including obtaining stable housing and beginning to consider employment opportunities. She also processed concerns about working with psychiatry, changing prescribed medication, and continued anxiety symptoms. While [employer] was on maternity leave, Kathy did work with Yusra Sawyer LCSW, for continued individual therapy and requested to continue working with Yusra.    Episodes of Higher  Level of Care:  Not during current treatment. Involved with [facility] in August 2020    Transfer request Initiated by: Therapist:  Yuki Andrews    Reason for Transfer Request: patient requested transfer    Does this individual need a clinician with specialized training/expertise?: No    Is this client [facility]rapists? Psychiatrist:  YOSI Lucero  Therapist:  Yusra Sawyer LCSW    Other pertinent issues: None    Are there any specific individuals who would be a “best fit” or who have already agreed to accept this transfer request?      Psychiatrist: N/A - will continue working with YOSI Lucero  Therapist:  N/A - will continue working with Yusra Sawyer LCSW  Rationale: Not Applicable    Attempts to maintain the current therapeutic relationship: Not Applicable    Transfer request routed to  therapist Yusra Sawyer, clinical supervisor Elaina Young, and psychiatry provider Alisha Moreno  for review and coordination of care.    Yuki Andrews LCSW10/17/24

## 2024-10-18 ENCOUNTER — TELEPHONE (OUTPATIENT)
Dept: PSYCHIATRY | Facility: CLINIC | Age: 39
End: 2024-10-18

## 2024-10-18 NOTE — LETTER
10/18/24     Kathy Vazquez   : 1985   [id number]  [place] [address]       It is the policy of [facility] to monitor and manage appointments that have been no-showed or cancelled with less than 48-hour notice. This is necessary to ensure that we are able to provide timely access for all patients to our providers. Undue numbers of unutilized appointments delays necessary medical care for all patients.      Dear Kathy Vazquez     10  We are sorry that you missed your appointment with Yusra Sawyer LCSW on 10/17/2024. It has been 1 month or more since your last appointment. Your health and follow-up care are important to us. We want to make you aware of your next appointment with Yusra Sawyer LCSW that is scheduled for Tuesday, 10/22/2024 at 1:00 pm.    Please be aware that our office policy states that if you 'no show' two or more Therapy appointments without prior notice of cancellation within in a calendar year, you may be discharged from Therapy treatment.  We want to bring this to your attention now to prevent an interruption of your care.  If you have any questions about this policy, please call us at the number above.     If we do not hear from you within 10 business days to make a follow up appointment, we will assume you are no longer interested in care here.    Thank you in advance for your cooperation and assistance.       Sincerely,      [facility] Support Staff

## 2024-10-18 NOTE — TELEPHONE ENCOUNTER
NO-SHOW LETTER MAILED TO Kathy Vazquez.  ADDRESS: [address]  [place] [id number]  SENT VIA [employer]

## 2024-10-23 ENCOUNTER — TELEPHONE (OUTPATIENT)
Dept: BEHAVIORAL/MENTAL HEALTH CLINIC | Facility: CLINIC | Age: 39
End: 2024-10-23

## 2024-10-24 ENCOUNTER — TELEPHONE (OUTPATIENT)
Dept: INFECTIOUS DISEASES | Facility: CLINIC | Age: 39
End: 2024-10-24

## 2024-10-24 NOTE — TELEPHONE ENCOUNTER
Contacted patient to remind her of blood work due before her appointment with us next week. Non fasting.     LM for pt to CB.

## 2024-10-25 ENCOUNTER — TELEPHONE (OUTPATIENT)
Dept: INFECTIOUS DISEASES | Facility: CLINIC | Age: 39
End: 2024-10-25

## 2024-10-25 NOTE — TELEPHONE ENCOUNTER
Called and left message for patient today.   Reminded patient of her upcoming appointment and to have labs done prior to appointment do results can be gone over.   Informed patient if there are any further questions to call the office.

## 2024-10-28 ENCOUNTER — TELEPHONE (OUTPATIENT)
Dept: INFECTIOUS DISEASES | Facility: CLINIC | Age: 39
End: 2024-10-28

## 2024-10-28 NOTE — TELEPHONE ENCOUNTER
LM for pt to CB    Attempt to contact patient to reschedule appointment. Unfortunately d/t provider change, however patient has not yet had her labs. Would like to ensure she is taking her medication and that she gets her labs prior to follow up so would favor pushing appointment out 1-2 weeks. If patient not taking antibiotics, cancel appointment and send in a note in. Do not reschedule.
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
## 2024-11-05 ENCOUNTER — OFFICE VISIT (OUTPATIENT)
Dept: FAMILY MEDICINE CLINIC | Facility: CLINIC | Age: 39
End: 2024-11-05
Payer: COMMERCIAL

## 2024-11-05 VITALS
TEMPERATURE: 98.4 F | SYSTOLIC BLOOD PRESSURE: 130 MMHG | WEIGHT: 205 LBS | HEART RATE: 88 BPM | BODY MASS INDEX: 37.73 KG/M2 | HEIGHT: 62 IN | RESPIRATION RATE: 18 BRPM | DIASTOLIC BLOOD PRESSURE: 76 MMHG | OXYGEN SATURATION: 98 %

## 2024-11-05 DIAGNOSIS — J44.9 CHRONIC OBSTRUCTIVE PULMONARY DISEASE, UNSPECIFIED COPD TYPE (HCC): ICD-10-CM

## 2024-11-05 DIAGNOSIS — I10 BENIGN ESSENTIAL HYPERTENSION: ICD-10-CM

## 2024-11-05 DIAGNOSIS — F41.1 GENERALIZED ANXIETY DISORDER: ICD-10-CM

## 2024-11-05 DIAGNOSIS — Z22.7 LATENT TUBERCULOSIS DIAGNOSED BY BLOOD TEST: ICD-10-CM

## 2024-11-05 DIAGNOSIS — Z79.899 CHRONIC USE OF BENZODIAZEPINE FOR THERAPEUTIC PURPOSE: ICD-10-CM

## 2024-11-05 DIAGNOSIS — E03.8 SUBCLINICAL HYPOTHYROIDISM: ICD-10-CM

## 2024-11-05 DIAGNOSIS — F43.12 CHRONIC POST-TRAUMATIC STRESS DISORDER (PTSD): ICD-10-CM

## 2024-11-05 DIAGNOSIS — F31.81 BIPOLAR 2 DISORDER, MAJOR DEPRESSIVE EPISODE (HCC): ICD-10-CM

## 2024-11-05 DIAGNOSIS — T07.XXXA MULTIPLE ABRASIONS: Primary | ICD-10-CM

## 2024-11-05 DIAGNOSIS — E66.01 SEVERE OBESITY (BMI 35.0-39.9) WITH COMORBIDITY (HCC): ICD-10-CM

## 2024-11-05 PROCEDURE — 99214 OFFICE O/P EST MOD 30 MIN: CPT | Performed by: FAMILY MEDICINE

## 2024-11-05 RX ORDER — MUPIROCIN 20 MG/G
OINTMENT TOPICAL 3 TIMES DAILY
Qty: 30 G | Refills: 0 | Status: SHIPPED | OUTPATIENT
Start: 2024-11-05

## 2024-11-05 NOTE — PROGRESS NOTES
Subjective:      Patient ID: Kathy Vazquez is a 39 y.o. female.    With hypertension, GERD, anxiety, COPD, breast nodules, latent TB presents for follow-up     Patient states that she has started isoniazid and recently increased her Xanax to 1 mg twice a day, and she was starting to take isoniazid however she experienced extreme anxiety and side effects from isoniazid and has not had to stop the medication.  She is willing to give it another try and start it today.  She had informed infectious disease about her decision as well and they did agree that there is no rush into continuing the medication until her anxiety is better controlled.    Blood pressure is well-controlled  She is working on smoking cessation  She also states that her cat has staph wounds and they are recurrent and every time the cat scratches her she gets rashes.  She has mentioned this to me several times in the past however has not noticed any rashes or lesions when she comes to the office.  It is possible that she had allergic reaction to the cat scratch or gets abrasions    Anxiety  Symptoms include nervous/anxious behavior. Patient reports no chest pain, nausea, palpitations or shortness of breath.           Past Medical History:   Diagnosis Date    Acute cough 06/14/2022    Adverse effect of alprazolam 04/28/2022    Allergies     Anxiety 05/2018    Female hirsutism     GERD (gastroesophageal reflux disease)     Head injury     Hypertension 2018    Papanicolaou smear for cervical cancer screening 2017    NL, no h/o of abn pap that she can recall    Positive TB test 06/14/2022    STD (female)     Thyroid nodule 05/2018    Tobacco user 05/2018       Family History   Problem Relation Age of Onset    Hypertension Mother     Kidney disease Mother     Cirrhosis Mother     Dialysis Mother     Diabetes Mother     Anxiety disorder Mother     Depression Mother     Anxiety disorder Father     Depression Father     Drug abuse Father     Bipolar  disorder Father     No Known Problems Sister     No Known Problems Sister     Melanoma Paternal Uncle     Stomach cancer Other     Colon cancer Other         Passed age 40       Past Surgical History:   Procedure Laterality Date    NO PAST SURGERIES          reports that she has been smoking cigarettes. She has a 25 pack-year smoking history. She has never used smokeless tobacco. She reports that she does not currently use alcohol. She reports that she does not currently use drugs after having used the following drugs: Benzodiazepines and Marijuana.      Current Outpatient Medications:     albuterol (PROVENTIL HFA,VENTOLIN HFA) 90 mcg/act inhaler, Inhale 2 puffs every 4 (four) hours as needed for wheezing or shortness of breath, Disp: 8.5 g, Rfl: 2    ALPRAZolam (XANAX) 1 mg tablet, Take 1 tablet (1 mg total) by mouth 2 (two) times a day as needed for anxiety Do not start before September 11, 2024., Disp: 60 tablet, Rfl: 1    buPROPion (WELLBUTRIN XL) 150 mg 24 hr tablet, Take 1 tablet (150 mg total) by mouth every morning, Disp: 90 tablet, Rfl: 1    fluticasone-salmeterol (Advair HFA) 230-21 MCG/ACT inhaler, Inhale 2 puffs 2 (two) times a day Rinse mouth after use., Disp: 36 g, Rfl: 1    lisinopril (ZESTRIL) 40 mg tablet, TAKE 1 TABLET(40 MG) BY MOUTH DAILY, Disp: 90 tablet, Rfl: 1    mupirocin (BACTROBAN) 2 % ointment, Apply topically 3 (three) times a day, Disp: 30 g, Rfl: 0    ibuprofen (MOTRIN) 800 mg tablet, Take 1 tablet (800 mg total) by mouth every 8 (eight) hours as needed for mild pain, Disp: 90 tablet, Rfl: 0    isoniazid (NYDRAZID) 300 mg tablet, Take 3 tablets (900 mg total) by mouth once a week for 12 doses, Disp: 36 tablet, Rfl: 0    magnesium Oxide (MAG-OX) 400 mg TABS, Take 1 tablet (400 mg total) by mouth daily (Patient not taking: Reported on 10/9/2024), Disp: 30 tablet, Rfl: 1    pyridoxine (VITAMIN B6) 50 mg tablet, Take 1 tablet (50 mg total) by mouth once a week for 12 doses, Disp: 12 tablet,  Rfl: 0    Rifapentine 150 MG TABS, Take 6 tablets (900 mg total) by mouth once a week for 12 doses, Disp: 72 tablet, Rfl: 0    The following portions of the patient's history were reviewed and updated as appropriate: allergies, current medications, past family history, past medical history, past social history, past surgical history and problem list.    Review of Systems   Constitutional:  Negative for fatigue and fever.   HENT:  Negative for congestion, facial swelling, mouth sores, rhinorrhea, sore throat and trouble swallowing.    Eyes:  Negative for pain and redness.   Respiratory:  Negative for cough, shortness of breath and wheezing.    Cardiovascular:  Negative for chest pain, palpitations and leg swelling.   Gastrointestinal:  Negative for abdominal pain, blood in stool, constipation, diarrhea and nausea.   Genitourinary:  Negative for dysuria, hematuria and urgency.   Musculoskeletal:  Negative for arthralgias, back pain and myalgias.   Skin:  Negative for rash and wound.   Neurological:  Negative for seizures, syncope and headaches.   Hematological:  Negative for adenopathy.   Psychiatric/Behavioral:  Negative for agitation and behavioral problems. The patient is nervous/anxious.          PHQ-2/9 Depression Screening    Little interest or pleasure in doing things: 0 - not at all  Feeling down, depressed, or hopeless: 0 - not at all  Trouble falling or staying asleep, or sleeping too much: 2 - more than half the days  Feeling tired or having little energy: 0 - not at all  Poor appetite or overeatin - not at all  Feeling bad about yourself - or that you are a failure or have let yourself or your family down: 0 - not at all  Trouble concentrating on things, such as reading the newspaper or watching television: 3 - nearly every day  Moving or speaking so slowly that other people could have noticed. Or the opposite - being so fidgety or restless that you have been moving around a lot more than usual: 3 -  "nearly every day  Thoughts that you would be better off dead, or of hurting yourself in some way: 0 - not at all  PHQ-9 Score: 8  PHQ-9 Interpretation: Mild depression       EMILY-7 Flowsheet Screening      Flowsheet Row Most Recent Value   Over the last two weeks, how often have you been bothered by the following problems?     Feeling nervous, anxious, or on edge 3   Not being able to stop or control worrying 1   Worrying too much about different things 1   Trouble relaxing  1   Being so restless that it's hard to sit still 1   Becoming easily annoyed or irritable  0   Feeling afraid as if something awful might happen 0   How difficult have these problems made it for you to do your work, take care of things at home, or get along with other people?  Somewhat difficult   EMILY Score  7              Objective:    /76 (BP Location: Right arm, Patient Position: Sitting, Cuff Size: Adult)   Pulse 88   Temp 98.4 °F (36.9 °C) (Tympanic)   Resp 18   Ht 5' 2\" (1.575 m)   Wt 93 kg (205 lb)   SpO2 98%   BMI 37.49 kg/m²      Physical Exam  Vitals and nursing note reviewed.   Constitutional:       Appearance: Normal appearance. She is well-developed. She is not ill-appearing.   HENT:      Head: Normocephalic and atraumatic.      Right Ear: External ear normal.      Left Ear: External ear normal.      Nose: Nose normal.      Mouth/Throat:      Mouth: Mucous membranes are moist.      Pharynx: No oropharyngeal exudate or posterior oropharyngeal erythema.   Eyes:      General: No scleral icterus.        Right eye: No discharge.         Left eye: No discharge.      Conjunctiva/sclera: Conjunctivae normal.   Cardiovascular:      Rate and Rhythm: Normal rate.      Heart sounds: No murmur heard.     No gallop.   Pulmonary:      Effort: Pulmonary effort is normal. No respiratory distress.      Breath sounds: Normal breath sounds. No stridor. No wheezing, rhonchi or rales.   Abdominal:      Palpations: Abdomen is soft.      " Tenderness: There is no abdominal tenderness.   Musculoskeletal:         General: No tenderness or deformity.   Skin:     Findings: No erythema or rash.   Neurological:      Mental Status: She is alert. Mental status is at baseline.   Psychiatric:         Mood and Affect: Mood is anxious.         Behavior: Behavior normal.         Judgment: Judgment normal.           Recent Results (from the past 8736 hour(s))   Drug Screen Routine w /Conf and Adulteration, urine.    Collection Time: 08/02/24  1:20 PM   Result Value Ref Range    Buprenorphine Negative CUTOFF:5 ng/mL    ETHYL GLUCURONIDE Negative CUTOFF:500 ng/mL    Amphetamine Screen, Urine Negative CUTOFF:300 ng/mL    Barbiturate Screen, Ur Negative CUTOFF:200 ng/mL    Benzodiazepine Screen, Urine ++POSITIVE++ (A) CUTOFF:50 ng/mL    Cocaine Metabolite Negative CUTOFF:150 ng/mL    PCP Scrn, Ur Negative CUTOFF:25 ng/mL    Cannabinoid Scrn, Ur ++POSITIVE++ (A) CUTOFF:20 ng/mL    6-Acetylmorphine, Urine Negative CUTOFF:10 ng/mL    Opiates Screen, Urine Negative CUTOFF:100 ng/mL    OXYCODONE/OXYMORPHONE Negative CUTOFF:100 ng/mL    Tapentadol Negative CUTOFF:200 ng/mL    FENTANYL Negative CUTOFF:2.0 ng/mL    Methadone Screen, Urine Negative CUTOFF:100 ng/mL    Propoxyphene Screen, Urine Negative CUTOFF:300 ng/mL    Tramadol Scrn, Ur Negative CUTOFF:200 ng/mL    CARISOPRODOL Negative CUTOFF:100 ng/mL    GABAPENTIN, IA Negative CUTOFF:1.0 ug/mL    CREATININE 51 mg/dL    pH 7.6 4.5 - 8.9    Nitrites Urine Negative NEGATIVE ug/mL   CBC and differential    Collection Time: 08/02/24  1:20 PM   Result Value Ref Range    WBC 7.35 4.31 - 10.16 Thousand/uL    RBC 4.54 3.81 - 5.12 Million/uL    Hemoglobin 13.3 11.5 - 15.4 g/dL    Hematocrit 41.4 34.8 - 46.1 %    MCV 91 82 - 98 fL    MCH 29.3 26.8 - 34.3 pg    MCHC 32.1 31.4 - 37.4 g/dL    RDW 12.6 11.6 - 15.1 %    MPV 11.0 8.9 - 12.7 fL    Platelets 221 149 - 390 Thousands/uL    nRBC 0 /100 WBCs    Segmented % 45 43 - 75 %     Immature Grans % 0 0 - 2 %    Lymphocytes % 43 14 - 44 %    Monocytes % 8 4 - 12 %    Eosinophils Relative 3 0 - 6 %    Basophils Relative 1 0 - 1 %    Absolute Neutrophils 3.32 1.85 - 7.62 Thousands/µL    Absolute Immature Grans 0.02 0.00 - 0.20 Thousand/uL    Absolute Lymphocytes 3.19 0.60 - 4.47 Thousands/µL    Absolute Monocytes 0.57 0.17 - 1.22 Thousand/µL    Eosinophils Absolute 0.19 0.00 - 0.61 Thousand/µL    Basophils Absolute 0.06 0.00 - 0.10 Thousands/µL   Comprehensive metabolic panel    Collection Time: 08/02/24  1:20 PM   Result Value Ref Range    Sodium 138 135 - 147 mmol/L    Potassium 4.3 3.5 - 5.3 mmol/L    Chloride 107 96 - 108 mmol/L    CO2 22 21 - 32 mmol/L    ANION GAP 9 4 - 13 mmol/L    BUN 11 5 - 25 mg/dL    Creatinine 0.70 0.60 - 1.30 mg/dL    Glucose 87 65 - 140 mg/dL    Calcium 9.2 8.4 - 10.2 mg/dL    AST 19 13 - 39 U/L    ALT 23 7 - 52 U/L    Alkaline Phosphatase 44 34 - 104 U/L    Total Protein 6.9 6.4 - 8.4 g/dL    Albumin 4.1 3.5 - 5.0 g/dL    Total Bilirubin 0.46 0.20 - 1.00 mg/dL    eGFR 107 ml/min/1.73sq m   Lipid panel    Collection Time: 08/02/24  1:20 PM   Result Value Ref Range    Cholesterol 186 See Comment mg/dL    Triglycerides 159 (H) See Comment mg/dL    HDL, Direct 42 (L) >=50 mg/dL    LDL Calculated 112 (H) 0 - 100 mg/dL    Non-HDL-Chol (CHOL-HDL) 144 mg/dl   TSH, 3rd generation with Free T4 reflex    Collection Time: 08/02/24  1:20 PM   Result Value Ref Range    TSH 3RD GENERATON 1.666 0.450 - 4.500 uIU/mL   Hemoglobin A1C    Collection Time: 08/02/24  1:20 PM   Result Value Ref Range    Hemoglobin A1C 5.6 Normal 4.0-5.6%; PreDiabetic 5.7-6.4%; Diabetic >=6.5%; Glycemic control for adults with diabetes <7.0% %     mg/dl   Pregnancy Test (HCG Qualitative)    Collection Time: 08/02/24  1:20 PM   Result Value Ref Range    Preg, Serum Negative Negative   EXPANDED BENZODIAZEPINE CONF    Collection Time: 08/02/24  1:20 PM   Result Value Ref Range    DESMETHYLDIAZEPAM Not  Detected ng/mg creat    OXAZEPAM Not Detected ng/mg creat    Temazepam Quantification-Measured Result Not Detected ng/mg creat    alpha-Hydroxyalprazolam  ng/mg creat    HYDROXYETHYLFLURAZEPAM Not Detected ng/mg creat    Lorazepam Quantification-Measured Result Not Detected ng/mg creat    ALPHA-HYDROXYTRIAZOLAM Not Detected ng/mg creat    ALPHA-HYDROXYMIDAZOLAM Not Detected ng/mg creat    7-Amino-Clonazepam Quantification-Measured Result UR Not Detected ng/mg creat    DIAZEPAM Not Detected ng/mg creat    ALPRAZOLAM 133 ng/mg creat    CLONAZEPAM Not Detected ng/mg creat    MIDAZOLAM Not Detected ng/mg creat    FLUNITRAZEPAM Not Detected ng/mg creat    DESMETHYLFLUNITRAZEPAM Not Detected ng/mg creat   CARBOXY-THC CONF, NORMALIZED    Collection Time: 08/02/24  1:20 PM   Result Value Ref Range    THC/CR RATIO 649 ng/mg creat       Laboratory Results: I have personally reviewed the pertinent laboratory results/reports     Radiology/Other Diagnostic Testing Results: I have reviewed the following imaging and agree with the interpretation below.    XR chest pa & lateral    Result Date: 8/2/2024  CHEST INDICATION:   Nonspecific reaction to tuberculin skin test without active tuberculosis. COMPARISON:  None. EXAM PERFORMED/VIEWS:  XR CHEST PA & LATERAL FINDINGS: Cardiomediastinal silhouette appears unremarkable. The lungs are clear.  No pneumothorax or pleural effusion. Osseous structures appear within normal limits for patient age.     No acute cardiopulmonary disease. No significant change from 9/5/2023 Electronically signed: 08/02/2024 05:34 PM Petey Pineda MD    Mammo diagnostic bilateral w 3d & cad    Result Date: 8/1/2024  DIAGNOSIS: Abnormal mammogram of right breast TECHNIQUE: Digital diagnostic mammography was performed. Computer Aided Detection (CAD) analyzed all applicable images. Right breast ultrasound was performed. COMPARISONS: Prior breast imaging dated: 03/30/2023, 03/30/2023, 12/20/2019, 05/03/2019,  and 03/18/2015 RELEVANT HISTORY: Family Breast Cancer History: No known family history of breast cancer. Family Medical History: Family medical history includes colon cancer in other. Personal History: No known relevant hormone history. No known relevant surgical history. No known relevant medical history. RISK ASSESSMENT: 5 Year Tyrer-Cuzick: 0.59% 10 Year Tyrer-Cuzick: 1.47% Lifetime Tyrer-Cuzick: 10.89% TISSUE DENSITY: There are scattered areas of fibroglandular density. INDICATION: Kathy Vazquez is a 39 y.o. female presenting for patient is due for annual bilateral breast imaging, and also overdue for 6 month follow-up or prior Right breast abnormalities noted on 03/30/23 diagnostic breast imaging. FINDINGS: RIGHT 1) MASS [C] Mammo diagnostic bilateral w 3d & cad: There is a 14 mm oval mass with circumscribed margins seen in the outer central region of the right breast at 9 o'clock in the posterior depth. Compared to the previous study, there are no significant changes. US breast right limited (diagnostic): There is a 13 mm x 7 mm x 11 mm oval, parallel, hypoechoic mass with circumscribed margins with no posterior features seen in the outer central region of the right breast at 9 o'clock in the posterior depth, 10 cm from the nipple. Compared to the previous study, there are no significant changes. 2) MASS [E] Mammo diagnostic bilateral w 3d & cad: There is a 14 mm mass seen in the lower outer quadrant of the right breast in the posterior depth. Compared to the previous study, there are no significant changes. US breast right limited (diagnostic): There is a mass seen in the right breast at 7 o'clock.  There is a oval patch of mixed echogenicity fibroglandular tissue in the 7:00 9 cm location which would be a reasonable potential correlate for the mammographic finding. Left Mammo diagnostic bilateral w 3d & cad There are no suspicious masses, grouped microcalcifications or areas of unexplained architectural  distortion. The skin and nipple areolar complex are unremarkable.  Stable small circumscribed nodule anterior left outer breast.     Circumscribed nodule in the 9 o'clock position likely representing fibroadenoma.  Additional nodular asymmetry, possibly 7 o'clock position.  Both showing no suspicious interval change over a span of 1.5 years.  Favor benign etiology.  Recommend follow-up diagnostic imaging in 1 year to reassess. ASSESSMENT/BI-RADS CATEGORY:  Overall: 3 - Probably Benign RECOMMENDATION:      - Diagnostic mammogram in 1 year for both breasts.      - Ultrasound in 1 year for both breasts. Workstation ID: [id number]     US breast right limited (diagnostic)    Result Date: 8/1/2024  DIAGNOSIS: Abnormal mammogram of right breast TECHNIQUE: Digital diagnostic mammography was performed. Computer Aided Detection (CAD) analyzed all applicable images. Right breast ultrasound was performed. COMPARISONS: Prior breast imaging dated: 03/30/2023, 03/30/2023, 12/20/2019, 05/03/2019, and 03/18/2015 RELEVANT HISTORY: Family Breast Cancer History: No known family history of breast cancer. Family Medical History: Family medical history includes colon cancer in other. Personal History: No known relevant hormone history. No known relevant surgical history. No known relevant medical history. RISK ASSESSMENT: 5 Year Tyrer-Cuzick: 0.59% 10 Year Tyrer-Cuzick: 1.47% Lifetime Tyrer-Cuzick: 10.89% TISSUE DENSITY: There are scattered areas of fibroglandular density. INDICATION: Kathy Vazquez is a 39 y.o. female presenting for patient is due for annual bilateral breast imaging, and also overdue for 6 month follow-up or prior Right breast abnormalities noted on 03/30/23 diagnostic breast imaging. FINDINGS: RIGHT 1) MASS [C] Mammo diagnostic bilateral w 3d & cad: There is a 14 mm oval mass with circumscribed margins seen in the outer central region of the right breast at 9 o'clock in the posterior depth. Compared to the previous  study, there are no significant changes. US breast right limited (diagnostic): There is a 13 mm x 7 mm x 11 mm oval, parallel, hypoechoic mass with circumscribed margins with no posterior features seen in the outer central region of the right breast at 9 o'clock in the posterior depth, 10 cm from the nipple. Compared to the previous study, there are no significant changes. 2) MASS [E] Mammo diagnostic bilateral w 3d & cad: There is a 14 mm mass seen in the lower outer quadrant of the right breast in the posterior depth. Compared to the previous study, there are no significant changes. US breast right limited (diagnostic): There is a mass seen in the right breast at 7 o'clock.  There is a oval patch of mixed echogenicity fibroglandular tissue in the 7:00 9 cm location which would be a reasonable potential correlate for the mammographic finding. Left Mammo diagnostic bilateral w 3d & cad There are no suspicious masses, grouped microcalcifications or areas of unexplained architectural distortion. The skin and nipple areolar complex are unremarkable.  Stable small circumscribed nodule anterior left outer breast.     Circumscribed nodule in the 9 o'clock position likely representing fibroadenoma.  Additional nodular asymmetry, possibly 7 o'clock position.  Both showing no suspicious interval change over a span of 1.5 years.  Favor benign etiology.  Recommend follow-up diagnostic imaging in 1 year to reassess. ASSESSMENT/BI-RADS CATEGORY:  Overall: 3 - Probably Benign RECOMMENDATION:      - Diagnostic mammogram in 1 year for both breasts.      - Ultrasound in 1 year for both breasts. Workstation ID: [id number]        Assessment/Plan:  1. Multiple abrasions  -     mupirocin (BACTROBAN) 2 % ointment; Apply topically 3 (three) times a day  2. Bipolar 2 disorder, major depressive episode (HCC)  -     buPROPion (WELLBUTRIN XL) 150 mg 24 hr tablet; Take 1 tablet (150 mg total) by mouth every morning  3. Chronic obstructive  "pulmonary disease, unspecified COPD type (HCC)  4. Benign essential hypertension  5. Subclinical hypothyroidism  6. Generalized anxiety disorder  Overview:  Follows with psychiatry, on multiple psychiatric medications.  Drug-drug interactions with rifapentine reviewed with ID pharmacist, the only interaction is with alprazolam and doses may be reduced on therapy.    7. Chronic use of benzodiazepine for therapeutic purpose  8. Chronic post-traumatic stress disorder (PTSD)  9. Severe obesity (BMI 35.0-39.9) with comorbidity (HCC)  10. Latent tuberculosis diagnosed by blood test  Overview:  Positive Quantiferon Gold x 2 and PPD. No symptoms or radiographic evidence of active TB. Counseled patient on nature of latent TB, risk of reactivation and indication for treatment.  She is agreeable to start treatment at this time.        Recommend topical mupirocin as needed for any wounds or lesions.  Encouraged to start isoniazid and rifampin.  Primary management of anxiety per psychiatrist  Given she is so anxious and dependent on benzodiazepines at this time would recommend continuing the current dose and a gradual taper over.  Of time.  Primary management per psychiatrist, she follows [facility]               Read package inserts for all medications before starting a new medications, call me if you have any questions.    Patient was given opportunity to ask questions and all questions were answered.    Disclaimer: Portions of the record may have been created with voice recognition software. Occasional wrong word or \"sound a like\" substitutions may have occurred due to the inherent limitations of voice recognition software. Read the chart carefully and recognize, using context, where substitutions have occurred. I have used the Epic copy/forward function to compose this note. I have reviewed my current note to ensure it reflects the current patient status, exam, assessment and plan.    "

## 2024-11-06 ENCOUNTER — OFFICE VISIT (OUTPATIENT)
Dept: PSYCHIATRY | Facility: CLINIC | Age: 39
End: 2024-11-06
Payer: COMMERCIAL

## 2024-11-06 DIAGNOSIS — F41.1 GENERALIZED ANXIETY DISORDER: Primary | ICD-10-CM

## 2024-11-06 DIAGNOSIS — F41.0 PANIC ATTACKS: ICD-10-CM

## 2024-11-06 DIAGNOSIS — F43.12 CHRONIC POST-TRAUMATIC STRESS DISORDER (PTSD): ICD-10-CM

## 2024-11-06 PROBLEM — R76.11 POSITIVE TB TEST: Status: RESOLVED | Noted: 2022-06-14 | Resolved: 2024-11-06

## 2024-11-06 PROBLEM — T42.4X5A: Status: RESOLVED | Noted: 2022-04-28 | Resolved: 2024-11-06

## 2024-11-06 PROCEDURE — 99214 OFFICE O/P EST MOD 30 MIN: CPT | Performed by: NURSE PRACTITIONER

## 2024-11-06 RX ORDER — ALPRAZOLAM 1 MG/1
1 TABLET ORAL 2 TIMES DAILY PRN
Qty: 60 TABLET | Refills: 2 | Status: SHIPPED | OUTPATIENT
Start: 2024-11-06

## 2024-11-06 RX ORDER — BUPROPION HYDROCHLORIDE 150 MG/1
150 TABLET ORAL EVERY MORNING
Qty: 90 TABLET | Refills: 1 | Status: SHIPPED | OUTPATIENT
Start: 2024-11-06 | End: 2025-05-05

## 2024-11-06 NOTE — ASSESSMENT & PLAN NOTE
Status: Not at goal, worsening  Plan: Start Zoloft optimizing to 50 mg daily after 1 week; continue Xanax 1 mg twice daily, Wellbutrin  mg daily, and individual psychotherapy  Orders:    sertraline (ZOLOFT) 50 mg tablet; Take half tablet daily for 1 week, then take full tablet daily    ALPRAZolam (XANAX) 1 mg tablet; Take 1 tablet (1 mg total) by mouth 2 (two) times a day as needed for anxiety

## 2024-11-06 NOTE — ASSESSMENT & PLAN NOTE
Status: Not at goal, worsening  Plan: Start Zoloft optimizing to 50 mg daily after 1 week; continue Xanax 1 mg twice daily, Wellbutrin  mg daily, and individual psychotherapy  Orders:    sertraline (ZOLOFT) 50 mg tablet; Take half tablet daily for 1 week, then take full tablet daily

## 2024-11-06 NOTE — ASSESSMENT & PLAN NOTE
Status: Not at goal, worsening  Plan: Start Zoloft optimizing to 50 mg daily after 1 week; continue Xanax 1 mg twice daily, and individual psychotherapy  Orders:    sertraline (ZOLOFT) 50 mg tablet; Take half tablet daily for 1 week, then take full tablet daily

## 2024-11-11 ENCOUNTER — TELEPHONE (OUTPATIENT)
Age: 39
End: 2024-11-11

## 2024-11-11 NOTE — TELEPHONE ENCOUNTER
Patient called in to confirm their next upcoming appointment with their talk therapist to make sure they wouldn't miss it.    Writer researched and confirmed their next upcoming talk therapy appointment is on 11/18/24 @4 in office.

## 2024-11-18 ENCOUNTER — TELEMEDICINE (OUTPATIENT)
Dept: BEHAVIORAL/MENTAL HEALTH CLINIC | Facility: CLINIC | Age: 39
End: 2024-11-18
Payer: COMMERCIAL

## 2024-11-18 DIAGNOSIS — F41.9 ANXIETY: ICD-10-CM

## 2024-11-18 DIAGNOSIS — F41.1 GENERALIZED ANXIETY DISORDER: Primary | ICD-10-CM

## 2024-11-18 DIAGNOSIS — F43.12 CHRONIC POST-TRAUMATIC STRESS DISORDER (PTSD): ICD-10-CM

## 2024-11-18 PROCEDURE — 90834 PSYTX W PT 45 MINUTES: CPT | Performed by: SOCIAL WORKER

## 2024-11-18 NOTE — BH TREATMENT PLAN
"Outpatient Behavioral Health Psychotherapy Treatment Plan    Kathy Vazquez  1985     Date of Initial Psychotherapy Assessment: 4/2/24   Date of Current Treatment Plan: 11/18/24  Treatment Plan Target Date: 5/18/25  Treatment Plan Expiration Date: 5/18/25    Diagnosis:   1. Generalized anxiety disorder        2. Anxiety        3. Chronic post-traumatic stress disorder (PTSD)            Area(s) of Need: anxiety, grief, trauma history    Long Term Goal 1 (in the client's own words): I want to obtain employment and \"get on with living.\"      Stage of Change: Action    Target Date for completion: 5/18/25     Anticipated therapeutic modalities: Individual therapy, client-centered supportive therapy, solution-focused/CBT/motivational interviewing as appropriate     People identified to complete this goal: Yusra McmillanLCSW      Objective 1: (identify the means of measuring success in meeting the objective): I will attend, complete GED prep classes.        Objective 2: (identify the means of measuring success in meeting the objective): I will begin to socialize so that I can find a healthy . .        I am currently under the care of a [facility] psychiatric provider: yes    My [facility] psychiatric provider is: Alisha Moreno    I am currently taking psychiatric medications: Yes, as prescribed    I feel that I will be ready for discharge from mental health care when I reach the following (measurable goal/objective): \"I have no idea. We only will know as we go.\"     For children and adults who have a legal guardian:   Has there been any change to custody orders and/or guardianship status? NA. If yes, attach updated documentation.    I have created my Crisis Plan and have been offered a copy of this plan    Behavioral Health Treatment Plan [facility]: Diagnosis and Treatment Plan explained to Kathy Vazquez acknowledges an understanding of their diagnosis. Kathy Vazquez " agrees to this treatment plan.    I have been offered a copy of this Treatment Plan. yes

## 2024-11-18 NOTE — PSYCH
Behavioral Health Psychotherapy Progress Note    Psychotherapy Provided: Individual Psychotherapy     1. Generalized anxiety disorder        2. Anxiety        3. Chronic post-traumatic stress disorder (PTSD)              Goals addressed in session: Goal 1     DATA: Kathy spoke  with this worker re: her feelings about her health, her desire to obtain employment after completing [employer] prep courses.  She shared both how difficult and healing it was to commemorate the third anniversary of her mother's passing.    Lolly indicated that she  remains frustrated with the labels associated with her struggles and the requirements to see a Psychiatrist to be medicated.  She shared that she stopped the TB medication due to an increase in anxiety, stomach issues.   Specific techniques used in this session were: psychoeducation, empathetic listening, validation, and motivational interviewing.    During this session, this clinician used the following therapeutic modalities: Client-centered Therapy, Motivational Interviewing, and Supportive Psychotherapy    Substance Abuse was not addressed during this session. If the client is diagnosed with a co-occurring substance use disorder, please indicate any changes in the frequency or amount of use: N/A. Stage of change for addressing substance use diagnoses: No substance use/Not applicable    ASSESSMENT:  Kathy Vazquez presents with a Euthymic/ normal mood. Kathy Vazquez was oriented to person, place, time, and situation. Grooming and Hygiene were good  as she expressed relief at  being able to detangle her hair. Ability to perform ADLs has improved. she was cooperative.  her affect is Normal range and intensity, which is congruent, with her mood and the content of the session.    The client has made some progress on their goals.    Kathy Vazquez presents with a none risk of suicide, none risk of self-harm, and none risk of harm to others.    For any risk assessment that  "surpasses a \"low\" rating, a safety plan must be developed.    A safety plan was indicated: no  If yes, describe in detail: N/A    PLAN: Between sessions, Kathy Vazquez will continue to work on short-term goals including following up with medical issues At the next session, the therapist will use Client-centered Therapy, Motivational Interviewing, and Supportive Psychotherapy to address symptoms as they arise. Kathy Vazquez will return to outpatient therapy on 7/12/2024.     Behavioral Health Treatment Plan and Discharge Planning: Kathy Vazquez is aware of and agrees to continue to work on their treatment plan. They have identified and are working toward their discharge goals. yes    Visit start and stop times:        11/18/24           "

## 2024-11-20 ENCOUNTER — TELEPHONE (OUTPATIENT)
Age: 39
End: 2024-11-20

## 2024-11-20 NOTE — TELEPHONE ENCOUNTER
"Outreached patient via phone at [phone]. The patient was available and verified name and date of birth.    Patient expressed difficulties r/t long term disability claim. Long term disability claim was recently rejected but is also pending review. Patient stated that their disability insurer is requesting a \"three-month prior clause awarding the patient with disability since June\". Expressed to patient to have this need clarified with insurer and to explicitly ask what information patient needs to provide.    Patient also asked to verify dates of treatment with Nevaeh Cristobal due to disability insurer requiring any notes from Nevaeh covering the dates of August 2023 - November 2023. Confirmed with patient that only one appointment confirmed with same, in July. Date of appointment was shared but no psychotherapy PHI was discussed. Made patient aware that if insurer requires proof of this information, then they should request same via MRO.    Patient asked about potential legal services, if needed. This writer suggested that, if patient feels legal services are needed, then they should begin by contacting [facility] [employer] ([employer]). [employer] will be able to either direct patient's needs or refer them to the [employer]. Patient verbalized familiarity with [employer] and was thankful for outreach. Patient denied additional questions/concerns at this time. Call disconnected.  "

## 2024-11-20 NOTE — TELEPHONE ENCOUNTER
Kathy called to request her  Donnie to call her back regarding her [employer] claim. She has a couple of questions pertaining to the claim that she would like to talk to  about and also regarding her medical records. Please call patient back at [phone].

## 2024-11-25 NOTE — TELEPHONE ENCOUNTER
Outreached patient via phone at [phone]. The patient was available and verified name and date of birth.    Patient stated that [facility] provider claimed to have sent fax to our provider at [phone] in order to confirm that there were no appointments in August, 2024. Made patient aware that we can notify her when the form arrives after we receive and review what information [employer] provider is requesting. Patient verbalized understanding and was encouraged to callback if needed.

## 2024-11-25 NOTE — TELEPHONE ENCOUNTER
Forms received and reviewed. Forms were addressed to previous provider and are not requesting the information that patient was told was needed. Patient asked if CM would be able to outreach [employer] regarding claim and needed info and this writer agreeable to same. Will update patient when new information becomes available.    Following concerns will need to be addressed:    [employer] is seeking updated Attending Physician Statement and RTW date clarification but it the form is addressed to wrong provider.    If [employer] is seeking recent records, they will need to proceed through MRO.    Does [employer] need notes from 2023? If so, these should be included in MRO request.

## 2024-11-25 NOTE — TELEPHONE ENCOUNTER
Patient called in requesting a call back from  regarding follow up on [employer] claim.    Writer informed patient  msg will be relay.

## 2024-12-02 ENCOUNTER — TELEPHONE (OUTPATIENT)
Age: 39
End: 2024-12-02

## 2024-12-02 NOTE — TELEPHONE ENCOUNTER
Kathy Vazquez and/or patient requested a call back to discuss the [employer] Long term disability paperwork .    They can be reached at P# [phone].       Thank you.

## 2024-12-03 NOTE — TELEPHONE ENCOUNTER
Outreached patient via phone at [phone]. The patient was available and verified name and date of birth. Made patient aware that Attending Physician Statement (APS) form was received. Patient stated one more form should be incoming regarding information for previous therapist, Nevaeh Cristobal. Made patient aware that we can provide update on same after it is received. Otherwise, we will return callback when APS form is completed.

## 2024-12-03 NOTE — TELEPHONE ENCOUNTER
Outreached patient via phone at [phone]. The patient was available and verified name and date of birth.    Patient requesting update on [employer] forms from [employer]. Patient stated that they last spoke with [employer] regarding same, yesterday, and that [employer] was confused on the matter and offering to resend the same paperwork to previous provider again. Made patient aware that we have not received forms yet.    Patient stated that they are also interested in employment opportunities and have been applying via phone. Preferably seeking remote work. Made suggestion for patient to utilize resources such as [employer] and [employer] for job searching and access to computers. Advised personal data on phone may be more secure means of transmitting any personal data for job applications.    Patient denied additional questions/concerns at this time.

## 2024-12-07 DIAGNOSIS — F43.12 CHRONIC POST-TRAUMATIC STRESS DISORDER (PTSD): ICD-10-CM

## 2024-12-07 DIAGNOSIS — F41.0 PANIC ATTACKS: ICD-10-CM

## 2024-12-07 DIAGNOSIS — F41.1 GENERALIZED ANXIETY DISORDER: ICD-10-CM

## 2024-12-09 ENCOUNTER — OFFICE VISIT (OUTPATIENT)
Dept: INFECTIOUS DISEASES | Facility: CLINIC | Age: 39
End: 2024-12-09
Payer: COMMERCIAL

## 2024-12-09 VITALS
HEART RATE: 90 BPM | WEIGHT: 211.6 LBS | OXYGEN SATURATION: 98 % | DIASTOLIC BLOOD PRESSURE: 72 MMHG | SYSTOLIC BLOOD PRESSURE: 116 MMHG | RESPIRATION RATE: 18 BRPM | BODY MASS INDEX: 38.94 KG/M2 | HEIGHT: 62 IN | TEMPERATURE: 98 F

## 2024-12-09 DIAGNOSIS — R21 RASH: ICD-10-CM

## 2024-12-09 DIAGNOSIS — Z22.7 TB LUNG, LATENT: Primary | ICD-10-CM

## 2024-12-09 DIAGNOSIS — F41.1 GENERALIZED ANXIETY DISORDER: ICD-10-CM

## 2024-12-09 PROCEDURE — 99214 OFFICE O/P EST MOD 30 MIN: CPT | Performed by: INTERNAL MEDICINE

## 2024-12-09 NOTE — PROGRESS NOTES
Name: Kathy Vazquez      : 1985      MRN: [id number]  Encounter Provider: Gigi Cherry MD  Encounter Date: 2024   Encounter department: [facility]  :  Assessment & Plan  TB lung, latent  Patient has positive TB QuantiFERON and PPD, consistent for latent TB, although she has no known history of TB exposure.  We already had extensive discussion with patient regarding treatment for latent TB with the rifapentine/INH for 3 months.  However, she has not started this plan yet due to concern for interaction with her Xanax.  I discussed with her in great detail again.  Rifapentin's reaction with an ACT is increasing Xanax metabolism, can be overcome by increasing Xanax dose.  Once again, I encouraged patient discussed this with her psychiatrist and complete treatment course for latent TB as prescribed.  Patient has no evidence of active TB.  She has no respiratory or constitutional symptoms.  Recent CXR is without infiltrate.  Recommend start weekly rifapentine/INH, with daily pyridoxine supplement, as previously outlined.  Treat x 3 months total.  Check CBC/CMP monthly while on above treatment regimen.  Avoid alcohol, Tylenol or any hepatotoxic medications.  Generalized anxiety disorder  Patient is currently on Xanax at 2 mg daily.  Interaction with rifapentin as in above, resulting in increased metabolism of Xanax.  During the treatment treatment for latent TB above, it would be reasonable to increase Xanax dose by 50%, to 3 mg daily.  After 3 months latent TB treatment course, Xanax can be decreased back to 2 mg daily and wean as needed.  Recommend increasing Xanax by 50%, to 3 mg daily dosing while on latent TB treatment for 3 months.  Decrease Xanax back down to baseline dose of 2 mg daily after completion of latent TB treatment (3 months).  Rash  Patient has a scaly round rash on her right wrist, suggestive of ringworm.  Will treat empirically with topical  "antifungal.  Ketoconazole cream twice daily x 14 days.    Prior records reviewed.  Discussed with patient in detail regarding the above plan.  Follow-up with us 1 month after starting latent TB treatment.    Antibiotics: None    History of Present Illness   Patient has no fever, chills, sweats; no nausea, vomiting, diarrhea; no cough, shortness of breath; no pain. No new symptoms.    ROS:  A complete review of systems is negative other than that noted above in the HPI.         Objective   /72   Pulse 90   Temp 98 °F (36.7 °C)   Resp 18   Ht 5' 2\" (1.575 m)   Wt 96 kg (211 lb 9.6 oz)   SpO2 98%   BMI 38.70 kg/m²      General: Alert, interactive, appearing well, nontoxic, no acute distress.  Throat: Oropharynx moist without lesions.   Lungs: Clear to auscultation bilaterally; no audible wheezes, rhonchi or rales; respirations unlabored  Heart: RRR; no murmur, rub or gallop  Abdomen: Soft, non-tender, non-distended, positive bowel sounds.    Extremities: No clubbing, cyanosis or edema  Skin: Scaly round rash on right forearm.  No drainage.  No erythema/warmth.  Nontender.    Lab Results: I have personally reviewed pertinent labs.  Lab Results   Component Value Date    K 4.3 08/02/2024     08/02/2024    CO2 22 08/02/2024    BUN 11 08/02/2024    CREATININE 0.70 08/02/2024    GLUF 94 03/26/2018    CALCIUM 9.2 08/02/2024    AST 19 08/02/2024    ALT 23 08/02/2024    ALKPHOS 44 08/02/2024    EGFR 107 08/02/2024     Lab Results   Component Value Date    WBC 7.35 08/02/2024    HGB 13.3 08/02/2024    HCT 41.4 08/02/2024    MCV 91 08/02/2024     08/02/2024   No results found for: \"ESR\"          "

## 2024-12-09 NOTE — ASSESSMENT & PLAN NOTE
Patient is currently on Xanax at 2 mg daily.  Interaction with rifapentin as in above, resulting in increased metabolism of Xanax.  During the treatment treatment for latent TB above, it would be reasonable to increase Xanax dose by 50%, to 3 mg daily.  After 3 months latent TB treatment course, Xanax can be decreased back to 2 mg daily and wean as needed.  Recommend increasing Xanax by 50%, to 3 mg daily dosing while on latent TB treatment for 3 months.  Decrease Xanax back down to baseline dose of 2 mg daily after completion of latent TB treatment (3 months).

## 2024-12-10 RX ORDER — KETOCONAZOLE 20 MG/G
CREAM TOPICAL 2 TIMES DAILY
Qty: 15 G | Refills: 0 | Status: SHIPPED | OUTPATIENT
Start: 2024-12-10 | End: 2024-12-24
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
## 2024-12-11 ENCOUNTER — TELEPHONE (OUTPATIENT)
Age: 39
End: 2024-12-11

## 2024-12-11 ENCOUNTER — OFFICE VISIT (OUTPATIENT)
Dept: PSYCHIATRY | Facility: CLINIC | Age: 39
End: 2024-12-11
Payer: COMMERCIAL

## 2024-12-11 DIAGNOSIS — F41.0 PANIC ATTACKS: ICD-10-CM

## 2024-12-11 DIAGNOSIS — F41.1 GENERALIZED ANXIETY DISORDER: ICD-10-CM

## 2024-12-11 DIAGNOSIS — F43.12 CHRONIC POST-TRAUMATIC STRESS DISORDER (PTSD): Primary | ICD-10-CM

## 2024-12-11 PROCEDURE — 90833 PSYTX W PT W E/M 30 MIN: CPT | Performed by: NURSE PRACTITIONER

## 2024-12-11 PROCEDURE — 99214 OFFICE O/P EST MOD 30 MIN: CPT | Performed by: NURSE PRACTITIONER

## 2024-12-11 RX ORDER — SERTRALINE HYDROCHLORIDE 100 MG/1
100 TABLET, FILM COATED ORAL DAILY
Qty: 30 TABLET | Refills: 1 | Status: SHIPPED | OUTPATIENT
Start: 2024-12-11

## 2024-12-11 NOTE — ASSESSMENT & PLAN NOTE
Status: Not at goal  Plan: Increase Zoloft to 100 mg daily; continue Wellbutrin  mg daily Xanax 1 mg twice daily as needed  Orders:    sertraline (ZOLOFT) 100 mg tablet; Take 1 tablet (100 mg total) by mouth daily

## 2024-12-11 NOTE — TELEPHONE ENCOUNTER
Pt called in she states that she just left her psychiatry office and she states that the provider will be leaving the practice soon. Since Dr. Cherry made the recommendation of the Xanax being increased while on the LTBI treatment, her psychiatrists says that he would have to order it for her now.       Informed pt that we typically do not prescribe Xanax but will ask and if not she may have to reach out to her PCP for further refills. If Dr. Cherry able to fill please send script to [facility] on file.     Pt would like a CB.

## 2024-12-11 NOTE — ASSESSMENT & PLAN NOTE
Status: Not at goal, mild improvement  Plan: Increase Zoloft to 100 mg daily; continue Wellbutrin  mg daily Xanax 1 mg twice daily as needed  Orders:    sertraline (ZOLOFT) 100 mg tablet; Take 1 tablet (100 mg total) by mouth daily

## 2024-12-12 NOTE — TELEPHONE ENCOUNTER
Patient called to see if there was an update on her paper work. Please reach out if there is and update.

## 2024-12-12 NOTE — TELEPHONE ENCOUNTER
Contacted patient.  CHANDANA for pt to CB.  Our intent was to provide guidance on how we can best address her situation however we do not order these types of medication. Patient will have to reach out to her PCP or establish a new psych provider.

## 2024-12-16 ENCOUNTER — TELEPHONE (OUTPATIENT)
Dept: BEHAVIORAL/MENTAL HEALTH CLINIC | Facility: CLINIC | Age: 39
End: 2024-12-16

## 2024-12-16 ENCOUNTER — TELEPHONE (OUTPATIENT)
Age: 39
End: 2024-12-16

## 2024-12-16 NOTE — TELEPHONE ENCOUNTER
Addressed message in 12/2 encounter. Providing this documentation to close the loop in this open encounter.

## 2024-12-16 NOTE — TELEPHONE ENCOUNTER
LSW called patient about the bereavement group. Kathy verbalized she was interested but was having multiple issues with her insurance.    Kathy verbalized that there was issues with her commercial and medicaid insurance because there allegedly were not payment made by her insurance. Kathy stated that there was issues with duplicates allegedly. Kathy stated that she needs to contact billing again due to bills from her insurance saying things were not covered.     Kathy stated that she called billing on Friday and she was instructed to call her insurance. Kathy verbalized she will be calling insurance today and hopefully the office again after this.    Patient verbalized she is interested in attending the groups and would like to be put down as a yes hoping that she can get her insurance issues resolved.

## 2024-12-16 NOTE — TELEPHONE ENCOUNTER
Patient called office asking for specific CM. Writer informed patient there is no direct number but the message can be sent. [facility]

## 2024-12-17 ENCOUNTER — TELEPHONE (OUTPATIENT)
Dept: PSYCHIATRY | Facility: CLINIC | Age: 39
End: 2024-12-17

## 2024-12-17 ENCOUNTER — TELEPHONE (OUTPATIENT)
Age: 39
End: 2024-12-17

## 2024-12-17 NOTE — TELEPHONE ENCOUNTER
Pt called regarding same issue with Nevaeh Cristobal. Pt was only seen 7/19/23 by provider. They are still asking for notes for that day. Patient would like a call  back to discuss. Ty

## 2024-12-18 ENCOUNTER — TELEPHONE (OUTPATIENT)
Dept: PSYCHIATRY | Facility: CLINIC | Age: 39
End: 2024-12-18

## 2024-12-19 NOTE — TELEPHONE ENCOUNTER
Returned callback to patient and verified that MRO request is visible in chart. Patient sounded tearful several times during phone call. Endorsed stress due to holidays, disability claim, hx of grief, new insurance billing issue, and uncertainty planning for the future due to pending long term disability insurance claim and unemployment expiring soon.    Explained to patient that we can look into [facility] services if interested to help with linkage to disability resources if this is a serious consideration of theirs. Patient declined same at this time.    Patient denied additional needs at this time but was encouraged to callback if needed.

## 2025-01-08 ENCOUNTER — TELEMEDICINE (OUTPATIENT)
Dept: BEHAVIORAL/MENTAL HEALTH CLINIC | Facility: CLINIC | Age: 40
End: 2025-01-08
Payer: COMMERCIAL

## 2025-01-08 DIAGNOSIS — F43.12 CHRONIC POST-TRAUMATIC STRESS DISORDER (PTSD): ICD-10-CM

## 2025-01-08 DIAGNOSIS — F41.1 GENERALIZED ANXIETY DISORDER: Primary | ICD-10-CM

## 2025-01-08 DIAGNOSIS — F31.81 BIPOLAR 2 DISORDER, MAJOR DEPRESSIVE EPISODE (HCC): ICD-10-CM

## 2025-01-08 PROCEDURE — 90832 PSYTX W PT 30 MINUTES: CPT | Performed by: SOCIAL WORKER

## 2025-01-08 NOTE — PSYCH
"Behavioral Health Psychotherapy Progress Note    Psychotherapy Provided: Individual Psychotherapy     1. Generalized anxiety disorder        2. Chronic post-traumatic stress disorder (PTSD)        3. Bipolar 2 disorder, major depressive episode (HCC)              Goals addressed in session: Goal 1     DATA: Kathy spoke  with this worker re: her feelings about her health, her need to obtain employment, and the sudden death of her cat during the holidays.  She reported that she has been sleeping \"a lot,\" is behind on her bills and feels depressed but is \"growing spiritually\" and grateful for what she has.   Specific techniques used in this session were: psychoeducation, empathetic listening, validation, and motivational interviewing.    During this session, this clinician used the following therapeutic modalities: Client-centered Therapy, Motivational Interviewing, and Supportive Psychotherapy    Substance Abuse was not addressed during this session. If the client is diagnosed with a co-occurring substance use disorder, please indicate any changes in the frequency or amount of use: N/A. Stage of change for addressing substance use diagnoses: No substance use/Not applicable    ASSESSMENT:  Kathy Vazquez presents with a Anxious and Depressed mood. Kathy Vazquez was oriented to person, place, time, and situation. Grooming and Hygiene were fair  as she was in bed during this session.   Ability to perform ADLs has improved. she was cooperative.  her affect is Normal range and intensity, which is congruent, with her mood and the content of the session.    The client has made some progress on their goals.    Kathy Vazquez presents with a none risk of suicide, none risk of self-harm, and none risk of harm to others.    For any risk assessment that surpasses a \"low\" rating, a safety plan must be developed.    A safety plan was indicated: no  If yes, describe in detail: N/A    PLAN: Between sessions, Kathy" Taylor will continue to work on short-term goals including following up with medical issues At the next session, the therapist will use Client-centered Therapy, Motivational Interviewing, and Supportive Psychotherapy to address symptoms as they arise. Kathy Vazquez will return to outpatient therapy on 7/12/2024.     Behavioral Health Treatment Plan and Discharge Planning: Kathy Vazquez is aware of and agrees to continue to work on their treatment plan. They have identified and are working toward their discharge goals. yes    Visit start and stop times:        01/08/25  Start Time: 1000  Stop Time: 1030  Total Visit Time: 30 minutes

## 2025-01-13 ENCOUNTER — TELEPHONE (OUTPATIENT)
Dept: BEHAVIORAL/MENTAL HEALTH CLINIC | Facility: CLINIC | Age: 40
End: 2025-01-13

## 2025-01-13 ENCOUNTER — TELEPHONE (OUTPATIENT)
Dept: PSYCHIATRY | Facility: CLINIC | Age: 40
End: 2025-01-13

## 2025-01-13 NOTE — TELEPHONE ENCOUNTER
LSW followed up with patient with her insurance issues. Kathy verbalized that she is still having issues with her insurance not yet covering her old appointment. LSW discussed that patient will be on future insurance issues.   Kathy verbalized that she would be upset if she going to be stuck with the bills from April.

## 2025-01-13 NOTE — TELEPHONE ENCOUNTER
Patient added to CM work queue. If patient reaches out regarding their referral, please [place] their [place] [id number].

## 2025-01-13 NOTE — TELEPHONE ENCOUNTER
----- Message from Vinnie CUMMINGS sent at 1/13/2025  1:47 PM EST -----  Regarding: Insurance headsup  Philip Fields,    In following up about the bereavement group Kathy was mentioning that she wanted to call you about these insurance issues and guidance on it. I suggested she call billing too and she said she would call them. She asked if I could message you about calling her.    -Rosalino

## 2025-01-15 DIAGNOSIS — F41.1 GENERALIZED ANXIETY DISORDER: ICD-10-CM

## 2025-01-15 DIAGNOSIS — F41.0 PANIC ATTACKS: ICD-10-CM

## 2025-01-15 DIAGNOSIS — F43.12 CHRONIC POST-TRAUMATIC STRESS DISORDER (PTSD): ICD-10-CM

## 2025-01-15 RX ORDER — SERTRALINE HYDROCHLORIDE 100 MG/1
100 TABLET, FILM COATED ORAL DAILY
Qty: 30 TABLET | Refills: 1 | Status: SHIPPED | OUTPATIENT
Start: 2025-01-15

## 2025-01-17 NOTE — TELEPHONE ENCOUNTER
Patient calling back requesting a callback from her CM in regards to updates about [employer] and the [place] and she had some additional questions

## 2025-01-20 NOTE — TELEPHONE ENCOUNTER
Outreached patient via phone at [phone]. The patient was not available and a voicemail was left with callback instructions for this writer at [phone].

## 2025-01-20 NOTE — TELEPHONE ENCOUNTER
Patient returned callback and shared concerns. Patient stated that [facility] provider spoke with O who is paper mailing documents from [facility]. Confirmed with patient that documents from [facility] typically paper mailed.    Patient also expressed issue with billing and appointments not being covered since April/May. Encouraged patient to outreach [employer] on their Primary Insurer's card. Also discussed possibility of conducting conference call between primary insurer and [facility], if able.    Provided review on [employer]DI claim - Apply for [facility]DI > Appeal if deny > Consider legal follow-up if appeal denied.    Patient denied additional questions/concerns at this time.

## 2025-01-21 ENCOUNTER — TELEPHONE (OUTPATIENT)
Dept: PSYCHIATRY | Facility: CLINIC | Age: 40
End: 2025-01-21

## 2025-01-21 NOTE — TELEPHONE ENCOUNTER
Spoke with patient and rescheduled 1/21/25 due to provider being out of the office. Scheduled 1/23/25.

## 2025-01-23 ENCOUNTER — TELEPHONE (OUTPATIENT)
Age: 40
End: 2025-01-23

## 2025-01-23 NOTE — TELEPHONE ENCOUNTER
Patient called in regarding appointment for 1.23.25, the patient was not aware it had been cancelled.    The patient was advised the [facility] appointment will be 2.21.25 @ 3:00pm    Patient was advised this is an in person appointment.

## 2025-01-28 DIAGNOSIS — J44.9 CHRONIC OBSTRUCTIVE PULMONARY DISEASE, UNSPECIFIED COPD TYPE (HCC): ICD-10-CM

## 2025-01-28 RX ORDER — ALBUTEROL SULFATE 90 UG/1
2 INHALANT RESPIRATORY (INHALATION) EVERY 4 HOURS PRN
Qty: 8.5 G | Refills: 2 | Status: SHIPPED | OUTPATIENT
Start: 2025-01-28

## 2025-02-04 DIAGNOSIS — F31.81 BIPOLAR 2 DISORDER, MAJOR DEPRESSIVE EPISODE (HCC): ICD-10-CM

## 2025-02-04 RX ORDER — BUPROPION HYDROCHLORIDE 150 MG/1
TABLET ORAL
Qty: 90 TABLET | Refills: 1 | Status: SHIPPED | OUTPATIENT
Start: 2025-02-04 | End: 2025-02-11 | Stop reason: SDUPTHER

## 2025-02-05 ENCOUNTER — TELEPHONE (OUTPATIENT)
Age: 40
End: 2025-02-05

## 2025-02-05 DIAGNOSIS — F41.1 GENERALIZED ANXIETY DISORDER: ICD-10-CM

## 2025-02-05 RX ORDER — ALPRAZOLAM 1 MG/1
1 TABLET ORAL 2 TIMES DAILY PRN
Qty: 60 TABLET | Refills: 0 | Status: SHIPPED | OUTPATIENT
Start: 2025-02-05

## 2025-02-05 NOTE — TELEPHONE ENCOUNTER
Patient is calling regarding cancelling an appointment.    Date/Time: 2/6 @12pm virtually.    Reason: emergency with cat    Patient was rescheduled: YES [x] NO []  If yes, when was Patient reschedule for: 2/10 @10am in office.     Patient requesting call back to reschedule: YES [] NO [x]

## 2025-02-05 NOTE — TELEPHONE ENCOUNTER
PDMP website reviewed. Kathy has been appropriately adherent to controlled psychotropic medications without evidence of abuse or misuse. As such, will send 30-day refill to pharmacy of choice and follow up as necessary.

## 2025-02-05 NOTE — TELEPHONE ENCOUNTER
Medication Refill Request     Name of Medication ALPRAZolam (XANAX)   Dose/Frequency 1mg  Quantity 60  Verified pharmacy   [x]  Verified ordering Provider   [x]  Does patient have enough for the next 3 days? Yes [] No [x]  Does patient have a follow-up appointment scheduled? Yes [x] No []   If so when is appointment: 2/21 @3pm

## 2025-02-10 ENCOUNTER — TELEMEDICINE (OUTPATIENT)
Dept: BEHAVIORAL/MENTAL HEALTH CLINIC | Facility: CLINIC | Age: 40
End: 2025-02-10
Payer: COMMERCIAL

## 2025-02-10 DIAGNOSIS — F41.1 GENERALIZED ANXIETY DISORDER: Primary | ICD-10-CM

## 2025-02-10 DIAGNOSIS — F43.12 CHRONIC POST-TRAUMATIC STRESS DISORDER (PTSD): ICD-10-CM

## 2025-02-10 DIAGNOSIS — F31.81 BIPOLAR 2 DISORDER, MAJOR DEPRESSIVE EPISODE (HCC): ICD-10-CM

## 2025-02-10 PROCEDURE — 90832 PSYTX W PT 30 MINUTES: CPT | Performed by: SOCIAL WORKER

## 2025-02-10 NOTE — PSYCH
"Behavioral Health Psychotherapy Progress Note    Psychotherapy Provided: Individual Psychotherapy     1. Generalized anxiety disorder        2. Chronic post-traumatic stress disorder (PTSD)        3. Bipolar 2 disorder, major depressive episode (HCC)                Goals addressed in session: Goal 1     DATA: Kathy spoke  further with this worker re: her feelings about her health, issues her lack of any income as she awaits SSD determination, and the illness of her second cat following the sudden death of her cat during the holidays.    Specific techniques used in this session were: psychoeducation, empathetic listening, validation, and motivational interviewing.    During this session, this clinician used the following therapeutic modalities: Client-centered Therapy, Motivational Interviewing, and Supportive Psychotherapy    Substance Abuse was not addressed during this session. If the client is diagnosed with a co-occurring substance use disorder, please indicate any changes in the frequency or amount of use: N/A. Stage of change for addressing substance use diagnoses: No substance use/Not applicable    ASSESSMENT:  Kathy Vazquez presents with a Anxious and Depressed mood. Kathy Vazquez was oriented to person, place, time, and situation. Grooming and Hygiene were poor and she was limited in her ability to sustain attention for the duration of today's session. .   Ability to perform ADLs has declined. she was cooperative.  her affect is Normal range and intensity, which is congruent, with her mood and the content of the session.    The client has made some progress on their goals.    Kathy Vazquez presents with a none risk of suicide, none risk of self-harm, and none risk of harm to others.    For any risk assessment that surpasses a \"low\" rating, a safety plan must be developed.    A safety plan was indicated: no  If yes, describe in detail: N/A    PLAN: Between sessions, Kathy Vazquez will " I'm okay with taking over anticoagulation.   continue to work on short-term goals including following up with medical issues At the next session, the therapist will use Client-centered Therapy, Motivational Interviewing, and Supportive Psychotherapy to address symptoms as they arise. Kathy Vazquez will return to outpatient therapy on 7/12/2024.     Behavioral Health Treatment Plan and Discharge Planning: Kathy Vazquez is aware of and agrees to continue to work on their treatment plan. They have identified and are working toward their discharge goals. yes    Visit start and stop times:        02/10/25  Start Time: 0955  Stop Time: 1025  Total Visit Time: 30 minutes        Virtual Regular Visit    Verification of patient location:    Patient is located at Home in the following state in which I hold an active license [place]      Assessment/Plan:    Problem List Items Addressed This Visit        Behavioral Health    Generalized anxiety disorder - Primary    Bipolar 2 disorder, major depressive episode (HCC)    Chronic post-traumatic stress disorder (PTSD)         Goals addressed in session: Goal 1     Depression Follow-up Plan Completed: Yes    Reason for visit is No chief complaint on file.       Encounter provider Yusra Sawyer      Recent Visits  No visits were found meeting these conditions.  Showing recent visits within past 7 days and meeting all other requirements  Today's Visits  Date Type Provider Dept   02/10/25 Telemedicine Yusra Sawyer [facility] Therapist [place]   Showing today's visits and meeting all other requirements  Future Appointments  No visits were found meeting these conditions.  Showing future appointments within next 150 days and meeting all other requirements       The patient was identified by name and date of birth. Kathy Vazquez was informed that this is a telemedicine visit and that the visit is being conducted throughthe Epic Embedded platform. She agrees to proceed..  My office door was closed. No one else was in  the room.  She acknowledged consent and understanding of privacy and security of the video platform. The patient has agreed to participate and understands they can discontinue the visit at any time.    Patient is aware this is a billable service.     Subjective  Kathy Vazquez is a 40 y.o. female  .      HPI     Past Medical History:   Diagnosis Date   • Acute cough 06/14/2022   • Adverse effect of alprazolam 04/28/2022   • Allergies    • Anxiety 05/2018   • Female hirsutism    • GERD (gastroesophageal reflux disease)    • Head injury    • Hypertension 2018   • Papanicolaou smear for cervical cancer screening 2017    NL, no h/o of abn pap that she can recall   • Positive TB test 06/14/2022   • STD (female)    • Thyroid nodule 05/2018   • Tobacco user 05/2018       Past Surgical History:   Procedure Laterality Date   • NO PAST SURGERIES         Current Outpatient Medications   Medication Sig Dispense Refill   • albuterol (PROVENTIL HFA,VENTOLIN HFA) 90 mcg/act inhaler INHALE 2 PUFFS BY MOUTH EVERY 4 HOURS AS NEEDED FOR WHEEZING OR SHORTNESS OF BREATH 8.5 g 2   • ALPRAZolam (XANAX) 1 mg tablet Take 1 tablet (1 mg total) by mouth 2 (two) times a day as needed for anxiety 60 tablet 0   • buPROPion (WELLBUTRIN XL) 150 mg 24 hr tablet TAKE 1 TABLET(150 MG) BY MOUTH EVERY MORNING 90 tablet 1   • fluticasone-salmeterol (Advair HFA) 230-21 MCG/ACT inhaler Inhale 2 puffs 2 (two) times a day Rinse mouth after use. (Patient taking differently: Inhale 2 puffs 2 (two) times a day as needed Rinse mouth after use.) 36 g 1   • ibuprofen (MOTRIN) 800 mg tablet Take 1 tablet (800 mg total) by mouth every 8 (eight) hours as needed for mild pain (Patient not taking: Reported on 12/9/2024) 90 tablet 0   • isoniazid (NYDRAZID) 300 mg tablet Take 3 tablets (900 mg total) by mouth once a week for 12 doses (Patient not taking: Reported on 12/9/2024) 36 tablet 0   • ketoconazole (NIZORAL) 2 % cream Apply topically 2 (two) times a day for 14  days 15 g 0   • lisinopril (ZESTRIL) 40 mg tablet TAKE 1 TABLET(40 MG) BY MOUTH DAILY 90 tablet 1   • magnesium Oxide (MAG-OX) 400 mg TABS Take 1 tablet (400 mg total) by mouth daily (Patient not taking: Reported on 12/9/2024) 30 tablet 1   • mupirocin (BACTROBAN) 2 % ointment Apply topically 3 (three) times a day (Patient taking differently: Apply topically as needed) 30 g 0   • pyridoxine (VITAMIN B6) 50 mg tablet Take 1 tablet (50 mg total) by mouth once a week for 12 doses (Patient not taking: Reported on 12/9/2024) 12 tablet 0   • Rifapentine 150 MG TABS Take 6 tablets (900 mg total) by mouth once a week for 12 doses (Patient not taking: Reported on 12/9/2024) 72 tablet 0   • sertraline (ZOLOFT) 100 mg tablet TAKE 1 TABLET(100 MG) BY MOUTH DAILY 30 tablet 1     No current facility-administered medications for this visit.        Allergies   Allergen Reactions   • Penicillins Swelling     Rash, swelling at IV site. Was to IV penicillin

## 2025-02-11 ENCOUNTER — OFFICE VISIT (OUTPATIENT)
Dept: FAMILY MEDICINE CLINIC | Facility: CLINIC | Age: 40
End: 2025-02-11
Payer: COMMERCIAL

## 2025-02-11 ENCOUNTER — TELEPHONE (OUTPATIENT)
Age: 40
End: 2025-02-11

## 2025-02-11 VITALS
RESPIRATION RATE: 18 BRPM | DIASTOLIC BLOOD PRESSURE: 88 MMHG | HEART RATE: 73 BPM | WEIGHT: 212 LBS | OXYGEN SATURATION: 98 % | HEIGHT: 62 IN | SYSTOLIC BLOOD PRESSURE: 134 MMHG | BODY MASS INDEX: 39.01 KG/M2

## 2025-02-11 DIAGNOSIS — J44.9 CHRONIC OBSTRUCTIVE PULMONARY DISEASE, UNSPECIFIED COPD TYPE (HCC): Primary | ICD-10-CM

## 2025-02-11 DIAGNOSIS — Z77.120 MOLD EXPOSURE: ICD-10-CM

## 2025-02-11 DIAGNOSIS — I10 BENIGN ESSENTIAL HYPERTENSION: ICD-10-CM

## 2025-02-11 DIAGNOSIS — Z12.4 ENCOUNTER FOR SCREENING FOR CERVICAL CANCER: ICD-10-CM

## 2025-02-11 DIAGNOSIS — F31.81 BIPOLAR 2 DISORDER, MAJOR DEPRESSIVE EPISODE (HCC): ICD-10-CM
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
## 2025-02-11 DIAGNOSIS — B37.0 ORAL THRUSH: ICD-10-CM

## 2025-02-11 DIAGNOSIS — I10 ESSENTIAL HYPERTENSION: ICD-10-CM

## 2025-02-11 DIAGNOSIS — S60.511A CAT SCRATCH OF HAND, RIGHT, INITIAL ENCOUNTER: ICD-10-CM

## 2025-02-11 DIAGNOSIS — L25.3: ICD-10-CM

## 2025-02-11 PROCEDURE — 99214 OFFICE O/P EST MOD 30 MIN: CPT | Performed by: FAMILY MEDICINE

## 2025-02-11 RX ORDER — MUPIROCIN 20 MG/G
OINTMENT TOPICAL 3 TIMES DAILY
Qty: 30 G | Refills: 0 | Status: SHIPPED | OUTPATIENT
Start: 2025-02-11

## 2025-02-11 RX ORDER — ALBUTEROL SULFATE 90 UG/1
2 INHALANT RESPIRATORY (INHALATION) EVERY 4 HOURS PRN
Qty: 8.5 G | Refills: 2 | Status: SHIPPED | OUTPATIENT
Start: 2025-02-11

## 2025-02-11 RX ORDER — BUPROPION HYDROCHLORIDE 150 MG/1
150 TABLET ORAL EVERY MORNING
Qty: 90 TABLET | Refills: 1 | Status: SHIPPED | OUTPATIENT
Start: 2025-02-11

## 2025-02-11 RX ORDER — CLOBETASOL PROPIONATE 0.5 MG/G
CREAM TOPICAL 2 TIMES DAILY
Qty: 30 G | Refills: 0 | Status: SHIPPED | OUTPATIENT
Start: 2025-02-11

## 2025-02-11 RX ORDER — LISINOPRIL 40 MG/1
40 TABLET ORAL DAILY
Qty: 90 TABLET | Refills: 1 | Status: SHIPPED | OUTPATIENT
Start: 2025-02-11

## 2025-02-11 RX ORDER — CEPHALEXIN 500 MG/1
500 CAPSULE ORAL EVERY 8 HOURS SCHEDULED
Qty: 15 CAPSULE | Refills: 0 | Status: SHIPPED | OUTPATIENT
Start: 2025-02-11 | End: 2025-02-16

## 2025-02-11 RX ORDER — FLUTICASONE PROPIONATE AND SALMETEROL XINAFOATE 230; 21 UG/1; UG/1
2 AEROSOL, METERED RESPIRATORY (INHALATION) 2 TIMES DAILY
Qty: 36 G | Refills: 1 | Status: SHIPPED | OUTPATIENT
Start: 2025-02-11

## 2025-02-11 NOTE — ASSESSMENT & PLAN NOTE
Follows psychiatry, discussed SSI based on mental health should be filled by psychiatrist  Orders:    buPROPion (WELLBUTRIN XL) 150 mg 24 hr tablet; Take 1 tablet (150 mg total) by mouth every morning

## 2025-02-11 NOTE — PROGRESS NOTES
Name: Kathy Vazquez      : 1985     MRN: [id number]  Encounter Provider: Cheryl Antonio MD  Encounter Date: 2025  Encounter department: [facility] MEDICINE    Assessment & Plan  Bipolar 2 disorder, major depressive episode (HCC)  Follows psychiatry, discussed SSI based on mental health should be filled by psychiatrist  Orders:    buPROPion (WELLBUTRIN XL) 150 mg 24 hr tablet; Take 1 tablet (150 mg total) by mouth every morning    Chronic obstructive pulmonary disease, unspecified COPD type (HCC)  Advised smoking cessation  Continue advair  Continue albuterol inhaler  Orders:    CBC and differential; Future    fluticasone-salmeterol (Advair HFA) 230-21 MCG/ACT inhaler; Inhale 2 puffs 2 (two) times a day Rinse mouth after use.    albuterol (PROVENTIL HFA,VENTOLIN HFA) 90 mcg/act inhaler; Inhale 2 puffs every 4 (four) hours as needed for wheezing or shortness of breath    Cat scratch of hand, right, initial encounter  Will treat with mupirocin and keflex, she is allergic to penicillin  Orders:    mupirocin (BACTROBAN) 2 % ointment; Apply topically 3 (three) times a day    cephalexin (KEFLEX) 500 mg capsule; Take 1 capsule (500 mg total) by mouth every 8 (eight) hours for 5 days    Contact dermatitis due to nonmedicinal chemical  I strongly suspect contact dermatitis as the cause for her hand dryness, scaling and lesions , avoid excess hand  use  Orders:    clobetasol (TEMOVATE) 0.05 % cream; Apply topically 2 (two) times a day    Mold exposure    Orders:    Allergen, MOLD Panel; Future    Benign essential hypertension  Continue losartan, controlled  Orders:    Comprehensive metabolic panel; Future    Encounter for screening for cervical cancer  Previously referred, she is aware she needs to follow up, is aware that if not screened cancer can be missed and found late which can lead to morbidity and death.  Orders:    Ambulatory Referral to Obstetrics / Gynecology;  Future                   Read package inserts for all medications before starting a new medications, call me if you have any questions.    Patient was given opportunity to ask questions and all questions were answered.    Subjective:     Kathy Vazquez is a 40 y.o. female.    With hypertension, GERD, anxiety, COPD, breast nodules, latent TB presents for follow-up     Patient states that she has started isoniazid and recently increased her Xanax to 1 mg twice a day, and she was starting to take isoniazid however she experienced extreme anxiety and side effects from isoniazid and rifapentine .  Did not start treatment for latent TB    Blood pressure is well-controlled    She also states that her cat has staph wounds and they are recurrent and every time the cat scratches her she gets rashes ongoing, most recently on 2/8/2025 .  She has mentioned this to me several times in the past however has not noticed any rashes or lesions when she comes to the office.  It is possible that she had allergic reaction to the cat scratch or gets abrasions, she also states that she had mold exposure and is not sure if that is affecting her breathing. She continues to smoke and is not convinced that it is COPD that is causing her symptoms  Has lesions in mouth  States she has applied for SSI -disability and I should be expecting forms        Past Medical History:   Diagnosis Date    Acute cough 06/14/2022    Adverse effect of alprazolam 04/28/2022    Allergies     Anxiety 05/2018    Female hirsutism     GERD (gastroesophageal reflux disease)     Head injury     Hypertension 2018    Papanicolaou smear for cervical cancer screening 2017    NL, no h/o of abn pap that she can recall    Positive TB test 06/14/2022    STD (female)     Thyroid nodule 05/2018    Tobacco user 05/2018       Family History   Problem Relation Age of Onset    Hypertension Mother     Kidney disease Mother     Cirrhosis Mother     Dialysis Mother     Diabetes Mother      Anxiety disorder Mother     Depression Mother     Anxiety disorder Father     Depression Father     Drug abuse Father     Bipolar disorder Father     No Known Problems Sister     No Known Problems Sister     Melanoma Paternal Uncle     Stomach cancer Other     Colon cancer Other         Passed age 40       Past Surgical History:   Procedure Laterality Date    NO PAST SURGERIES          reports that she has been smoking cigarettes. She has a 25 pack-year smoking history. She has never used smokeless tobacco. She reports that she does not currently use alcohol. She reports that she does not currently use drugs after having used the following drugs: Benzodiazepines and Marijuana.      Current Outpatient Medications:     albuterol (PROVENTIL HFA,VENTOLIN HFA) 90 mcg/act inhaler, INHALE 2 PUFFS BY MOUTH EVERY 4 HOURS AS NEEDED FOR WHEEZING OR SHORTNESS OF BREATH, Disp: 8.5 g, Rfl: 2    ALPRAZolam (XANAX) 1 mg tablet, Take 1 tablet (1 mg total) by mouth 2 (two) times a day as needed for anxiety, Disp: 60 tablet, Rfl: 0    buPROPion (WELLBUTRIN XL) 150 mg 24 hr tablet, TAKE 1 TABLET(150 MG) BY MOUTH EVERY MORNING, Disp: 90 tablet, Rfl: 1    cephalexin (KEFLEX) 500 mg capsule, Take 1 capsule (500 mg total) by mouth every 8 (eight) hours for 5 days, Disp: 15 capsule, Rfl: 0    clobetasol (TEMOVATE) 0.05 % cream, Apply topically 2 (two) times a day, Disp: 30 g, Rfl: 0    fluticasone-salmeterol (Advair HFA) 230-21 MCG/ACT inhaler, Inhale 2 puffs 2 (two) times a day Rinse mouth after use., Disp: 36 g, Rfl: 1    lisinopril (ZESTRIL) 40 mg tablet, TAKE 1 TABLET(40 MG) BY MOUTH DAILY, Disp: 90 tablet, Rfl: 1    mupirocin (BACTROBAN) 2 % ointment, Apply topically 3 (three) times a day, Disp: 30 g, Rfl: 0    sertraline (ZOLOFT) 100 mg tablet, TAKE 1 TABLET(100 MG) BY MOUTH DAILY, Disp: 30 tablet, Rfl: 1    isoniazid (NYDRAZID) 300 mg tablet, Take 3 tablets (900 mg total) by mouth once a week for 12 doses (Patient not taking:  Reported on 2024), Disp: 36 tablet, Rfl: 0    ketoconazole (NIZORAL) 2 % cream, Apply topically 2 (two) times a day for 14 days, Disp: 15 g, Rfl: 0    pyridoxine (VITAMIN B6) 50 mg tablet, Take 1 tablet (50 mg total) by mouth once a week for 12 doses (Patient not taking: Reported on 2024), Disp: 12 tablet, Rfl: 0    Rifapentine 150 MG TABS, Take 6 tablets (900 mg total) by mouth once a week for 12 doses (Patient not taking: Reported on 2024), Disp: 72 tablet, Rfl: 0    The following portions of the patient's history were reviewed and updated as appropriate: allergies, current medications, past family history, past medical history, past social history, past surgical history and problem list.    Review of Systems   Constitutional:  Negative for fatigue and fever.   HENT:  Negative for congestion, facial swelling, mouth sores, rhinorrhea, sore throat and trouble swallowing.    Eyes:  Negative for pain and redness.   Respiratory:  Positive for shortness of breath. Negative for cough and wheezing.    Cardiovascular:  Negative for chest pain, palpitations and leg swelling.   Gastrointestinal:  Negative for abdominal pain, blood in stool, constipation, diarrhea and nausea.   Genitourinary:  Negative for dysuria, hematuria and urgency.   Musculoskeletal:  Negative for arthralgias, back pain and myalgias.   Skin:  Positive for rash. Negative for wound.   Neurological:  Negative for seizures, syncope and headaches.   Hematological:  Negative for adenopathy.   Psychiatric/Behavioral:  Negative for agitation and behavioral problems. The patient is nervous/anxious.          PHQ-2/9 Depression Screening    Little interest or pleasure in doing things: 1 - several days  Feeling down, depressed, or hopeless: 3 - nearly every day  Trouble falling or staying asleep, or sleeping too much: 3 - nearly every day  Feeling tired or having little energy: 0 - not at all  Poor appetite or overeatin - not at all  Feeling bad  "about yourself - or that you are a failure or have let yourself or your family down: 0 - not at all  Trouble concentrating on things, such as reading the newspaper or watching television: 3 - nearly every day  Moving or speaking so slowly that other people could have noticed. Or the opposite - being so fidgety or restless that you have been moving around a lot more than usual: 1 - several days  Thoughts that you would be better off dead, or of hurting yourself in some way: 0 - not at all  PHQ-9 Score: 11  PHQ-9 Interpretation: Moderate depression             Objective:    /88 (BP Location: Left arm, Patient Position: Sitting, Cuff Size: Large)   Pulse 73   Resp 18   Ht 5' 2\" (1.575 m)   Wt 96.2 kg (212 lb)   SpO2 98%   BMI 38.78 kg/m²      Physical Exam  Vitals and nursing note reviewed.   Constitutional:       Appearance: Normal appearance. She is well-developed. She is not ill-appearing.   HENT:      Head: Normocephalic and atraumatic.      Right Ear: External ear normal.      Left Ear: External ear normal.      Nose: Nose normal.      Mouth/Throat:      Mouth: Mucous membranes are moist.      Pharynx: No oropharyngeal exudate or posterior oropharyngeal erythema.   Eyes:      General: No scleral icterus.        Right eye: No discharge.         Left eye: No discharge.      Conjunctiva/sclera: Conjunctivae normal.   Cardiovascular:      Rate and Rhythm: Normal rate.      Heart sounds: No murmur heard.     No gallop.   Pulmonary:      Effort: Pulmonary effort is normal. No respiratory distress.      Breath sounds: Normal breath sounds. No stridor. No wheezing, rhonchi or rales.   Abdominal:      Palpations: Abdomen is soft.      Tenderness: There is no abdominal tenderness.   Musculoskeletal:         General: No tenderness or deformity.   Skin:     Findings: Erythema and rash present.      Comments: Rash on right medial wrist, mild erythema and koebner's phenomenon due to constant irritation  Dry hands " and dermitis noted   Neurological:      Mental Status: She is alert. Mental status is at baseline.   Psychiatric:         Behavior: Behavior normal.         Judgment: Judgment normal.           Recent Results (from the past 52 weeks)   Drug Screen Routine w /Conf and Adulteration, urine.    Collection Time: 08/02/24  1:20 PM   Result Value Ref Range    Buprenorphine Negative CUTOFF:5 ng/mL    ETHYL GLUCURONIDE Negative CUTOFF:500 ng/mL    Amphetamine Screen, Urine Negative CUTOFF:300 ng/mL    Barbiturate Screen, Ur Negative CUTOFF:200 ng/mL    Benzodiazepine Screen, Urine ++POSITIVE++ (A) CUTOFF:50 ng/mL    Cocaine Metabolite Negative CUTOFF:150 ng/mL    PCP Scrn, Ur Negative CUTOFF:25 ng/mL    Cannabinoid Scrn, Ur ++POSITIVE++ (A) CUTOFF:20 ng/mL    6-Acetylmorphine, Urine Negative CUTOFF:10 ng/mL    Opiates Screen, Urine Negative CUTOFF:100 ng/mL    OXYCODONE/OXYMORPHONE Negative CUTOFF:100 ng/mL    Tapentadol Negative CUTOFF:200 ng/mL    FENTANYL Negative CUTOFF:2.0 ng/mL    Methadone Screen, Urine Negative CUTOFF:100 ng/mL    Propoxyphene Screen, Urine Negative CUTOFF:300 ng/mL    Tramadol Scrn, Ur Negative CUTOFF:200 ng/mL    CARISOPRODOL Negative CUTOFF:100 ng/mL    GABAPENTIN, IA Negative CUTOFF:1.0 ug/mL    CREATININE 51 mg/dL    pH 7.6 4.5 - 8.9    Nitrites Urine Negative NEGATIVE ug/mL   CBC and differential    Collection Time: 08/02/24  1:20 PM   Result Value Ref Range    WBC 7.35 4.31 - 10.16 Thousand/uL    RBC 4.54 3.81 - 5.12 Million/uL    Hemoglobin 13.3 11.5 - 15.4 g/dL    Hematocrit 41.4 34.8 - 46.1 %    MCV 91 82 - 98 fL    MCH 29.3 26.8 - 34.3 pg    MCHC 32.1 31.4 - 37.4 g/dL    RDW 12.6 11.6 - 15.1 %    MPV 11.0 8.9 - 12.7 fL    Platelets 221 149 - 390 Thousands/uL    nRBC 0 /100 WBCs    Segmented % 45 43 - 75 %    Immature Grans % 0 0 - 2 %    Lymphocytes % 43 14 - 44 %    Monocytes % 8 4 - 12 %    Eosinophils Relative 3 0 - 6 %    Basophils Relative 1 0 - 1 %    Absolute Neutrophils 3.32 1.85 -  7.62 Thousands/µL    Absolute Immature Grans 0.02 0.00 - 0.20 Thousand/uL    Absolute Lymphocytes 3.19 0.60 - 4.47 Thousands/µL    Absolute Monocytes 0.57 0.17 - 1.22 Thousand/µL    Eosinophils Absolute 0.19 0.00 - 0.61 Thousand/µL    Basophils Absolute 0.06 0.00 - 0.10 Thousands/µL   Comprehensive metabolic panel    Collection Time: 08/02/24  1:20 PM   Result Value Ref Range    Sodium 138 135 - 147 mmol/L    Potassium 4.3 3.5 - 5.3 mmol/L    Chloride 107 96 - 108 mmol/L    CO2 22 21 - 32 mmol/L    ANION GAP 9 4 - 13 mmol/L    BUN 11 5 - 25 mg/dL    Creatinine 0.70 0.60 - 1.30 mg/dL    Glucose 87 65 - 140 mg/dL    Calcium 9.2 8.4 - 10.2 mg/dL    AST 19 13 - 39 U/L    ALT 23 7 - 52 U/L    Alkaline Phosphatase 44 34 - 104 U/L    Total Protein 6.9 6.4 - 8.4 g/dL    Albumin 4.1 3.5 - 5.0 g/dL    Total Bilirubin 0.46 0.20 - 1.00 mg/dL    eGFR 107 ml/min/1.73sq m   Lipid panel    Collection Time: 08/02/24  1:20 PM   Result Value Ref Range    Cholesterol 186 See Comment mg/dL    Triglycerides 159 (H) See Comment mg/dL    HDL, Direct 42 (L) >=50 mg/dL    LDL Calculated 112 (H) 0 - 100 mg/dL    Non-HDL-Chol (CHOL-HDL) 144 mg/dl   TSH, 3rd generation with Free T4 reflex    Collection Time: 08/02/24  1:20 PM   Result Value Ref Range    TSH 3RD GENERATON 1.666 0.450 - 4.500 uIU/mL   Hemoglobin A1C    Collection Time: 08/02/24  1:20 PM   Result Value Ref Range    Hemoglobin A1C 5.6 Normal 4.0-5.6%; PreDiabetic 5.7-6.4%; Diabetic >=6.5%; Glycemic control for adults with diabetes <7.0% %     mg/dl   Pregnancy Test (HCG Qualitative)    Collection Time: 08/02/24  1:20 PM   Result Value Ref Range    Preg, Serum Negative Negative   EXPANDED BENZODIAZEPINE CONF    Collection Time: 08/02/24  1:20 PM   Result Value Ref Range    DESMETHYLDIAZEPAM Not Detected ng/mg creat    OXAZEPAM Not Detected ng/mg creat    Temazepam Quantification-Measured Result Not Detected ng/mg creat    alpha-Hydroxyalprazolam  ng/mg creat     HYDROXYETHYLFLURAZEPAM Not Detected ng/mg creat    Lorazepam Quantification-Measured Result Not Detected ng/mg creat    ALPHA-HYDROXYTRIAZOLAM Not Detected ng/mg creat    ALPHA-HYDROXYMIDAZOLAM Not Detected ng/mg creat    7-Amino-Clonazepam Quantification-Measured Result UR Not Detected ng/mg creat    DIAZEPAM Not Detected ng/mg creat    ALPRAZOLAM 133 ng/mg creat    CLONAZEPAM Not Detected ng/mg creat    MIDAZOLAM Not Detected ng/mg creat    FLUNITRAZEPAM Not Detected ng/mg creat    DESMETHYLFLUNITRAZEPAM Not Detected ng/mg creat   CARBOXY-THC CONF, NORMALIZED    Collection Time: 08/02/24  1:20 PM   Result Value Ref Range    THC/CR RATIO 649 ng/mg creat       Laboratory Results: I have personally reviewed the pertinent laboratory results/reports     Radiology/Other Diagnostic Testing Results: I have reviewed the following imaging and agree with the interpretation below.    XR chest pa & lateral  Result Date: 8/2/2024  CHEST INDICATION:   Nonspecific reaction to tuberculin skin test without active tuberculosis. COMPARISON:  None. EXAM PERFORMED/VIEWS:  XR CHEST PA & LATERAL FINDINGS: Cardiomediastinal silhouette appears unremarkable. The lungs are clear.  No pneumothorax or pleural effusion. Osseous structures appear within normal limits for patient age.     No acute cardiopulmonary disease. No significant change from 9/5/2023 Electronically signed: 08/02/2024 05:34 PM Petey Pineda MD    Mammo diagnostic bilateral w 3d & cad  Result Date: 8/1/2024  DIAGNOSIS: Abnormal mammogram of right breast TECHNIQUE: Digital diagnostic mammography was performed. Computer Aided Detection (CAD) analyzed all applicable images. Right breast ultrasound was performed. COMPARISONS: Prior breast imaging dated: 03/30/2023, 03/30/2023, 12/20/2019, 05/03/2019, and 03/18/2015 RELEVANT HISTORY: Family Breast Cancer History: No known family history of breast cancer. Family Medical History: Family medical history includes colon cancer in  other. Personal History: No known relevant hormone history. No known relevant surgical history. No known relevant medical history. RISK ASSESSMENT: 5 Year Tyrer-Cuzick: 0.59% 10 Year Tyrer-Cuzick: 1.47% Lifetime Tyrer-Cuzick: 10.89% TISSUE DENSITY: There are scattered areas of fibroglandular density. INDICATION: Kathy Vazquez is a 39 y.o. female presenting for patient is due for annual bilateral breast imaging, and also overdue for 6 month follow-up or prior Right breast abnormalities noted on 03/30/23 diagnostic breast imaging. FINDINGS: RIGHT 1) MASS [C] Mammo diagnostic bilateral w 3d & cad: There is a 14 mm oval mass with circumscribed margins seen in the outer central region of the right breast at 9 o'clock in the posterior depth. Compared to the previous study, there are no significant changes. US breast right limited (diagnostic): There is a 13 mm x 7 mm x 11 mm oval, parallel, hypoechoic mass with circumscribed margins with no posterior features seen in the outer central region of the right breast at 9 o'clock in the posterior depth, 10 cm from the nipple. Compared to the previous study, there are no significant changes. 2) MASS [E] Mammo diagnostic bilateral w 3d & cad: There is a 14 mm mass seen in the lower outer quadrant of the right breast in the posterior depth. Compared to the previous study, there are no significant changes. US breast right limited (diagnostic): There is a mass seen in the right breast at 7 o'clock.  There is a oval patch of mixed echogenicity fibroglandular tissue in the 7:00 9 cm location which would be a reasonable potential correlate for the mammographic finding. Left Mammo diagnostic bilateral w 3d & cad There are no suspicious masses, grouped microcalcifications or areas of unexplained architectural distortion. The skin and nipple areolar complex are unremarkable.  Stable small circumscribed nodule anterior left outer breast.     Circumscribed nodule in the 9 o'clock  position likely representing fibroadenoma.  Additional nodular asymmetry, possibly 7 o'clock position.  Both showing no suspicious interval change over a span of 1.5 years.  Favor benign etiology.  Recommend follow-up diagnostic imaging in 1 year to reassess. ASSESSMENT/BI-RADS CATEGORY:  Overall: 3 - Probably Benign RECOMMENDATION:      - Diagnostic mammogram in 1 year for both breasts.      - Ultrasound in 1 year for both breasts. Workstation ID: [id number]     US breast right limited (diagnostic)  Result Date: 8/1/2024  DIAGNOSIS: Abnormal mammogram of right breast TECHNIQUE: Digital diagnostic mammography was performed. Computer Aided Detection (CAD) analyzed all applicable images. Right breast ultrasound was performed. COMPARISONS: Prior breast imaging dated: 03/30/2023, 03/30/2023, 12/20/2019, 05/03/2019, and 03/18/2015 RELEVANT HISTORY: Family Breast Cancer History: No known family history of breast cancer. Family Medical History: Family medical history includes colon cancer in other. Personal History: No known relevant hormone history. No known relevant surgical history. No known relevant medical history. RISK ASSESSMENT: 5 Year Tyrer-Cuzick: 0.59% 10 Year Tyrer-Cuzick: 1.47% Lifetime Tyrer-Cuzick: 10.89% TISSUE DENSITY: There are scattered areas of fibroglandular density. INDICATION: Kathy Vazquez is a 39 y.o. female presenting for patient is due for annual bilateral breast imaging, and also overdue for 6 month follow-up or prior Right breast abnormalities noted on 03/30/23 diagnostic breast imaging. FINDINGS: RIGHT 1) MASS [C] Mammo diagnostic bilateral w 3d & cad: There is a 14 mm oval mass with circumscribed margins seen in the outer central region of the right breast at 9 o'clock in the posterior depth. Compared to the previous study, there are no significant changes. US breast right limited (diagnostic): There is a 13 mm x 7 mm x 11 mm oval, parallel, hypoechoic mass with circumscribed margins with  "no posterior features seen in the outer central region of the right breast at 9 o'clock in the posterior depth, 10 cm from the nipple. Compared to the previous study, there are no significant changes. 2) MASS [E] Mammo diagnostic bilateral w 3d & cad: There is a 14 mm mass seen in the lower outer quadrant of the right breast in the posterior depth. Compared to the previous study, there are no significant changes. US breast right limited (diagnostic): There is a mass seen in the right breast at 7 o'clock.  There is a oval patch of mixed echogenicity fibroglandular tissue in the 7:00 9 cm location which would be a reasonable potential correlate for the mammographic finding. Left Mammo diagnostic bilateral w 3d & cad There are no suspicious masses, grouped microcalcifications or areas of unexplained architectural distortion. The skin and nipple areolar complex are unremarkable.  Stable small circumscribed nodule anterior left outer breast.     Circumscribed nodule in the 9 o'clock position likely representing fibroadenoma.  Additional nodular asymmetry, possibly 7 o'clock position.  Both showing no suspicious interval change over a span of 1.5 years.  Favor benign etiology.  Recommend follow-up diagnostic imaging in 1 year to reassess. ASSESSMENT/BI-RADS CATEGORY:  Overall: 3 - Probably Benign RECOMMENDATION:      - Diagnostic mammogram in 1 year for both breasts.      - Ultrasound in 1 year for both breasts. Workstation ID: [id number]        Disclaimer: Portions of the record may have been created with voice recognition software. Occasional wrong word or \"sound a like\" substitutions may have occurred due to the inherent limitations of voice recognition software. Read the chart carefully and recognize, using context, where substitutions have occurred. I have used the Epic copy/forward function to compose this note. I have reviewed my current note to ensure it reflects the current patient status, exam, assessment and " plan.

## 2025-02-11 NOTE — ASSESSMENT & PLAN NOTE
Advised smoking cessation  Continue advair  Continue albuterol inhaler  Orders:    CBC and differential; Future    fluticasone-salmeterol (Advair HFA) 230-21 MCG/ACT inhaler; Inhale 2 puffs 2 (two) times a day Rinse mouth after use.    albuterol (PROVENTIL HFA,VENTOLIN HFA) 90 mcg/act inhaler; Inhale 2 puffs every 4 (four) hours as needed for wheezing or shortness of breath

## 2025-02-11 NOTE — TELEPHONE ENCOUNTER
Patient called asking if she can speak with , she had just see her PCP and would like to discuss further with him. She still has not been able to start TB treatment as she is waiting for her Psychiatrist to increase her medication. She had noted one of her cats had recently passed away and her other cat is not doing very well. She wants to be tested for fungal and bacteria but her PCP only put an order in for Mold, that if she wants additional labs she would need to ask . Patient is also concerned about the TB outbreak going around, her respiratory issues, her anxiety and her animals possibly causing her further issues. She is asking for  to please call her to discuss further, she was very upset and emotional with her current situation. Please advise

## 2025-02-12 RX ORDER — NYSTATIN 100000 [USP'U]/ML
500000 SUSPENSION ORAL 4 TIMES DAILY
Qty: 200 ML | Refills: 0 | Status: SHIPPED | OUTPATIENT
Start: 2025-02-12 | End: 2025-02-22

## 2025-02-12 NOTE — TELEPHONE ENCOUNTER
Called and spoke to patient. I offered to schedule her an appt with a doctor asap in office. She said she will call the office back tomorrow morning because she was bringing her cat to the vet.

## 2025-02-13 ENCOUNTER — TELEPHONE (OUTPATIENT)
Dept: PSYCHIATRY | Facility: CLINIC | Age: 40
End: 2025-02-13

## 2025-02-13 NOTE — TELEPHONE ENCOUNTER
Left voicemail informing patient and/or parent/guardian of the Psych Encounter form needing to be signed as a requirement from the insurance company for billing purposes. Patient can access form via [employer] and sign electronically.     Please make patient aware this form must be signed for each visit as a requirement to continue future visits with provider.    Virtual Encounter form 2/10/25 call #1

## 2025-02-14 NOTE — TELEPHONE ENCOUNTER
Called patient today to try to schedule appt with Dr. Cherry as per our conversation Wednesday. Left message asking her to call the office back to get that scheduled.

## 2025-02-19 ENCOUNTER — TELEPHONE (OUTPATIENT)
Dept: PSYCHIATRY | Facility: CLINIC | Age: 40
End: 2025-02-19

## 2025-02-19 NOTE — TELEPHONE ENCOUNTER
Left voicemail informing patient and/or parent/guardian of the Psych Encounter form needing to be signed as a requirement from the insurance company for billing purposes. Patient can access form via [employer] and sign electronically.     Please make patient aware this form must be signed for each visit as a requirement to continue future visits with provider.

## 2025-02-21 ENCOUNTER — TELEPHONE (OUTPATIENT)
Dept: PSYCHIATRY | Facility: CLINIC | Age: 40
End: 2025-02-21

## 2025-02-21 NOTE — TELEPHONE ENCOUNTER
Called and LVM for patient to notify that 2/21/25 HELGA appt will be canclled due to provider calling out.    Rescheduled [facility]GA appt for 3/28/25 @ 3:00 PM and advised patient to call back if this does not work for them.

## 2025-02-21 NOTE — TELEPHONE ENCOUNTER
Patient called in to confirm the rescheduled appointment for 3.28.25@ 3:00pm.    Additionally, the patient is requesting a letter mailed/and uploaded to their chart regarding the following:    -Prior 3 cancelled appointments were due to providers out of office/sick.  No show letter was mailed to the patient on 10.23.24, regarding the above.    Patient was not aware that appointment on 1.23.25 was cancelled. This appointment was rescheduled for 2.21.25, and then cancelled. The appointment for 2.21.25 was for HELGA.    -Patient did express concern for her care not being managed, due to cancellations.    Please contact the patient to discuss further, regarding the letter request to acknowledge the above.    Thank you.

## 2025-02-24 NOTE — TELEPHONE ENCOUNTER
Outreached patient via phone at [phone]. The patient was available and verified name and date of birth.    Patient stated that they were seeking documentation R/T cancelled appointments due to needing information for [facility] application, [facility], and regarding patient cancellation policy.    Patient stated that they do not need a letter, just clear documentation as it does not appear in Epic. Appointment List was reviewed with patient. Patient was able to verify all dates in chart. This writer agreed to help send a message with the dates we discussed.    Information was sent to patient via [employer].

## 2025-02-27 ENCOUNTER — TELEMEDICINE (OUTPATIENT)
Dept: BEHAVIORAL/MENTAL HEALTH CLINIC | Facility: CLINIC | Age: 40
End: 2025-02-27
Payer: COMMERCIAL

## 2025-02-27 DIAGNOSIS — F31.81 BIPOLAR 2 DISORDER, MAJOR DEPRESSIVE EPISODE (HCC): ICD-10-CM

## 2025-02-27 DIAGNOSIS — F43.12 CHRONIC POST-TRAUMATIC STRESS DISORDER (PTSD): ICD-10-CM

## 2025-02-27 DIAGNOSIS — F41.1 GENERALIZED ANXIETY DISORDER: Primary | ICD-10-CM

## 2025-02-27 PROCEDURE — 90832 PSYTX W PT 30 MINUTES: CPT | Performed by: SOCIAL WORKER

## 2025-02-27 NOTE — PSYCH
Behavioral Health Psychotherapy Progress Note    Psychotherapy Provided: Individual Psychotherapy     1. Generalized anxiety disorder        2. Chronic post-traumatic stress disorder (PTSD)        3. Bipolar 2 disorder, major depressive episode (HCC)                  Goals addressed in session: Goal 1     DATA: Kathy spoke  further with this worker re: her feelings about her health issues her lack of any income death of her second cat.  However, she did share that she has since adopted these two cats 4 family members.  She expressed concern that she contracted something from her cats and is pursuing an appt with infectious disease.  Lolly also reported how upsetting it was that none of her doctors wished her a Happy Birthday.  She reported that she only leaves home to buy cigarettes and cat treats, but today plans to go to [employer], to get an oil change and go to [place].  Lolly reported that she has not applied to any jobs as she can only work virtually, but did not graduate from high school and fears this will impact getting hired.  Lolly was unwilling to consider attending PHP for additional support and is hoping that her Psychiatrist will prescribe her something for attention.   Specific techniques used in this session were: psychoeducation, empathetic listening, validation, and motivational interviewing.    During this session, this clinician used the following therapeutic modalities: Client-centered Therapy, Motivational Interviewing, and Supportive Psychotherapy    Substance Abuse was not addressed during this session. If the client is diagnosed with a co-occurring substance use disorder, please indicate any changes in the frequency or amount of use: N/A. Stage of change for addressing substance use diagnoses: No substance use/Not applicable    ASSESSMENT:  Kathy Vazquez presents with a Anxious and Depressed mood. Kathy Vazquez was oriented to person, place, time, and situation. Grooming and Hygiene were  "poor and she was limited in her ability to sustain attention for the duration of today's session. .   Ability to perform ADLs has declined. she was cooperative.  her affect is Normal range and intensity, which is congruent, with her mood and the content of the session.    The client has made some progress on their goals.    Kathy Vazquez presents with a none risk of suicide, none risk of self-harm, and none risk of harm to others.    For any risk assessment that surpasses a \"low\" rating, a safety plan must be developed.    A safety plan was indicated: no  If yes, describe in detail: N/A    PLAN: Between sessions, Kathy Vazquez will continue to work on short-term goals including following up with medical issues At the next session, the therapist will use Client-centered Therapy, Motivational Interviewing, and Supportive Psychotherapy to address symptoms as they arise.     Behavioral Health Treatment Plan and Discharge Planning: Kathy Vazquez is aware of and agrees to continue to work on their treatment plan. They have identified and are working toward their discharge goals. yes    Visit start and stop times:        02/27/25  Start Time: 0800  Stop Time: 0830  Total Visit Time: 30 minutes        Virtual Regular Visit    Verification of patient location:    Patient is located at Home in the following state in which I hold an active license [place]      Assessment/Plan:    Problem List Items Addressed This Visit        Behavioral Health    Generalized anxiety disorder - Primary    Bipolar 2 disorder, major depressive episode (HCC)    Chronic post-traumatic stress disorder (PTSD)           Goals addressed in session: Goal 1     Depression Follow-up Plan Completed: Yes    Reason for visit is No chief complaint on file.       Encounter provider Yusra Sawyer      Recent Visits  No visits were found meeting these conditions.  Showing recent visits within past 7 days and meeting all other requirements  Today's " Visits  Date Type Provider Dept   02/27/25 Telemedicine Yusra Sawyer [facility] Therapist [place]   Showing today's visits and meeting all other requirements  Future Appointments  No visits were found meeting these conditions.  Showing future appointments within next 150 days and meeting all other requirements       The patient was identified by name and date of birth. Kathy Vazquez was informed that this is a telemedicine visit and that the visit is being conducted throughthe Epic Embedded platform. She agrees to proceed..  My office door was closed. No one else was in the room.  She acknowledged consent and understanding of privacy and security of the video platform. The patient has agreed to participate and understands they can discontinue the visit at any time.    Patient is aware this is a billable service.     Subjective  Kathy Vazquez is a 40 y.o. female  .      HPI     Past Medical History:   Diagnosis Date   • Acute cough 06/14/2022   • Adverse effect of alprazolam 04/28/2022   • Allergies    • Anxiety 05/2018   • Female hirsutism    • GERD (gastroesophageal reflux disease)    • Head injury    • Hypertension 2018   • Papanicolaou smear for cervical cancer screening 2017    NL, no h/o of abn pap that she can recall   • Positive TB test 06/14/2022   • STD (female)    • Thyroid nodule 05/2018   • Tobacco user 05/2018       Past Surgical History:   Procedure Laterality Date   • NO PAST SURGERIES         Current Outpatient Medications   Medication Sig Dispense Refill   • albuterol (PROVENTIL HFA,VENTOLIN HFA) 90 mcg/act inhaler Inhale 2 puffs every 4 (four) hours as needed for wheezing or shortness of breath 8.5 g 2   • ALPRAZolam (XANAX) 1 mg tablet Take 1 tablet (1 mg total) by mouth 2 (two) times a day as needed for anxiety 60 tablet 0   • buPROPion (WELLBUTRIN XL) 150 mg 24 hr tablet Take 1 tablet (150 mg total) by mouth every morning 90 tablet 1   • clobetasol (TEMOVATE) 0.05 % cream  Apply topically 2 (two) times a day 30 g 0   • fluticasone-salmeterol (Advair HFA) 230-21 MCG/ACT inhaler Inhale 2 puffs 2 (two) times a day Rinse mouth after use. 36 g 1   • isoniazid (NYDRAZID) 300 mg tablet Take 3 tablets (900 mg total) by mouth once a week for 12 doses (Patient not taking: Reported on 12/9/2024) 36 tablet 0   • ketoconazole (NIZORAL) 2 % cream Apply topically 2 (two) times a day for 14 days 15 g 0   • lisinopril (ZESTRIL) 40 mg tablet Take 1 tablet (40 mg total) by mouth daily 90 tablet 1   • mupirocin (BACTROBAN) 2 % ointment Apply topically 3 (three) times a day 30 g 0   • pyridoxine (VITAMIN B6) 50 mg tablet Take 1 tablet (50 mg total) by mouth once a week for 12 doses (Patient not taking: Reported on 12/9/2024) 12 tablet 0   • Rifapentine 150 MG TABS Take 6 tablets (900 mg total) by mouth once a week for 12 doses (Patient not taking: Reported on 12/9/2024) 72 tablet 0   • sertraline (ZOLOFT) 100 mg tablet TAKE 1 TABLET(100 MG) BY MOUTH DAILY 30 tablet 1     No current facility-administered medications for this visit.        Allergies   Allergen Reactions   • Penicillins Swelling     Rash, swelling at IV site. Was to IV penicillin

## 2025-03-04 ENCOUNTER — TELEPHONE (OUTPATIENT)
Dept: PSYCHIATRY | Facility: CLINIC | Age: 40
End: 2025-03-04

## 2025-03-04 NOTE — TELEPHONE ENCOUNTER
Spoke with patient will sign off on 2/10/25 2/27/25 Virtual Encounter forms in MY chart 3/4/25 call #1

## 2025-03-04 NOTE — TELEPHONE ENCOUNTER
Spoke to patient and/or parent/guardian to make aware of the Psych Encounter form needing to be signed from recent completed appointment(s).     Patient also gave me verbal consent for 02/27/2025 Visit

## 2025-03-10 DIAGNOSIS — F41.1 GENERALIZED ANXIETY DISORDER: ICD-10-CM

## 2025-03-10 RX ORDER — ALPRAZOLAM 1 MG/1
1 TABLET ORAL 2 TIMES DAILY PRN
Qty: 60 TABLET | Refills: 0 | Status: SHIPPED | OUTPATIENT
Start: 2025-03-10

## 2025-03-10 NOTE — TELEPHONE ENCOUNTER
Medication Refill Request     Name of Medication Xanax  Dose/Frequency 1mg  Quantity 60  Verified pharmacy   [x]  Verified ordering Provider   [x]  Does patient have enough for the next 3 days? Yes [] No [x]  Does patient have a follow-up appointment scheduled? Yes [x] No []   If so when is appointment: 3/28/25 at 3pm [facility] from [place].

## 2025-03-11 ENCOUNTER — TELEMEDICINE (OUTPATIENT)
Dept: BEHAVIORAL/MENTAL HEALTH CLINIC | Facility: CLINIC | Age: 40
End: 2025-03-11
Payer: COMMERCIAL

## 2025-03-11 ENCOUNTER — OFFICE VISIT (OUTPATIENT)
Age: 40
End: 2025-03-11
Payer: COMMERCIAL

## 2025-03-11 VITALS
SYSTOLIC BLOOD PRESSURE: 132 MMHG | WEIGHT: 210 LBS | TEMPERATURE: 96.7 F | HEIGHT: 62 IN | BODY MASS INDEX: 38.64 KG/M2 | RESPIRATION RATE: 18 BRPM | DIASTOLIC BLOOD PRESSURE: 72 MMHG | OXYGEN SATURATION: 98 % | HEART RATE: 84 BPM

## 2025-03-11 DIAGNOSIS — F43.12 CHRONIC POST-TRAUMATIC STRESS DISORDER (PTSD): ICD-10-CM

## 2025-03-11 DIAGNOSIS — Z22.7 LATENT TUBERCULOSIS DIAGNOSED BY BLOOD TEST: ICD-10-CM

## 2025-03-11 DIAGNOSIS — F41.1 GENERALIZED ANXIETY DISORDER: ICD-10-CM

## 2025-03-11 DIAGNOSIS — Z22.7 TB LUNG, LATENT: Primary | ICD-10-CM

## 2025-03-11 DIAGNOSIS — R21 RASH: ICD-10-CM

## 2025-03-11 DIAGNOSIS — F41.1 GENERALIZED ANXIETY DISORDER: Primary | ICD-10-CM

## 2025-03-11 DIAGNOSIS — F31.81 BIPOLAR 2 DISORDER, MAJOR DEPRESSIVE EPISODE (HCC): ICD-10-CM

## 2025-03-11 PROCEDURE — 90832 PSYTX W PT 30 MINUTES: CPT | Performed by: SOCIAL WORKER

## 2025-03-11 PROCEDURE — 99214 OFFICE O/P EST MOD 30 MIN: CPT | Performed by: INTERNAL MEDICINE

## 2025-03-11 RX ORDER — LANOLIN ALCOHOL/MO/W.PET/CERES
50 CREAM (GRAM) TOPICAL WEEKLY
Qty: 90 TABLET | Refills: 0 | Status: SHIPPED | OUTPATIENT
Start: 2025-03-11 | End: 2026-11-25

## 2025-03-11 RX ORDER — ISONIAZID 300 MG/1
300 TABLET ORAL DAILY
Qty: 90 TABLET | Refills: 0 | Status: SHIPPED | OUTPATIENT
Start: 2025-03-11 | End: 2025-06-09

## 2025-03-11 NOTE — PSYCH
Behavioral Health Psychotherapy Progress Note    Psychotherapy Provided: Individual Psychotherapy     1. Generalized anxiety disorder        2. Chronic post-traumatic stress disorder (PTSD)        3. Bipolar 2 disorder, major depressive episode (HCC)                    Goals addressed in session: Goal 1     DATA: Kathy spoke  further with this worker re: her feelings about her health issues and indicated hope that her appt with an Infectious Disease doctor today would identify the cause of her rash, breathing issues.  She reported that she continues to struggle to focus and has not pursued employment despite her lack of income.  She expressed relief that she met her neighbors during a gas leak yesterday. Specific techniques used in this session were: psychoeducation, empathetic listening, validation, and motivational interviewing.    During this session, this clinician used the following therapeutic modalities: Client-centered Therapy, Motivational Interviewing, and Supportive Psychotherapy    Substance Abuse was not addressed during this session. If the client is diagnosed with a co-occurring substance use disorder, please indicate any changes in the frequency or amount of use: N/A. Stage of change for addressing substance use diagnoses: No substance use/Not applicable    ASSESSMENT:  Kathy Vazquez presents with a Anxious and Depressed mood. Kathy Vazquez was oriented to person, place, time, and situation. Grooming and Hygiene were poor and she was again limited in her ability to sustain attention for the duration of today's session. .   Ability to perform ADLs has declined. she was cooperative.  her affect is Normal range and intensity, which is congruent, with her mood and the content of the session.    The client has made some progress on their goals.    Kathy Vazquez presents with a none risk of suicide, none risk of self-harm, and none risk of harm to others.    For any risk assessment that  "surpasses a \"low\" rating, a safety plan must be developed.    A safety plan was indicated: no  If yes, describe in detail: N/A    PLAN: Between sessions, Kathy Vazquez will continue to work on short-term goals including following up with medical issues At the next session, the therapist will use Client-centered Therapy, Motivational Interviewing, and Supportive Psychotherapy to address symptoms as they arise.     Behavioral Health Treatment Plan and Discharge Planning: Kathy Vazquez is aware of and agrees to continue to work on their treatment plan. They have identified and are working toward their discharge goals. yes    Visit start and stop times:        03/11/25  Start Time: 0910  Stop Time: 0940  Total Visit Time: 30 minutes        Virtual Regular Visit    Verification of patient location:    Patient is located at Home in the following state in which I hold an active license [place]      Assessment/Plan:    Problem List Items Addressed This Visit        Behavioral Health    Generalized anxiety disorder - Primary    Bipolar 2 disorder, major depressive episode (HCC)    Chronic post-traumatic stress disorder (PTSD)             Goals addressed in session: Goal 1     Depression Follow-up Plan Completed: Yes    Reason for visit is No chief complaint on file.       Encounter provider Yusra Sawyer      Recent Visits  Date Type Provider Dept   03/04/25 Telephone Yusra Sawyer [facility] [place]   Showing recent visits within past 7 days and meeting all other requirements  Today's Visits  Date Type Provider Dept   03/11/25 Telemedicine Yusra Sawyer [facility] Therapist [place]   Showing today's visits and meeting all other requirements  Future Appointments  No visits were found meeting these conditions.  Showing future appointments within next 150 days and meeting all other requirements       The patient was identified by name and date of birth. Kathy Vazquez was informed that this is a " telemedicine visit and that the visit is being conducted throughthe Epic Embedded platform. She agrees to proceed..  My office door was closed. No one else was in the room.  She acknowledged consent and understanding of privacy and security of the video platform. The patient has agreed to participate and understands they can discontinue the visit at any time.    Patient is aware this is a billable service.     Subjective  Kathy Vazquez is a 40 y.o. female  .      HPI     Past Medical History:   Diagnosis Date   • Acute cough 06/14/2022   • Adverse effect of alprazolam 04/28/2022   • Allergies    • Anxiety 05/2018   • Female hirsutism    • GERD (gastroesophageal reflux disease)    • Head injury    • Hypertension 2018   • Papanicolaou smear for cervical cancer screening 2017    NL, no h/o of abn pap that she can recall   • Positive TB test 06/14/2022   • STD (female)    • Thyroid nodule 05/2018   • Tobacco user 05/2018       Past Surgical History:   Procedure Laterality Date   • NO PAST SURGERIES         Current Outpatient Medications   Medication Sig Dispense Refill   • albuterol (PROVENTIL HFA,VENTOLIN HFA) 90 mcg/act inhaler Inhale 2 puffs every 4 (four) hours as needed for wheezing or shortness of breath 8.5 g 2   • ALPRAZolam (XANAX) 1 mg tablet Take 1 tablet (1 mg total) by mouth 2 (two) times a day as needed for anxiety 60 tablet 0   • buPROPion (WELLBUTRIN XL) 150 mg 24 hr tablet Take 1 tablet (150 mg total) by mouth every morning 90 tablet 1   • clobetasol (TEMOVATE) 0.05 % cream Apply topically 2 (two) times a day 30 g 0   • fluticasone-salmeterol (Advair HFA) 230-21 MCG/ACT inhaler Inhale 2 puffs 2 (two) times a day Rinse mouth after use. 36 g 1   • isoniazid (NYDRAZID) 300 mg tablet Take 3 tablets (900 mg total) by mouth once a week for 12 doses (Patient not taking: Reported on 12/9/2024) 36 tablet 0   • ketoconazole (NIZORAL) 2 % cream Apply topically 2 (two) times a day for 14 days 15 g 0   •  lisinopril (ZESTRIL) 40 mg tablet Take 1 tablet (40 mg total) by mouth daily 90 tablet 1   • mupirocin (BACTROBAN) 2 % ointment Apply topically 3 (three) times a day 30 g 0   • pyridoxine (VITAMIN B6) 50 mg tablet Take 1 tablet (50 mg total) by mouth once a week for 12 doses (Patient not taking: Reported on 12/9/2024) 12 tablet 0   • Rifapentine 150 MG TABS Take 6 tablets (900 mg total) by mouth once a week for 12 doses (Patient not taking: Reported on 12/9/2024) 72 tablet 0   • sertraline (ZOLOFT) 100 mg tablet TAKE 1 TABLET(100 MG) BY MOUTH DAILY 30 tablet 1     No current facility-administered medications for this visit.        Allergies   Allergen Reactions   • Penicillins Swelling     Rash, swelling at IV site. Was to IV penicillin

## 2025-03-11 NOTE — ASSESSMENT & PLAN NOTE
Patient is currently on Xanax at 2 mg daily.  Interaction with rifapentin/rifampin as in above, resulting in increased metabolism of Xanax.  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  At this point, it is best to avoid rifampin/rifapentine altogether, as in above.  Management per psychiatry.

## 2025-03-11 NOTE — PROGRESS NOTES
Name: Kathy Vazquez      : 1985      MRN: [id number]  Encounter Provider: Gigi Cherry MD  Encounter Date: 3/11/2025   Encounter department: [facility] INFECTIOUS DISEASE [facility]  :  Assessment & Plan  TB lung, latent  Patient has positive TB QuantiFERON and PPD, consistent for latent TB, although she has no known history of TB exposure.  We already had extensive discussion with patient regarding treatment for latent TB with the rifapentine/INH for 3 months or rifampin monotherapy for 4 months.  However, she has not started any treatment yet due to concern for interaction with her Xanax.  At this point, it would be best to avoid rifapentine/rifampin altogether and treat patient with INH for 6 to 9 months.  Patient has no evidence of active TB.  She has no respiratory or constitutional symptoms.  Recent CXR is without infiltrate.  Patient counseled regarding hepatotoxicity and need to avoid all hepatotoxic medications.  She is also counseled regarding the risk for neuropathy.  Taking pyridoxine supplement decreases this risk.  Start  mg daily  Pyridoxine supplement 50 mg daily.    Treat x 6-9 months total.  Monitor LFTs monthly.  Avoid alcohol, Tylenol or any hepatotoxic medications.  Generalized anxiety disorder  Patient is currently on Xanax at 2 mg daily.  Interaction with rifapentin/rifampin as in above, resulting in increased metabolism of Xanax.  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  At this point, it is best to avoid rifampin/rifapentine altogether, as in above.  Management per psychiatry.  Rash  Patient has a scaly round rash on her right wrist, suggestive of ringworm.  Topical antifungal was prescribed patient has not taken it, although she is concerned that the scaly rash is not better.  She is unable to give me a reason why she is not taking the prescribed topical antifungal.  I encouraged her to take  "it.  Ketoconazole cream twice daily x 14 days.    Discussed with patient in detail regarding the above plan.  Follow-up with us in 2 months.    Antibiotics: None    History of Present Illness   Patient is comfortable.  She is anxious but otherwise has no specific complaints.  No respiratory or constitutional symptoms.  Her right wrist rash is unchanged but she has not taken the antifungal cream that was prescribed during last visit.    ROS:  A complete review of systems is negative other than that noted above in the HPI.         Objective   /72   Pulse 84   Temp (!) 96.7 °F (35.9 °C)   Resp 18   Ht 5' 2\" (1.575 m)   Wt 95.3 kg (210 lb)   SpO2 98%   BMI 38.41 kg/m²      General: Alert, interactive, appearing well, nontoxic, no acute distress.  Throat: Oropharynx moist without lesions.   Lungs: Clear to auscultation bilaterally; no audible wheezes, rhonchi or rales; respirations unlabored  Heart: RRR; no murmur, rub or gallop  Abdomen: Soft, non-tender, non-distended, positive bowel sounds.    Extremities: No clubbing, cyanosis or edema  Skin: Stable round scaly rash on dorsal surface of right wrist. No draining wounds.    Lab Results: I have personally reviewed pertinent labs.  Lab Results   Component Value Date    K 4.3 08/02/2024     08/02/2024    CO2 22 08/02/2024    BUN 11 08/02/2024    CREATININE 0.70 08/02/2024    GLUF 94 03/26/2018    CALCIUM 9.2 08/02/2024    AST 19 08/02/2024    ALT 23 08/02/2024    ALKPHOS 44 08/02/2024    EGFR 107 08/02/2024     Lab Results   Component Value Date    WBC 7.35 08/02/2024    HGB 13.3 08/02/2024    HCT 41.4 08/02/2024    MCV 91 08/02/2024     08/02/2024   No results found for: \"ESR\"          "

## 2025-03-14 ENCOUNTER — TELEPHONE (OUTPATIENT)
Dept: PSYCHIATRY | Facility: CLINIC | Age: 40
End: 2025-03-14

## 2025-03-14 NOTE — TELEPHONE ENCOUNTER
Unable to leave message number not in service at this time.  Virtual Encounter form 3/11/25 call #1  3/14/25

## 2025-03-15 NOTE — PROGRESS NOTES
RUKHSANA attempted outreach to pt.  This number is not in service at this time.      SW sent email to pt to advise of outreach.  The following was sent:   I attempted to reach you by phone.  I received a message that your number was not in service.  I reviewed your chart and it looks like Dr. Antonio was agreeable to completing the needed forms for you.  I wanted to confirm that you received these completed documents.    I also wanted to follow up on the rental assistance you were seeking from [facility].  Were you able to connect with them?  I wanted to touch base about your interest in counseling and if you want to move forward with resources for providers.  I look forward to hearing from you soon.  Sincerely,   Lien     To get better and follow your care plan as instructed.

## 2025-03-20 ENCOUNTER — TELEPHONE (OUTPATIENT)
Dept: PSYCHIATRY | Facility: CLINIC | Age: 40
End: 2025-03-20

## 2025-03-20 NOTE — TELEPHONE ENCOUNTER
Left voicemail informing patient and/or parent/guardian of the Psych Encounter form needing to be signed as a requirement from the insurance company for billing purposes. Patient can access form via [employer] and sign electronically.     Please make patient aware this form must be signed for each visit as a requirement to continue future visits with provider.

## 2025-03-28 ENCOUNTER — OFFICE VISIT (OUTPATIENT)
Dept: PSYCHIATRY | Facility: CLINIC | Age: 40
End: 2025-03-28
Payer: COMMERCIAL

## 2025-03-28 DIAGNOSIS — F43.12 CHRONIC POST-TRAUMATIC STRESS DISORDER (PTSD): ICD-10-CM

## 2025-03-28 DIAGNOSIS — F41.1 GENERALIZED ANXIETY DISORDER: ICD-10-CM

## 2025-03-28 DIAGNOSIS — F41.0 PANIC ATTACKS: ICD-10-CM

## 2025-03-28 DIAGNOSIS — F31.81 BIPOLAR 2 DISORDER, MAJOR DEPRESSIVE EPISODE (HCC): Primary | ICD-10-CM

## 2025-03-28 DIAGNOSIS — Z79.899 CHRONIC USE OF BENZODIAZEPINE FOR THERAPEUTIC PURPOSE: ICD-10-CM

## 2025-03-28 PROCEDURE — 90792 PSYCH DIAG EVAL W/MED SRVCS: CPT | Performed by: PSYCHIATRY & NEUROLOGY

## 2025-03-28 RX ORDER — ALPRAZOLAM 1 MG/1
1 TABLET ORAL 2 TIMES DAILY PRN
Qty: 60 TABLET | Refills: 0 | Status: SHIPPED | OUTPATIENT
Start: 2025-04-08

## 2025-03-28 RX ORDER — SERTRALINE HYDROCHLORIDE 100 MG/1
100 TABLET, FILM COATED ORAL DAILY
Qty: 100 TABLET | Refills: 0 | Status: SHIPPED | OUTPATIENT
Start: 2025-03-28
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
## 2025-03-28 NOTE — ASSESSMENT & PLAN NOTE
Orders:    ALPRAZolam (XANAX) 1 mg tablet; Take 1 tablet (1 mg total) by mouth 2 (two) times a day as needed for anxiety Do not start before April 8, 2025.    sertraline (ZOLOFT) 100 mg tablet; Take 1 tablet (100 mg total) by mouth daily

## 2025-03-31 ENCOUNTER — TELEPHONE (OUTPATIENT)
Age: 40
End: 2025-03-31

## 2025-03-31 ENCOUNTER — TELEPHONE (OUTPATIENT)
Dept: PSYCHIATRY | Facility: CLINIC | Age: 40
End: 2025-03-31

## 2025-03-31 NOTE — TELEPHONE ENCOUNTER
Called and left message for patient to return a call to [phone] and schedule 4 week follow up with provider (Clemencia Liu). Please schedule upon return call. Thank you.

## 2025-04-08 ENCOUNTER — TELEMEDICINE (OUTPATIENT)
Dept: BEHAVIORAL/MENTAL HEALTH CLINIC | Facility: CLINIC | Age: 40
End: 2025-04-08
Payer: COMMERCIAL

## 2025-04-08 DIAGNOSIS — F31.81 BIPOLAR 2 DISORDER, MAJOR DEPRESSIVE EPISODE (HCC): ICD-10-CM

## 2025-04-08 DIAGNOSIS — F43.12 CHRONIC POST-TRAUMATIC STRESS DISORDER (PTSD): ICD-10-CM

## 2025-04-08 DIAGNOSIS — F41.1 GENERALIZED ANXIETY DISORDER: Primary | ICD-10-CM

## 2025-04-08 PROCEDURE — 90834 PSYTX W PT 45 MINUTES: CPT | Performed by: SOCIAL WORKER

## 2025-04-08 NOTE — PSYCH
"Behavioral Health Psychotherapy Progress Note    Psychotherapy Provided: Individual Psychotherapy     1. Generalized anxiety disorder        2. Chronic post-traumatic stress disorder (PTSD)        3. Bipolar 2 disorder, major depressive episode (HCC)                Goals addressed in session: Goal 1     DATA: Kathy spoke with this worker re: her feelings re: her recent appt with her new treating Psychiatrist with whom she began to \"explore her childhood.\"  She reported that she continues to struggle to focus and has not pursued employment.  She expressed feelings isolated as she does not socialize or interact with others.  Lolly also stated that she has not started either the TB medications or the antifungal medication that has been prescribed to her.  Specific techniques used in this session were: psychoeducation, empathetic listening, validation, and motivational interviewing.    During this session, this clinician used the following therapeutic modalities: Client-centered Therapy, Motivational Interviewing, and Supportive Psychotherapy    Substance Abuse was not addressed during this session. If the client is diagnosed with a co-occurring substance use disorder, please indicate any changes in the frequency or amount of use: N/A. Stage of change for addressing substance use diagnoses: No substance use/Not applicable    ASSESSMENT:  Kathy Vazquez presents with a Anxious and Depressed mood. Kathy Vazquez was oriented to person, place, time, and situation. Grooming and Hygiene were poor and she was again limited in her ability to sustain attention for the duration of today's session. .   Ability to perform ADLs has declined. she was cooperative.  her affect is Normal range and intensity, which is congruent, with her mood and the content of the session.  He thought process was paranoid and tangential.   The client has not made some progress on their goals.    Kathy Vazquez presents with a none risk of " "suicide, none risk of self-harm, and none risk of harm to others.    For any risk assessment that surpasses a \"low\" rating, a safety plan must be developed.    A safety plan was indicated: no  If yes, describe in detail: N/A    PLAN: Between sessions, Kathy Vazquez will continue to work on short-term goals including following up with medical issues At the next session, the therapist will use Client-centered Therapy, Motivational Interviewing, and Supportive Psychotherapy to address symptoms as they arise.     Behavioral Health Treatment Plan and Discharge Planning: Kathy Vazquez is aware of and agrees to continue to work on their treatment plan. They have identified and are working toward their discharge goals. yes    Visit start and stop times:        04/08/25  Start Time: 0900  Stop Time: 0940  Total Visit Time: 40 minutes        Virtual Regular Visit    Verification of patient location:    Patient is located at Home in the following state in which I hold an active license [place]      Assessment/Plan:    Problem List Items Addressed This Visit        Behavioral Health    Generalized anxiety disorder - Primary    Bipolar 2 disorder, major depressive episode (HCC)    Chronic post-traumatic stress disorder (PTSD)               Goals addressed in session: Goal 1     Depression Follow-up Plan Completed: Yes    Reason for visit is No chief complaint on file.       Encounter provider Yusra Sawyer      Recent Visits  No visits were found meeting these conditions.  Showing recent visits within past 7 days and meeting all other requirements  Today's Visits  Date Type Provider Dept   04/08/25 Telemedicine Yusra Sawyer [facility] Therapist [place]   Showing today's visits and meeting all other requirements  Future Appointments  No visits were found meeting these conditions.  Showing future appointments within next 150 days and meeting all other requirements       The patient was identified by name and date of " birth. Kathy Vazquez was informed that this is a telemedicine visit and that the visit is being conducted throughthe Epic Embedded platform. She agrees to proceed..  My office door was closed. No one else was in the room.  She acknowledged consent and understanding of privacy and security of the video platform. The patient has agreed to participate and understands they can discontinue the visit at any time.    Patient is aware this is a billable service.     Subjective  Kathy Vazquez is a 40 y.o. female  .      HPI     Past Medical History:   Diagnosis Date   • Acute cough 06/14/2022   • Adverse effect of alprazolam 04/28/2022   • Allergies    • Anxiety 05/2018   • Female hirsutism    • GERD (gastroesophageal reflux disease)    • Head injury    • Hypertension 2018   • Papanicolaou smear for cervical cancer screening 2017    NL, no h/o of abn pap that she can recall   • Positive TB test 06/14/2022   • STD (female)    • Thyroid nodule 05/2018   • Tobacco user 05/2018       Past Surgical History:   Procedure Laterality Date   • NO PAST SURGERIES         Current Outpatient Medications   Medication Sig Dispense Refill   • albuterol (PROVENTIL HFA,VENTOLIN HFA) 90 mcg/act inhaler Inhale 2 puffs every 4 (four) hours as needed for wheezing or shortness of breath 8.5 g 2   • ALPRAZolam (XANAX) 1 mg tablet Take 1 tablet (1 mg total) by mouth 2 (two) times a day as needed for anxiety Do not start before April 8, 2025. 60 tablet 0   • buPROPion (WELLBUTRIN XL) 150 mg 24 hr tablet Take 1 tablet (150 mg total) by mouth every morning 90 tablet 1   • clobetasol (TEMOVATE) 0.05 % cream Apply topically 2 (two) times a day 30 g 0   • fluticasone-salmeterol (Advair HFA) 230-21 MCG/ACT inhaler Inhale 2 puffs 2 (two) times a day Rinse mouth after use. 36 g 1   • isoniazid (NYDRAZID) 300 mg tablet Take 1 tablet (300 mg total) by mouth daily 90 tablet 0   • ketoconazole (NIZORAL) 2 % cream Apply topically 2 (two) times a day for 14  days 15 g 0   • lisinopril (ZESTRIL) 40 mg tablet Take 1 tablet (40 mg total) by mouth daily 90 tablet 1   • mupirocin (BACTROBAN) 2 % ointment Apply topically 3 (three) times a day 30 g 0   • pyridoxine (VITAMIN B6) 50 mg tablet Take 1 tablet (50 mg total) by mouth once a week for 90 doses 90 tablet 0   • sertraline (ZOLOFT) 100 mg tablet Take 1 tablet (100 mg total) by mouth daily 100 tablet 0     No current facility-administered medications for this visit.        Allergies   Allergen Reactions   • Penicillins Swelling     Rash, swelling at IV site. Was to IV penicillin

## 2025-04-11 ENCOUNTER — TELEPHONE (OUTPATIENT)
Dept: PSYCHIATRY | Facility: CLINIC | Age: 40
End: 2025-04-11

## 2025-04-11 NOTE — TELEPHONE ENCOUNTER
Left voicemail informing patient and/or parent/guardian of the Psych Encounter form needing to be signed as a requirement from the insurance company for billing purposes. Patient can access form via [employer] and sign electronically.     Please make patient aware this form must be signed for each visit as a requirement to continue future visits with provider.    Virtual Encounter form 4/8/25 call #1

## 2025-04-22 ENCOUNTER — TELEMEDICINE (OUTPATIENT)
Dept: BEHAVIORAL/MENTAL HEALTH CLINIC | Facility: CLINIC | Age: 40
End: 2025-04-22
Payer: COMMERCIAL

## 2025-04-22 DIAGNOSIS — F31.81 BIPOLAR 2 DISORDER, MAJOR DEPRESSIVE EPISODE (HCC): ICD-10-CM

## 2025-04-22 DIAGNOSIS — F43.12 CHRONIC POST-TRAUMATIC STRESS DISORDER (PTSD): ICD-10-CM

## 2025-04-22 DIAGNOSIS — F41.1 GENERALIZED ANXIETY DISORDER: Primary | ICD-10-CM

## 2025-04-22 PROCEDURE — 90834 PSYTX W PT 45 MINUTES: CPT | Performed by: SOCIAL WORKER

## 2025-04-22 NOTE — PSYCH
"Behavioral Health Psychotherapy Progress Note    Psychotherapy Provided: Individual Psychotherapy     1. Generalized anxiety disorder        2. Chronic post-traumatic stress disorder (PTSD)        3. Bipolar 2 disorder, major depressive episode (HCC)                  Goals addressed in session: Goal 1     DATA: Kathy spoke with this worker re: her feelings re: her Lolly also stated that she has not started either the TB medications or the antifungal medication that has been prescribed to her as she \"wants to know 'the cause'\" of her health issues; not just treat the symptoms.'\" She admitted that she never filed an appeal with [employer] for std and has no income at this time.  Additionally, her siblings have informed her that they are unable to help her financially any longer.  She indicated that she is spiraling but has found breathwork  and oregano oil to be helpful.    Specific techniques used in this session were: psychoeducation, empathetic listening, validation, and motivational interviewing.    During this session, this clinician used the following therapeutic modalities: Client-centered Therapy, Motivational Interviewing, and Supportive Psychotherapy    Substance Abuse was not addressed during this session. If the client is diagnosed with a co-occurring substance use disorder, please indicate any changes in the frequency or amount of use: N/A. Stage of change for addressing substance use diagnoses: No substance use/Not applicable    ASSESSMENT:  Kathy Vazquez presents with a Anxious and Depressed mood. Kathy Vazquez was oriented to person, place, time, and situation. Grooming and Hygiene were poor but she did report that she has begun to clean and organize her apt.   Ability to perform ADLs has declined. she was cooperative.  her affect is Normal range and intensity, which is congruent, with her mood and the content of the session.  He thought process was paranoid and tangential.   The client has " "not made some progress on their goals.    Kathy Vazquez presents with a none risk of suicide, none risk of self-harm, and none risk of harm to others.    For any risk assessment that surpasses a \"low\" rating, a safety plan must be developed.    A safety plan was indicated: no  If yes, describe in detail: N/A    PLAN: Between sessions, Kathy Vazquez will continue to work on short-term goals including following up with medical issues At the next session, the therapist will use Client-centered Therapy, Motivational Interviewing, and Supportive Psychotherapy to address symptoms as they arise.     Behavioral Health Treatment Plan and Discharge Planning: Kathy Vazquez is aware of and agrees to continue to work on their treatment plan. They have identified and are working toward their discharge goals. yes    Visit start and stop times:        04/22/25  Start Time: 1100  Stop Time: 1140  Total Visit Time: 40 minutes        Virtual Regular Visit    Verification of patient location:    Patient is located at Home in the following state in which I hold an active license [place]      Assessment/Plan:    Problem List Items Addressed This Visit        Behavioral Health    Generalized anxiety disorder - Primary    Bipolar 2 disorder, major depressive episode (HCC)    Chronic post-traumatic stress disorder (PTSD)                   Goals addressed in session: Goal 1     Depression Follow-up Plan Completed: Yes    Reason for visit is No chief complaint on file.       Encounter provider Yusra Sawyer      Recent Visits  No visits were found meeting these conditions.  Showing recent visits within past 7 days and meeting all other requirements  Today's Visits  Date Type Provider Dept   04/22/25 Telemedicine Yusra Sawyer [facility] Therapist [place]   Showing today's visits and meeting all other requirements  Future Appointments  No visits were found meeting these conditions.  Showing future appointments within next 150 " days and meeting all other requirements       The patient was identified by name and date of birth. Kathy Vazquez was informed that this is a telemedicine visit and that the visit is being conducted throughthe Epic Embedded platform. She agrees to proceed..  My office door was closed. No one else was in the room.  She acknowledged consent and understanding of privacy and security of the video platform. The patient has agreed to participate and understands they can discontinue the visit at any time.    Patient is aware this is a billable service.     Subjective  Kathy Vazquez is a 40 y.o. female  .      HPI     Past Medical History:   Diagnosis Date   • Acute cough 06/14/2022   • Adverse effect of alprazolam 04/28/2022   • Allergies    • Anxiety 05/2018   • Female hirsutism    • GERD (gastroesophageal reflux disease)    • Head injury    • Hypertension 2018   • Papanicolaou smear for cervical cancer screening 2017    [place], no h/o of abn pap that she can recall   • Positive TB test 06/14/2022   • STD (female)    • Thyroid nodule 05/2018   • Tobacco user 05/2018       Past Surgical History:   Procedure Laterality Date   • NO PAST SURGERIES         Current Outpatient Medications   Medication Sig Dispense Refill   • albuterol (PROVENTIL HFA,VENTOLIN HFA) 90 mcg/act inhaler Inhale 2 puffs every 4 (four) hours as needed for wheezing or shortness of breath 8.5 g 2   • ALPRAZolam (XANAX) 1 mg tablet Take 1 tablet (1 mg total) by mouth 2 (two) times a day as needed for anxiety Do not start before April 8, 2025. 60 tablet 0   • buPROPion (WELLBUTRIN XL) 150 mg 24 hr tablet Take 1 tablet (150 mg total) by mouth every morning 90 tablet 1   • clobetasol (TEMOVATE) 0.05 % cream Apply topically 2 (two) times a day 30 g 0   • fluticasone-salmeterol (Advair HFA) 230-21 MCG/ACT inhaler Inhale 2 puffs 2 (two) times a day Rinse mouth after use. 36 g 1   • isoniazid (NYDRAZID) 300 mg tablet Take 1 tablet (300 mg total) by mouth daily  90 tablet 0   • ketoconazole (NIZORAL) 2 % cream Apply topically 2 (two) times a day for 14 days 15 g 0   • lisinopril (ZESTRIL) 40 mg tablet Take 1 tablet (40 mg total) by mouth daily 90 tablet 1   • mupirocin (BACTROBAN) 2 % ointment Apply topically 3 (three) times a day 30 g 0   • pyridoxine (VITAMIN B6) 50 mg tablet Take 1 tablet (50 mg total) by mouth once a week for 90 doses 90 tablet 0   • sertraline (ZOLOFT) 100 mg tablet Take 1 tablet (100 mg total) by mouth daily 100 tablet 0     No current facility-administered medications for this visit.        Allergies   Allergen Reactions   • Penicillins Swelling     Rash, swelling at IV site. Was to IV penicillin

## 2025-04-24 ENCOUNTER — TELEPHONE (OUTPATIENT)
Age: 40
End: 2025-04-24

## 2025-04-25 ENCOUNTER — TELEMEDICINE (OUTPATIENT)
Dept: PSYCHIATRY | Facility: CLINIC | Age: 40
End: 2025-04-25
Payer: COMMERCIAL

## 2025-04-25 ENCOUNTER — TELEPHONE (OUTPATIENT)
Age: 40
End: 2025-04-25

## 2025-04-25 ENCOUNTER — TELEPHONE (OUTPATIENT)
Dept: PSYCHIATRY | Facility: CLINIC | Age: 40
End: 2025-04-25

## 2025-04-25 DIAGNOSIS — F43.12 CHRONIC POST-TRAUMATIC STRESS DISORDER (PTSD): ICD-10-CM

## 2025-04-25 DIAGNOSIS — F31.81 BIPOLAR 2 DISORDER, MAJOR DEPRESSIVE EPISODE (HCC): Primary | ICD-10-CM

## 2025-04-25 DIAGNOSIS — Z79.899 CHRONIC USE OF BENZODIAZEPINE FOR THERAPEUTIC PURPOSE: ICD-10-CM

## 2025-04-25 DIAGNOSIS — F41.1 GENERALIZED ANXIETY DISORDER: ICD-10-CM

## 2025-04-25 PROCEDURE — 90833 PSYTX W PT W E/M 30 MIN: CPT | Performed by: PSYCHIATRY & NEUROLOGY

## 2025-04-25 PROCEDURE — 99214 OFFICE O/P EST MOD 30 MIN: CPT | Performed by: PSYCHIATRY & NEUROLOGY

## 2025-04-25 RX ORDER — ALPRAZOLAM 1 MG/1
1 TABLET ORAL 2 TIMES DAILY PRN
Qty: 60 TABLET | Refills: 2 | Status: SHIPPED | OUTPATIENT
Start: 2025-05-06
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
## 2025-04-25 NOTE — TELEPHONE ENCOUNTER
The patient called in and is not able to access My Chart for the 10am appointment today.    Patient requested a link be sent her, to attend the appointment.    Writer verified with the provider, if a link can be sent to the patient. Writer advised the patient that the provider will send a link to the mobile number on file.

## 2025-04-28 ENCOUNTER — TELEPHONE (OUTPATIENT)
Age: 40
End: 2025-04-28

## 2025-04-28 ENCOUNTER — TELEPHONE (OUTPATIENT)
Dept: PSYCHIATRY | Facility: CLINIC | Age: 40
End: 2025-04-28

## 2025-04-28 NOTE — TELEPHONE ENCOUNTER
Called and left message for patient to return a call to [phone] and schedule 4 week follow up with provider (Clemencia Liu). Please schedule upon return call. Thank you.

## 2025-04-28 NOTE — TELEPHONE ENCOUNTER
Patient contacted the office to schedule a follow up visit with provider. Patient is now scheduled for 5/22/25  at 3:30pm in office.

## 2025-04-29 ENCOUNTER — TELEMEDICINE (OUTPATIENT)
Dept: BEHAVIORAL/MENTAL HEALTH CLINIC | Facility: CLINIC | Age: 40
End: 2025-04-29
Payer: COMMERCIAL

## 2025-04-29 DIAGNOSIS — F31.81 BIPOLAR 2 DISORDER, MAJOR DEPRESSIVE EPISODE (HCC): ICD-10-CM

## 2025-04-29 DIAGNOSIS — F41.1 GENERALIZED ANXIETY DISORDER: Primary | ICD-10-CM

## 2025-04-29 DIAGNOSIS — F43.12 CHRONIC POST-TRAUMATIC STRESS DISORDER (PTSD): ICD-10-CM

## 2025-04-29 PROCEDURE — 90832 PSYTX W PT 30 MINUTES: CPT | Performed by: SOCIAL WORKER

## 2025-04-29 NOTE — PSYCH
"Behavioral Health Psychotherapy Progress Note    Psychotherapy Provided: Individual Psychotherapy     1. Generalized anxiety disorder        2. Chronic post-traumatic stress disorder (PTSD)        3. Bipolar 2 disorder, major depressive episode (HCC)                  Goals addressed in session: Goal 1     DATA: Kathy spoke with this worker re: her feelings re: her need to cut today's session short so that she can get her car inspected (\"lick and stick\").  She stated that she has been \"so happy\" as she is making progress on organizing her apt while also being gifted furniture by a friend.   Specific techniques used in this session were: psychoeducation, empathetic listening, validation, and motivational interviewing.    During this session, this clinician used the following therapeutic modalities: Client-centered Therapy, Motivational Interviewing, and Supportive Psychotherapy    Substance Abuse was not addressed during this session. If the client is diagnosed with a co-occurring substance use disorder, please indicate any changes in the frequency or amount of use: N/A. Stage of change for addressing substance use diagnoses: No substance use/Not applicable    ASSESSMENT:  Kathy Vazquez presents with a Euthymic/ normal mood. Kathy Vazquez was oriented to person, place, time, and situation. Grooming and Hygiene were poor but she did report that she has contnued to clean and organize her apt.   Ability to perform ADLs has improved. she was cooperative.  her affect is Normal range and intensity, which is congruent, with her mood and the content of the session.  He thought process was paranoid and tangential.   The client has not made some progress on their goals.    Kathy Vazquez presents with a none risk of suicide, none risk of self-harm, and none risk of harm to others.    For any risk assessment that surpasses a \"low\" rating, a safety plan must be developed.    A safety plan was indicated: no  If yes, " describe in detail: N/A    PLAN: Between sessions, Kathy Vazquez will continue to work on short-term goals including following up with medical issues At the next session, the therapist will use Client-centered Therapy, Motivational Interviewing, and Supportive Psychotherapy to address symptoms as they arise.     Behavioral Health Treatment Plan and Discharge Planning: Kathy Vazquez is aware of and agrees to continue to work on their treatment plan. They have identified and are working toward their discharge goals. yes    Visit start and stop times:        04/29/25  Start Time: 0955  Stop Time: 1015  Total Visit Time: 20 minutes        Virtual Regular Visit    Verification of patient location:    Patient is located at Home in the following state in which I hold an active license [place]      Assessment/Plan:    Problem List Items Addressed This Visit        Behavioral Health    Generalized anxiety disorder - Primary    Bipolar 2 disorder, major depressive episode (HCC)    Chronic post-traumatic stress disorder (PTSD)                   Goals addressed in session: Goal 1     Depression Follow-up Plan Completed: Yes    Reason for visit is No chief complaint on file.       Encounter provider Yusra Sawyer      Recent Visits  Date Type Provider Dept   04/22/25 Telemedicine Yusra Sawyer [facility] [place]   Showing recent visits within past 7 days and meeting all other requirements  Today's Visits  Date Type Provider Dept   04/29/25 Telemedicine Yusra Sawyer [facility]rap[facility] [place]   Showing today's visits and meeting all other requirements  Future Appointments  No visits were found meeting these conditions.  Showing future appointments within next 150 days and meeting all other requirements       The patient was identified by name and date of birth. Kathy Vazquez was informed that this is a telemedicine visit and that the visit is being conducted throughthe Epic Embedded  platform. She agrees to proceed..  My office door was closed. No one else was in the room.  She acknowledged consent and understanding of privacy and security of the video platform. The patient has agreed to participate and understands they can discontinue the visit at any time.    Patient is aware this is a billable service.     Subjective  Kathy Vazquez is a 40 y.o. female  .      HPI     Past Medical History:   Diagnosis Date   • Acute cough 06/14/2022   • Adverse effect of alprazolam 04/28/2022   • Allergies    • Anxiety 05/2018   • Female hirsutism    • GERD (gastroesophageal reflux disease)    • Head injury    • Hypertension 2018   • Papanicolaou smear for cervical cancer screening 2017    NL, no h/o of abn pap that she can recall   • Positive TB test 06/14/2022   • STD (female)    • Thyroid nodule 05/2018   • Tobacco user 05/2018       Past Surgical History:   Procedure Laterality Date   • NO PAST SURGERIES         Current Outpatient Medications   Medication Sig Dispense Refill   • albuterol (PROVENTIL HFA,VENTOLIN HFA) 90 mcg/act inhaler Inhale 2 puffs every 4 (four) hours as needed for wheezing or shortness of breath 8.5 g 2   • [START ON 5/6/2025] ALPRAZolam (XANAX) 1 mg tablet Take 1 tablet (1 mg total) by mouth 2 (two) times a day as needed for anxiety Do not start before May 6, 2025. 60 tablet 2   • buPROPion (WELLBUTRIN XL) 150 mg 24 hr tablet Take 1 tablet (150 mg total) by mouth every morning 90 tablet 1   • clobetasol (TEMOVATE) 0.05 % cream Apply topically 2 (two) times a day 30 g 0   • fluticasone-salmeterol (Advair HFA) 230-21 MCG/ACT inhaler Inhale 2 puffs 2 (two) times a day Rinse mouth after use. 36 g 1   • isoniazid (NYDRAZID) 300 mg tablet Take 1 tablet (300 mg total) by mouth daily 90 tablet 0   • ketoconazole (NIZORAL) 2 % cream Apply topically 2 (two) times a day for 14 days 15 g 0   • lisinopril (ZESTRIL) 40 mg tablet Take 1 tablet (40 mg total) by mouth daily 90 tablet 1   •  mupirocin (BACTROBAN) 2 % ointment Apply topically 3 (three) times a day 30 g 0   • pyridoxine (VITAMIN B6) 50 mg tablet Take 1 tablet (50 mg total) by mouth once a week for 90 doses 90 tablet 0   • sertraline (ZOLOFT) 100 mg tablet Take 1 tablet (100 mg total) by mouth daily 100 tablet 0     No current facility-administered medications for this visit.        Allergies   Allergen Reactions   • Penicillins Swelling     Rash, swelling at IV site. Was to IV penicillin

## 2025-04-29 NOTE — BH CRISIS PLAN
"Client Name: Kathy Vazquez       Client YOB: 1985    [facility] Safety Plan      Creation Date: 4/10/24 Update Date: 10/12/26   Created By: Yuki Andrews LCSW Last Updated By: Yuki Andrews LCSW      Step 1: Warning Signs:   Warning Signs   Monthly menstrual cycle contributes to emotional changes consistently- easily triggered to cry   Physical pains in chest when anxious   Feelings of fear, \"freeze\" response            Step 2: Internal Coping Strategies:   Internal Coping Strategies   Self-talk, reassurance - \"You're okay\"   Negative coping skill of smoking - hoping to quit (previously tried smoking cessation program)   Getting \"signs\" from late mom that randomly come up in dreams or in day-to-day life (numbers, etc)   Talking with mom/what would she think or suggest   Cooking   Gardening   Hiking, being in nature   Journaling   Breathwork   Spa or meditation music            Step 3: People and social settings that provide distraction:   Name Contact Information   Roxi Kong, and her spouse [phone] /  [phone]   [place] In cell phone   Cousin, Yola In cell phone            Step 4: People whom I can ask for help during a crisis:      Name Contact Information    Roxi Kong, and her spouse [phone] / [phone]    [place] In cell phone    Cousin, Yola In cell phone      Step 5: Professionals or agencies I can contact during a crisis:      Clinican/Agency Name Phone Emergency Contact    [facility] (business hours) [phone] Therapist, Yuki Andrews    [facility] [phone] PCP, Dr. Antonio      Local Emergency Department Emergency Department Phone Emergency Department Address    [facility] [phone] [address], [place] [address]    [facility] [phone] [facility] [facility], [place] [address]        Crisis Phone Numbers:   Suicide Prevention Lifeline: " Call or Text  723 Crisis Text Line: Text HOME to 289-085   Please note: Some [facility] do not have a separate number for Child/Adolescent specific crisis. If your county is not listed under Child/Adolescent, please call the adult number for your county      Adult Crisis Numbers: Child/Adolescent Crisis Numbers   [facility]: [phone] [facility]: [phone]   [facility]: [phone] [facility]: [phone]   [facility]: [phone] [place]: [phone]   [facility]: [phone] [place] [facility]: [phone]   [place] [facility]: [phone]   [facility]: [phone]   [facility]: [phone]   [facility]: [phone] (daytime) [phone] (after hours, weekends, holidays)      Step 6: Making the environment safer (plan for lethal means safety):   Plan: Obtained a license to conceal a firearm in 2020- gun currently stored locked in storage unit.      Optional: What is most important to me and worth living for?   Life itself is beautiful. I love to live, to thrive, to exist and breathe the air.      Aye Safety Plan. Elizabeth James and Roque Sams. Used with permission of the authors.

## 2025-04-30 ENCOUNTER — TELEPHONE (OUTPATIENT)
Dept: PSYCHIATRY | Facility: CLINIC | Age: 40
End: 2025-04-30

## 2025-04-30 NOTE — TELEPHONE ENCOUNTER
Pt called stating she was calling in to give a verbal for past virtual appt. Writer explained that form needs to be signed so pt does not get billed. Pt stated she deleted my chart from phone due to taking up too much memory. Writer checked with office and was advised to send encounter for office to mail out consent form for pt to sign or pt can come into office to sign Virtual consent form. Pt had pt on hold and call was dropped so writer ret call and LM

## 2025-04-30 NOTE — TELEPHONE ENCOUNTER
Patient called in regarding signing encounter for visit 4/29 @10am.     Patient shared she has given verbal consent to have this signed by the office in the past.     Patient Is requesting a call back to discuss this.     Writer also connected with office clerical staff who will mail out encounter form and send it on the mail for patient to sign.

## 2025-04-30 NOTE — TELEPHONE ENCOUNTER
Left voicemail informing patient and/or parent/guardian of the Psych Encounter form needing to be signed as a requirement from the insurance company for billing purposes. Patient can access form via [employer] and sign electronically.     Please make patient aware this form must be signed for each visit as a requirement to continue future visits with provider.

## 2025-05-13 ENCOUNTER — TELEPHONE (OUTPATIENT)
Dept: PSYCHIATRY | Facility: CLINIC | Age: 40
End: 2025-05-13

## 2025-05-13 ENCOUNTER — TELEMEDICINE (OUTPATIENT)
Dept: FAMILY MEDICINE CLINIC | Facility: CLINIC | Age: 40
End: 2025-05-13
Payer: COMMERCIAL

## 2025-05-13 ENCOUNTER — TELEMEDICINE (OUTPATIENT)
Dept: BEHAVIORAL/MENTAL HEALTH CLINIC | Facility: CLINIC | Age: 40
End: 2025-05-13
Payer: COMMERCIAL

## 2025-05-13 DIAGNOSIS — F31.81 BIPOLAR 2 DISORDER, MAJOR DEPRESSIVE EPISODE (HCC): ICD-10-CM

## 2025-05-13 DIAGNOSIS — F41.1 GENERALIZED ANXIETY DISORDER: Primary | ICD-10-CM

## 2025-05-13 DIAGNOSIS — F31.81 BIPOLAR 2 DISORDER, MAJOR DEPRESSIVE EPISODE (HCC): Primary | ICD-10-CM

## 2025-05-13 DIAGNOSIS — F41.1 GENERALIZED ANXIETY DISORDER: ICD-10-CM

## 2025-05-13 DIAGNOSIS — E66.01 SEVERE OBESITY (BMI 35.0-39.9) WITH COMORBIDITY (HCC): ICD-10-CM

## 2025-05-13 DIAGNOSIS — F43.12 CHRONIC POST-TRAUMATIC STRESS DISORDER (PTSD): ICD-10-CM

## 2025-05-13 DIAGNOSIS — E03.8 SUBCLINICAL HYPOTHYROIDISM: ICD-10-CM

## 2025-05-13 DIAGNOSIS — I10 ESSENTIAL HYPERTENSION: ICD-10-CM

## 2025-05-13 DIAGNOSIS — J44.9 CHRONIC OBSTRUCTIVE PULMONARY DISEASE, UNSPECIFIED COPD TYPE (HCC): ICD-10-CM

## 2025-05-13 PROCEDURE — 90834 PSYTX W PT 45 MINUTES: CPT | Performed by: SOCIAL WORKER

## 2025-05-13 PROCEDURE — 99214 OFFICE O/P EST MOD 30 MIN: CPT | Performed by: FAMILY MEDICINE

## 2025-05-13 RX ORDER — FLUTICASONE PROPIONATE AND SALMETEROL XINAFOATE 230; 21 UG/1; UG/1
2 AEROSOL, METERED RESPIRATORY (INHALATION) 2 TIMES DAILY
Qty: 36 G | Refills: 1 | Status: SHIPPED | OUTPATIENT
Start: 2025-05-13

## 2025-05-13 RX ORDER — ALBUTEROL SULFATE 90 UG/1
2 INHALANT RESPIRATORY (INHALATION) EVERY 4 HOURS PRN
Qty: 8.5 G | Refills: 2 | Status: SHIPPED | OUTPATIENT
Start: 2025-05-13

## 2025-05-13 RX ORDER — LISINOPRIL 40 MG/1
40 TABLET ORAL DAILY
Qty: 90 TABLET | Refills: 1 | Status: SHIPPED | OUTPATIENT
Start: 2025-05-13

## 2025-05-13 NOTE — PROGRESS NOTES
Virtual Regular VisitName: Kathy Vazquez      : 1985      MRN: [id number]  Encounter Provider: Cheryl Antonio MD  Encounter Date: 2025   Encounter department: [facility]  :  Assessment & Plan  Bipolar 2 disorder, major depressive episode (HCC)  Follows with psychiatry       Chronic obstructive pulmonary disease, unspecified COPD type (HCC)  Continue advair, albuterol prn  Orders:    fluticasone-salmeterol (Advair HFA) 230-21 MCG/ACT inhaler; Inhale 2 puffs 2 (two) times a day Rinse mouth after use.    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 Has some stuff for her house, seeing psychiatrist, has appointment with social security, has food and difficulty paying bills, she did have short term disability and unemployment, long term disability was denied.  Still has breast pains, joint pains, continues to smoke, feels overwhelmed and tired      Review of Systems   Constitutional:  Negative for fatigue and fever.   HENT:  Negative for congestion, facial swelling, mouth sores, rhinorrhea, sore throat and trouble swallowing.    Eyes:  Negative for pain and redness.   Respiratory:  Positive for shortness of breath. Negative for cough and wheezing.    Cardiovascular:  Negative for chest pain, palpitations and leg swelling.   Gastrointestinal:  Negative for abdominal pain, blood in stool, constipation, diarrhea and nausea.    Genitourinary:  Negative for dysuria, hematuria and urgency.   Musculoskeletal:  Positive for arthralgias. Negative for back pain and myalgias.   Skin:  Negative for rash and wound.   Neurological:  Negative for seizures, syncope and headaches.   Hematological:  Negative for adenopathy.   Psychiatric/Behavioral:  Positive for decreased concentration and sleep disturbance. Negative for agitation and behavioral problems. The patient is nervous/anxious.        Objective   There were no vitals taken for this visit.    Physical Exam  Vitals and nursing note reviewed.   Constitutional:       Appearance: She is well-developed.   HENT:      Head: Normocephalic and atraumatic.      Right Ear: External ear normal.      Left Ear: External ear normal.   Eyes:      General: No scleral icterus.     Conjunctiva/sclera: Conjunctivae normal.   Pulmonary:      Effort: Pulmonary effort is normal. No respiratory distress.   Skin:     Coloration: Skin is not jaundiced or pale.   Neurological:      Mental Status: She is alert. Mental status is at baseline.   Psychiatric:         Mood and Affect: Mood is anxious and depressed.         Behavior: Behavior normal.         Administrative Statements   Encounter provider Cheryl Antonio MD    The Patient is located at Home and in the following state in which I hold an active license [place].    The patient was identified by name and date of birth. Kathy Vazquez was informed that this is a telemedicine visit and that the visit is being conducted through the Epic Embedded platform. She agrees to proceed..  My office door was closed. No one else was in the room.  She acknowledged consent and understanding of privacy and security of the video platform. The patient has agreed to participate and understands they can discontinue the visit at any time.    I have spent a total time of 20 minutes in caring for this patient on the day of the visit/encounter including Diagnostic results, Prognosis, Risks and  benefits of tx options, Instructions for management, Patient and family education, Importance of tx compliance, Risk factor reductions, Impressions, Counseling / Coordination of care, Documenting in the medical record, and Reviewing/placing orders in the medical record (including tests, medications, and/or procedures), not including the time spent for establishing the audio/video connection.

## 2025-05-13 NOTE — TELEPHONE ENCOUNTER
Spoke to patient and/or parent/guardian to make aware of the Psych Encounter form needing to be signed from recent completed appointment(s).     Patient provided verbal consent for virtual visit with Yusra Sawyer on 5/13/2025.

## 2025-05-13 NOTE — PSYCH
Behavioral Health Psychotherapy Progress Note    Psychotherapy Provided: Individual Psychotherapy     1. Generalized anxiety disorder        2. Chronic post-traumatic stress disorder (PTSD)        3. Bipolar 2 disorder, major depressive episode (HCC)                  Goals addressed in session: Goal 1     DATA: Kathy spoke with this worker re: her feelings re: her recently increased socialization and time spent with a peer cleaning.  She indicated that she is both exhausted and grateful for the blessings she has received recently.  Lolly admitted that she struggles with following through with medical provider recommendations as she tends to believe conspiracy theories.   Specific techniques used in this session were: psychoeducation, empathetic listening, validation, and motivational interviewing.    During this session, this clinician used the following therapeutic modalities: Client-centered Therapy, Motivational Interviewing, and Supportive Psychotherapy    Substance Abuse was not addressed during this session. If the client is diagnosed with a co-occurring substance use disorder, please indicate any changes in the frequency or amount of use: N/A. Stage of change for addressing substance use diagnoses: No substance use/Not applicable    ASSESSMENT:  Kathy Vazquez presents with a Anxious mood. Kathy Vazquez was oriented to person, place, time, and situation. Grooming and Hygiene were poor  and she admitted that she has not made progress in continuing to clean and organize her apt.   Ability to perform ADLs has improved. she was cooperative.  her affect is Normal range and intensity, which is congruent, with her mood and the content of the session.  He thought process was paranoid and tangential.   The client has not made some progress on their goals.    Kathy Vazquez presents with a none risk of suicide, none risk of self-harm, and none risk of harm to others.    For any risk assessment that surpasses  "a \"low\" rating, a safety plan must be developed.    A safety plan was indicated: no  If yes, describe in detail: N/A    PLAN: Between sessions, Kathy Vazquez will continue to work on short-term goals including following up with medical issues At the next session, the therapist will use Client-centered Therapy, Motivational Interviewing, and Supportive Psychotherapy to address symptoms as they arise.     Behavioral Health Treatment Plan and Discharge Planning: Kathy Vazquez is aware of and agrees to continue to work on their treatment plan. They have identified and are working toward their discharge goals. yes    Visit start and stop times:        05/13/25  Start Time: 1055  Stop Time: 1135  Total Visit Time: 40 minutes        Virtual Regular Visit    Verification of patient location:    Patient is located at Home in the following state in which I hold an active license [place]      Assessment/Plan:    Problem List Items Addressed This Visit        Behavioral Health    Generalized anxiety disorder - Primary    Bipolar 2 disorder, major depressive episode (HCC)    Chronic post-traumatic stress disorder (PTSD)                   Goals addressed in session: Goal 1     Depression Follow-up Plan Completed: Yes    Reason for visit is No chief complaint on file.       Encounter provider Yusra Sawyer      Recent Visits  No visits were found meeting these conditions.  Showing recent visits within past 7 days and meeting all other requirements  Today's Visits  Date Type Provider Dept   05/13/25 Telemedicine Yusra Sawyer [facility] Therapist [place]   Showing today's visits and meeting all other requirements  Future Appointments  No visits were found meeting these conditions.  Showing future appointments within next 150 days and meeting all other requirements       The patient was identified by name and date of birth. Kathy Vazquez was informed that this is a telemedicine visit and that the visit is being " conducted throughthe Epic Embedded platform. She agrees to proceed..  My office door was closed. No one else was in the room.  She acknowledged consent and understanding of privacy and security of the video platform. The patient has agreed to participate and understands they can discontinue the visit at any time.    Patient is aware this is a billable service.     Subjective  Kathy Vazquez is a 40 y.o. female  .      HPI     Past Medical History:   Diagnosis Date   • Acute cough 06/14/2022   • Adverse effect of alprazolam 04/28/2022   • Allergies    • Anxiety 05/2018   • Female hirsutism    • GERD (gastroesophageal reflux disease)    • Head injury    • Hypertension 2018   • Papanicolaou smear for cervical cancer screening 2017    NL, no h/o of abn pap that she can recall   • Positive TB test 06/14/2022   • STD (female)    • Thyroid nodule 05/2018   • Tobacco user 05/2018       Past Surgical History:   Procedure Laterality Date   • NO PAST SURGERIES         Current Outpatient Medications   Medication Sig Dispense Refill   • albuterol (PROVENTIL HFA,VENTOLIN HFA) 90 mcg/act inhaler Inhale 2 puffs every 4 (four) hours as needed for wheezing or shortness of breath 8.5 g 2   • ALPRAZolam (XANAX) 1 mg tablet Take 1 tablet (1 mg total) by mouth 2 (two) times a day as needed for anxiety Do not start before May 6, 2025. 60 tablet 2   • buPROPion (WELLBUTRIN XL) 150 mg 24 hr tablet Take 1 tablet (150 mg total) by mouth every morning 90 tablet 1   • clobetasol (TEMOVATE) 0.05 % cream Apply topically 2 (two) times a day 30 g 0   • fluticasone-salmeterol (Advair HFA) 230-21 MCG/ACT inhaler Inhale 2 puffs 2 (two) times a day Rinse mouth after use. 36 g 1   • isoniazid (NYDRAZID) 300 mg tablet Take 1 tablet (300 mg total) by mouth daily 90 tablet 0   • ketoconazole (NIZORAL) 2 % cream Apply topically 2 (two) times a day for 14 days 15 g 0   • lisinopril (ZESTRIL) 40 mg tablet Take 1 tablet (40 mg total) by mouth daily 90 tablet  1   • mupirocin (BACTROBAN) 2 % ointment Apply topically 3 (three) times a day 30 g 0   • pyridoxine (VITAMIN B6) 50 mg tablet Take 1 tablet (50 mg total) by mouth once a week for 90 doses 90 tablet 0   • sertraline (ZOLOFT) 100 mg tablet Take 1 tablet (100 mg total) by mouth daily 100 tablet 0     No current facility-administered medications for this visit.        Allergies   Allergen Reactions   • Penicillins Swelling     Rash, swelling at IV site. Was to IV penicillin

## 2025-05-13 NOTE — ASSESSMENT & PLAN NOTE
Continue advair, albuterol prn  Orders:    fluticasone-salmeterol (Advair HFA) 230-21 MCG/ACT inhaler; Inhale 2 puffs 2 (two) times a day Rinse mouth after use.    albuterol (PROVENTIL HFA,VENTOLIN HFA) 90 mcg/act inhaler; Inhale 2 puffs every 4 (four) hours as needed for wheezing or shortness of breath

## 2025-05-13 NOTE — BH TREATMENT PLAN
"Outpatient Behavioral Health Psychotherapy Treatment Plan    Kathy Taylor  1985     Date of Initial Psychotherapy Assessment: 4/2/24   Date of Current Treatment Plan: 05/13/25  Treatment Plan Target Date: 11/13/25  Treatment Plan Expiration Date: 11/13/25    Diagnosis:   1. Generalized anxiety disorder        2. Chronic post-traumatic stress disorder (PTSD)        3. Bipolar 2 disorder, major depressive episode (HCC)            Area(s) of Need: multiple health issues, no income    Long Term Goal 1 (in the client's own words): I want to address my health concerns    Stage of Change: Action    Target Date for completion: 11/13/25     Anticipated therapeutic modalities: Individual therapy, client-centered supportive therapy, solution-focused/CBT/motivational interviewing as appropriate     People identified to complete this goal: Yusra McmillanLCSW      Objective 1: (identify the means of measuring success in meeting the objective): I will attend all medical appts as scheduled while also following through with recommended tests and medications.         Objective 2: (identify the means of measuring success in meeting the objective): I will continue to socialize as this decreases my feelings of loneliness and isolation.     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      I am currently under the care of a [facility] psychiatric provider: yes    My [facility] psychiatric provider is: Alisha Moreno    I am currently taking psychiatric medications: Yes, as prescribed    I feel that I will be ready for discharge from mental health care when I reach the following (measurable goal/objective): \"I have no idea. We only will know as we go.\"     For children and adults who have a legal guardian:   Has there been any change to custody orders and/or guardianship status? NA. If yes, attach updated documentation.    I have created my " Crisis Plan and have been offered a copy of this plan    Behavioral Health Treatment Plan [facility]: Diagnosis and Treatment Plan explained to Kathy Vazquez acknowledges an understanding of their diagnosis. Kathy Vazquez agrees to this treatment plan.    I have been offered a copy of this Treatment Plan. yes

## 2025-05-14 ENCOUNTER — TELEPHONE (OUTPATIENT)
Dept: INFECTIOUS DISEASES | Facility: CLINIC | Age: 40
End: 2025-05-14

## 2025-05-14 NOTE — TELEPHONE ENCOUNTER
Patient returned call and stated she never started the antibiotic regimen so she did not complete labs. Patient requested a call back on how she should proceed. Please advise.

## 2025-05-19 ENCOUNTER — TELEMEDICINE (OUTPATIENT)
Dept: INFECTIOUS DISEASES | Facility: CLINIC | Age: 40
End: 2025-05-19
Payer: COMMERCIAL

## 2025-05-19 DIAGNOSIS — Z22.7 TB LUNG, LATENT: Primary | ICD-10-CM

## 2025-05-19 DIAGNOSIS — F41.1 GENERALIZED ANXIETY DISORDER: ICD-10-CM

## 2025-05-19 DIAGNOSIS — R21 RASH: ICD-10-CM

## 2025-05-19 PROCEDURE — 99214 OFFICE O/P EST MOD 30 MIN: CPT | Performed by: INTERNAL MEDICINE

## 2025-05-19 NOTE — ASSESSMENT & PLAN NOTE
Patient is currently on Xanax prescribed to her by her psychiatrist.  Interaction with rifapentin/rifampin as in above, resulting in increased metabolism of Xanax.  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  Therefore, decision was made to avoid rifampin/rifapentine altogether and treat patient's latent TB with INH instead, as in above. Unfortunately, also as above, patient has not started treatment due to her poorly controlled anxiety disorder, per her report

## 2025-05-19 NOTE — PROGRESS NOTES
"Virtual Regular VisitName: Kathy Vazquez      : 1985      MRN: [id number]  Encounter Provider: Gigi Cherry MD  Encounter Date: 2025   Encounter department: [facility]  :  Assessment & Plan  TB lung, latent  Patient has positive TB QuantiFERON and PPD, consistent for latent TB, although she has no known history of TB exposure.  Patient first saw us regarding this problem back in .  We already had extensive discussion with patient regarding treatment for latent TB with the rifapentine/INH for 3 months or rifampin monotherapy for 4 months.  However, she had not started any treatment yet due to concern for interaction with her Xanax.  Decision was made during last visit in March of this year to treat patient with INH for 6 to 9 months.  Unfortunately, patient is still not taking INH due to severe anxiety, according to her.  I assured patient once again that INH does not interact with the Xanax or any other psychiatric medication.  She states that she will start taking it when her anxiety is \"under better control.\"  Patient has no evidence of active TB.  She has no respiratory or constitutional symptoms.  Recent CXR is without infiltrate.  Patient counseled regarding hepatotoxicity and need to avoid all hepatotoxic medications.  She is also counseled regarding the risk for neuropathy.  Taking pyridoxine supplement decreases this risk.  Recommend starting  mg daily.  Pyridoxine supplement 50 mg daily.    Treat x 6-9 months total.  Monitor LFTs monthly.  Avoid alcohol, Tylenol or any hepatotoxic medications.  Generalized anxiety disorder  Patient is currently on Xanax prescribed to her by her psychiatrist.  Interaction with rifapentin/rifampin as in above, resulting in increased metabolism of Xanax.  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  Therefore, decision was made to avoid " rifampin/rifapentine altogether and treat patient's latent TB with INH instead, as in above. Unfortunately, also as above, patient has not started treatment due to her poorly controlled anxiety disorder, per her report  Rash  Patient has a scaly round rash on her right wrist, suggestive of ringworm.  Topical antifungal was prescribed patient has not taken it, for same reason as above.  I encouraged her to take it.  Recommend ketoconazole cream twice daily x 14 days.     Discussed with patient in detail regarding the above plan.  Follow-up with us after patient starts taking INH.      History of Present Illness   Patient states that she has been more anxious than usual over the last 2 months.  She continues to see her psychiatrist.  Patient states that her severe anxiety is preventing her from taking any medication, including INH and antifungal cream.  Other than her severe anxiety, she denies any other physical symptoms.  No fever, chills or night sweats.  No respiratory symptoms.    HPI  Review of Systems   All other systems reviewed and are negative.      Objective   There were no vitals taken for this visit.    Physical Exam    General: Well-appearing.    Administrative Statements   Encounter provider Gigi Cherry MD    The Patient is located at Home and in the following state in which I hold an active license [place].    The patient was identified by name and date of birth. Kathy Vazquez was informed that this is a telemedicine visit and that the visit is being conducted through the Epic Embedded platform. She agrees to proceed..  My office door was closed. No one else was in the room.  She acknowledged consent and understanding of privacy and security of the video platform. The patient has agreed to participate and understands they can discontinue the visit at any time.    I have spent a total time of 30 minutes in caring for this patient on the day of the visit/encounter including Prognosis, Risks and benefits of  tx options, Impressions, Counseling / Coordination of care, Documenting in the medical record, and Obtaining or reviewing history  , not including the time spent for establishing the audio/video connection.

## 2025-05-22 ENCOUNTER — TELEMEDICINE (OUTPATIENT)
Dept: PSYCHIATRY | Facility: CLINIC | Age: 40
End: 2025-05-22
Payer: COMMERCIAL

## 2025-05-22 ENCOUNTER — TELEPHONE (OUTPATIENT)
Age: 40
End: 2025-05-22

## 2025-05-22 DIAGNOSIS — F41.0 PANIC ATTACKS: ICD-10-CM

## 2025-05-22 DIAGNOSIS — F43.12 CHRONIC POST-TRAUMATIC STRESS DISORDER (PTSD): ICD-10-CM

## 2025-05-22 DIAGNOSIS — Z79.899 CHRONIC USE OF BENZODIAZEPINE FOR THERAPEUTIC PURPOSE: ICD-10-CM

## 2025-05-22 DIAGNOSIS — F31.81 BIPOLAR 2 DISORDER, MAJOR DEPRESSIVE EPISODE (HCC): Primary | ICD-10-CM

## 2025-05-22 DIAGNOSIS — F41.1 GENERALIZED ANXIETY DISORDER: ICD-10-CM

## 2025-05-22 PROCEDURE — 99214 OFFICE O/P EST MOD 30 MIN: CPT | Performed by: PSYCHIATRY & NEUROLOGY

## 2025-05-22 PROCEDURE — 90833 PSYTX W PT W E/M 30 MIN: CPT | Performed by: PSYCHIATRY & NEUROLOGY

## 2025-05-22 NOTE — TELEPHONE ENCOUNTER
Pt called to get her appt for 5/22/25 at 3:30pm switched to a virtual visit.  Pt has [employer] and will connect with provider through [employer]. Writer switched appt and checked it in.     Provider has been advised via secure chat.
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
## 2025-05-22 NOTE — TELEPHONE ENCOUNTER
The patient called to request that the provider send a link to her mobile number on file:  [phone]     At this time, the patient is unable to access My Chart. The writer contacted the provider via secure chat, to advise. The patient was updated that the provider is running behind, and will contact her around 4pm.    The patient declined rescheduling, and kept her appointment.    The clerical department was updated as well.

## 2025-05-28 ENCOUNTER — TELEPHONE (OUTPATIENT)
Dept: PSYCHIATRY | Facility: CLINIC | Age: 40
End: 2025-05-28

## 2025-06-05 ENCOUNTER — TELEPHONE (OUTPATIENT)
Dept: OBGYN CLINIC | Facility: CLINIC | Age: 40
End: 2025-06-05

## 2025-06-08 ENCOUNTER — APPOINTMENT (EMERGENCY)
Dept: CT IMAGING | Facility: HOSPITAL | Age: 40
End: 2025-06-08
Payer: COMMERCIAL

## 2025-06-08 ENCOUNTER — APPOINTMENT (EMERGENCY)
Dept: RADIOLOGY | Facility: HOSPITAL | Age: 40
End: 2025-06-08
Payer: COMMERCIAL

## 2025-06-08 ENCOUNTER — HOSPITAL ENCOUNTER (EMERGENCY)
Facility: HOSPITAL | Age: 40
Discharge: HOME | End: 2025-06-08
Attending: EMERGENCY MEDICINE | Admitting: EMERGENCY MEDICINE
Payer: COMMERCIAL

## 2025-06-08 VITALS
BODY MASS INDEX: 36.92 KG/M2 | DIASTOLIC BLOOD PRESSURE: 73 MMHG | WEIGHT: 200.62 LBS | OXYGEN SATURATION: 100 % | TEMPERATURE: 98 F | HEART RATE: 78 BPM | SYSTOLIC BLOOD PRESSURE: 136 MMHG | RESPIRATION RATE: 20 BRPM | HEIGHT: 62 IN

## 2025-06-08 DIAGNOSIS — K08.9 CHRONIC DENTAL PAIN: ICD-10-CM

## 2025-06-08 DIAGNOSIS — R07.9 CHEST PAIN: Primary | ICD-10-CM

## 2025-06-08 DIAGNOSIS — M25.511 RIGHT SHOULDER PAIN: ICD-10-CM

## 2025-06-08 DIAGNOSIS — M54.2 NECK PAIN: ICD-10-CM

## 2025-06-08 DIAGNOSIS — G89.29 CHRONIC DENTAL PAIN: ICD-10-CM

## 2025-06-08 LAB
ALBUMIN SERPL BCG-MCNC: 5.1 G/DL (ref 3.5–5)
ALP SERPL-CCNC: 58 U/L (ref 34–104)
ALT SERPL W P-5'-P-CCNC: 37 U/L (ref 7–52)
ANION GAP SERPL CALCULATED.3IONS-SCNC: 11 MMOL/L (ref 4–13)
AST SERPL W P-5'-P-CCNC: 26 U/L (ref 13–39)
ATRIAL RATE: 80 BPM
BASOPHILS # BLD AUTO: 0.06 THOUSANDS/ÂΜL (ref 0–0.1)
BASOPHILS NFR BLD AUTO: 1 % (ref 0–1)
BILIRUB SERPL-MCNC: 0.65 MG/DL (ref 0.2–1)
BILIRUB UR QL STRIP: NEGATIVE
BUN SERPL-MCNC: 10 MG/DL (ref 5–25)
CALCIUM SERPL-MCNC: 9.7 MG/DL (ref 8.4–10.2)
CARDIAC TROPONIN I PNL SERPL HS: <2 NG/L (ref ?–50)
CHLORIDE SERPL-SCNC: 102 MMOL/L (ref 96–108)
CLARITY UR: CLEAR
CO2 SERPL-SCNC: 25 MMOL/L (ref 21–32)
COLOR UR: YELLOW
CREAT SERPL-MCNC: 0.75 MG/DL (ref 0.6–1.3)
EOSINOPHIL # BLD AUTO: 0.11 THOUSAND/ÂΜL (ref 0–0.61)
EOSINOPHIL NFR BLD AUTO: 2 % (ref 0–6)
ERYTHROCYTE [DISTWIDTH] IN BLOOD BY AUTOMATED COUNT: 12.6 % (ref 11.6–15.1)
EXT PREGNANCY TEST URINE: NEGATIVE
EXT. CONTROL: NORMAL
GFR SERPL CREATININE-BSD FRML MDRD: 98 ML/MIN/1.73SQ M
GLUCOSE SERPL-MCNC: 92 MG/DL (ref 65–140)
GLUCOSE UR STRIP-MCNC: NEGATIVE MG/DL
HCT VFR BLD AUTO: 42.4 % (ref 34.8–46.1)
HGB BLD-MCNC: 14 G/DL (ref 11.5–15.4)
HGB UR QL STRIP.AUTO: NEGATIVE
IMM GRANULOCYTES # BLD AUTO: 0.01 THOUSAND/UL (ref 0–0.2)
IMM GRANULOCYTES NFR BLD AUTO: 0 % (ref 0–2)
KETONES UR STRIP-MCNC: NEGATIVE MG/DL
LEUKOCYTE ESTERASE UR QL STRIP: NEGATIVE
LIPASE SERPL-CCNC: 122 U/L (ref 11–82)
LYMPHOCYTES # BLD AUTO: 3.16 THOUSANDS/ÂΜL (ref 0.6–4.47)
LYMPHOCYTES NFR BLD AUTO: 43 % (ref 14–44)
MCH RBC QN AUTO: 30 PG (ref 26.8–34.3)
MCHC RBC AUTO-ENTMCNC: 33 G/DL (ref 31.4–37.4)
MCV RBC AUTO: 91 FL (ref 82–98)
MONOCYTES # BLD AUTO: 0.57 THOUSAND/ÂΜL (ref 0.17–1.22)
MONOCYTES NFR BLD AUTO: 8 % (ref 4–12)
NEUTROPHILS # BLD AUTO: 3.47 THOUSANDS/ÂΜL (ref 1.85–7.62)
NEUTS SEG NFR BLD AUTO: 46 % (ref 43–75)
NITRITE UR QL STRIP: NEGATIVE
NRBC BLD AUTO-RTO: 0 /100 WBCS
P AXIS: 58 DEGREES
PH UR STRIP.AUTO: 7 [PH]
PLATELET # BLD AUTO: 249 THOUSANDS/UL (ref 149–390)
PMV BLD AUTO: 10.2 FL (ref 8.9–12.7)
POTASSIUM SERPL-SCNC: 3.8 MMOL/L (ref 3.5–5.3)
PR INTERVAL: 152 MS
PROT SERPL-MCNC: 7.9 G/DL (ref 6.4–8.4)
PROT UR STRIP-MCNC: NEGATIVE MG/DL
QRS AXIS: 47 DEGREES
QRSD INTERVAL: 96 MS
QT INTERVAL: 382 MS
QTC INTERVAL: 440 MS
RBC # BLD AUTO: 4.67 MILLION/UL (ref 3.81–5.12)
SODIUM SERPL-SCNC: 138 MMOL/L (ref 135–147)
SP GR UR STRIP.AUTO: <=1.005 (ref 1–1.03)
T WAVE AXIS: 46 DEGREES
UROBILINOGEN UR QL STRIP.AUTO: 0.2 E.U./DL
VENTRICULAR RATE: 80 BPM
WBC # BLD AUTO: 7.38 THOUSAND/UL (ref 4.31–10.16)

## 2025-06-08 PROCEDURE — 93010 ELECTROCARDIOGRAM REPORT: CPT | Performed by: INTERNAL MEDICINE

## 2025-06-08 PROCEDURE — 36415 COLL VENOUS BLD VENIPUNCTURE: CPT

## 2025-06-08 PROCEDURE — 99285 EMERGENCY DEPT VISIT HI MDM: CPT

## 2025-06-08 PROCEDURE — 74177 CT ABD & PELVIS W/CONTRAST: CPT

## 2025-06-08 PROCEDURE — 84484 ASSAY OF TROPONIN QUANT: CPT

## 2025-06-08 PROCEDURE — 93005 ELECTROCARDIOGRAM TRACING: CPT

## 2025-06-08 PROCEDURE — 81003 URINALYSIS AUTO W/O SCOPE: CPT

## 2025-06-08 PROCEDURE — 83690 ASSAY OF LIPASE: CPT

## 2025-06-08 PROCEDURE — 96360 HYDRATION IV INFUSION INIT: CPT

## 2025-06-08 PROCEDURE — 80053 COMPREHEN METABOLIC PANEL: CPT

## 2025-06-08 PROCEDURE — 81025 URINE PREGNANCY TEST: CPT

## 2025-06-08 PROCEDURE — 85025 COMPLETE CBC W/AUTO DIFF WBC: CPT

## 2025-06-08 PROCEDURE — 71046 X-RAY EXAM CHEST 2 VIEWS: CPT

## 2025-06-08 PROCEDURE — 99285 EMERGENCY DEPT VISIT HI MDM: CPT | Performed by: EMERGENCY MEDICINE

## 2025-06-08 PROCEDURE — 96361 HYDRATE IV INFUSION ADD-ON: CPT

## 2025-06-08 RX ORDER — IBUPROFEN 800 MG/1
800 TABLET, FILM COATED ORAL EVERY 8 HOURS PRN
Qty: 90 TABLET | Refills: 0 | OUTPATIENT
Start: 2025-06-08

## 2025-06-08 RX ADMIN — IOHEXOL 95 ML: 350 INJECTION, SOLUTION INTRAVENOUS at 19:02

## 2025-06-08 RX ADMIN — SODIUM CHLORIDE 1000 ML: 0.9 INJECTION, SOLUTION INTRAVENOUS at 18:15

## 2025-06-08 NOTE — ED PROVIDER NOTES
Time reflects when diagnosis was documented in both MDM as applicable and the Disposition within this note       Time User Action Codes Description Comment    6/8/2025  9:07 PM Stone Shakila Add [R07.9] Chest pain     6/8/2025  9:07 PM Shakila Stone Add [M25.511] Right shoulder pain     6/8/2025  9:08 PM Shakila Stone Add [M54.2] Neck pain           ED Disposition       ED Disposition   Discharge    Condition   Stable    Date/Time   Sun Jun 8, 2025  9:07 PM    Comment   Kathy Vazquez discharge to home/self care.                   Assessment & Plan       Medical Decision Making  Kathy Vazquez is a 40 y.o. female presenting with upper chest pain radiating to right shoulder. Vitals grossly unremarkable. Exam remarkable for right lateral chest wall pain, right cervical paraspinal tenderness as well as spasm.  No midline tenderness.  No focal neurologic deficits.  Exam otherwise unremarkable.      DDx including but not limited to: Muscle spasm, cervical radiculopathy, appendicitis, gastroenteritis, gastritis, PUD, GERD, gastroparesis, hepatitis, pancreatitis, colitis, enteritis, diverticulitis, food poisoning, mesenteric adenitis, epiploic appendagitis, mesenteric panniculitis, mesenteric ischemia, IBD, IBS, ileus, bowel obstruction, volvulus, internal hernia, AAA, cholecystitis, biliary colic, choledocholithiasis, perforated viscus, tumor, splenic etiology, constipation, pelvic pathology, renal colic, pyelonephritis, UTI, cardiac etiology.      See ED course for further updates and interpretation of results.    Based on these results and H&P, no suspect cervical radiculopathy/muscle spasms as etiology of patient's symptoms.  Patient have improvement of her symptoms during the time in the ED without intervention.  Evaluation did not reveal any cardiopulmonary disease, lab work grossly unremarkable except for very mild elevation of lipase, though acute pancreatitis is not consistent with patient's  symptoms at this time, imaging also grossly unremarkable.  She is stable for discharge.  Given referral to comprehensive spine.    Results, clinical impressions, and plan were discussed with patient. They expressed understanding and were in agreement with plan. Patient was given the opportunity to ask questions in ED. All questions and concerns were addressed in ED.    After evaluation and workup in the emergency department Patient appears well, is nontoxic appearing, expresses understanding and agrees with plan of care at this time.  In light of this patient would benefit from outpatient management. Discussed strict return precautions.  Discussed importance of following up with PCP in the next few days.  All questions answered.  Patient is agreeable to discharge.    Amount and/or Complexity of Data Reviewed  Labs: ordered. Decision-making details documented in ED Course.  Radiology: ordered. Decision-making details documented in ED Course.    Risk  Prescription drug management.        ED Course as of 06/10/25 0235   Sun Jun 08, 2025   1805 No active cancer, not bed ridden, no calf swelling, no collateral superficial veins, entire leg is not swollen, no localized tenderness along the deep venous system, no pitting edema confined to the symptomatic leg, no paralysis paresis or recent plaster immobilization, no history DVT.   1825 CBC and differential  Within normal limits   1841 LIPASE(!): 122  Slightly elevated   1846 Comprehensive metabolic panel(!)  Grossly unremarkable   1857 hs TnI 0hr: <2   1932 PREGNANCY TEST URINE: Negative   1932 UA w Reflex to Microscopic w Reflex to Culture  Within normal limits   2041 CT abdomen pelvis with contrast  Scan being read   2107 CT abdomen pelvis with contrast  IMPRESSION:     1.  No acute inflammatory process identified within the abdomen or pelvis.  2.  Stable 1 cm left adrenal nodule likely adenoma.  3.  Fibroid uterus.       Medications   sodium chloride 0.9 % bolus 1,000  "mL (0 mL Intravenous Stopped 25)   iohexol (OMNIPAQUE) 350 MG/ML injection (SINGLE-DOSE) 100 mL (95 mL Intravenous Given 25)       ED Risk Strat Scores   HEART Risk Score      Flowsheet Row Most Recent Value   Heart Score Risk Calculator    History 1 Filed at: 2025   ECG 0 Filed at: 2025   Age 0 Filed at: 2025   Risk Factors 1 Filed at: 2025   Troponin 0 Filed at: 2025   HEART Score 2 Filed at: 2025          HEART Risk Score      Flowsheet Row Most Recent Value   Heart Score Risk Calculator    History 1 Filed at: 2025   ECG 0 Filed at: 2025   Age 0 Filed at: 2025   Risk Factors 1 Filed at: 2025   Troponin 0 Filed at: 2025   HEART Score 2 Filed at: 2025                      No data recorded                            History of Present Illness       Chief Complaint   Patient presents with    Chest Pain     Chest pain and back pain intermittent \"for awhile\" concerned because two cats recently , diagnosed with URI       Past Medical History[1]   Past Surgical History[2]   Family History[3]   Social History[4]   E-Cigarette/Vaping    E-Cigarette Use Never User       E-Cigarette/Vaping Substances      I have reviewed and agree with the history as documented.     Kathy Vazquez is a 40 y.o. female with history of hypertension, COPD, EMILY, bipolar 2, hypothyroidism, latent TB on QuantiFERON presenting for chest pain.    Patient reports intermittent ongoing nonexertional, nonpleuritic, neck and chest pain for a while, worse over thel last few days though patient is concerned she possibly was exposed to some sort of parasite because 2 of her cats  recently.  Patient is unsure as to why her cats .  Reports that they were not vaccinated.  They  over a month ago.  No fevers, chills, nausea, vomiting, vision changes, focal neurologic deficits, headache, shortness " of breath.  Has been eating and drinking.  Patient reports that she is sad because her cats had , but denies SI, HI, AVH.  No medication changes, no recent trauma.      Chest Pain  Associated symptoms: no abdominal pain, no back pain, no cough, no dizziness, no dysphagia, no fever, no headache, no nausea, no palpitations, no shortness of breath, not vomiting and no weakness        Review of Systems   Constitutional:  Negative for chills and fever.   HENT:  Negative for trouble swallowing.    Eyes:  Negative for pain and visual disturbance.   Respiratory:  Negative for cough and shortness of breath.    Cardiovascular:  Positive for chest pain. Negative for palpitations and leg swelling.   Gastrointestinal:  Negative for abdominal pain, constipation, diarrhea, nausea and vomiting.   Genitourinary:  Negative for dysuria, frequency and hematuria.   Musculoskeletal:  Positive for neck pain. Negative for arthralgias, back pain and neck stiffness.   Skin:  Negative for color change and rash.   Neurological:  Negative for dizziness, seizures, syncope, weakness, light-headedness and headaches.   Psychiatric/Behavioral:  Negative for dysphoric mood.    All other systems reviewed and are negative.          Objective       ED Triage Vitals [25]   Temperature Pulse Blood Pressure Respirations SpO2 Patient Position - Orthostatic VS   98 °F (36.7 °C) 78 164/99 16 100 % Lying      Temp Source Heart Rate Source BP Location FiO2 (%) Pain Score    Oral Monitor Left arm -- 10 - Worst Possible Pain      Vitals      Date and Time Temp Pulse SpO2 Resp BP Pain Score FACES Pain Rating User   25 -- 78 100 % 20 136/73 -- -- SANTOS   252 98 °F (36.7 °C) 78 100 % 16 164/99 10 - Worst Possible Pain -- EJN            Physical Exam  Vitals and nursing note reviewed.   Constitutional:       General: She is not in acute distress.     Appearance: She is well-developed.   HENT:      Head: Normocephalic and atraumatic.       Mouth/Throat:      Mouth: Mucous membranes are moist.      Pharynx: Oropharynx is clear.     Eyes:      Extraocular Movements: Extraocular movements intact.      Conjunctiva/sclera: Conjunctivae normal.       Cardiovascular:      Rate and Rhythm: Normal rate and regular rhythm.      Pulses: Normal pulses.      Heart sounds: Normal heart sounds.   Pulmonary:      Effort: Pulmonary effort is normal. No respiratory distress.      Breath sounds: Normal breath sounds. No wheezing, rhonchi or rales.   Chest:      Chest wall: Tenderness (Right chest wall tenderness) present.   Abdominal:      General: Abdomen is flat.      Palpations: Abdomen is soft.      Tenderness: There is no abdominal tenderness.     Musculoskeletal:      Cervical back: Normal range of motion. Spasms and tenderness (Primarily right side tenderness) present. No swelling, deformity, erythema, torticollis or bony tenderness. Normal range of motion.      Thoracic back: Normal.      Lumbar back: Normal.      Right lower leg: No edema.      Left lower leg: No edema.     Skin:     General: Skin is warm and dry.      Capillary Refill: Capillary refill takes less than 2 seconds.     Neurological:      General: No focal deficit present.      Mental Status: She is alert and oriented to person, place, and time.      Sensory: Sensation is intact. No sensory deficit.      Motor: Motor function is intact. No weakness.      Coordination: Coordination is intact.      Gait: Gait is intact.     Psychiatric:         Mood and Affect: Mood normal.         Behavior: Behavior normal.         Results Reviewed       Procedure Component Value Units Date/Time    UA w Reflex to Microscopic w Reflex to Culture [[id number]]  (Normal) Collected: 06/08/25 1911    Lab Status: Final result Specimen: Urine, Clean Catch Updated: 06/08/25 1921     Color, UA Yellow     Clarity, UA Clear     Specific Gravity, UA <=1.005     pH, UA 7.0     Leukocytes, UA Negative     Nitrite, UA Negative      Protein, UA Negative mg/dl      Glucose, UA Negative mg/dl      Ketones, UA Negative mg/dl      Urobilinogen, UA 0.2 E.U./dl      Bilirubin, UA Negative     Occult Blood, UA Negative    POCT pregnancy, urine [[id number]]  (Normal) Collected: 06/08/25 1915    Lab Status: Final result Updated: 06/08/25 1915     EXT Preg Test, Ur Negative     Control Valid    HS Troponin 0hr (reflex protocol) [[id number]]  (Normal) Collected: 06/08/25 1815    Lab Status: Final result Specimen: Blood from Arm, Right Updated: 06/08/25 1846     hs TnI 0hr <2 ng/L     Lipase [[id number]]  (Abnormal) Collected: 06/08/25 1815    Lab Status: Final result Specimen: Blood from Arm, Right Updated: 06/08/25 1840     Lipase 122 u/L     Comprehensive metabolic panel [[id number]]  (Abnormal) Collected: 06/08/25 1815    Lab Status: Final result Specimen: Blood from Arm, Right Updated: 06/08/25 1840     Sodium 138 mmol/L      Potassium 3.8 mmol/L      Chloride 102 mmol/L      CO2 25 mmol/L      ANION GAP 11 mmol/L      BUN 10 mg/dL      Creatinine 0.75 mg/dL      Glucose 92 mg/dL      Calcium 9.7 mg/dL      AST 26 U/L      ALT 37 U/L      Alkaline Phosphatase 58 U/L      Total Protein 7.9 g/dL      Albumin 5.1 g/dL      Total Bilirubin 0.65 mg/dL      eGFR 98 ml/min/1.73sq m     Narrative:      National Kid[employer] guidelines for Chronic Kidney Disease (CKD):     Stage 1 with normal or high GFR (GFR > 90 mL/min/1.73 square meters)    Stage 2 Mild CKD (GFR = 60-89 mL/min/1.73 square meters)    Stage 3A Moderate CKD (GFR = 45-59 mL/min/1.73 square meters)    Stage 3B Moderate CKD (GFR = 30-44 mL/min/1.73 square meters)    Stage 4 Severe CKD (GFR = 15-29 mL/min/1.73 square meters)    Stage 5 End Stage CKD (GFR <15 mL/min/1.73 square meters)  Note: GFR calculation is accurate only with a steady state creatinine    CBC and differential [[id number]] Collected: 06/08/25 1815    Lab Status: Final result Specimen: Blood from Arm, Right Updated:  06/08/25 1824     WBC 7.38 Thousand/uL      RBC 4.67 Million/uL      Hemoglobin 14.0 g/dL      Hematocrit 42.4 %      MCV 91 fL      MCH 30.0 pg      MCHC 33.0 g/dL      RDW 12.6 %      MPV 10.2 fL      Platelets 249 Thousands/uL      nRBC 0 /100 WBCs      Segmented % 46 %      Immature Grans % 0 %      Lymphocytes % 43 %      Monocytes % 8 %      Eosinophils Relative 2 %      Basophils Relative 1 %      Absolute Neutrophils 3.47 Thousands/µL      Absolute Immature Grans 0.01 Thousand/uL      Absolute Lymphocytes 3.16 Thousands/µL      Absolute Monocytes 0.57 Thousand/µL      Eosinophils Absolute 0.11 Thousand/µL      Basophils Absolute 0.06 Thousands/µL             CT abdomen pelvis with contrast   Final Interpretation by Anselmo Bañuelos MD (06/08 2057)      1.  No acute inflammatory process identified within the abdomen or pelvis.   2.  Stable 1 cm left adrenal nodule likely adenoma.   3.  Fibroid uterus.         Workstation performed: [id number]         XR chest 2 views   Final Interpretation by Suleiman Hunt MD (06/08 2151)      No acute cardiopulmonary disease.            Workstation performed: [id number]             ECG 12 Lead Documentation Only    Date/Time: 6/8/2025 5:52 PM    Performed by: Shakila Stone MD  Authorized by: Shakila Stone MD    Indications / Diagnosis:  Chest pain  ECG reviewed by me, the ED Provider: yes    Patient location:  ED  Previous ECG:     Previous ECG:  Compared to current    Comparison ECG info:  9/5/2023    Similarity:  No change  Interpretation:     Interpretation: normal    Rate:     ECG rate:  80    ECG rate assessment: normal    Rhythm:     Rhythm: sinus rhythm    Ectopy:     Ectopy: none    QRS:     QRS axis:  Normal    QRS intervals:  Normal  Conduction:     Conduction: normal    ST segments:     ST segments:  Normal  T waves:     T waves: normal    Comments:      QTc 440      ED Medication and Procedure Management   Prior to Admission Medications   Prescriptions  Last Dose Informant Patient Reported? Taking?   ALPRAZolam (XANAX) 1 mg tablet   No No   Sig: Take 1 tablet (1 mg total) by mouth 2 (two) times a day as needed for anxiety Do not start before May 6, 2025.   albuterol (PROVENTIL HFA,VENTOLIN HFA) 90 mcg/act inhaler   No No   Sig: Inhale 2 puffs every 4 (four) hours as needed for wheezing or shortness of breath   buPROPion (WELLBUTRIN XL) 150 mg 24 hr tablet   No No   Sig: Take 1 tablet (150 mg total) by mouth every morning   clobetasol (TEMOVATE) 0.05 % cream   No No   Sig: Apply topically 2 (two) times a day   Patient not taking: Reported on 5/13/2025   fluticasone-salmeterol (Advair HFA) 230-21 MCG/ACT inhaler   No No   Sig: Inhale 2 puffs 2 (two) times a day Rinse mouth after use.   isoniazid (NYDRAZID) 300 mg tablet   No No   Sig: Take 1 tablet (300 mg total) by mouth daily   ketoconazole (NIZORAL) 2 % cream   No No   Sig: Apply topically 2 (two) times a day for 14 days   lisinopril (ZESTRIL) 40 mg tablet   No No   Sig: Take 1 tablet (40 mg total) by mouth daily   pyridoxine (VITAMIN B6) 50 mg tablet   No No   Sig: Take 1 tablet (50 mg total) by mouth once a week for 90 doses   sertraline (ZOLOFT) 100 mg tablet   No No   Sig: Take 1 tablet (100 mg total) by mouth daily      Facility-Administered Medications: None     Discharge Medication List as of 6/8/2025  9:30 PM        CONTINUE these medications which have NOT CHANGED    Details   albuterol (PROVENTIL HFA,VENTOLIN HFA) 90 mcg/act inhaler Inhale 2 puffs every 4 (four) hours as needed for wheezing or shortness of breath, Starting Tue 5/13/2025, Normal      ALPRAZolam (XANAX) 1 mg tablet Take 1 tablet (1 mg total) by mouth 2 (two) times a day as needed for anxiety Do not start before May 6, 2025., Starting Tue 5/6/2025, Normal      buPROPion (WELLBUTRIN XL) 150 mg 24 hr tablet Take 1 tablet (150 mg total) by mouth every morning, Starting Tue 2/11/2025, Normal      clobetasol (TEMOVATE) 0.05 % cream Apply  topically 2 (two) times a day, Starting 2025, Normal      fluticasone-salmeterol (Advair HFA) 230-21 MCG/ACT inhaler Inhale 2 puffs 2 (two) times a day Rinse mouth after use., Starting 2025, Normal      isoniazid (NYDRAZID) 300 mg tablet Take 1 tablet (300 mg total) by mouth daily, Starting Tue 3/11/2025, Until 2025, Normal      ketoconazole (NIZORAL) 2 % cream Apply topically 2 (two) times a day for 14 days, Starting Tue 12/10/2024, Until 2024, Normal      lisinopril (ZESTRIL) 40 mg tablet Take 1 tablet (40 mg total) by mouth daily, Starting 2025, Normal      pyridoxine (VITAMIN B6) 50 mg tablet Take 1 tablet (50 mg total) by mouth once a week for 90 doses, Starting Tue 3/11/2025, Until 2026, Normal      sertraline (ZOLOFT) 100 mg tablet Take 1 tablet (100 mg total) by mouth daily, Starting Fri 3/28/2025, Normal             ED SEPSIS DOCUMENTATION   Time reflects when diagnosis was documented in both MDM as applicable and the Disposition within this note       Time User Action Codes Description Comment    2025  9:07 PM Shakila Stone [R07.9] Chest pain     2025  9:07 PM Shakila Stone [M25.511] Right shoulder pain     2025  9:08 PM Shakila Stone [M54.2] Neck pain                      [1]   Past Medical History:  Diagnosis Date    Acute cough 2022    Adverse effect of alprazolam 2022    Allergies     Anxiety 2018    Female hirsutism     GERD (gastroesophageal reflux disease)     Head injury     Hypertension 2018    Papanicolaou smear for cervical cancer screening     NL, no h/o of abn pap that she can recall    Positive TB test 2022    STD (female)     Thyroid nodule 2018    Tobacco user 2018   [2]   Past Surgical History:  Procedure Laterality Date    NO PAST SURGERIES     [3]   Family History  Problem Relation Name Age of Onset    Hypertension Mother      Kidney disease Mother       Cirrhosis Mother      Dialysis Mother      Diabetes Mother      Anxiety disorder Mother      Depression Mother      Anxiety disorder Father Eliseo     Depression Father Eliseo     Drug abuse Father Eliseo     Bipolar disorder Father Eliseo     No Known Problems Sister      No Known Problems Sister      Melanoma Paternal Uncle      Stomach cancer Other [facility]     Colon cancer Other [facility]         Passed age 38   [4]   Social History  Tobacco Use    Smoking status: Every Day     Current packs/day: 1.00     Average packs/day: 1 pack/day for 25.0 years (25.0 ttl pk-yrs)     Types: Cigarettes    Smokeless tobacco: Never    Tobacco comments:     smoked since age 10   Vaping Use    Vaping status: Never Used   Substance Use Topics    Alcohol use: Not Currently    Drug use: Yes     Types: Benzodiazepines, Marijuana     Comment: leyt Stone MD  06/10/25 0235

## 2025-06-08 NOTE — PSYCH
Subjective:     Patient ID: Genia Mckee is a 38 y o  female  [facility] Clinical Progress Notes      Specialized Services Documentation  Therapist must complete separate progress note for each specific clinical activity in which the individual participated during the day  GROUP PSYCHOTHERAPY ([id number]) Group was facilitated virtually in a private office using HIPAA Compliant and Approved Microsoft Teams  Genia Mckee consented to the use of tele-video modality of treatment and was virtually present for group psychotherapy today  This writer facilitated introductions and check-in to begin group  Clients engaged in discussions that helped identify barriers to accomplishing goals and utilizing wellness tools  Clients participated actively in group discussion  Kathy discussed that at times her tendency to stay stuck in the past hinders her ability to accomplish goals  She explained that at times she can become busy and her tools and goals escape her task list  Justine Billingsley continues to make progress towards goals through verbal participation in group; to accomplish long term goals continue to utilize skills learned in programming    [place] Plan Objectives: 1 1, 1 2, 1 4  Therapist: Ny Anderson MA, Sarahi Drummond MA, LPC [EKG obtained to assist in diagnosis and management of assessed problem(s)] : EKG obtained to assist in diagnosis and management of assessed problem(s) [FreeTextEntry1] : Patient was recommended to get an eye exam and discuss colonoscopy with the gastroenterologist.  She was recommended the COVID-vaccine.  Her EKG shows normal sinus rhythm and low voltage.  She will return for an echocardiogram to evaluate her for structural heart disease.  The patient will contact me what the names of her other providers. She is not having any cognitive impairment. She has no significant current or past depression. She is fully functional and there are no safety issues. She will be screened for eye examination, colonoscopy,  Pap smear, Bone density, Mammogram and Immunizations over the next 10 years. The patient was furnished with personalized health advice and does not need referral to health education or preventative counseling services. Patient does not need advanced care planning services.  Laboratory testing was done.

## 2025-06-08 NOTE — ED PROVIDER NOTES
ED Disposition       None          Assessment & Plan   {Hyperlinks  Risk Stratification - NIHSS - HEART SCORE - Fill out sepsis note and make sure you call [phone] if severe or septic shock:[id number]}    MDM         Medications - No data to display    ED Risk Strat Scores                    No data recorded                            History of Present Illness   {Hyperlinks  History (Med, Surg, Fam, Social) - Current Medications - Allergies  :[id number]}    No chief complaint on file.      Past Medical History[1]   Past Surgical History[2]   Family History[3]   Social History[4]   E-Cigarette/Vaping    E-Cigarette Use Never User       E-Cigarette/Vaping Substances      I have reviewed and agree with the history as documented.     HPI    Review of Systems        Objective   {Hyperlinks  Historical Vitals - Historical Labs - Chart Review/Microbiology - Last Echo - Code Status  :[id number]}    ED Triage Vitals   Temp Pulse BP Resp SpO2 Patient Position - Orthostatic VS   -- -- -- -- -- --      Temp src Heart Rate Source BP Location FiO2 (%) Pain Score    -- -- -- -- --      Vitals    None         Physical Exam    Results Reviewed       None            No orders to display       Procedures    ED Medication and Procedure Management   Prior to Admission Medications   Prescriptions Last Dose Informant Patient Reported? Taking?   ALPRAZolam (XANAX) 1 mg tablet   No No   Sig: Take 1 tablet (1 mg total) by mouth 2 (two) times a day as needed for anxiety Do not start before May 6, 2025.   albuterol (PROVENTIL HFA,VENTOLIN HFA) 90 mcg/act inhaler   No No   Sig: Inhale 2 puffs every 4 (four) hours as needed for wheezing or shortness of breath   buPROPion (WELLBUTRIN XL) 150 mg 24 hr tablet   No No   Sig: Take 1 tablet (150 mg total) by mouth every morning   clobetasol (TEMOVATE) 0.05 % cream   No No   Sig: Apply topically 2 (two) times a day   Patient not taking: Reported on 5/13/2025   fluticasone-salmeterol (Advair HFA)  230-21 MCG/ACT inhaler   No No   Sig: Inhale 2 puffs 2 (two) times a day Rinse mouth after use.   isoniazid (NYDRAZID) 300 mg tablet   No No   Sig: Take 1 tablet (300 mg total) by mouth daily   ketoconazole (NIZORAL) 2 % cream   No No   Sig: Apply topically 2 (two) times a day for 14 days   lisinopril (ZESTRIL) 40 mg tablet   No No   Sig: Take 1 tablet (40 mg total) by mouth daily   pyridoxine (VITAMIN B6) 50 mg tablet   No No   Sig: Take 1 tablet (50 mg total) by mouth once a week for 90 doses   sertraline (ZOLOFT) 100 mg tablet   No No   Sig: Take 1 tablet (100 mg total) by mouth daily      Facility-Administered Medications: None     Patient's Medications   Discharge Prescriptions    No medications on file     No discharge procedures on file.  ED SEPSIS DOCUMENTATION              [1]   Past Medical History:  Diagnosis Date    Acute cough 2022    Adverse effect of alprazolam 2022    Allergies     Anxiety 2018    Female hirsutism     GERD (gastroesophageal reflux disease)     Head injury     Hypertension 2018    Papanicolaou smear for cervical cancer screening 2017    NL, no h/o of abn pap that she can recall    Positive TB test 2022    STD (female)     Thyroid nodule 2018    Tobacco user 2018   [2]   Past Surgical History:  Procedure Laterality Date    NO PAST SURGERIES     [3]   Family History  Problem Relation Name Age of Onset    Hypertension Mother      Kidney disease Mother      Cirrhosis Mother      Dialysis Mother      Diabetes Mother      Anxiety disorder Mother      Depression Mother      Anxiety disorder Father Eliseo     Depression Father Eliseo     Drug abuse Father Eliseo     Bipolar disorder Father Eliseo     No Known Problems Sister      No Known Problems Sister      Melanoma Paternal Uncle      Stomach cancer Other MGGF     Colon cancer Other PGGF         Passed age 38   [4]   Social History  Tobacco Use    Smoking  status: Every Day     Current packs/day: 1.00     Average packs/day: 1 pack/day for 25.0 years (25.0 ttl pk-yrs)     Types: Cigarettes    Smokeless tobacco: Never    Tobacco comments:     smoked since age 10   Vaping Use    Vaping status: Never Used   Substance Use Topics    Alcohol use: Not Currently    Drug use: Yes     Types: Benzodiazepines, Marijuana     Comment: rare

## 2025-06-08 NOTE — TELEPHONE ENCOUNTER
The original prescription was discontinued on 2/11/2025 by Cheryl Antonio MD. Renewing this prescription may not be appropriate.

## 2025-06-09 ENCOUNTER — NURSE TRIAGE (OUTPATIENT)
Dept: PHYSICAL THERAPY | Facility: OTHER | Age: 40
End: 2025-06-09

## 2025-06-09 ENCOUNTER — TELEPHONE (OUTPATIENT)
Age: 40
End: 2025-06-09

## 2025-06-09 NOTE — TELEPHONE ENCOUNTER
Additional Information   Negative: Is this related to a work injury?   Negative: Is this related to an MVA?   Negative: Are you currently recieving homecare services?    Background - Initial Assessment  Clinical complaint: Pt went to the ED for chest, back and shoulder pain. When triaged she stated her pain is ;eft side neck into the left shoulder and down the arm. Has on and off numbness and tingling in bilat arms. NKI Started 2 years ago and numbness and tingling started 2-3 weeks ago. Has not been seen or treated by any doctors prior. Pt states she has been dealing with and taking care of other medical issues first. No prior neck or back SX. Pt states she went to the ED mainly for her chest pain, but imaging and tests were clear. Seen in ED 6/6/25. Pain is intermittent and feels numb, tingling, burning, stabbing and pulling  Date of onset: 2 years  Frequency of pain: intermittent  Quality of pain: burning, stabbing, and tingling, pulling    Protocols used: [facility] Protocol

## 2025-06-09 NOTE — DISCHARGE INSTRUCTIONS
You were evaluated in the emergency department for: chest pain. You can access your current and pending results through [employer] [employer]. A radiologist will take a second look at your X-Rays, if you had any, and you will be contacted with any new findings.    - You should follow-up with your primary care provider, as soon as possible, for re-evaluation.    Please, return to the emergency department if you experience new or worsening symptoms, fever, chest pain, shortness of breath, difficulty breathing, dizziness, abdominal pain, persistent nausea/vomiting, syncope or passing out, blood in your urine or stool, coughing up blood, leg swelling/pain, urinary retention, bowel or bladder incontinence, numbness between your legs.

## 2025-06-09 NOTE — TELEPHONE ENCOUNTER
Called Kathy ([phone]) - she said she was in the ED yesterday because she is trying to figure out what is going on with her chest, she has a burning sensation. She has latent TB but tests came back negative. All cardiac and respiratory work ups have come up negative and they told her they think it is anxiety. She doesn't believe this and wants to investigate possible mold, parasites, or fungal infection. I recommended she have a GI work up to see if it is GERD. She has an appt with her PCP tomorrow and will bring this up. She said two of her cats passed away and she is suffering. She said she is finally prioritizing herself and taking care of herself. Emotional support offered throughout conversation. She will follow up with Yusra at appointment on 6/12.

## 2025-06-09 NOTE — TELEPHONE ENCOUNTER
Patient called in to confirm their next upcoming talk therapy appointment.    Writer was able to confirm 6/12 @10am virtually.    Patient states there is a lot going on and they had been in the emergency room last night.    Patient expressed verbal understanding.

## 2025-06-10 ENCOUNTER — TELEPHONE (OUTPATIENT)
Age: 40
End: 2025-06-10

## 2025-06-10 NOTE — TELEPHONE ENCOUNTER
"Patient called to let ask pcp is she can place lab testing for parasites, fungal or any bacterial infection or disease that may be caught from dealing with cats and or their litter. Patient has many symptoms that will be discussed in her OV on 6/11; (rash on the wrist, burning in chest, distended stomach, chills, belching, blurry vision for long time- per appt notes). Mentioned her cat and her have the same symptoms and the vet stated the cat may have \"plague\" . If labs are placed please notify patient.   "

## 2025-06-10 NOTE — TELEPHONE ENCOUNTER
Called the patient to complete the triage process started 6/9 @ 12:15. Call went to .    Voice message left for patient to call back. Phone number and hours of business provided.     Referral Closed.  Triage will be completed when a call back is received.

## 2025-06-11 ENCOUNTER — TELEPHONE (OUTPATIENT)
Age: 40
End: 2025-06-11

## 2025-06-11 ENCOUNTER — OFFICE VISIT (OUTPATIENT)
Dept: FAMILY MEDICINE CLINIC | Facility: CLINIC | Age: 40
End: 2025-06-11
Payer: COMMERCIAL

## 2025-06-11 VITALS
WEIGHT: 199.4 LBS | BODY MASS INDEX: 36.7 KG/M2 | HEIGHT: 62 IN | DIASTOLIC BLOOD PRESSURE: 76 MMHG | OXYGEN SATURATION: 98 % | SYSTOLIC BLOOD PRESSURE: 110 MMHG | RESPIRATION RATE: 18 BRPM | HEART RATE: 90 BPM

## 2025-06-11 DIAGNOSIS — Z00.00 ANNUAL PHYSICAL EXAM: Primary | ICD-10-CM

## 2025-06-11 DIAGNOSIS — R19.7 DIARRHEA, UNSPECIFIED TYPE: ICD-10-CM

## 2025-06-11 DIAGNOSIS — R14.2 FUNCTIONAL BELCHING DISORDER: ICD-10-CM

## 2025-06-11 DIAGNOSIS — D25.9 UTERINE LEIOMYOMA, UNSPECIFIED LOCATION: ICD-10-CM

## 2025-06-11 DIAGNOSIS — D35.02 ADRENAL ADENOMA, LEFT: ICD-10-CM

## 2025-06-11 DIAGNOSIS — R10.13 DYSPEPSIA: ICD-10-CM

## 2025-06-11 PROCEDURE — 99214 OFFICE O/P EST MOD 30 MIN: CPT | Performed by: FAMILY MEDICINE

## 2025-06-11 PROCEDURE — 99396 PREV VISIT EST AGE 40-64: CPT | Performed by: FAMILY MEDICINE

## 2025-06-11 RX ORDER — OMEPRAZOLE 40 MG/1
40 CAPSULE, DELAYED RELEASE ORAL
Qty: 30 CAPSULE | Refills: 0 | Status: SHIPPED | OUTPATIENT
Start: 2025-06-11 | End: 2025-06-12

## 2025-06-11 NOTE — PROGRESS NOTES
Adult Annual Physical  Name: Kathy Vazquez      : 1985      MRN: [id number]  Encounter Provider: Cheryl Antonio MD  Encounter Date: 2025   Encounter department: [facility]    :  Assessment & Plan  Annual physical exam         Adrenal adenoma, left  Incidental adenoma adenoma  Orders:    Cortisol Level, AM Specimen; Future    Metanephrine, Fractionated Plasma Free; Future    Catecholamines, fractionated, plasma; Future    Uterine leiomyoma, unspecified location  Follow-up with GYN as previously referred she had to cancel her appointment       Diarrhea, unspecified type  Recommend stool studies and H. pylori testing given dyspepsia  Symptoms  Orders:    Ova and parasite examination; Future    H. pylori antigen, stool; Future    Ambulatory Referral to Gastroenterology; Future    Functional belching disorder  Recommend follow-up with gastroenterology  Orders:    H. pylori antigen, stool; Future    Ambulatory Referral to Gastroenterology; Future    Dyspepsia  We will rule out H. pylori  Orders:    H. pylori antigen, stool; Future    Ambulatory Referral to Gastroenterology; Future                   Preventive Screenings:  - Diabetes Screening: screening up-to-date  - Hepatitis C screening: screening up-to-date   - HIV screening: screening up-to-date   - Breast cancer screening: screening up-to-date   - Lung cancer screening: screening not indicated     Immunizations:  - Immunizations due: Prevnar 20 and Tdap         History of Present Illness     Adult Annual Physical:  Patient presents for annual physical. ED f/u rash on the hand which is chronic , she believes it is zoonotic disease, did not get the previous testing,  has burning in chest, distended stomach, chills, belching, blurry vision X long time   She is very anxious about having acquired to 4 cats and states they had some kind of disease  She is still not started tuberculosis treatment  Follows with psychiatry for  anxiety  She thinks parasitic diseases and cancers are the same  She also has increased burping and belching  She was previously concerned about mold exposure however did not do the testing that was ordered it was last time.  She has not started tuberculosis treatment either.  She does report shortness of breath.     Diet and Physical Activity:  - Diet/Nutrition: well balanced diet and consuming 3-5 servings of fruits/vegetables daily.  - Exercise: moderate cardiovascular exercise.    Depression Screening:    - PHQ-9 Score: 22    General Health:    - Hearing: normal hearing bilateral ears.  - Vision: vision problems and most recent eye exam > 1 year ago.  - Dental: no dental visits for > 1 year.    /GYN Health:  - Follows with GYN: yes.   - Menopause: premenopausal.   - Last menstrual cycle: 5/30/2025.   Review of Systems   Constitutional:  Positive for chills. Negative for fatigue and fever.   HENT:  Negative for congestion, facial swelling, mouth sores, rhinorrhea, sore throat and trouble swallowing.    Eyes:  Negative for pain and redness.   Respiratory:  Positive for chest tightness and shortness of breath. Negative for cough and wheezing.    Cardiovascular:  Negative for chest pain, palpitations and leg swelling.   Gastrointestinal:  Negative for abdominal pain, blood in stool, constipation, diarrhea and nausea.   Genitourinary:  Negative for dysuria, hematuria and urgency.   Musculoskeletal:  Negative for arthralgias, back pain and myalgias.   Skin:  Positive for rash. Negative for wound.   Neurological:  Negative for seizures, syncope and headaches.   Hematological:  Negative for adenopathy.   Psychiatric/Behavioral:  Negative for agitation and behavioral problems. The patient is nervous/anxious.      Medical History Reviewed by provider this encounter:  Tobacco  Allergies  Meds  Problems  Med Hx  Surg Hx  Fam Hx     .  Medications Ordered Prior to Encounter[1]   Social History[2]    Objective   BP  "110/76 (BP Location: Right arm, Patient Position: Sitting, Cuff Size: Large)   Pulse 90   Resp 18   Ht 5' 2\" (1.575 m)   Wt 90.4 kg (199 lb 6.4 oz)   LMP 05/30/2025   SpO2 98%   BMI 36.47 kg/m²     Physical Exam  Vitals and nursing note reviewed.   Constitutional:       Appearance: Normal appearance. She is well-developed. She is obese.   HENT:      Head: Normocephalic and atraumatic.      Right Ear: Tympanic membrane, ear canal and external ear normal.      Left Ear: Tympanic membrane, ear canal and external ear normal.      Nose: Nose normal.      Mouth/Throat:      Pharynx: No oropharyngeal exudate.     Eyes:      General: No scleral icterus.        Right eye: No discharge.         Left eye: No discharge.      Conjunctiva/sclera: Conjunctivae normal.      Pupils: Pupils are equal, round, and reactive to light.     Neck:      Thyroid: No thyromegaly.     Cardiovascular:      Rate and Rhythm: Normal rate and regular rhythm.      Heart sounds: No murmur heard.     No gallop.   Pulmonary:      Effort: Pulmonary effort is normal. No respiratory distress.      Breath sounds: Normal breath sounds. No wheezing or rales.   Abdominal:      Palpations: Abdomen is soft.      Tenderness: There is no abdominal tenderness.     Musculoskeletal:         General: No tenderness or deformity.      Cervical back: Normal range of motion.      Right lower leg: No edema.      Left lower leg: No edema.   Lymphadenopathy:      Cervical: No cervical adenopathy.     Skin:     General: Skin is warm.      Capillary Refill: Capillary refill takes less than 2 seconds.      Findings: Rash (hypepigmented rash on right hand dorsum over first metacarpal) present. No erythema.     Neurological:      Mental Status: She is alert and oriented to person, place, and time.      Deep Tendon Reflexes: Reflexes normal.     Psychiatric:         Mood and Affect: Mood is anxious.         Behavior: Behavior is hyperactive.         Thought Content: Thought " content normal.         Judgment: Judgment normal.              [1]   Current Outpatient Medications on File Prior to Visit   Medication Sig Dispense Refill    albuterol (PROVENTIL HFA,VENTOLIN HFA) 90 mcg/act inhaler Inhale 2 puffs every 4 (four) hours as needed for wheezing or shortness of breath 8.5 g 2    ALPRAZolam (XANAX) 1 mg tablet Take 1 tablet (1 mg total) by mouth 2 (two) times a day as needed for anxiety Do not start before May 6, 2025. 60 tablet 2    buPROPion (WELLBUTRIN XL) 150 mg 24 hr tablet Take 1 tablet (150 mg total) by mouth every morning 90 tablet 1    fluticasone-salmeterol (Advair HFA) 230-21 MCG/ACT inhaler Inhale 2 puffs 2 (two) times a day Rinse mouth after use. 36 g 1    lisinopril (ZESTRIL) 40 mg tablet Take 1 tablet (40 mg total) by mouth daily 90 tablet 1    pyridoxine (VITAMIN B6) 50 mg tablet Take 1 tablet (50 mg total) by mouth once a week for 90 doses 90 tablet 0    sertraline (ZOLOFT) 100 mg tablet Take 1 tablet (100 mg total) by mouth daily 100 tablet 0    clobetasol (TEMOVATE) 0.05 % cream Apply topically 2 (two) times a day (Patient not taking: Reported on 5/13/2025) 30 g 0    isoniazid (NYDRAZID) 300 mg tablet Take 1 tablet (300 mg total) by mouth daily 90 tablet 0    ketoconazole (NIZORAL) 2 % cream Apply topically 2 (two) times a day for 14 days 15 g 0     No current facility-administered medications on file prior to visit.   [2]   Social History  Tobacco Use    Smoking status: Every Day     Current packs/day: 1.00     Average packs/day: 1 pack/day for 25.0 years (25.0 ttl pk-yrs)     Types: Cigarettes    Smokeless tobacco: Never    Tobacco comments:     smoked since age 10   Vaping Use    Vaping status: Never Used   Substance and Sexual Activity    Alcohol use: Not Currently    Drug use: Yes     Types: Benzodiazepines, Marijuana     Comment: rare    Sexual activity: Not Currently     Birth control/protection: Abstinence

## 2025-06-11 NOTE — TELEPHONE ENCOUNTER
Patient called in wanted to speak with Dr. Cherry as soon as possible. Patient states that she was in the ER a couple days ago because she has not been doing well. She explained that she has been having chest and back pain, breathing issues and constant chills. Pt said 2 of her young cats passed away unexpected recently and the other cats that she currently has have been sick as well.  told patient that's cats might have had the plague. Her PCP ordered pt a fecal test to check for parasites, but patient is very worried. Her PCP suggested that she reach out to Dr. Cherry about this and too see if he is able to order any other blood testing for the pt to find out what could possibly be wrong and possibly come in for an appt. Patient advised me many times that she is worried and stressed and scared she is going to die too. Please advise. Patient.

## 2025-06-11 NOTE — PATIENT INSTRUCTIONS
"Patient Education     Routine physical for adults   The Basics   Written by the doctors and editors at [facility]   What is a physical? -- A physical is a routine visit, or \"check-up,\" with your doctor. You might also hear it called a \"wellness visit\" or \"preventive visit.\"  During each visit, the doctor will:   Ask about your physical and mental health   Ask about your habits, behaviors, and lifestyle   Do an exam   Give you vaccines if needed   Talk to you about any medicines you take   Give advice about your health   Answer your questions  Getting regular check-ups is an important part of taking care of your health. It can help your doctor find and treat any problems you have. But it's also important for preventing health problems.  A routine physical is different from a \"sick visit.\" A sick visit is when you see a doctor because of a health concern or problem. Since physicals are scheduled ahead of time, you can think about what you want to ask the doctor.  How often should I get a physical? -- It depends on your age and health. In general, for people age 21 years and older:   If you are younger than 50 years, you might be able to get a physical every 3 years.   If you are 50 years or older, your doctor might recommend a physical every year.  If you have an ongoing health condition, like diabetes or high blood pressure, your doctor will probably want to see you more often.  What happens during a physical? -- In general, each visit will include:   Physical exam - The doctor or nurse will check your height, weight, heart rate, and blood pressure. They will also look at your eyes and ears. They will ask about how you are feeling and whether you have any symptoms that bother you.   Medicines - It's a good idea to bring a list of all the medicines you take to each doctor visit. Your doctor will talk to you about your medicines and answer any questions. Tell them if you are having any side effects that bother you. You " "should also tell them if you are having trouble paying for any of your medicines.   Habits and behaviors - This includes:   Your diet   Your exercise habits   Whether you smoke, drink alcohol, or use drugs   Whether you are sexually active   Whether you feel safe at home  Your doctor will talk to you about things you can do to improve your health and lower your risk of health problems. They will also offer help and support. For example, if you want to quit smoking, they can give you advice and might prescribe medicines. If you want to improve your diet or get more physical activity, they can help you with this, too.   Lab tests, if needed - The tests you get will depend on your age and situation. For example, your doctor might want to check your:   Cholesterol   Blood sugar   Iron level   Vaccines - The recommended vaccines will depend on your age, health, and what vaccines you already had. Vaccines are very important because they can prevent certain serious or deadly infections.   Discussion of screening - \"Screening\" means checking for diseases or other health problems before they cause symptoms. Your doctor can recommend screening based on your age, risk, and preferences. This might include tests to check for:   Cancer, such as breast, prostate, cervical, ovarian, colorectal, prostate, lung, or skin cancer   Sexually transmitted infections, such as chlamydia and gonorrhea   Mental health conditions like depression and anxiety  Your doctor will talk to you about the different types of screening tests. They can help you decide which screenings to have. They can also explain what the results might mean.   Answering questions - The physical is a good time to ask the doctor or nurse questions about your health. If needed, they can refer you to other doctors or specialists, too.  Adults older than 65 years often need other care, too. As you get older, your doctor will talk to you about:   How to prevent falling at " home   Hearing or vision tests   Memory testing   How to take your medicines safely   Making sure that you have the help and support you need at home  All topics are updated as new evidence becomes available and our peer review process is complete.  This topic retrieved from [employer] on: May 02, 2024.  Topic 050530 Version 1.0  Release: 32.4.3 - C32.122  © 2024 UpToDate, Inc. and/or its affiliates. All rights reserved.  Consumer Information Use and Disclaimer   Disclaimer: This generalized information is a limited summary of diagnosis, treatment, and/or medication information. It is not meant to be comprehensive and should be used as a tool to help the user understand and/or assess potential diagnostic and treatment options. It does NOT include all information about conditions, treatments, medications, side effects, or risks that may apply to a specific patient. It is not intended to be medical advice or a substitute for the medical advice, diagnosis, or treatment of a health care provider based on the health care provider's examination and assessment of a patient's specific and unique circumstances. Patients must speak with a health care provider for complete information about their health, medical questions, and treatment options, including any risks or benefits regarding use of medications. This information does not endorse any treatments or medications as safe, effective, or approved for treating a specific patient. UpToDate, Inc. and its affiliates disclaim any warranty or liability relating to this information or the use thereof.The use of this information is governed by the Terms of Use, available at https://www.woltersHackHandsuwer.com/en/know/clinical-effectiveness-terms. 2024© UpToDate, Inc. and its affiliates and/or licensors. All rights reserved.  Copyright   © 2024 UpToDate, Inc. and/or its affiliates. All rights reserved.

## 2025-06-11 NOTE — ASSESSMENT & PLAN NOTE
Incidental adenoma adenoma  Orders:    Cortisol Level, AM Specimen; Future    Metanephrine, Fractionated Plasma Free; Future    Catecholamines, fractionated, plasma; Future

## 2025-06-12 ENCOUNTER — TELEMEDICINE (OUTPATIENT)
Dept: BEHAVIORAL/MENTAL HEALTH CLINIC | Facility: CLINIC | Age: 40
End: 2025-06-12
Payer: COMMERCIAL

## 2025-06-12 ENCOUNTER — TELEPHONE (OUTPATIENT)
Dept: OTHER | Facility: HOSPITAL | Age: 40
End: 2025-06-12

## 2025-06-12 DIAGNOSIS — F43.12 CHRONIC POST-TRAUMATIC STRESS DISORDER (PTSD): ICD-10-CM

## 2025-06-12 DIAGNOSIS — F41.1 GENERALIZED ANXIETY DISORDER: Primary | ICD-10-CM

## 2025-06-12 DIAGNOSIS — F31.81 BIPOLAR 2 DISORDER, MAJOR DEPRESSIVE EPISODE (HCC): ICD-10-CM

## 2025-06-12 PROCEDURE — 90834 PSYTX W PT 45 MINUTES: CPT | Performed by: SOCIAL WORKER

## 2025-06-12 RX ORDER — OMEPRAZOLE 40 MG/1
CAPSULE, DELAYED RELEASE ORAL
Qty: 90 CAPSULE | Refills: 0 | Status: SHIPPED | OUTPATIENT
Start: 2025-06-12

## 2025-06-12 NOTE — PSYCH
"Behavioral Health Psychotherapy Progress Note    Psychotherapy Provided: Individual Psychotherapy     1. Generalized anxiety disorder        2. Chronic post-traumatic stress disorder (PTSD)        3. Bipolar 2 disorder, major depressive episode (HCC)              Goals addressed in session: Goal 1     DATA: Kathy spoke with this worker re: her feelings re: her ongoing shortness of breath, chest pain, breast pain, headache, joint pain and her belief that she has acquired either cancer or a parasite from her cats. She reported that her PCP laughed at her during her most recent appt and is adamant that these are not symptoms of her anxiety.  She also added that she has stopped interacting with the friendly female peer who gave her a bed as she is \"a witch\" with bad energy.  She stated that she has stopped associating with all of the people that she met through her and got rid of the bed that she had given her as she fears it was contributing to her lung issues.  Specific techniques used in this session were: psychoeducation, empathetic listening, validation, and motivational interviewing.    During this session, this clinician used the following therapeutic modalities: Client-centered Therapy, Motivational Interviewing, and Supportive Psychotherapy    Substance Abuse was not addressed during this session. If the client is diagnosed with a co-occurring substance use disorder, please indicate any changes in the frequency or amount of use: N/A. Stage of change for addressing substance use diagnoses: No substance use/Not applicable    ASSESSMENT:  Kathy Vazquez presents with a Anxious mood. Kathy Vazquez was oriented to person, place, and time. Grooming and Hygiene were poor  and she admitted that she has not started TB treatment and will not until this other condition has been diagnosed and treated.    Ability to perform ADLs has improved. she was cooperative.  her affect is Normal range and intensity, which is " "congruent, with her mood and the content of the session.  He thought process was paranoid and tangential.   The client has not made some progress on their goals.    Kathy Vazquez presents with a none risk of suicide, none risk of self-harm, and none risk of harm to others.    For any risk assessment that surpasses a \"low\" rating, a safety plan must be developed.    A safety plan was indicated: no  If yes, describe in detail: N/A    PLAN: Between sessions, Kathy Vazquez will continue to work on short-term goals including following up with medical issues At the next session, the therapist will use Client-centered Therapy, Motivational Interviewing, and Supportive Psychotherapy to address symptoms as they arise.     Behavioral Health Treatment Plan and Discharge Planning: Kathy Vazquez is aware of and agrees to continue to work on their treatment plan. They have identified and are working toward their discharge goals. yes    Visit start and stop times:        06/12/25  Start Time: 1000  Stop Time: 1040  Total Visit Time: 40 minutes        Virtual Regular Visit    Verification of patient location:    Patient is located at Home in the following state in which I hold an active license [place]      Assessment/Plan:    Problem List Items Addressed This Visit        Behavioral Health    Generalized anxiety disorder - Primary    Bipolar 2 disorder, major depressive episode (HCC)    Chronic post-traumatic stress disorder (PTSD)                     Goals addressed in session: Goal 1     Depression Follow-up Plan Completed: Yes    Reason for visit is No chief complaint on file.       Encounter provider Yusra Sawyer      Recent Visits  Date Type Provider Dept   06/11/25 Office Visit Cheryl Antonio MD [facility]   Showing recent visits within past 7 days and meeting all other requirements  Today's Visits  Date Type Provider Dept   06/12/25 Telemedicine Yusra Sawyer [facility] [place]   Showing " today's visits and meeting all other requirements  Future Appointments  No visits were found meeting these conditions.  Showing future appointments within next 150 days and meeting all other requirements       The patient was identified by name and date of birth. Kathy Vazquez was informed that this is a telemedicine visit and that the visit is being conducted throughthe Epic Embedded platform. She agrees to proceed..  My office door was closed. No one else was in the room.  She acknowledged consent and understanding of privacy and security of the video platform. The patient has agreed to participate and understands they can discontinue the visit at any time.    Patient is aware this is a billable service.     Subjective  Kathy Vazquez is a 40 y.o. female  .      HPI     Past Medical History:   Diagnosis Date   • Acute cough 06/14/2022   • Adverse effect of alprazolam 04/28/2022   • Allergies    • Anxiety 05/2018   • Female hirsutism    • GERD (gastroesophageal reflux disease)    • Head injury    • Hypertension 2018   • Papanicolaou smear for cervical cancer screening 2017    NL, no h/o of abn pap that she can recall   • Positive TB test 06/14/2022   • STD (female)    • Thyroid nodule 05/2018   • Tobacco user 05/2018       Past Surgical History:   Procedure Laterality Date   • NO PAST SURGERIES         Current Outpatient Medications   Medication Sig Dispense Refill   • albuterol (PROVENTIL HFA,VENTOLIN HFA) 90 mcg/act inhaler Inhale 2 puffs every 4 (four) hours as needed for wheezing or shortness of breath 8.5 g 2   • ALPRAZolam (XANAX) 1 mg tablet Take 1 tablet (1 mg total) by mouth 2 (two) times a day as needed for anxiety Do not start before May 6, 2025. 60 tablet 2   • buPROPion (WELLBUTRIN XL) 150 mg 24 hr tablet Take 1 tablet (150 mg total) by mouth every morning 90 tablet 1   • clobetasol (TEMOVATE) 0.05 % cream Apply topically 2 (two) times a day (Patient not taking: Reported on 5/13/2025) 30 g 0   •  fluticasone-salmeterol (Advair HFA) 230-21 MCG/ACT inhaler Inhale 2 puffs 2 (two) times a day Rinse mouth after use. 36 g 1   • isoniazid (NYDRAZID) 300 mg tablet Take 1 tablet (300 mg total) by mouth daily 90 tablet 0   • ketoconazole (NIZORAL) 2 % cream Apply topically 2 (two) times a day for 14 days 15 g 0   • lisinopril (ZESTRIL) 40 mg tablet Take 1 tablet (40 mg total) by mouth daily 90 tablet 1   • omeprazole (PriLOSEC) 40 MG capsule TAKE 1 CAPSULE(40 MG) BY MOUTH DAILY BEFORE BREAKFAST FOR 7 DAYS THEREAFTER AS NEEDED 90 capsule 0   • pyridoxine (VITAMIN B6) 50 mg tablet Take 1 tablet (50 mg total) by mouth once a week for 90 doses 90 tablet 0   • sertraline (ZOLOFT) 100 mg tablet Take 1 tablet (100 mg total) by mouth daily 100 tablet 0     No current facility-administered medications for this visit.        Allergies   Allergen Reactions   • Penicillins Swelling     Rash, swelling at IV site. Was to IV penicillin

## 2025-06-12 NOTE — TELEPHONE ENCOUNTER
"I called and discussed with patient per her request.    Patient has multiple longstanding nonspecific subjective symptoms.  She is convinced that she has \"parasitic infection,\" that she acquired from her cats.  She has this concerns from reading information from Internet and listening to testimony of people on [employer].  She had recent CBC with differential that was normal, without eosinophilia and she has not had eosinophilia in the past.    I told her that with her longstanding stable subjective symptoms and all normal blood work and imaging, parasitic infection is unlikely.  She does not need any additional workup for parasitic infection.    Patient states that she will seek out \"parasitic specialist\" somewhere.  " Problem: Pain:  Goal: Pain level will decrease  Description: Pain level will decrease  3/20/2021 0904 by Cayden Everett RN  Outcome: Ongoing  3/20/2021 0141 by Dangelo Downs RN  Outcome: Ongoing  Note: Patient's pain will continue to improve and continue to be managed per the [facility]. Patient will also try relaxation techniques, rest, and cold therapy to aid in pain relief. Problem: Falls - Risk of:  Goal: Will remain free from falls  Description: Will remain free from falls  3/20/2021 0904 by Cayden Everett RN  Outcome: Ongoing  3/20/2021 0141 by Dangelo Downs RN  Outcome: Ongoing  Note: Patient will remain free from falls. Patient will use call light to notify staff of needs prior to exiting the bed. Patient's bed will remain in lowest position with wheels locked and bed alarm engaged.

## 2025-06-17 ENCOUNTER — TELEPHONE (OUTPATIENT)
Dept: PSYCHIATRY | Facility: CLINIC | Age: 40
End: 2025-06-17

## 2025-06-17 NOTE — TELEPHONE ENCOUNTER
Left voicemail informing patient and/or parent/guardian of the Psych Encounter form needing to be signed as a requirement from the insurance company for billing purposes. Patient can access form via [employer] and sign electronically.     Please make patient aware this form must be signed for each visit as a requirement to continue future visits with provider.    Virtual Encounter form 6/12/25 call #[phone]

## 2025-06-24 ENCOUNTER — TELEPHONE (OUTPATIENT)
Dept: PSYCHIATRY | Facility: CLINIC | Age: 40
End: 2025-06-24

## 2025-06-24 NOTE — TELEPHONE ENCOUNTER
Left voicemail informing patient and/or parent/guardian of the Psych Encounter form needing to be signed as a requirement from the insurance company for billing purposes. Patient can access form via [employer] and sign electronically.     Please make patient aware this form must be signed for each visit as a requirement to continue future visits with provider.

## 2025-06-27 ENCOUNTER — TELEMEDICINE (OUTPATIENT)
Dept: PSYCHIATRY | Facility: CLINIC | Age: 40
End: 2025-06-27
Payer: COMMERCIAL

## 2025-06-27 DIAGNOSIS — F41.1 GENERALIZED ANXIETY DISORDER: ICD-10-CM

## 2025-06-27 DIAGNOSIS — Z79.899 CHRONIC USE OF BENZODIAZEPINE FOR THERAPEUTIC PURPOSE: ICD-10-CM

## 2025-06-27 DIAGNOSIS — F43.12 CHRONIC POST-TRAUMATIC STRESS DISORDER (PTSD): ICD-10-CM

## 2025-06-27 DIAGNOSIS — F31.81 BIPOLAR 2 DISORDER, MAJOR DEPRESSIVE EPISODE (HCC): Primary | ICD-10-CM

## 2025-06-27 DIAGNOSIS — F41.0 PANIC ATTACKS: ICD-10-CM

## 2025-06-27 PROCEDURE — 90833 PSYTX W PT W E/M 30 MIN: CPT | Performed by: PSYCHIATRY & NEUROLOGY

## 2025-06-27 PROCEDURE — 99214 OFFICE O/P EST MOD 30 MIN: CPT | Performed by: PSYCHIATRY & NEUROLOGY

## 2025-06-27 RX ORDER — SERTRALINE HYDROCHLORIDE 100 MG/1
100 TABLET, FILM COATED ORAL DAILY
Qty: 90 TABLET | Refills: 0 | Status: SHIPPED | OUTPATIENT
Start: 2025-06-27
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
## 2025-06-30 ENCOUNTER — TELEPHONE (OUTPATIENT)
Dept: PSYCHIATRY | Facility: CLINIC | Age: 40
End: 2025-06-30

## 2025-07-01 ENCOUNTER — TELEMEDICINE (OUTPATIENT)
Dept: BEHAVIORAL/MENTAL HEALTH CLINIC | Facility: CLINIC | Age: 40
End: 2025-07-01
Payer: COMMERCIAL

## 2025-07-01 DIAGNOSIS — F41.1 GENERALIZED ANXIETY DISORDER: Primary | ICD-10-CM

## 2025-07-01 DIAGNOSIS — F43.12 CHRONIC POST-TRAUMATIC STRESS DISORDER (PTSD): ICD-10-CM

## 2025-07-01 DIAGNOSIS — F31.81 BIPOLAR 2 DISORDER, MAJOR DEPRESSIVE EPISODE (HCC): ICD-10-CM

## 2025-07-01 PROCEDURE — 90834 PSYTX W PT 45 MINUTES: CPT | Performed by: SOCIAL WORKER
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
## 2025-07-07 ENCOUNTER — TELEPHONE (OUTPATIENT)
Dept: PSYCHIATRY | Facility: CLINIC | Age: 40
End: 2025-07-07

## 2025-07-07 NOTE — TELEPHONE ENCOUNTER
Left voicemail informing patient and/or parent/guardian of the Psych Encounter form needing to be signed as a requirement from the insurance company for billing purposes. Patient can access form via [employer] and sign electronically.     Please make patient aware this form must be signed for each visit as a requirement to continue future visits with provider.    Also reminded the patient that the virtual care behavioral consent forms needs to be signed in order to continue virtual visits.

## 2025-07-08 ENCOUNTER — TELEPHONE (OUTPATIENT)
Dept: PSYCHIATRY | Facility: CLINIC | Age: 40
End: 2025-07-08

## 2025-07-08 NOTE — TELEPHONE ENCOUNTER
Left voicemail informing patient and/or parent/guardian of the Psych Encounter form needing to be signed as a requirement from the insurance company for billing purposes. Patient can access form via [employer] and sign electronically.     Please make patient aware this form must be signed for each visit as a requirement to continue future visits with provider.    Also needs 06/27 & 07/01

## 2025-07-10 ENCOUNTER — TELEPHONE (OUTPATIENT)
Dept: PSYCHIATRY | Facility: CLINIC | Age: 40
End: 2025-07-10

## 2025-07-10 NOTE — TELEPHONE ENCOUNTER
Spoke to patient and/or parent/guardian to make aware of the Psych Encounter form needing to be signed from recent completed appointment(s).

## 2025-07-14 ENCOUNTER — TELEPHONE (OUTPATIENT)
Dept: PSYCHIATRY | Facility: CLINIC | Age: 40
End: 2025-07-14

## 2025-07-14 NOTE — TELEPHONE ENCOUNTER
NO-SHOW LETTER MAILED TO Kathy Vazquez.  ADDRESS: [address]  [place] [address]  SENT VIA [employer]

## 2025-07-14 NOTE — LETTER
25     Kathy Vazquez   : 1985   [address]  [place] [address]       It is the policy of [facility] to monitor and manage appointments that have been no-showed or cancelled with less than 48-hour notice. This is necessary to ensure that we are able to provide timely access for all patients to our providers. Undue numbers of unutilized appointments delays necessary medical care for all patients.      Dear Kathy Vazquez:      We are sorry that you missed your appointment with Yusra Sawyer LCSW on 2025. It has been 2 weeks since your last appointment. Your health and follow-up care are important to us. We want to make you aware that you do not have another appointment with Yusra Sawyer LCSW scheduled.    Please be aware that our office policy states that if you 'no show' two or more Therapy appointments without prior notice of cancellation within in a calendar year, you may be discharged from Therapy treatment.  We want to bring this to your attention now to prevent an interruption of your care.  If you have any questions about this policy, please call us at the number above.     If we do not hear from you within 10 business days to make a follow up appointment, we will assume you are no longer interested in care here.    Thank you in advance for your cooperation and assistance.       Sincerely,      [facility] Support Staff

## 2025-07-18 ENCOUNTER — TELEPHONE (OUTPATIENT)
Age: 40
End: 2025-07-18

## 2025-07-18 NOTE — TELEPHONE ENCOUNTER
Patient currently in [facility] due to worsening symptoms. Patient requesting callback for referral

## 2025-07-18 NOTE — TELEPHONE ENCOUNTER
Patient asking for a referral for [facility] Infectious Disease Dept. She is having worsening symptoms and [facility] cannot see her until 08/04/2025. She thinks her cats have parasites that are making her ill.  RN advised patient that if her symptoms are worsening, she should go back to the EMERGENCY DEPARTMENT.  Please call back when referral is placed.

## 2025-07-18 NOTE — TELEPHONE ENCOUNTER
Patient called back to say she would like a referral to  Infectious Disease. She does not have an appointment with [facility] infectious disease. She wants to know if a referral can be places first and she will go to whoever sees her quicker. If she can only get one, she will choose . Please, advise.

## 2025-07-21 NOTE — TELEPHONE ENCOUNTER
Yes, please place the referral for infectious disease as she is concerned about zoonotic diseases from cats.  I do suspect that it is possible that she has mild Dander allergy however she never went for allergy testing.  I doubt that any infectious disease will be able to see her prior to August 4 which is following week, but no harm in trying

## 2025-07-22 ENCOUNTER — TELEPHONE (OUTPATIENT)
Age: 40
End: 2025-07-22

## 2025-07-23 ENCOUNTER — SOCIAL WORK (OUTPATIENT)
Dept: BEHAVIORAL/MENTAL HEALTH CLINIC | Facility: CLINIC | Age: 40
End: 2025-07-23
Payer: COMMERCIAL

## 2025-07-23 ENCOUNTER — TELEPHONE (OUTPATIENT)
Age: 40
End: 2025-07-23

## 2025-07-23 ENCOUNTER — TELEPHONE (OUTPATIENT)
Dept: FAMILY MEDICINE CLINIC | Facility: CLINIC | Age: 40
End: 2025-07-23

## 2025-07-23 DIAGNOSIS — F43.12 CHRONIC POST-TRAUMATIC STRESS DISORDER (PTSD): ICD-10-CM

## 2025-07-23 DIAGNOSIS — J44.9 CHRONIC OBSTRUCTIVE PULMONARY DISEASE, UNSPECIFIED COPD TYPE (HCC): Primary | ICD-10-CM

## 2025-07-23 DIAGNOSIS — F31.81 BIPOLAR 2 DISORDER, MAJOR DEPRESSIVE EPISODE (HCC): ICD-10-CM

## 2025-07-23 DIAGNOSIS — F41.1 GENERALIZED ANXIETY DISORDER: Primary | ICD-10-CM

## 2025-07-23 PROCEDURE — 90837 PSYTX W PT 60 MINUTES: CPT | Performed by: SOCIAL WORKER

## 2025-07-23 RX ORDER — FLUTICASONE FUROATE AND VILANTEROL 100; 25 UG/1; UG/1
1 POWDER RESPIRATORY (INHALATION) DAILY
Qty: 60 BLISTER | Refills: 3 | Status: SHIPPED | OUTPATIENT
Start: 2025-07-23 | End: 2026-01-19

## 2025-07-23 NOTE — PSYCH
"Behavioral Health Psychotherapy Progress Note    Psychotherapy Provided: Individual Psychotherapy     1. Generalized anxiety disorder        2. Chronic post-traumatic stress disorder (PTSD)        3. Bipolar 2 disorder, major depressive episode (HCC)                  Goals addressed in session: Goal 1     DATA: Kathy attended today's session in person at her request as last week was the anniversary of her 's passing.  She spoke at length today about her past including the death of her mother, grandmother and . She remains extremely focused on the disease that she believes she has contacted from her cats who \" of the Bubonic plague.\"    Specific techniques used in this session were: psychoeducation, empathetic listening, validation, and motivational interviewing.    During this session, this clinician used the following therapeutic modalities: Client-centered Therapy, Motivational Interviewing, and Supportive Psychotherapy    Substance Abuse was not addressed during this session. If the client is diagnosed with a co-occurring substance use disorder, please indicate any changes in the frequency or amount of use: N/A. Stage of change for addressing substance use diagnoses: No substance use/Not applicable    ASSESSMENT:  Kathy Vazquez presents with a Anxious mood. Kathy Vazquez was oriented to person, place, and time. Grooming and Hygiene were poor  and she admitted that she is aware that her thinking is delusional as her sisters have informed her of this.    Ability to perform ADLs has declined. she was cooperative.  her affect is Overbright, which is congruent, with her mood and the content of the session.  He thought process was delusional and tangential.   The client has not made some progress on their goals.    Kathy Vazquez presents with a none risk of suicide, none risk of self-harm, and none risk of harm to others.    For any risk assessment that surpasses a \"low\" rating, a safety " plan must be developed.    A safety plan was indicated: no  If yes, describe in detail: N/A    PLAN: Between sessions, Kathy Deluca will continue to work on short-term goals including following up with medical issues At the next session, the therapist will use Client-centered Therapy, Motivational Interviewing, and Supportive Psychotherapy to address symptoms as they arise.     Behavioral Health Treatment Plan and Discharge Planning: Kathyfannie Vazquez is aware of and agrees to continue to work on their treatment plan. They have identified and are working toward their discharge goals. yes    Visit start and stop times:        07/23/25  Start Time: 0900  Stop Time: 0955  Total Visit Time: 55 minutes

## 2025-07-23 NOTE — TELEPHONE ENCOUNTER
Patient is convinced that something is wrong with her and it is related to her cat. I am not an infectious disease expert not zoonotic ds expert which she seeks and hence she was referred to infectious disease.  have nothing further to pursue at this time

## 2025-07-23 NOTE — TELEPHONE ENCOUNTER
"Fax from [facility], with \"Drug Change Request\"  Fluticasone/Salm Inh 230/21mcg is not covered by patients plan.  Drug alternative is Wixelainhubaer, Fluticsalmeaer, Breoelliptain.  "

## 2025-07-23 NOTE — TELEPHONE ENCOUNTER
"Reviewed pt chart and previous notation from provider as to why appt was canceled. The referral for Zoonotic bacterial disease  indicates that testing was done to confirm an actual parasitic infection. Upon reviewing the chart and labs done, there seems to be no positive parasitic testing. Back in June Ova/Parasite was ordered and never completed. When pt reached out to ID office it was made aware that provider spoke with pt informing her that she does not have a parasitic infection and needed the additional work up and that pt was going to seek a \"parasite specialist\"    Is there a confirmed parasite? no    If yes, schedule in first available appointment.    If no, follow with PCP to obtain proper testing.       Pt would first have to get Ova/Parasite testing done, if they come back negative there is no need to follow up with infectious disease  "

## 2025-07-28 NOTE — TELEPHONE ENCOUNTER
Noted, she is aware that she needs Latent TB treatment , but has chosen not to start it, mainly because of her uncontrolled anxiety.

## 2025-08-06 ENCOUNTER — TELEMEDICINE (OUTPATIENT)
Dept: BEHAVIORAL/MENTAL HEALTH CLINIC | Facility: CLINIC | Age: 40
End: 2025-08-06
Payer: COMMERCIAL

## 2025-08-06 DIAGNOSIS — F41.1 GENERALIZED ANXIETY DISORDER: Primary | ICD-10-CM

## 2025-08-06 DIAGNOSIS — F43.12 CHRONIC POST-TRAUMATIC STRESS DISORDER (PTSD): ICD-10-CM

## 2025-08-06 DIAGNOSIS — F31.81 BIPOLAR 2 DISORDER, MAJOR DEPRESSIVE EPISODE (HCC): ICD-10-CM

## 2025-08-06 PROCEDURE — 90834 PSYTX W PT 45 MINUTES: CPT | Performed by: SOCIAL WORKER

## 2025-08-07 DIAGNOSIS — F41.0 PANIC ATTACKS: ICD-10-CM

## 2025-08-07 DIAGNOSIS — F41.1 GENERALIZED ANXIETY DISORDER: ICD-10-CM

## 2025-08-07 DIAGNOSIS — F43.12 CHRONIC POST-TRAUMATIC STRESS DISORDER (PTSD): ICD-10-CM

## 2025-08-07 RX ORDER — ALPRAZOLAM 1 MG/1
1 TABLET ORAL 2 TIMES DAILY PRN
Qty: 60 TABLET | Refills: 2 | Status: SHIPPED | OUTPATIENT
Start: 2025-08-07

## 2025-08-07 RX ORDER — SERTRALINE HYDROCHLORIDE 100 MG/1
100 TABLET, FILM COATED ORAL DAILY
Qty: 90 TABLET | Refills: 0 | Status: SHIPPED | OUTPATIENT
Start: 2025-08-07

## 2025-08-11 ENCOUNTER — TELEPHONE (OUTPATIENT)
Dept: PSYCHIATRY | Facility: CLINIC | Age: 40
End: 2025-08-11

## 2025-08-15 ENCOUNTER — TELEPHONE (OUTPATIENT)
Age: 40
End: 2025-08-15

## 2025-08-18 ENCOUNTER — TELEPHONE (OUTPATIENT)
Dept: PSYCHIATRY | Facility: CLINIC | Age: 40
End: 2025-08-18

## 2025-08-20 DIAGNOSIS — I10 ESSENTIAL HYPERTENSION: ICD-10-CM

## 2025-08-21 RX ORDER — LISINOPRIL 40 MG/1
40 TABLET ORAL DAILY
Qty: 90 TABLET | Refills: 1 | Status: SHIPPED | OUTPATIENT
Start: 2025-08-21